# Patient Record
Sex: MALE | Race: WHITE | NOT HISPANIC OR LATINO | Employment: OTHER | URBAN - METROPOLITAN AREA
[De-identification: names, ages, dates, MRNs, and addresses within clinical notes are randomized per-mention and may not be internally consistent; named-entity substitution may affect disease eponyms.]

---

## 2017-05-26 ENCOUNTER — TELEPHONE (OUTPATIENT)
Dept: FAMILY MEDICINE | Facility: CLINIC | Age: 82
End: 2017-05-26

## 2017-05-26 DIAGNOSIS — E78.5 HYPERLIPIDEMIA LDL GOAL <100: Primary | ICD-10-CM

## 2017-05-26 DIAGNOSIS — Z12.5 SCREENING FOR PROSTATE CANCER: ICD-10-CM

## 2017-05-26 NOTE — TELEPHONE ENCOUNTER
Reason for Call:  Other     Detailed comments: Patient wants to know if he needs to make an appointment for lab before his next appointment on 6/14/2017?     Phone Number Patient can be reached at: Home number on file [phone] (home)    Best Time: any    Can we leave a detailed message on this number? YES    Call taken on 5/26/2017 at 11:36 AM by Vanesa Matt

## 2017-06-12 DIAGNOSIS — E78.5 HYPERLIPIDEMIA LDL GOAL <100: ICD-10-CM

## 2017-06-12 DIAGNOSIS — Z12.5 SCREENING FOR PROSTATE CANCER: ICD-10-CM

## 2017-06-12 LAB
ALBUMIN SERPL-MCNC: 3.9 G/DL (ref 3.4–5)
ALP SERPL-CCNC: 61 U/L (ref 40–150)
ALT SERPL W P-5'-P-CCNC: 27 U/L (ref 0–70)
ANION GAP SERPL CALCULATED.3IONS-SCNC: 7 MMOL/L (ref 3–14)
AST SERPL W P-5'-P-CCNC: 23 U/L (ref 0–45)
BILIRUB SERPL-MCNC: 0.7 MG/DL (ref 0.2–1.3)
BUN SERPL-MCNC: 19 MG/DL (ref 7–30)
CALCIUM SERPL-MCNC: 9.2 MG/DL (ref 8.5–10.1)
CHLORIDE SERPL-SCNC: 104 MMOL/L (ref 94–109)
CHOLEST SERPL-MCNC: 150 MG/DL
CO2 SERPL-SCNC: 29 MMOL/L (ref 20–32)
CREAT SERPL-MCNC: 1.08 MG/DL (ref 0.66–1.25)
GFR SERPL CREATININE-BSD FRML MDRD: 66 ML/MIN/1.7M2
GLUCOSE SERPL-MCNC: 95 MG/DL (ref 70–99)
HDLC SERPL-MCNC: 43 MG/DL
LDLC SERPL CALC-MCNC: 85 MG/DL
NONHDLC SERPL-MCNC: 107 MG/DL
POTASSIUM SERPL-SCNC: 4.3 MMOL/L (ref 3.4–5.3)
PROT SERPL-MCNC: 7.6 G/DL (ref 6.8–8.8)
PSA SERPL-ACNC: 1.06 UG/L (ref 0–4)
SODIUM SERPL-SCNC: 140 MMOL/L (ref 133–144)
TRIGL SERPL-MCNC: 111 MG/DL

## 2017-06-12 PROCEDURE — 36415 COLL VENOUS BLD VENIPUNCTURE: CPT | Performed by: FAMILY MEDICINE

## 2017-06-12 PROCEDURE — 80061 LIPID PANEL: CPT | Performed by: FAMILY MEDICINE

## 2017-06-12 PROCEDURE — G0103 PSA SCREENING: HCPCS | Performed by: FAMILY MEDICINE

## 2017-06-12 PROCEDURE — 80053 COMPREHEN METABOLIC PANEL: CPT | Performed by: FAMILY MEDICINE

## 2017-06-14 ENCOUNTER — OFFICE VISIT (OUTPATIENT)
Dept: FAMILY MEDICINE | Facility: CLINIC | Age: 82
End: 2017-06-14
Payer: MEDICARE

## 2017-06-14 VITALS
BODY MASS INDEX: 26.68 KG/M2 | TEMPERATURE: 97.7 F | HEART RATE: 54 BPM | SYSTOLIC BLOOD PRESSURE: 132 MMHG | RESPIRATION RATE: 16 BRPM | HEIGHT: 71 IN | OXYGEN SATURATION: 95 % | WEIGHT: 190.6 LBS | DIASTOLIC BLOOD PRESSURE: 80 MMHG

## 2017-06-14 DIAGNOSIS — I10 ESSENTIAL HYPERTENSION WITH GOAL BLOOD PRESSURE LESS THAN 130/80: ICD-10-CM

## 2017-06-14 DIAGNOSIS — I25.10 CORONARY ARTERY DISEASE INVOLVING NATIVE CORONARY ARTERY OF NATIVE HEART WITHOUT ANGINA PECTORIS: ICD-10-CM

## 2017-06-14 DIAGNOSIS — E78.5 HYPERLIPIDEMIA LDL GOAL <100: ICD-10-CM

## 2017-06-14 PROCEDURE — 99214 OFFICE O/P EST MOD 30 MIN: CPT | Performed by: FAMILY MEDICINE

## 2017-06-14 RX ORDER — ROSUVASTATIN CALCIUM 40 MG/1
40 TABLET, COATED ORAL DAILY
Qty: 90 TABLET | Refills: 3 | Status: SHIPPED | OUTPATIENT
Start: 2017-06-14 | End: 2018-07-12

## 2017-06-14 RX ORDER — METOPROLOL SUCCINATE 25 MG/1
25 TABLET, EXTENDED RELEASE ORAL DAILY
Qty: 90 TABLET | Refills: 3 | Status: SHIPPED | OUTPATIENT
Start: 2017-06-14 | End: 2018-07-05

## 2017-06-14 RX ORDER — NITROGLYCERIN 0.4 MG/1
0.4 TABLET SUBLINGUAL EVERY 5 MIN PRN
Qty: 25 TABLET | Refills: 3 | Status: SHIPPED | OUTPATIENT
Start: 2017-06-14 | End: 2019-07-17

## 2017-06-14 RX ORDER — ISOSORBIDE MONONITRATE 30 MG/1
30 TABLET, EXTENDED RELEASE ORAL EVERY MORNING
Qty: 90 TABLET | Refills: 3 | Status: SHIPPED | OUTPATIENT
Start: 2017-06-14 | End: 2018-07-12

## 2017-06-14 ASSESSMENT — PAIN SCALES - GENERAL: PAINLEVEL: NO PAIN (0)

## 2017-06-14 NOTE — MR AVS SNAPSHOT
"              After Visit Summary   6/14/2017    Tima Mckenzie    MRN: [id number]           Patient Information     Date Of Birth          1935        Visit Information        Provider Department      6/14/2017 3:20 PM Julieta Gandhi MD [facility]        Today's Diagnoses     Essential hypertension with goal blood pressure less than 130/80        Hyperlipidemia LDL goal <100        Coronary artery disease involving native coronary artery of native heart without angina pectoris           Follow-ups after your visit        Follow-up notes from your care team     Return in about 1 year (around 6/14/2018).      Who to contact     If you have questions or need follow up information about today's clinic visit or your schedule please contact [facility] directly at [phone].  Normal or non-critical lab and imaging results will be communicated to you by MyChart, letter or phone within 4 business days after the clinic has received the results. If you do not hear from us within 7 days, please contact the clinic through MyChart or phone. If you have a critical or abnormal lab result, we will notify you by phone as soon as possible.  Submit refill requests through [employer] or call your pharmacy and they will forward the refill request to us. Please allow 3 business days for your refill to be completed.          Additional Information About Your Visit        MyChart Information     [employer] lets you send messages to your doctor, view your test results, renew your prescriptions, schedule appointments and more. To sign up, go to [email or web address] . Click on \"Log in\" on the left side of the screen, which will take you to the Welcome page. Then click on \"Sign up Now\" on the right side of the page.     You will be asked to enter the access code listed below, as well as some personal information. Please follow the directions to create your username and password.     Your access " "code is: [email or web address]  Expires: 2017  4:06 PM     Your access code will  in 90 days. If you need help or a new code, please call your [facility] or [phone].        Care EveryWhere ID     This is your Care EveryWhere ID. This could be used by other organizations to access your [place] medical records  [id number]        Your Vitals Were     Pulse Temperature Respirations Height Pulse Oximetry BMI (Body Mass Index)    54 97.7  F (36.5  C) (Oral) 16 1.803 m (5' 11\") 95% 26.58 kg/m2       Blood Pressure from Last 3 Encounters:   17 132/80   10/24/16 128/70   16 132/76    Weight from Last 3 Encounters:   17 86.5 kg (190 lb 9.6 oz)   10/24/16 87.7 kg (193 lb 6.4 oz)   16 87.1 kg (192 lb)              Today, you had the following     No orders found for display         Where to get your medicines      These medications were sent to [place] [address] [place] [facility] [place] - [address]  [address] [facility] [place] [address]     Phone:  [phone]     isosorbide mononitrate 30 MG 24 hr tablet    metoprolol 25 MG 24 hr tablet    nitroglycerin 0.4 MG sublingual tablet    rosuvastatin 40 MG tablet          Primary Care Provider Office Phone # Fax #    Julieta Ramos Gandhi [phone]       [address] [address]        Thank you!     Thank you for choosing [facility]  for your care. Our goal is always to provide you with excellent care. Hearing back from our patients is one way we can continue to improve our services. Please take a few minutes to complete the written survey that you may receive in the mail after your visit with us. Thank you!             Your Updated Medication List - Protect others around you: Learn how to safely use, store and throw away your medicines at www.disposemymeds.org.          This list is accurate as of: 17  4:06 PM.  Always use your most recent med list.       "             Brand Name Dispense Instructions for use    aspirin 81 MG tablet      Take 1 tablet by mouth daily. *.       calcium-vitamin D 600-400 MG-UNIT per tablet    CALTRATE    100 tablet    Take 1 tablet by mouth 2 times daily.       cetirizine 10 MG tablet    zyrTEC    90 tablet    Take 1 tablet (10 mg) by mouth every evening       fluticasone 50 MCG/ACT spray    FLONASE    1 Package    Spray 2 sprays into both nostrils daily       isosorbide mononitrate 30 MG 24 hr tablet    IMDUR    90 tablet    Take 1 tablet (30 mg) by mouth every morning       metoprolol 25 MG 24 hr tablet    TOPROL-XL    90 tablet    Take 1 tablet (25 mg) by mouth daily       nitroglycerin 0.4 MG sublingual tablet    NITROSTAT    25 tablet    Place 1 tablet (0.4 mg) under the tongue every 5 minutes as needed up to 3 tablets per episode.       NIZORAL EX      2.5mg apply as needed       rosuvastatin 40 MG tablet    CRESTOR    90 tablet    Take 1 tablet (40 mg) by mouth daily

## 2017-06-14 NOTE — PROGRESS NOTES
"  SUBJECTIVE:                                                    Tima Mckenzie is a 82 year old male who presents to clinic today for the following health issues:      Hyperlipidemia Follow-Up      Rate your low fat/cholesterol diet?: good    Taking statin?  Yes, no muscle aches from statin    Other lipid medications/supplements?:  none     Hypertension Follow-up      Outpatient blood pressures are not being checked.    Low Salt Diet: low salt       Amount of exercise or physical activity: 3 days weekly    Problems taking medications regularly: No    Medication side effects: none    Diet: regular (no restrictions)    1. Pronating ankles - R is worse. Recommend heel cups? Referral to podiatry?  2. Pain in toes -- when he wakes up -- when he stands pain improves    SUBJECTIVE:  Here today primarily in follow-up of hypertension, lipids, coronary disease. No urinary symptoms whatsoever. Continues to be very active, playing tennis multiple times per week. He notes that he spent pronating his right ankle little more causing some pain along the lateral aspect of his ankle. Just wanted to ask about different types of inserts. Also notes that on occasion his little toes, left greater than right, will go numb at night. Seems to resolve with standing up. Wanted to make sure was not a circulation issue. No claudication symptoms otherwise.    Review of systems otherwise negative.  Past medical, family, and social history reviewed and updated in chart.    OBJECTIVE:  /80 (BP Location: Right arm, Patient Position: Right side, Cuff Size: Adult Regular)  Pulse 54  Temp 97.7  F (36.5  C) (Oral)  Resp 16  Ht 1.803 m (5' 11\")  Wt 86.5 kg (190 lb 9.6 oz)  SpO2 95%  BMI 26.58 kg/m2  Alert, pleasant, upbeat, and in no apparent discomfort.  S1 and S2 normal, no murmurs, clicks, gallops or rubs. Regular rate and rhythm. Chest is clear; no wheezes or rales. No edema or JVD.  Feet - normal pedal pulses and capillary " refill.  Past labs reviewed with the patient.     ASSESSMENT / PLAN:  (I10) Essential hypertension with goal blood pressure less than 130/80  Comment: Doing well at current dosage. Refilled ×1 year  Plan: metoprolol (TOPROL-XL) 25 MG 24 hr tablet,         isosorbide mononitrate (IMDUR) 30 MG 24 hr         tablet            (E78.5) Hyperlipidemia LDL goal <100  Comment: Doing well current dosage. Refill ×1 year  Plan: rosuvastatin (CRESTOR) 40 MG tablet            (I25.10) Coronary artery disease involving native coronary artery of native heart without angina pectoris  Comment: refilled   Plan: nitroglycerin (NITROSTAT) 0.4 MG sublingual         tablet          I think the issue of the numb toes is more of a surface nerve impingement, and not a circulatory issue. Patient will work on positioning    Follow up annually or as needed  SNatalia Gandhi MD    (Chart documentation completed in part with Dragon voice-recognition software.  Even though reviewed some grammatical, spelling, and word errors may remain.)

## 2017-06-14 NOTE — NURSING NOTE
"Chief Complaint   Patient presents with     Recheck Medication     labs completed       Initial /80 (BP Location: Right arm, Patient Position: Right side, Cuff Size: Adult Regular)  Pulse 54  Temp 97.7  F (36.5  C) (Oral)  Resp 16  Ht 1.803 m (5' 11\")  Wt 86.5 kg (190 lb 9.6 oz)  SpO2 95%  BMI 26.58 kg/m2 Estimated body mass index is 26.58 kg/(m^2) as calculated from the following:    Height as of this encounter: 1.803 m (5' 11\").    Weight as of this encounter: 86.5 kg (190 lb 9.6 oz).  Medication Reconciliation: complete     Will Janice GRIGSBY      "

## 2018-01-23 ENCOUNTER — OFFICE VISIT (OUTPATIENT)
Dept: FAMILY MEDICINE | Facility: CLINIC | Age: 83
End: 2018-01-23
Payer: MEDICARE

## 2018-01-23 VITALS
SYSTOLIC BLOOD PRESSURE: 128 MMHG | BODY MASS INDEX: 27.13 KG/M2 | DIASTOLIC BLOOD PRESSURE: 76 MMHG | RESPIRATION RATE: 12 BRPM | HEART RATE: 60 BPM | TEMPERATURE: 98.4 F | WEIGHT: 193.8 LBS | HEIGHT: 71 IN

## 2018-01-23 DIAGNOSIS — I25.10 CORONARY ARTERY DISEASE INVOLVING NATIVE CORONARY ARTERY OF NATIVE HEART WITHOUT ANGINA PECTORIS: Primary | ICD-10-CM

## 2018-01-23 DIAGNOSIS — I10 ESSENTIAL HYPERTENSION WITH GOAL BLOOD PRESSURE LESS THAN 130/80: ICD-10-CM

## 2018-01-23 DIAGNOSIS — N45.1 EPIDIDYMITIS: ICD-10-CM

## 2018-01-23 PROCEDURE — 99214 OFFICE O/P EST MOD 30 MIN: CPT | Performed by: FAMILY MEDICINE

## 2018-01-23 RX ORDER — CIPROFLOXACIN 500 MG/1
500 TABLET, FILM COATED ORAL 2 TIMES DAILY
Qty: 14 TABLET | Refills: 0 | Status: SHIPPED | OUTPATIENT
Start: 2018-01-23 | End: 2018-06-22

## 2018-01-23 NOTE — MR AVS SNAPSHOT
"              After Visit Summary   1/23/2018    Tima Mckenzie    MRN: [id number]           Patient Information     Date Of Birth          1935        Visit Information        Provider Department      1/23/2018 11:40 AM Julieta Gandhi MD [facility]        Today's Diagnoses     Coronary artery disease involving native coronary artery of native heart without angina pectoris    -  1    Essential hypertension with goal blood pressure less than 130/80        Epididymitis           Follow-ups after your visit        Follow-up notes from your care team     Return in about 6 months (around 7/23/2018).      Who to contact     If you have questions or need follow up information about today's clinic visit or your schedule please contact [facility] directly at [phone].  Normal or non-critical lab and imaging results will be communicated to you by MyChart, letter or phone within 4 business days after the clinic has received the results. If you do not hear from us within 7 days, please contact the clinic through MyChart or phone. If you have a critical or abnormal lab result, we will notify you by phone as soon as possible.  Submit refill requests through [employer] or call your pharmacy and they will forward the refill request to us. Please allow 3 business days for your refill to be completed.          Additional Information About Your Visit        [employer] Information     [employer] lets you send messages to your doctor, view your test results, renew your prescriptions, schedule appointments and more. To sign up, go to [email or web address] . Click on \"Log in\" on the left side of the screen, which will take you to the Welcome page. Then click on \"Sign up Now\" on the right side of the page.     You will be asked to enter the access code listed below, as well as some personal information. Please follow the directions to create your username and password.     Your access code is: " [email or web address]  Expires: 2018 12:12 PM     Your access code will  in 90 days. If you need help or a new code, please call your [facility] clinic or [phone].        Care EveryWhere ID     This is your Care EveryWhere ID. This could be used by other organizations to access your [facility] medical records  [id number]        Your Vitals Were     Pulse Temperature Respirations Height BMI (Body Mass Index)       60 98.4  F (36.9  C) (Oral) 12 1.803 m (5' 11\") 27.03 kg/m2        Blood Pressure from Last 3 Encounters:   18 128/76   17 132/80   10/24/16 128/70    Weight from Last 3 Encounters:   18 87.9 kg (193 lb 12.8 oz)   17 86.5 kg (190 lb 9.6 oz)   10/24/16 87.7 kg (193 lb 6.4 oz)              Today, you had the following     No orders found for display         Today's Medication Changes          These changes are accurate as of: 18 12:12 PM.  If you have any questions, ask your nurse or doctor.               Start taking these medicines.        Dose/Directions    ciprofloxacin 500 MG tablet   Commonly known as:  CIPRO   Used for:  Epididymitis   Started by:  Julieta Gandhi MD        Dose:  500 mg   Take 1 tablet (500 mg) by mouth 2 times daily   Quantity:  14 tablet   Refills:  0            Where to get your medicines      These medications were sent to [place] [address] [place] [facility] [place]  [address]  [address] [address]     Phone:  [phone]     ciprofloxacin 500 MG tablet                Primary Care Provider Office Phone # Fax #    Julieta Gandhi [phone] [address] [facility] [address]        Equal Access to Services     [facility] ANDRES AH: Norma royalo Soshilo, waaxda luqadaha, qaybta kaalmada adeegyada, waxay hammad lord. So [facility] [phone].    ATENCIÓN: Si habla español, tiene a mares disposición servicios gratuitos de asistencia lingüística. Llame al " [phone].    We comply with applicable federal civil rights laws and [place] laws. We do not discriminate on the basis of race, color, national origin, age, disability, sex, sexual orientation, or gender identity.            Thank you!     Thank you for choosing [facility]  for your care. Our goal is always to provide you with excellent care. Hearing back from our patients is one way we can continue to improve our services. Please take a few minutes to complete the written survey that you may receive in the mail after your visit with us. Thank you!             Your Updated Medication List - Protect others around you: Learn how to safely use, store and throw away your medicines at www.disposemymeds.org.          This list is accurate as of: 1/23/18 12:12 PM.  Always use your most recent med list.                   Brand Name Dispense Instructions for use Diagnosis    aspirin 81 MG tablet      Take 1 tablet by mouth daily. *.        calcium-vitamin D 600-400 MG-UNIT per tablet    CALTRATE    100 tablet    Take 1 tablet by mouth 2 times daily.    Preventative health care       cetirizine 10 MG tablet    zyrTEC    90 tablet    Take 1 tablet (10 mg) by mouth every evening    Seasonal allergic rhinitis, unspecified allergic rhinitis trigger       ciprofloxacin 500 MG tablet    CIPRO    14 tablet    Take 1 tablet (500 mg) by mouth 2 times daily    Epididymitis       fluticasone 50 MCG/ACT spray    FLONASE    1 Package    Spray 2 sprays into both nostrils daily    Seasonal allergic rhinitis       isosorbide mononitrate 30 MG 24 hr tablet    IMDUR    90 tablet    Take 1 tablet (30 mg) by mouth every morning    Essential hypertension with goal blood pressure less than 130/80       metoprolol succinate 25 MG 24 hr tablet    TOPROL-XL    90 tablet    Take 1 tablet (25 mg) by mouth daily    Essential hypertension with goal blood pressure less than 130/80       nitroGLYcerin 0.4 MG sublingual tablet     NITROSTAT    25 tablet    Place 1 tablet (0.4 mg) under the tongue every 5 minutes as needed up to 3 tablets per episode.    Coronary artery disease involving native coronary artery of native heart without angina pectoris       NIZORAL EX      2.5mg apply as needed        rosuvastatin 40 MG tablet    CRESTOR    90 tablet    Take 1 tablet (40 mg) by mouth daily    Hyperlipidemia LDL goal <100

## 2018-01-23 NOTE — PROGRESS NOTES
"  SUBJECTIVE:   Tima Mckenzie is a 82 year old male who presents to clinic today for the following health issues:      Hypertension Follow-up      Outpatient blood pressures are not being checked.    Low Salt Diet: low salt        Amount of exercise or physical activity: 4-5 days/week for an average of 30-45 minutes    Problems taking medications regularly: No    Medication side effects: none    Diet: regular (no restrictions)    SUBJECTIVE:  Here today in routine six-month follow-up of hypertension, lipids, coronary artery disease  From the standpoint he is doing well.  Continues to play tennis quite a bit with no coronary symptoms whatsoever  Patient reports no side effects from medications, and desires no change in therapy.     Recently returned from a trip to [place].  Shortly after returning developed a tender mass near his right testicle.  He feels it measured almost 1 inch in size and was quite tender to palpate.  Denied any associated urinary symptoms whatsoever.  It has shrunk down to only a small remnant.  Status post vasectomy number of years ago.    Review of systems otherwise negative.  Past medical, family, and social history reviewed and updated in chart.    OBJECTIVE:  /76  Pulse 60  Temp 98.4  F (36.9  C) (Oral)  Resp 12  Ht 1.803 m (5' 11\")  Wt 87.9 kg (193 lb 12.8 oz)  BMI 27.03 kg/m2  Alert, pleasant, upbeat, and in no apparent discomfort.  S1 and S2 normal, no murmurs, clicks, gallops or rubs. Regular rate and rhythm. Chest is clear; no wheezes or rales. No edema or JVD.   -normal circumcised male.  Left teste is normal.  Right teste itself is normal, but there is a tender fluidlike mass in the epididymis just above it.  Past labs reviewed with the patient.     ASSESSMENT / PLAN:  (I25.10) Coronary artery disease involving native coronary artery of native heart without angina pectoris  (primary encounter diagnosis)  Comment: Doing well.  Continue with lifestyle and monitoring " risk factors  Plan:     (I10) Essential hypertension with goal blood pressure less than 130/80  Comment: Controlled on current regimen.  Lab work up-to-date  Plan:     (N45.1) Epididymitis  Comment: Counseled the patient on the benign nature of this in his particular case.  He is leaving next week for [place] and will bring a prescription for ciprofloxacin along with him in case it returns  Plan: ciprofloxacin (CIPRO) 500 MG tablet            Follow up 6 months  SNatalia Gandhi MD    (Chart documentation completed in part with Dragon voice-recognition software.  Even though reviewed some grammatical, spelling, and word errors may remain.)

## 2018-01-23 NOTE — NURSING NOTE
"Chief Complaint   Patient presents with     RECHECK       Initial /70 (BP Location: Right arm, Patient Position: Sitting, Cuff Size: Adult Regular)  Pulse 60  Temp 98.4  F (36.9  C) (Oral)  Resp 12  Ht 1.803 m (5' 11\")  Wt 87.9 kg (193 lb 12.8 oz)  BMI 27.03 kg/m2 Estimated body mass index is 27.03 kg/(m^2) as calculated from the following:    Height as of this encounter: 1.803 m (5' 11\").    Weight as of this encounter: 87.9 kg (193 lb 12.8 oz).  Medication Reconciliation: abe Javed        "

## 2018-03-05 ENCOUNTER — OFFICE VISIT (OUTPATIENT)
Dept: FAMILY MEDICINE | Facility: CLINIC | Age: 83
End: 2018-03-05
Payer: MEDICARE

## 2018-03-05 VITALS
HEART RATE: 69 BPM | WEIGHT: 190.5 LBS | BODY MASS INDEX: 26.57 KG/M2 | OXYGEN SATURATION: 97 % | TEMPERATURE: 97.7 F | SYSTOLIC BLOOD PRESSURE: 132 MMHG | DIASTOLIC BLOOD PRESSURE: 80 MMHG | RESPIRATION RATE: 18 BRPM

## 2018-03-05 DIAGNOSIS — J06.9 UPPER RESPIRATORY TRACT INFECTION, UNSPECIFIED TYPE: Primary | ICD-10-CM

## 2018-03-05 PROCEDURE — 99213 OFFICE O/P EST LOW 20 MIN: CPT | Performed by: FAMILY MEDICINE

## 2018-03-05 NOTE — NURSING NOTE
"Chief Complaint   Patient presents with     URI       Initial /82 (BP Location: Right arm, Patient Position: Sitting, Cuff Size: Adult Regular)  Pulse 69  Temp 97.7  F (36.5  C) (Oral)  Resp 18  Wt 86.4 kg (190 lb 8 oz)  SpO2 97%  BMI 26.57 kg/m2 Estimated body mass index is 26.57 kg/(m^2) as calculated from the following:    Height as of 1/23/18: 1.803 m (5' 11\").    Weight as of this encounter: 86.4 kg (190 lb 8 oz).  Medication Reconciliation: abe Javed        "

## 2018-03-05 NOTE — PROGRESS NOTES
SUBJECTIVE:   Tima Mckenzie is a 82 year old male who presents to clinic today for the following health issues:      Acute Illness   Acute illness concerns: URI  Onset: 2-3 weeks    Fever: no    Chills/Sweats: no    Headache (location?): YES    Sinus Pressure:no    Conjunctivitis:  no    Ear Pain: no    Rhinorrhea: YES    Congestion: no    Sore Throat: no     Cough: YES    Wheeze: YES    Decreased Appetite: YES    Nausea: no    Vomiting: no    Diarrhea:  no    Dysuria/Freq.: no    Fatigue/Achiness: YES    Sick/Strep Exposure: no     Therapies Tried and outcome: allergy relief helps a little    SUBJECTIVE:  Here today with symptoms as above that began 2-3 weeks prior after coming back from a trip to [facility].  Is never felt terribly sick with fever, chills, etc. but symptoms are not going away.  Continues to have copious postnasal drip and chest congestion.  Feels tired and fatigued overall.  At times he is a bit wheezy but not really short of breath.  Loratadine seems to help temporarily but not completely    Review of systems otherwise negative.  Past medical, family, and social history reviewed and updated in chart.    OBJECTIVE:  /80  Pulse 69  Temp 97.7  F (36.5  C) (Oral)  Resp 18  Wt 86.4 kg (190 lb 8 oz)  SpO2 97%  BMI 26.57 kg/m2  Alert, pleasant, upbeat, and in no apparent discomfort.  Ears normal. Throat and pharynx normal. Neck supple. No adenopathy or masses in the neck or supraclavicular regions. Sinuses non tender.  Heart regular rate and rhythm without murmur  Lungs have end expiratory congestion wheezing bilaterally that clears with deep breathing  Past labs reviewed with the patient.     ASSESSMENT / PLAN:  (J06.9) Upper respiratory tract infection, unspecified type  (primary encounter diagnosis)  Comment: Discussed mechanism of action of the proposed medication, as well as potential effects, both good and bad.  Patient expressed understanding and agreed with treatment.   Plan:  amoxicillin-clavulanate (AUGMENTIN) 875-125 MG         per tablet            Follow up as needed   S. Ramos Gandhi MD    (Chart documentation completed in part with Dragon voice-recognition software.  Even though reviewed some grammatical, spelling, and word errors may remain.)

## 2018-03-05 NOTE — MR AVS SNAPSHOT
After Visit Summary   3/5/2018    Tima Mckenzie    MRN: [id number]           Patient Information     Date Of Birth          1935        Visit Information        Provider Department      3/5/2018 3:00 PM Julieta Gandhi MD [facility]        Today's Diagnoses     Upper respiratory tract infection, unspecified type    -  1       Follow-ups after your visit        Follow-up notes from your care team     Return if symptoms worsen or fail to improve.      Who to contact     If you have questions or need follow up information about today's clinic visit or your schedule please contact [facility] directly at [phone].  Normal or non-critical lab and imaging results will be communicated to you by MyChart, letter or phone within 4 business days after the clinic has received the results. If you do not hear from us within 7 days, please contact the clinic through MyChart or phone. If you have a critical or abnormal lab result, we will notify you by phone as soon as possible.  Submit refill requests through [employer] or call your pharmacy and they will forward the refill request to us. Please allow 3 business days for your refill to be completed.          Additional Information About Your Visit        Care EveryWhere ID     This is your Care EveryWhere ID. This could be used by other organizations to access your [facility] medical records  [id number]        Your Vitals Were     Pulse Temperature Respirations Pulse Oximetry BMI (Body Mass Index)       69 97.7  F (36.5  C) (Oral) 18 97% 26.57 kg/m2        Blood Pressure from Last 3 Encounters:   03/05/18 132/80   01/23/18 128/76   06/14/17 132/80    Weight from Last 3 Encounters:   03/05/18 86.4 kg (190 lb 8 oz)   01/23/18 87.9 kg (193 lb 12.8 oz)   06/14/17 86.5 kg (190 lb 9.6 oz)              Today, you had the following     No orders found for display         Today's Medication Changes          These changes are  accurate as of 3/5/18  3:22 PM.  If you have any questions, ask your nurse or doctor.               Start taking these medicines.        Dose/Directions    amoxicillin-clavulanate 875-125 MG per tablet   Commonly known as:  AUGMENTIN   Used for:  Upper respiratory tract infection, unspecified type   Started by:  Julieta Gandhi MD        Dose:  1 tablet   Take 1 tablet by mouth 2 times daily   Quantity:  20 tablet   Refills:  0            Where to get your medicines      These medications were sent to [place] [facility] [id number] [facility] - [place], [place] - [address]  [address] [place] [address]     Phone:  [phone]     amoxicillin-clavulanate 875-125 MG per tablet                Primary Care Provider Office Phone # Fax #    Julieta Gandhi [phone] [address] [facility] [address]        Equal Access to Services     [facility]: Hadii aad ku hadasho Soomaali, waaxda luqadaha, qaybta kaalmada adeegyada, waxay idiin hayaan sugey adam . So [facility] [phone].    ATENCIÓN: Si habla español, tiene a mares disposición servicios gratuitos de asistencia lingüística. Esperanza al [phone].    We comply with applicable federal civil rights laws and [place] laws. We do not discriminate on the basis of race, color, national origin, age, disability, sex, sexual orientation, or gender identity.            Thank you!     Thank you for choosing [facility]  for your care. Our goal is always to provide you with excellent care. Hearing back from our patients is one way we can continue to improve our services. Please take a few minutes to complete the written survey that you may receive in the mail after your visit with us. Thank you!             Your Updated Medication List - Protect others around you: Learn how to safely use, store and throw away your medicines at www.disposemymeds.org.          This list is accurate as of 3/5/18  3:22 PM.  Always use  your most recent med list.                   Brand Name Dispense Instructions for use Diagnosis    amoxicillin-clavulanate 875-125 MG per tablet    AUGMENTIN    20 tablet    Take 1 tablet by mouth 2 times daily    Upper respiratory tract infection, unspecified type       aspirin 81 MG tablet      Take 1 tablet by mouth daily. *.        calcium-vitamin D 600-400 MG-UNIT per tablet    CALTRATE    100 tablet    Take 1 tablet by mouth 2 times daily.    Preventative health care       cetirizine 10 MG tablet    zyrTEC    90 tablet    Take 1 tablet (10 mg) by mouth every evening    Seasonal allergic rhinitis, unspecified allergic rhinitis trigger       ciprofloxacin 500 MG tablet    CIPRO    14 tablet    Take 1 tablet (500 mg) by mouth 2 times daily    Epididymitis       fluticasone 50 MCG/ACT spray    FLONASE    1 Package    Spray 2 sprays into both nostrils daily    Seasonal allergic rhinitis       isosorbide mononitrate 30 MG 24 hr tablet    IMDUR    90 tablet    Take 1 tablet (30 mg) by mouth every morning    Essential hypertension with goal blood pressure less than 130/80       metoprolol succinate 25 MG 24 hr tablet    TOPROL-XL    90 tablet    Take 1 tablet (25 mg) by mouth daily    Essential hypertension with goal blood pressure less than 130/80       nitroGLYcerin 0.4 MG sublingual tablet    NITROSTAT    25 tablet    Place 1 tablet (0.4 mg) under the tongue every 5 minutes as needed up to 3 tablets per episode.    Coronary artery disease involving native coronary artery of native heart without angina pectoris       NIZORAL EX      2.5mg apply as needed        rosuvastatin 40 MG tablet    CRESTOR    90 tablet    Take 1 tablet (40 mg) by mouth daily    Hyperlipidemia LDL goal <100

## 2018-06-11 ENCOUNTER — OFFICE VISIT (OUTPATIENT)
Dept: FAMILY MEDICINE | Facility: CLINIC | Age: 83
End: 2018-06-11
Payer: MEDICARE

## 2018-06-11 VITALS
OXYGEN SATURATION: 95 % | BODY MASS INDEX: 26.9 KG/M2 | DIASTOLIC BLOOD PRESSURE: 72 MMHG | RESPIRATION RATE: 18 BRPM | HEIGHT: 71 IN | HEART RATE: 61 BPM | WEIGHT: 192.1 LBS | TEMPERATURE: 97.9 F | SYSTOLIC BLOOD PRESSURE: 144 MMHG

## 2018-06-11 DIAGNOSIS — R09.81 NASAL CONGESTION: ICD-10-CM

## 2018-06-11 DIAGNOSIS — J81.1 PULMONARY CONGESTION AND HYPOSTASIS: Primary | ICD-10-CM

## 2018-06-11 PROCEDURE — 99214 OFFICE O/P EST MOD 30 MIN: CPT | Performed by: NURSE PRACTITIONER

## 2018-06-11 RX ORDER — MOMETASONE FUROATE MONOHYDRATE 50 UG/1
2 SPRAY, METERED NASAL DAILY
Qty: 1 BOX | Refills: 3 | Status: SHIPPED | OUTPATIENT
Start: 2018-06-11 | End: 2019-12-18

## 2018-06-11 RX ORDER — AZITHROMYCIN 250 MG/1
TABLET, FILM COATED ORAL
Qty: 6 TABLET | Refills: 0 | Status: SHIPPED | OUTPATIENT
Start: 2018-06-11 | End: 2018-06-22

## 2018-06-11 ASSESSMENT — PAIN SCALES - GENERAL: PAINLEVEL: NO PAIN (0)

## 2018-06-11 NOTE — PROGRESS NOTES
SUBJECTIVE:   Tima Mckenzie is a 83 year old male who presents to clinic today for the following health issues:      Acute Illness   Acute illness concerns: Sinus problem  Onset: 3 weeks ago    Fever: no    Chills/Sweats: no    Headache (location?): YES    Sinus Pressure:YES    Conjunctivitis:  no    Ear Pain: no    Rhinorrhea: YES    Congestion: no     Sore Throat: no     Cough: YES    Wheeze: YES    Decreased Appetite: no    Nausea: no    Vomiting: no    Diarrhea:  no    Dysuria/Freq.: no    Fatigue/Achiness: YES    Sick/Strep Exposure: no     Therapies Tried and outcome: sinus relief OTC but that isn't helping too much    Had augmentin in march. Took other sinus stuff and it dried up. Went on cruise to [place] 3 weeks ago. It was inside room. Spent more time in the lounges looking out. After there for few days and nose was dripping. His wife wasn't as bad. Seems to be worsening. Has not used flonase. The cough is the worst, sometimes productive. Thin nasal drainage.  More SOB the past few weeks.       Problem list and histories reviewed & adjusted, as indicated.  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     Seasonal allergic rhinitis     Bilateral inguinal hernia     Senile nuclear sclerosis     Posterior vitreous detachment of both eyes     Essential hypertension with goal blood pressure less than 130/80     Seasonal allergic rhinitis, unspecified allergic rhinitis trigger     Coronary artery disease involving native coronary artery of native heart without angina pectoris     Past Surgical History:   Procedure Laterality Date     ANGIOGRAM  4/30/2003    No Disease, Clemons,LAD,SVG,OM1- small LT Main, Occlusion of CX- D1 50-70% Stenosis, RCA  30-80%     COLONOSCOPY       COLONOSCOPY N/A 10/14/2014    Procedure: COMBINED COLONOSCOPY, SINGLE BIOPSY/POLYPECTOMY BY BIOPSY;  Surgeon: Konstantin Coates MD;  Location: [facility] OR     COLONOSCOPY WITH CO2 INSUFFLATION N/A 10/14/2014    Procedure: COLONOSCOPY WITH CO2 INSUFFLATION;  Surgeon: Konstantin Coates MD;  Location: [facility] OR     CORONARY ARTERY BYPASS  1997    X 3     HERNIA REPAIR  2014    [facility].       Social History   Substance Use Topics     Smoking status: Former Smoker     Smokeless tobacco: Never Used      Comment: 1997     Alcohol use Yes      Comment: rarely      History reviewed. No pertinent family history.      Current Outpatient Prescriptions   Medication Sig Dispense Refill     aspirin 81 MG tablet Take 1 tablet by mouth daily. *.   3     azithromycin (ZITHROMAX) 250 MG tablet Two tablets first day, then one tablet daily for four days. 6 tablet 0     calcium-vitamin D (CALTRATE) 600-400 MG-UNIT per tablet Take 1 tablet by mouth 2 times daily. 100 tablet 11     cetirizine (ZYRTEC) 10 MG tablet Take 1 tablet (10 mg) by mouth every evening 90 tablet 3     ciprofloxacin (CIPRO) 500 MG tablet Take 1 tablet (500 mg) by mouth 2 times daily 14 tablet 0     fluticasone (FLONASE) 50 MCG/ACT nasal spray Spray 2 sprays into both nostrils daily 1 Package 11     isosorbide mononitrate (IMDUR) 30 MG 24 hr tablet Take 1 tablet (30 mg) by mouth every morning 90 tablet 3     metoprolol (TOPROL-XL) 25 MG 24 hr tablet Take 1 tablet (25 mg) by mouth daily 90 tablet 3     mometasone (NASONEX) 50 MCG/ACT spray Spray 2 sprays into both nostrils daily 1 Box 3     nitroglycerin (NITROSTAT) 0.4 MG sublingual tablet Place 1 tablet (0.4 mg) under the tongue every 5 minutes as needed up to 3 tablets per episode. 25 tablet 3     NIZORAL EX 2.5mg apply as needed        rosuvastatin (CRESTOR) 40 MG tablet Take 1 tablet (40 mg) by mouth daily 90 tablet 3     Allergies   Allergen Reactions     No Known Drug Allergy   "      Reviewed and updated as needed this visit by clinical staff  Tobacco  Allergies  Meds  Problems  Med Hx  Surg Hx  Fam Hx  Soc Hx        Reviewed and updated as needed this visit by Provider  Allergies  Meds  Problems         ROS:  Constitutional, HEENT, cardiovascular, pulmonary-as above, gi and gu systems are negative, except as otherwise noted.    OBJECTIVE:     /72 (BP Location: Right arm, Patient Position: Sitting, Cuff Size: Adult Regular)  Pulse 61  Temp 97.9  F (36.6  C) (Oral)  Resp 18  Ht 1.803 m (5' 11\")  Wt 87.1 kg (192 lb 1.6 oz)  SpO2 95%  BMI 26.79 kg/m2  Body mass index is 26.79 kg/(m^2).  GENERAL: tired appearing, alert and no distress  EYES: Eyes grossly normal to inspection, PERRL and conjunctivae and sclerae normal  HENT: ear canals and TM's normal, nose and mouth without ulcers or lesions. Nasal passages irritated, red appearing  NECK: no adenopathy, no asymmetry, masses, or scars and thyroid normal to palpation  RESP: lungs diminished, left posterior crackles and decreased air movement to auscultation - no wheezing  CV: regular rate and rhythm, normal S1 S2, no S3 or S4, no murmur, click or rub,Diagnostic Test Results:  none     ASSESSMENT/PLAN:         1. Pulmonary congestion and hypostasis  Consider chest x-ray if doesn't clear with antibiotic. Likely walking pneumonia  - azithromycin (ZITHROMAX) 250 MG tablet; Two tablets first day, then one tablet daily for four days.  Dispense: 6 tablet; Refill: 0    2. Nasal congestion  Has congestion and nasal dripping  - mometasone (NASONEX) 50 MCG/ACT spray; Spray 2 sprays into both nostrils daily  Dispense: 1 Box; Refill: 3    FUTURE APPOINTMENTS:       - Follow-up visit prn    MARIA DEL CARMEN Weems, NP-C  [facility]    "

## 2018-06-11 NOTE — MR AVS SNAPSHOT
"              After Visit Summary   6/11/2018    Tima Mckenzie    MRN: [id number]           Patient Information     Date Of Birth          1935        Visit Information        Provider Department      6/11/2018 2:20 PM Viki Bishop NP [facility]        Today's Diagnoses     Pulmonary congestion and hypostasis    -  1    Nasal congestion           Follow-ups after your visit        Who to contact     If you have questions or need follow up information about today's clinic visit or your schedule please contact [facility] directly at [phone].  Normal or non-critical lab and imaging results will be communicated to you by MyChart, letter or phone within 4 business days after the clinic has received the results. If you do not hear from us within 7 days, please contact the clinic through MyChart or phone. If you have a critical or abnormal lab result, we will notify you by phone as soon as possible.  Submit refill requests through [employer] or call your pharmacy and they will forward the refill request to us. Please allow 3 business days for your refill to be completed.          Additional Information About Your Visit        Care EveryWhere ID     This is your Care EveryWhere ID. This could be used by other organizations to access your [facility] medical records  [id number]        Your Vitals Were     Pulse Temperature Respirations Height Pulse Oximetry BMI (Body Mass Index)    61 97.9  F (36.6  C) (Oral) 18 1.803 m (5' 11\") 95% 26.79 kg/m2       Blood Pressure from Last 3 Encounters:   06/11/18 144/72   03/05/18 132/80   01/23/18 128/76    Weight from Last 3 Encounters:   06/11/18 87.1 kg (192 lb 1.6 oz)   03/05/18 86.4 kg (190 lb 8 oz)   01/23/18 87.9 kg (193 lb 12.8 oz)              Today, you had the following     No orders found for display         Today's Medication Changes          These changes are accurate as of 6/11/18 11:59 PM.  If you have any questions, ask your nurse " or doctor.               Start taking these medicines.        Dose/Directions    azithromycin 250 MG tablet   Commonly known as:  ZITHROMAX   Used for:  Pulmonary congestion and hypostasis   Started by:  Viki Bishop NP        Two tablets first day, then one tablet daily for four days.   Quantity:  6 tablet   Refills:  0       mometasone 50 MCG/ACT spray   Commonly known as:  NASONEX   Used for:  Nasal congestion   Started by:  Viki Bishop NP        Dose:  2 spray   Spray 2 sprays into both nostrils daily   Quantity:  1 Box   Refills:  3            Where to get your medicines      These medications were sent to [place] [facility] [address] [facility] - [place], [place] - [address]  [address], [place] [address]     Phone:  [phone]     azithromycin 250 MG tablet    mometasone 50 MCG/ACT spray                Primary Care Provider Office Phone # Fax #    Julieta Ramos Gandhi [phone] [address] [facility] [address]        Equal Access to Services     [facility]: Hadii aad ku hadasho Soomaali, waaxda luqadaha, qaybta kaalmada adeegyada, waxay idiin hayaan adeeg khararomy adam . So [facility] [phone].    ATENCIÓN: Si habla español, tiene a mares disposición servicios gratuitos de asistencia lingüística. [facility] [phone].    We comply with applicable federal civil rights laws and [place] laws. We do not discriminate on the basis of race, color, national origin, age, disability, sex, sexual orientation, or gender identity.            Thank you!     Thank you for choosing [facility]  for your care. Our goal is always to provide you with excellent care. Hearing back from our patients is one way we can continue to improve our services. Please take a few minutes to complete the written survey that you may receive in the mail after your visit with us. Thank you!             Your Updated Medication List - Protect others around you: Learn how to safely use, store and  throw away your medicines at www.disposemymeds.org.          This list is accurate as of 6/11/18 11:59 PM.  Always use your most recent med list.                   Brand Name Dispense Instructions for use Diagnosis    aspirin 81 MG tablet      Take 1 tablet by mouth daily. *.        azithromycin 250 MG tablet    ZITHROMAX    6 tablet    Two tablets first day, then one tablet daily for four days.    Pulmonary congestion and hypostasis       calcium-vitamin D 600-400 MG-UNIT per tablet    CALTRATE    100 tablet    Take 1 tablet by mouth 2 times daily.    Preventative health care       cetirizine 10 MG tablet    zyrTEC    90 tablet    Take 1 tablet (10 mg) by mouth every evening    Seasonal allergic rhinitis, unspecified allergic rhinitis trigger       ciprofloxacin 500 MG tablet    CIPRO    14 tablet    Take 1 tablet (500 mg) by mouth 2 times daily    Epididymitis       fluticasone 50 MCG/ACT spray    FLONASE    1 Package    Spray 2 sprays into both nostrils daily    Seasonal allergic rhinitis       isosorbide mononitrate 30 MG 24 hr tablet    IMDUR    90 tablet    Take 1 tablet (30 mg) by mouth every morning    Essential hypertension with goal blood pressure less than 130/80       metoprolol succinate 25 MG 24 hr tablet    TOPROL-XL    90 tablet    Take 1 tablet (25 mg) by mouth daily    Essential hypertension with goal blood pressure less than 130/80       mometasone 50 MCG/ACT spray    NASONEX    1 Box    Spray 2 sprays into both nostrils daily    Nasal congestion       nitroGLYcerin 0.4 MG sublingual tablet    NITROSTAT    25 tablet    Place 1 tablet (0.4 mg) under the tongue every 5 minutes as needed up to 3 tablets per episode.    Coronary artery disease involving native coronary artery of native heart without angina pectoris       NIZORAL EX      2.5mg apply as needed        rosuvastatin 40 MG tablet    CRESTOR    90 tablet    Take 1 tablet (40 mg) by mouth daily    Hyperlipidemia LDL goal <100

## 2018-06-22 ENCOUNTER — OFFICE VISIT (OUTPATIENT)
Dept: FAMILY MEDICINE | Facility: CLINIC | Age: 83
End: 2018-06-22
Payer: MEDICARE

## 2018-06-22 VITALS
HEIGHT: 71 IN | DIASTOLIC BLOOD PRESSURE: 82 MMHG | SYSTOLIC BLOOD PRESSURE: 124 MMHG | OXYGEN SATURATION: 96 % | RESPIRATION RATE: 18 BRPM | TEMPERATURE: 97.8 F | HEART RATE: 52 BPM | WEIGHT: 188 LBS | BODY MASS INDEX: 26.32 KG/M2

## 2018-06-22 DIAGNOSIS — J30.1 ACUTE SEASONAL ALLERGIC RHINITIS DUE TO POLLEN: Primary | ICD-10-CM

## 2018-06-22 PROCEDURE — 99213 OFFICE O/P EST LOW 20 MIN: CPT | Performed by: NURSE PRACTITIONER

## 2018-06-22 RX ORDER — FEXOFENADINE HCL AND PSEUDOEPHEDRINE HCL 180; 240 MG/1; MG/1
1 TABLET, EXTENDED RELEASE ORAL DAILY
Qty: 30 TABLET | Refills: 1 | Status: SHIPPED | OUTPATIENT
Start: 2018-06-22 | End: 2018-07-12

## 2018-06-22 NOTE — PROGRESS NOTES
SUBJECTIVE:   Tima Mckenzie is a 83 year old male who presents to clinic today for the following health issues:      Acute Illness   Acute illness concerns: Cough   Onset: 5 weeks     Fever: no     Chills/Sweats: no     Headache (location?): YES    Sinus Pressure:no    Conjunctivitis:  no    Ear Pain: no    Rhinorrhea: YES    Congestion: no     Sore Throat: no      Cough: YES-productive of clear sputum    Wheeze: YES    Decreased Appetite: no     Nausea: no     Vomiting: no     Diarrhea:  no     Dysuria/Freq.: no     Fatigue/Achiness: YES- fatigue due to age per pt     Sick/Strep Exposure: no      Therapies Tried and outcome: Treated with Zpak and Cipro - getting a little better    After antibiotic 2nd time nose was still dripping, then postnasal drip, on-going coughing.  Feeling more dry now. Using nasonex, loratidine, then taking Allegra in the evening.  Will give 2 months of Allegra D, if working, ask for refill and will give it.     Does get heart burn and reflux. Takes OTC antacid.         Problem list and histories reviewed & adjusted, as indicated.  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     Seasonal allergic rhinitis     Bilateral inguinal hernia     Senile nuclear sclerosis     Posterior vitreous detachment of both eyes     Essential hypertension with goal blood pressure less than 130/80     Seasonal allergic rhinitis, unspecified allergic rhinitis trigger     Coronary artery disease involving native coronary artery of native heart without angina pectoris     Past Surgical History:   Procedure Laterality Date     ANGIOGRAM  4/30/2003    No Disease, Clemons,LAD,SVG,OM1- small LT Main, Occlusion of CX- D1 50-70% Stenosis, RCA 30-80%      "COLONOSCOPY       COLONOSCOPY N/A 10/14/2014    Procedure: COMBINED COLONOSCOPY, SINGLE BIOPSY/POLYPECTOMY BY BIOPSY;  Surgeon: Konstantin Coates MD;  Location: [facility] OR     COLONOSCOPY WITH CO2 INSUFFLATION N/A 10/14/2014    Procedure: COLONOSCOPY WITH CO2 INSUFFLATION;  Surgeon: Konstantin Coates MD;  Location: [facility] OR     CORONARY ARTERY BYPASS  1997    X 3     HERNIA REPAIR  2014    [facility].       Social History   Substance Use Topics     Smoking status: Former Smoker     Smokeless tobacco: Never Used      Comment: 1997     Alcohol use Yes      Comment: rarely      History reviewed. No pertinent family history.        Reviewed and updated as needed this visit by clinical staff  Tobacco  Allergies  Meds  Med Hx  Surg Hx  Fam Hx  Soc Hx      Reviewed and updated as needed this visit by Provider         ROS:  Constitutional, HEENT, cardiovascular, pulmonary, gi and gu systems are negative, except as otherwise noted.    OBJECTIVE:     /82 (BP Location: Right arm, Patient Position: Sitting, Cuff Size: Adult Regular)  Pulse 52  Temp 97.8  F (36.6  C) (Oral)  Resp 18  Ht 1.803 m (5' 11\")  Wt 85.3 kg (188 lb)  SpO2 96%  BMI 26.22 kg/m2  Body mass index is 26.22 kg/(m^2).  GENERAL: healthy, alert and no distress  EYES: Eyes grossly normal to inspection, PERRL and conjunctivae and sclerae normal  HENT: ear canals and TM's normal, nose and mouth without ulcers or lesions posterior pharynx slight erythema, nasal passages red  NECK: no adenopathy, no asymmetry, masses, or scars and thyroid normal to palpation  RESP: lungs clear to auscultation - no rales, rhonchi or wheezes, dry cough noted  CV: regular rate and rhythm, normal S1 S2, no S3 or S4, no murmur  MS: no gross musculoskeletal defects noted, no edema    Diagnostic Test Results:  none     ASSESSMENT/PLAN:           1. Acute seasonal allergic rhinitis due to pollen  Cough is not infectious, likely allergy related as he admits to " post-nasal drip and GERD which both can cause coughing. Trial Allegra D, if working call for refills. Continue using nasal spray  - fexofenadine-pseudoePHEDrine (ALLEGRA-D 24) 180-240 MG per 24 hr tablet; Take 1 tablet by mouth daily  Dispense: 30 tablet; Refill: 1    FUTURE APPOINTMENTS:       - Follow-up visit prn    MARIA DEL CARMEN Weems, NP-C  [facility]

## 2018-06-22 NOTE — MR AVS SNAPSHOT
"              After Visit Summary   6/22/2018    Tima Mckenzie    MRN: [id number]           Patient Information     Date Of Birth          1935        Visit Information        Provider Department      6/22/2018 12:40 PM Viki Bishop NP [facility]        Today's Diagnoses     Acute seasonal allergic rhinitis due to pollen    -  1    Need for prophylactic vaccination with tetanus-diphtheria (TD)          Care Instructions    Call if not improving, or call if need more refills on Allegra-D          Follow-ups after your visit        Who to contact     If you have questions or need follow up information about today's clinic visit or your schedule please contact [facility] directly at [phone].  Normal or non-critical lab and imaging results will be communicated to you by MyChart, letter or phone within 4 business days after the clinic has received the results. If you do not hear from us within 7 days, please contact the clinic through MyChart or phone. If you have a critical or abnormal lab result, we will notify you by phone as soon as possible.  Submit refill requests through [employer] or call your pharmacy and they will forward the refill request to us. Please allow 3 business days for your refill to be completed.          Additional Information About Your Visit        Care EveryWhere ID     This is your Care EveryWhere ID. This could be used by other organizations to access your [place] medical records  [id number]        Your Vitals Were     Pulse Temperature Respirations Height Pulse Oximetry BMI (Body Mass Index)    52 97.8  F (36.6  C) (Oral) 18 1.803 m (5' 11\") 96% 26.22 kg/m2       Blood Pressure from Last 3 Encounters:   06/22/18 124/82   06/11/18 144/72   03/05/18 132/80    Weight from Last 3 Encounters:   06/22/18 85.3 kg (188 lb)   06/11/18 87.1 kg (192 lb 1.6 oz)   03/05/18 86.4 kg (190 lb 8 oz)              Today, you had the following     No orders found for " display         Today's Medication Changes          These changes are accurate as of 6/22/18  1:16 PM.  If you have any questions, ask your nurse or doctor.               Start taking these medicines.        Dose/Directions    fexofenadine-pseudoePHEDrine 180-240 MG per 24 hr tablet   Commonly known as:  ALLEGRA-D 24   Used for:  Acute seasonal allergic rhinitis due to pollen   Started by:  Viki Bishop NP        Dose:  1 tablet   Take 1 tablet by mouth daily   Quantity:  30 tablet   Refills:  1         Stop taking these medicines if you haven't already. Please contact your care team if you have questions.     fluticasone 50 MCG/ACT spray   Commonly known as:  FLONASE   Stopped by:  Viki Bishop NP                Where to get your medicines      Some of these will need a paper prescription and others can be bought over the counter.  Ask your nurse if you have questions.     Bring a paper prescription for each of these medications     fexofenadine-pseudoePHEDrine 180-240 MG per 24 hr tablet                Primary Care Provider Office Phone # Fax #    Julieta Ramos Gandhi [phone] [address] [facility] [address]        Equal Access to Services     [facility]: Hadii sepideh aguilar hadasho Soomaali, waaxda luqadaha, qaybta kaalmada adeegyada, andres adam . So [facility] [phone].    ATENCIÓN: Si habla español, tiene a mares disposición servicios gratuitos de asistencia lingüística. [facility] [phone].    We comply with applicable federal civil rights laws and [place] laws. We do not discriminate on the basis of race, color, national origin, age, disability, sex, sexual orientation, or gender identity.            Thank you!     Thank you for choosing [facility]  for your care. Our goal is always to provide you with excellent care. Hearing back from our patients is one way we can continue to improve our services. Please take a few minutes to  complete the written survey that you may receive in the mail after your visit with us. Thank you!             Your Updated Medication List - Protect others around you: Learn how to safely use, store and throw away your medicines at www.disposemymeds.org.          This list is accurate as of 6/22/18  1:16 PM.  Always use your most recent med list.                   Brand Name Dispense Instructions for use Diagnosis    aspirin 81 MG tablet      Take 1 tablet by mouth daily. *.        calcium-vitamin D 600-400 MG-UNIT per tablet    CALTRATE    100 tablet    Take 1 tablet by mouth 2 times daily.    Preventative health care       cetirizine 10 MG tablet    zyrTEC    90 tablet    Take 1 tablet (10 mg) by mouth every evening    Seasonal allergic rhinitis, unspecified allergic rhinitis trigger       fexofenadine-pseudoePHEDrine 180-240 MG per 24 hr tablet    ALLEGRA-D 24    30 tablet    Take 1 tablet by mouth daily    Acute seasonal allergic rhinitis due to pollen       isosorbide mononitrate 30 MG 24 hr tablet    IMDUR    90 tablet    Take 1 tablet (30 mg) by mouth every morning    Essential hypertension with goal blood pressure less than 130/80       metoprolol succinate 25 MG 24 hr tablet    TOPROL-XL    90 tablet    Take 1 tablet (25 mg) by mouth daily    Essential hypertension with goal blood pressure less than 130/80       mometasone 50 MCG/ACT spray    NASONEX    1 Box    Spray 2 sprays into both nostrils daily    Nasal congestion       nitroGLYcerin 0.4 MG sublingual tablet    NITROSTAT    25 tablet    Place 1 tablet (0.4 mg) under the tongue every 5 minutes as needed up to 3 tablets per episode.    Coronary artery disease involving native coronary artery of native heart without angina pectoris       NIZORAL EX      2.5mg apply as needed        rosuvastatin 40 MG tablet    CRESTOR    90 tablet    Take 1 tablet (40 mg) by mouth daily    Hyperlipidemia LDL goal <100

## 2018-07-05 DIAGNOSIS — I10 ESSENTIAL HYPERTENSION WITH GOAL BLOOD PRESSURE LESS THAN 130/80: ICD-10-CM

## 2018-07-05 RX ORDER — METOPROLOL SUCCINATE 25 MG/1
25 TABLET, EXTENDED RELEASE ORAL DAILY
Qty: 30 TABLET | Refills: 0 | Status: SHIPPED | OUTPATIENT
Start: 2018-07-05 | End: 2018-07-12

## 2018-07-05 NOTE — TELEPHONE ENCOUNTER
Due for office visit and labs, per provider plan and last chronic disease check up on 1/23/18. 30 day alfred refill given. No future appt scheduled at this time.  Team, please remind patient that is due for follow up by end of July and that a alfred refill was given on Metoprolol, thank you.    Marta Vides RN  [facility]

## 2018-07-05 NOTE — TELEPHONE ENCOUNTER
"Requested Prescriptions   Pending Prescriptions Disp Refills     metoprolol succinate (TOPROL-XL) 25 MG 24 hr tablet 90 tablet 3     Sig: Take 1 tablet (25 mg) by mouth daily    Beta-Blockers Protocol Passed    7/5/2018 10:34 AM       Passed - Blood pressure under 140/90 in past 12 months    BP Readings from Last 3 Encounters:   06/22/18 124/82   06/11/18 144/72   03/05/18 132/80                Passed - Patient is age 6 or older       Passed - Recent (12 mo) or future (30 days) visit within the authorizing provider's specialty    Patient had office visit in the last 12 months or has a visit in the next 30 days with authorizing provider or within the authorizing provider's specialty.  See \"Patient Info\" tab in inbasket, or \"Choose Columns\" in Meds & Orders section of the refill encounter.            metoprolol (TOPROL-XL) 25 MG 24 hr tablet  Last Written Prescription Date:  6/14/17  Last Fill Quantity: 90,  # refills: 3   Last office visit: 6/22/2018 with prescribing provider:  Dr. Gandhi   Future Office Visit:      "

## 2018-07-12 ENCOUNTER — OFFICE VISIT (OUTPATIENT)
Dept: FAMILY MEDICINE | Facility: CLINIC | Age: 83
End: 2018-07-12
Payer: MEDICARE

## 2018-07-12 VITALS
HEART RATE: 54 BPM | TEMPERATURE: 97.8 F | WEIGHT: 191.5 LBS | HEIGHT: 71 IN | BODY MASS INDEX: 26.81 KG/M2 | OXYGEN SATURATION: 96 % | DIASTOLIC BLOOD PRESSURE: 80 MMHG | SYSTOLIC BLOOD PRESSURE: 124 MMHG

## 2018-07-12 DIAGNOSIS — I25.10 CORONARY ARTERY DISEASE INVOLVING NATIVE CORONARY ARTERY OF NATIVE HEART WITHOUT ANGINA PECTORIS: ICD-10-CM

## 2018-07-12 DIAGNOSIS — Z23 NEED FOR PROPHYLACTIC VACCINATION WITH TETANUS-DIPHTHERIA (TD): ICD-10-CM

## 2018-07-12 DIAGNOSIS — E78.00 HYPERCHOLESTEREMIA: Primary | ICD-10-CM

## 2018-07-12 DIAGNOSIS — E78.5 HYPERLIPIDEMIA LDL GOAL <100: ICD-10-CM

## 2018-07-12 DIAGNOSIS — I10 ESSENTIAL HYPERTENSION WITH GOAL BLOOD PRESSURE LESS THAN 130/80: ICD-10-CM

## 2018-07-12 DIAGNOSIS — Z23 NEED FOR PROPHYLACTIC VACCINATION AGAINST STREPTOCOCCUS PNEUMONIAE (PNEUMOCOCCUS): ICD-10-CM

## 2018-07-12 PROCEDURE — 99214 OFFICE O/P EST MOD 30 MIN: CPT | Mod: 25 | Performed by: NURSE PRACTITIONER

## 2018-07-12 PROCEDURE — 90714 TD VACC NO PRESV 7 YRS+ IM: CPT | Performed by: NURSE PRACTITIONER

## 2018-07-12 PROCEDURE — 90670 PCV13 VACCINE IM: CPT | Performed by: NURSE PRACTITIONER

## 2018-07-12 PROCEDURE — 90471 IMMUNIZATION ADMIN: CPT | Performed by: NURSE PRACTITIONER

## 2018-07-12 PROCEDURE — G0009 ADMIN PNEUMOCOCCAL VACCINE: HCPCS | Mod: 59 | Performed by: NURSE PRACTITIONER

## 2018-07-12 RX ORDER — ISOSORBIDE MONONITRATE 30 MG/1
30 TABLET, EXTENDED RELEASE ORAL EVERY MORNING
Qty: 90 TABLET | Refills: 1 | Status: SHIPPED | OUTPATIENT
Start: 2018-07-12 | End: 2019-01-24

## 2018-07-12 RX ORDER — METOPROLOL SUCCINATE 25 MG/1
25 TABLET, EXTENDED RELEASE ORAL DAILY
Qty: 90 TABLET | Refills: 1 | Status: SHIPPED | OUTPATIENT
Start: 2018-07-12 | End: 2019-03-28

## 2018-07-12 RX ORDER — ROSUVASTATIN CALCIUM 40 MG/1
40 TABLET, COATED ORAL DAILY
Qty: 90 TABLET | Refills: 1 | Status: SHIPPED | OUTPATIENT
Start: 2018-07-12 | End: 2019-03-28

## 2018-07-12 ASSESSMENT — PAIN SCALES - GENERAL: PAINLEVEL: NO PAIN (0)

## 2018-07-12 NOTE — PROGRESS NOTES
SUBJECTIVE:   Tima Mckenzie is a 83 year old male who presents to clinic today for the following health issues:      Hyperlipidemia Follow-Up      Rate your low fat/cholesterol diet?: fair    Taking statin?  Yes, possible muscle aches from statin    Other lipid medications/supplements?:  None    On rosuvastatin  Wakes up in the middle of the night hips/legs hurt. On-going likely prior to being on statin. Not worsening. Takes asa and it relieves it.      Hypertension Follow-up      Outpatient blood pressures are not being checked.    Low Salt Diet: low salt    On IMDUR 30 mg daily  Metoprolol 25 mg daily-HR seems to be in the 50s    Vascular Disease Follow-up:  Coronary Artery Disease (CAD)      Chest pain or pressure, left side neck or arm pain: No    Shortness of breath/increased sweats/nausea with exertion: Yes, with activity    Pain in calves walking 1-2 blocks: No    Worsened or new symptoms since last visit: No    Nitroglycerin use: no    Daily aspirin use: Yes      Amount of exercise or physical activity: 2-3 days/week for an average of 30-45 minutes    Problems taking medications regularly: No    Medication side effects: muscle aches    Diet: low salt    On asa 81 mg daily      Everything is stable today. Going on cruise in January for a cruise in [place]!        Problem list and histories reviewed & adjusted, as indicated.  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     Seasonal allergic rhinitis     Bilateral inguinal hernia     Senile nuclear sclerosis     Posterior vitreous detachment of both eyes     Essential hypertension with goal blood pressure less than 130/80     Seasonal allergic rhinitis, unspecified allergic rhinitis trigger      Coronary artery disease involving native coronary artery of native heart without angina pectoris     Past Surgical History:   Procedure Laterality Date     ANGIOGRAM  4/30/2003    No Disease, Clemons,LAD,SVG,OM1- small LT Main, Occlusion of CX- D1 50-70% Stenosis, RCA 30-80%     COLONOSCOPY       COLONOSCOPY N/A 10/14/2014    Procedure: COMBINED COLONOSCOPY, SINGLE BIOPSY/POLYPECTOMY BY BIOPSY;  Surgeon: Konstantin Coates MD;  Location: [facility] OR     COLONOSCOPY WITH CO2 INSUFFLATION N/A 10/14/2014    Procedure: COLONOSCOPY WITH CO2 INSUFFLATION;  Surgeon: Konstantin Coates MD;  Location: [facility] OR     CORONARY ARTERY BYPASS  1997    X 3     HERNIA REPAIR  2014    [facility].       Social History   Substance Use Topics     Smoking status: Former Smoker     Smokeless tobacco: Never Used      Comment: 1997     Alcohol use Yes      Comment: rarely      No family history on file.      Current Outpatient Prescriptions   Medication Sig Dispense Refill     aspirin 81 MG tablet Take 1 tablet by mouth daily. *.   3     calcium-vitamin D (CALTRATE) 600-400 MG-UNIT per tablet Take 1 tablet by mouth 2 times daily. 100 tablet 11     Fexofenadine HCl (ALLEGRA PO)        isosorbide mononitrate (IMDUR) 30 MG 24 hr tablet Take 1 tablet (30 mg) by mouth every morning 90 tablet 1     metoprolol succinate (TOPROL-XL) 25 MG 24 hr tablet Take 1 tablet (25 mg) by mouth daily 90 tablet 1     mometasone (NASONEX) 50 MCG/ACT spray Spray 2 sprays into both nostrils daily 1 Box 3     nitroglycerin (NITROSTAT) 0.4 MG sublingual tablet Place 1 tablet (0.4 mg) under the tongue every 5 minutes as needed up to 3 tablets per episode. 25 tablet 3     rosuvastatin (CRESTOR) 40 MG tablet Take 1 tablet (40 mg) by mouth daily 90 tablet 1     cetirizine (ZYRTEC) 10 MG tablet Take 1 tablet (10 mg) by mouth every evening (Patient not taking: Reported on 7/12/2018) 90 tablet 3     NIZORAL EX 2.5mg apply as needed        [DISCONTINUED] isosorbide  "mononitrate (IMDUR) 30 MG 24 hr tablet Take 1 tablet (30 mg) by mouth every morning 90 tablet 3     [DISCONTINUED] metoprolol succinate (TOPROL-XL) 25 MG 24 hr tablet Take 1 tablet (25 mg) by mouth daily 30 tablet 0     [DISCONTINUED] rosuvastatin (CRESTOR) 40 MG tablet Take 1 tablet (40 mg) by mouth daily 90 tablet 3     Allergies   Allergen Reactions     No Known Drug Allergy      Seasonal Allergies        Reviewed and updated as needed this visit by clinical staff  Tobacco  Allergies  Meds  Problems       Reviewed and updated as needed this visit by Provider  Allergies  Meds  Problems         ROS:  Constitutional, HEENT, cardiovascular, pulmonary, gi and gu systems are negative, except as otherwise noted.    OBJECTIVE:     /80 (BP Location: Right arm, Patient Position: Chair, Cuff Size: Adult Regular)  Pulse 54  Temp 97.8  F (36.6  C) (Oral)  Ht 1.803 m (5' 11\")  Wt 86.9 kg (191 lb 8 oz)  SpO2 96%  BMI 26.71 kg/m2  Body mass index is 26.71 kg/(m^2).  GENERAL: healthy, alert and no distress  EYES: Eyes grossly normal to inspection, PERRL and conjunctivae and sclerae normal  HENT: ear canals and TM's normal, nose and mouth without ulcers or lesions  NECK: no adenopathy, no asymmetry, masses, or scars and thyroid normal to palpation  RESP: lungs clear to auscultation - no rales, rhonchi or wheezes  CV: regular rate and rhythm, normal S1 S2, no S3 or S4, no murmur, click or rub  ABDOMEN: soft, nontender, no hepatosplenomegaly, no masses and bowel sounds normal  MS: no gross musculoskeletal defects noted, no edema    Diagnostic Test Results:  No results found for this or any previous visit (from the past 24 hour(s)).    ASSESSMENT/PLAN:         1. Hypercholesteremia  Screen. stable  - Lipid panel reflex to direct LDL Fasting; Future    2. Coronary artery disease involving native coronary artery of native heart without angina pectoris  stable  - COMPREHENSIVE METABOLIC PANEL; Future    3. Essential " hypertension with goal blood pressure less than 130/80  Stable. Refilled medications.  Monitor HR then, may need to make changes on metoprolol?  - COMPREHENSIVE METABOLIC PANEL; Future  - metoprolol succinate (TOPROL-XL) 25 MG 24 hr tablet; Take 1 tablet (25 mg) by mouth daily  Dispense: 90 tablet; Refill: 1  - isosorbide mononitrate (IMDUR) 30 MG 24 hr tablet; Take 1 tablet (30 mg) by mouth every morning  Dispense: 90 tablet; Refill: 1    4. Need for prophylactic vaccination against Streptococcus pneumoniae (pneumococcus)  given    5. Need for prophylactic vaccination with tetanus-diphtheria (TD)  given    6. Hyperlipidemia LDL goal <100  Screen. Continue medication.  - rosuvastatin (CRESTOR) 40 MG tablet; Take 1 tablet (40 mg) by mouth daily  Dispense: 90 tablet; Refill: 1    FUTURE APPOINTMENTS:       - Follow-up visit in 6 months.    MARIA DEL CARMEN Weems, NP-C  [facility]

## 2018-07-12 NOTE — NURSING NOTE
Screening Questionnaire for Adult Immunization    Are you sick today?   No   Do you have allergies to medications, food, a vaccine component or latex?   No   Have you ever had a serious reaction after receiving a vaccination?   No   Do you have a long-term health problem with heart disease, lung disease, asthma, kidney disease, metabolic disease (e.g. diabetes), anemia, or other blood disorder?   Yes   Do you have cancer, leukemia, HIV/AIDS, or any other immune system problem?   No   In the past 3 months, have you taken medications that affect  your immune system, such as prednisone, other steroids, or anticancer drugs; drugs for the treatment of rheumatoid arthritis, Crohn s disease, or psoriasis; or have you had radiation treatments?   No   Have you had a seizure, or a brain or other nervous system problem?   No   During the past year, have you received a transfusion of blood or blood     products, or been given immune (gamma) globulin or antiviral drug?   No   For women: Are you pregnant or is there a chance you could become        pregnant during the next month?   No   Have you received any vaccinations in the past 4 weeks?   No     Immunization questionnaire was positive for at least one answer.  Notified Dr. Bishop.        Per orders of Dr. Bishop, injection of TD and Pneumococcal 13 given by Jayshree Acuna. Patient instructed to remain in clinic for 15 minutes afterwards, and to report any adverse reaction to me immediately.       Screening performed by Jayshree Acuna on 7/12/2018 at 10:55 AM.

## 2018-07-12 NOTE — MR AVS SNAPSHOT
After Visit Summary   7/12/2018    Tima Mckenzie    MRN: [id number]           Patient Information     Date Of Birth          1935        Visit Information        Provider Department      7/12/2018 10:20 AM Viki Bishop NP [facility]        Today's Diagnoses     Hypercholesteremia    -  1    Coronary artery disease involving native coronary artery of native heart without angina pectoris        Essential hypertension with goal blood pressure less than 130/80        Need for prophylactic vaccination against Streptococcus pneumoniae (pneumococcus)        Need for prophylactic vaccination with tetanus-diphtheria (TD)        Hyperlipidemia LDL goal <100           Follow-ups after your visit        Future tests that were ordered for you today     Open Future Orders        Priority Expected Expires Ordered    COMPREHENSIVE METABOLIC PANEL Routine  7/12/2019 7/12/2018    Lipid panel reflex to direct LDL Fasting Routine  7/12/2019 7/12/2018            Who to contact     If you have questions or need follow up information about today's clinic visit or your schedule please contact [facility] directly at [phone].  Normal or non-critical lab and imaging results will be communicated to you by MyChart, letter or phone within 4 business days after the clinic has received the results. If you do not hear from us within 7 days, please contact the clinic through MyChart or phone. If you have a critical or abnormal lab result, we will notify you by phone as soon as possible.  Submit refill requests through [employer] or call your pharmacy and they will forward the refill request to us. Please allow 3 business days for your refill to be completed.          Additional Information About Your Visit        Care EveryWhere ID     This is your Care EveryWhere ID. This could be used by other organizations to access your [place] medical records  [id number]        Your Vitals Were      "Pulse Temperature Height Pulse Oximetry BMI (Body Mass Index)       54 97.8  F (36.6  C) (Oral) 1.803 m (5' 11\") 96% 26.71 kg/m2        Blood Pressure from Last 3 Encounters:   07/12/18 124/80   06/22/18 124/82   06/11/18 144/72    Weight from Last 3 Encounters:   07/12/18 86.9 kg (191 lb 8 oz)   06/22/18 85.3 kg (188 lb)   06/11/18 87.1 kg (192 lb 1.6 oz)                 Where to get your medicines      These medications were sent to [place] [address] [facility] [place], [place] - [address]  [address] [address]     Phone:  [phone]     isosorbide mononitrate 30 MG 24 hr tablet    metoprolol succinate 25 MG 24 hr tablet    rosuvastatin 40 MG tablet          Primary Care Provider Office Phone # Fax #    Julieta Ramos Gandhi [phone] [address] [facility] [address]        Equal Access to Services     [facility]: Hadii sepideh ku hadasho Soomaali, waaxda luqadaha, qaybta kaalmada aderobert, andres adam . So [facility] [phone].    ATENCIÓN: Si habla español, tiene a mares disposición servicios gratuitos de asistencia lingüística. [facility] [phone].    We comply with applicable federal civil rights laws and [place] laws. We do not discriminate on the basis of race, color, national origin, age, disability, sex, sexual orientation, or gender identity.            Thank you!     Thank you for choosing [facility]  for your care. Our goal is always to provide you with excellent care. Hearing back from our patients is one way we can continue to improve our services. Please take a few minutes to complete the written survey that you may receive in the mail after your visit with us. Thank you!             Your Updated Medication List - Protect others around you: Learn how to safely use, store and throw away your medicines at www.disposemymeds.org.          This list is accurate as of 7/12/18 10:47 AM.  Always use your most " recent med list.                   Brand Name Dispense Instructions for use Diagnosis    ALLEGRA PO           aspirin 81 MG tablet      Take 1 tablet by mouth daily. *.        calcium-vitamin D 600-400 MG-UNIT per tablet    CALTRATE    100 tablet    Take 1 tablet by mouth 2 times daily.    Preventative health care       cetirizine 10 MG tablet    zyrTEC    90 tablet    Take 1 tablet (10 mg) by mouth every evening    Seasonal allergic rhinitis, unspecified allergic rhinitis trigger       isosorbide mononitrate 30 MG 24 hr tablet    IMDUR    90 tablet    Take 1 tablet (30 mg) by mouth every morning    Essential hypertension with goal blood pressure less than 130/80       metoprolol succinate 25 MG 24 hr tablet    TOPROL-XL    90 tablet    Take 1 tablet (25 mg) by mouth daily    Essential hypertension with goal blood pressure less than 130/80       mometasone 50 MCG/ACT spray    NASONEX    1 Box    Spray 2 sprays into both nostrils daily    Nasal congestion       nitroGLYcerin 0.4 MG sublingual tablet    NITROSTAT    25 tablet    Place 1 tablet (0.4 mg) under the tongue every 5 minutes as needed up to 3 tablets per episode.    Coronary artery disease involving native coronary artery of native heart without angina pectoris       NIZORAL EX      2.5mg apply as needed        rosuvastatin 40 MG tablet    CRESTOR    90 tablet    Take 1 tablet (40 mg) by mouth daily    Hyperlipidemia LDL goal <100

## 2018-08-30 DIAGNOSIS — I10 ESSENTIAL HYPERTENSION WITH GOAL BLOOD PRESSURE LESS THAN 130/80: ICD-10-CM

## 2018-08-30 DIAGNOSIS — I25.10 CORONARY ARTERY DISEASE INVOLVING NATIVE CORONARY ARTERY OF NATIVE HEART WITHOUT ANGINA PECTORIS: ICD-10-CM

## 2018-08-30 DIAGNOSIS — E78.00 HYPERCHOLESTEREMIA: ICD-10-CM

## 2018-08-30 LAB
ALBUMIN SERPL-MCNC: 3.8 G/DL (ref 3.4–5)
ALP SERPL-CCNC: 56 U/L (ref 40–150)
ALT SERPL W P-5'-P-CCNC: 24 U/L (ref 0–70)
ANION GAP SERPL CALCULATED.3IONS-SCNC: 6 MMOL/L (ref 3–14)
AST SERPL W P-5'-P-CCNC: 25 U/L (ref 0–45)
BILIRUB SERPL-MCNC: 0.7 MG/DL (ref 0.2–1.3)
BUN SERPL-MCNC: 16 MG/DL (ref 7–30)
CALCIUM SERPL-MCNC: 9.2 MG/DL (ref 8.5–10.1)
CHLORIDE SERPL-SCNC: 104 MMOL/L (ref 94–109)
CHOLEST SERPL-MCNC: 132 MG/DL
CO2 SERPL-SCNC: 29 MMOL/L (ref 20–32)
CREAT SERPL-MCNC: 1.04 MG/DL (ref 0.66–1.25)
GFR SERPL CREATININE-BSD FRML MDRD: 69 ML/MIN/1.7M2
GLUCOSE SERPL-MCNC: 84 MG/DL (ref 70–99)
HDLC SERPL-MCNC: 43 MG/DL
LDLC SERPL CALC-MCNC: 66 MG/DL
NONHDLC SERPL-MCNC: 89 MG/DL
POTASSIUM SERPL-SCNC: 4.2 MMOL/L (ref 3.4–5.3)
PROT SERPL-MCNC: 7.6 G/DL (ref 6.8–8.8)
SODIUM SERPL-SCNC: 139 MMOL/L (ref 133–144)
TRIGL SERPL-MCNC: 113 MG/DL

## 2018-08-30 PROCEDURE — 80061 LIPID PANEL: CPT | Performed by: NURSE PRACTITIONER

## 2018-08-30 PROCEDURE — 80053 COMPREHEN METABOLIC PANEL: CPT | Performed by: NURSE PRACTITIONER

## 2018-08-30 PROCEDURE — 36415 COLL VENOUS BLD VENIPUNCTURE: CPT | Performed by: NURSE PRACTITIONER

## 2018-09-17 DIAGNOSIS — E78.5 HYPERLIPIDEMIA LDL GOAL <100: ICD-10-CM

## 2018-09-17 NOTE — TELEPHONE ENCOUNTER
"Requested Prescriptions   Pending Prescriptions Disp Refills     rosuvastatin (CRESTOR) 40 MG tablet 90 tablet 1     Sig: Take 1 tablet (40 mg) by mouth daily    Statins Protocol Passed    9/17/2018 11:19 AM       Passed - LDL on file in past 12 months    Recent Labs   Lab Test  08/30/18   0756   LDL  66            Passed - No abnormal creatine kinase in past 12 months    No lab results found.            Passed - Recent (12 mo) or future (30 days) visit within the authorizing provider's specialty    Patient had office visit in the last 12 months or has a visit in the next 30 days with authorizing provider or within the authorizing provider's specialty.  See \"Patient Info\" tab in inbasket, or \"Choose Columns\" in Meds & Orders section of the refill encounter.           Passed - Patient is age 18 or older      rosuvastatin (CRESTOR) 40 MG tablet  Last Written Prescription Date:  7/12/18  Last Fill Quantity: 90,  # refills: 1   Last office visit: 7/12/2018 with prescribing provider:  Dr. Gandhi   Future Office Visit:      "

## 2018-09-18 RX ORDER — ROSUVASTATIN CALCIUM 40 MG/1
40 TABLET, COATED ORAL DAILY
Qty: 90 TABLET | Refills: 1
Start: 2018-09-18

## 2018-09-18 NOTE — TELEPHONE ENCOUNTER
90 day supply with 1 refills sent on 7/12/18. Should have refill on file at pharmacy. Too soon to refill.     Belle Haq RN, BSN

## 2019-01-21 DIAGNOSIS — I10 ESSENTIAL HYPERTENSION WITH GOAL BLOOD PRESSURE LESS THAN 130/80: ICD-10-CM

## 2019-01-21 NOTE — TELEPHONE ENCOUNTER
"Requested Prescriptions   Pending Prescriptions Disp Refills     isosorbide mononitrate (IMDUR) 30 MG 24 hr tablet 90 tablet 1     Sig: Take 1 tablet (30 mg) by mouth every morning    Nitrates Passed - 1/21/2019  8:09 AM       Passed - Blood pressure under 140/90 in past 12 months    BP Readings from Last 3 Encounters:   07/12/18 124/80   06/22/18 124/82   06/11/18 144/72                Passed - Pt is not on erectile dysfunction medications       Passed - Recent (12 mo) or future (30 days) visit within the authorizing provider's specialty    Patient had office visit in the last 12 months or has a visit in the next 30 days with authorizing provider or within the authorizing provider's specialty.  See \"Patient Info\" tab in inbasket, or \"Choose Columns\" in Meds & Orders section of the refill encounter.             Passed - Medication is active on med list       Passed - Patient is age 18 or older        isosorbide mononitrate (IMDUR) 30 MG 24 hr tablet  Last Written Prescription Date:  7/12/18  Last Fill Quantity: 90,  # refills: 1   Last office visit: 7/12/2018 with prescribing provider:  Viki Bishop   Future Office Visit:      "

## 2019-01-24 RX ORDER — ISOSORBIDE MONONITRATE 30 MG/1
30 TABLET, EXTENDED RELEASE ORAL EVERY MORNING
Qty: 90 TABLET | Refills: 1 | Status: SHIPPED | OUTPATIENT
Start: 2019-01-24 | End: 2019-03-28

## 2019-01-24 NOTE — TELEPHONE ENCOUNTER
Prescription approved per [facility] Refill Protocol.    Marta Vides RN  [facility]

## 2019-03-26 ENCOUNTER — TELEPHONE (OUTPATIENT)
Dept: FAMILY MEDICINE | Facility: CLINIC | Age: 84
End: 2019-03-26

## 2019-03-26 NOTE — TELEPHONE ENCOUNTER
Reason for Call:  Other     Detailed comments: Please call back and advise. Do I need lab work before I see you on this Thursday. Please leave a message.    Phone Number Patient can be reached at: Cell number on file:    Telephone Information:   Mobile [phone]     Best Time: any    Can we leave a detailed message on this number? YES    Call taken on 3/26/2019 at 10:34 AM by Bety England

## 2019-03-26 NOTE — TELEPHONE ENCOUNTER
His cholesterol was stable last August 2018-it is next due August 2019.  He does not need fasting labs, he can do labs when he comes to the appointment.  We will just rechecking his kidney function-which he does not need to be fasting for.  AMRIA DEL CARMEN Weems, NP-C  [facility]

## 2019-03-28 ENCOUNTER — OFFICE VISIT (OUTPATIENT)
Dept: FAMILY MEDICINE | Facility: CLINIC | Age: 84
End: 2019-03-28
Payer: MEDICARE

## 2019-03-28 VITALS
WEIGHT: 191.9 LBS | HEIGHT: 71 IN | BODY MASS INDEX: 26.86 KG/M2 | TEMPERATURE: 97.6 F | DIASTOLIC BLOOD PRESSURE: 77 MMHG | SYSTOLIC BLOOD PRESSURE: 136 MMHG | OXYGEN SATURATION: 96 % | HEART RATE: 54 BPM

## 2019-03-28 DIAGNOSIS — I25.10 CORONARY ARTERY DISEASE INVOLVING NATIVE CORONARY ARTERY OF NATIVE HEART WITHOUT ANGINA PECTORIS: ICD-10-CM

## 2019-03-28 DIAGNOSIS — I21.3 ST ELEVATION MYOCARDIAL INFARCTION (STEMI), UNSPECIFIED ARTERY (H): ICD-10-CM

## 2019-03-28 DIAGNOSIS — E78.5 HYPERLIPIDEMIA LDL GOAL <100: ICD-10-CM

## 2019-03-28 DIAGNOSIS — I10 ESSENTIAL HYPERTENSION WITH GOAL BLOOD PRESSURE LESS THAN 130/80: Primary | ICD-10-CM

## 2019-03-28 LAB
ANION GAP SERPL CALCULATED.3IONS-SCNC: 6 MMOL/L (ref 3–14)
BUN SERPL-MCNC: 20 MG/DL (ref 7–30)
CALCIUM SERPL-MCNC: 9.2 MG/DL (ref 8.5–10.1)
CHLORIDE SERPL-SCNC: 105 MMOL/L (ref 94–109)
CO2 SERPL-SCNC: 28 MMOL/L (ref 20–32)
CREAT SERPL-MCNC: 1.04 MG/DL (ref 0.66–1.25)
GFR SERPL CREATININE-BSD FRML MDRD: 67 ML/MIN/{1.73_M2}
GLUCOSE SERPL-MCNC: 90 MG/DL (ref 70–99)
POTASSIUM SERPL-SCNC: 4.3 MMOL/L (ref 3.4–5.3)
SODIUM SERPL-SCNC: 139 MMOL/L (ref 133–144)

## 2019-03-28 PROCEDURE — 80048 BASIC METABOLIC PNL TOTAL CA: CPT | Performed by: NURSE PRACTITIONER

## 2019-03-28 PROCEDURE — 36415 COLL VENOUS BLD VENIPUNCTURE: CPT | Performed by: NURSE PRACTITIONER

## 2019-03-28 PROCEDURE — 99214 OFFICE O/P EST MOD 30 MIN: CPT | Performed by: NURSE PRACTITIONER

## 2019-03-28 RX ORDER — ISOSORBIDE MONONITRATE 30 MG/1
30 TABLET, EXTENDED RELEASE ORAL EVERY MORNING
Qty: 90 TABLET | Refills: 1 | Status: SHIPPED | OUTPATIENT
Start: 2019-03-28 | End: 2019-07-17

## 2019-03-28 RX ORDER — ROSUVASTATIN CALCIUM 40 MG/1
40 TABLET, COATED ORAL DAILY
Qty: 90 TABLET | Refills: 1 | Status: SHIPPED | OUTPATIENT
Start: 2019-03-28 | End: 2019-07-17

## 2019-03-28 RX ORDER — METOPROLOL SUCCINATE 25 MG/1
25 TABLET, EXTENDED RELEASE ORAL DAILY
Qty: 90 TABLET | Refills: 1 | Status: SHIPPED | OUTPATIENT
Start: 2019-03-28 | End: 2019-07-17

## 2019-03-28 ASSESSMENT — MIFFLIN-ST. JEOR: SCORE: 1597.58

## 2019-03-28 ASSESSMENT — PAIN SCALES - GENERAL: PAINLEVEL: NO PAIN (0)

## 2019-03-28 NOTE — PROGRESS NOTES
"  SUBJECTIVE:   Tima Mckenzie is a 83 year old male who presents to clinic today for the following health issues:      Hyperlipidemia Follow-Up      Rate your low fat/cholesterol diet?: good    Taking statin?  Yes, no muscle aches from statin    Other lipid medications/supplements?:  None    At night hips and knees hurt, when he 'forgets' to take his medication they don't hurt. No change in pain in years. Stable labs, repeat in 6 months.     Hypertension Follow-up      Outpatient blood pressures are not being checked.    Low Salt Diet: no added salt    Amount of exercise or physical activity: 4-5 days/week for an average of 45-60 minutes    Problems taking medications regularly: No    Medication side effects: none    Diet: low salt    Hx of MI, HTN, CAD.  HR is in 50s, no change from last visit. No chest pain no SOB. A tiny dizziness. Few months ago, thought he had a minor stroke, off balanced to the left, dizzy after getting up quickly. That has all since passed.  Highly encouraged patient next time this occurs to go to the emergency room.  He reports \"what would they do with me?\"  Patient should follow-up in ER if that occurs again, they can also do some scanning and make sure everything is stable-especially since he has had history of MI.  Patient did not seem convinced that he should go if this occurs again.  Plays tennis daily, no dizziness. Will check BMP today.       Concern - Lump on right index finger  Onset: about a year    Description:   Lump appeared about a year ago on right index finger next to the knuckle. States no pain when pressing on the lump, however, the knuckle hurts when pressing or putting pressure on knuckle. States he has tried pressing really hard on the lump and it would flattened but lump will appear 15-20 mins later.       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  If starts causing pain by provider Denise do surgical consult.  At this time appears " benign        Zyrtec once in a while.   Not taking calcium vit D recently.  Going on Eco-Vacay then BringShare at the end in May.       Problem list and histories reviewed & adjusted, as indicated.  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     Seasonal allergic rhinitis     Bilateral inguinal hernia     Senile nuclear sclerosis     Posterior vitreous detachment of both eyes     Essential hypertension with goal blood pressure less than 130/80     Seasonal allergic rhinitis, unspecified allergic rhinitis trigger     Coronary artery disease involving native coronary artery of native heart without angina pectoris     Past Surgical History:   Procedure Laterality Date     ANGIOGRAM  4/30/2003    No Disease, [place],LAD,SVG,OM1- small LT Main, Occlusion of CX- D1 50-70% Stenosis, RCA 30-80%     COLONOSCOPY       COLONOSCOPY N/A 10/14/2014    Procedure: COMBINED COLONOSCOPY, SINGLE BIOPSY/POLYPECTOMY BY BIOPSY;  Surgeon: Konstantin Coates MD;  Location: [facility] OR     COLONOSCOPY WITH CO2 INSUFFLATION N/A 10/14/2014    Procedure: COLONOSCOPY WITH CO2 INSUFFLATION;  Surgeon: Konstantin Coates MD;  Location: [facility] OR     CORONARY ARTERY BYPASS  1997    X 3     HERNIA REPAIR  2014    [facility].       Social History     Tobacco Use     Smoking status: Former Smoker     Smokeless tobacco: Never Used     Tobacco comment: 1997   Substance Use Topics     Alcohol use: Yes     Comment: rarely      History reviewed. No pertinent family history.      Current Outpatient Medications   Medication Sig Dispense Refill     aspirin 81 MG tablet Take 1 tablet by mouth daily. *.   3     calcium-vitamin D (CALTRATE) 600-400 MG-UNIT per tablet Take 1 tablet by  "mouth 2 times daily. 100 tablet 11     cetirizine (ZYRTEC) 10 MG tablet Take 1 tablet (10 mg) by mouth every evening 90 tablet 3     Fexofenadine HCl (ALLEGRA PO)        isosorbide mononitrate (IMDUR) 30 MG 24 hr tablet Take 1 tablet (30 mg) by mouth every morning 90 tablet 1     metoprolol succinate ER (TOPROL-XL) 25 MG 24 hr tablet Take 1 tablet (25 mg) by mouth daily 90 tablet 1     mometasone (NASONEX) 50 MCG/ACT spray Spray 2 sprays into both nostrils daily 1 Box 3     nitroglycerin (NITROSTAT) 0.4 MG sublingual tablet Place 1 tablet (0.4 mg) under the tongue every 5 minutes as needed up to 3 tablets per episode. 25 tablet 3     NIZORAL EX 2.5mg apply as needed        rosuvastatin (CRESTOR) 40 MG tablet Take 1 tablet (40 mg) by mouth daily 90 tablet 1     Allergies   Allergen Reactions     No Known Drug Allergy      Seasonal Allergies        Reviewed and updated as needed this visit by clinical staff  Tobacco  Allergies  Meds  Problems  Med Hx  Surg Hx  Fam Hx  Soc Hx        Reviewed and updated as needed this visit by Provider  Tobacco  Allergies  Meds  Problems  Med Hx  Surg Hx  Fam Hx         ROS:  Constitutional, HEENT, cardiovascular-as above,  pulmonary, gi and gu systems are negative, except as otherwise noted.    OBJECTIVE:     /77 (BP Location: Left arm, Patient Position: Chair, Cuff Size: Adult Regular)   Pulse 54   Temp 97.6  F (36.4  C) (Tympanic)   Ht 1.803 m (5' 11\")   Wt 87 kg (191 lb 14.4 oz)   SpO2 96%   BMI 26.76 kg/m    Body mass index is 26.76 kg/m .  GENERAL: healthy, alert and no distress  EYES: Eyes grossly normal to inspection, PERRL and conjunctivae and sclerae normal  HENT: ear canals and TM's normal, nose and mouth without ulcers or lesions  NECK: no adenopathy, no asymmetry, masses, or scars and thyroid normal to palpation  RESP: lungs clear to auscultation - no rales, rhonchi or wheezes  CV: regular rate and rhythm, normal S1 S2, no S3 or S4, faint murmur " "noted, click or rub, no peripheral edema   ABDOMEN: soft, nontender, no hepatosplenomegaly, no masses and bowel sounds normal  MS: no gross musculoskeletal defects noted    Diagnostic Test Results:  No results found for this or any previous visit (from the past 24 hour(s)).    ASSESSMENT/PLAN:     Hyperlipidemia; controlled   Plan:  No changes in the patient's current treatment plan    Hypertension; controlled   Associated with the following complications:    None   Plan:  No changes in the patient's current treatment plan          1. Essential hypertension with goal blood pressure less than 130/80  Blood pressure heart rate are stable.  Continue same medications follow-up in 4 months-thought noted heart murmur. Sent patient letter to return in 4 months, not 6 like originally suggested.   - isosorbide mononitrate (IMDUR) 30 MG 24 hr tablet; Take 1 tablet (30 mg) by mouth every morning  Dispense: 90 tablet; Refill: 1  - metoprolol succinate ER (TOPROL-XL) 25 MG 24 hr tablet; Take 1 tablet (25 mg) by mouth daily  Dispense: 90 tablet; Refill: 1  - Basic metabolic panel  - **Comprehensive metabolic panel FUTURE anytime; Future    2. Hyperlipidemia LDL goal <100  Stable.  Follow-up in 4 months with fasting labs.  - rosuvastatin (CRESTOR) 40 MG tablet; Take 1 tablet (40 mg) by mouth daily  Dispense: 90 tablet; Refill: 1  - Lipid panel reflex to direct LDL Fasting; Future    3. ST elevation myocardial infarction (STEMI), unspecified artery (H)  This was 20 years ago.  Things have been stable since.    4. Coronary artery disease involving native coronary artery of native heart without angina pectoris  Patient reported some dizziness and possible \"TIA\".  Highly advised patient  This occurs to go to the emergency room.  He did not seem very convinced that this is what he should do.  He has zero symptoms since that occurred.      FUTURE APPOINTMENTS:       - Follow-up visit in 4 months, fasting labs prior    MARIA DEL CARMEN Weems, " NP-C  [facility]    This chart was documented by provider using a voice activated software called Dragon in addition to manual typing. There may be vocabulary errors or other grammatical errors due to this.

## 2019-03-28 NOTE — LETTER
[facility]  [address] [place] [address]  [id number]  [phone]    March 29, 2019      Tima Mckenzie  [address] [facility] [address]          Mr. Mckenzie,     Your kidney function was normal.  I did forget to note that I thought I heard a very slight heart murmur.  I recommend returning in about 4 months (around July) instead of 6 months so we can follow-up on that.  Certainly go to the emergency room if you feel like you are having a stroke or TIA again as discussed.  Please call if you have any questions.     Please contact the clinic if you have additional questions.  Thank you.     Sincerely,     MARIA DEL CARMEN Weems, NP-C   [facility]

## 2019-06-24 DIAGNOSIS — I10 ESSENTIAL HYPERTENSION WITH GOAL BLOOD PRESSURE LESS THAN 130/80: ICD-10-CM

## 2019-06-24 NOTE — TELEPHONE ENCOUNTER
"Requested Prescriptions   Pending Prescriptions Disp Refills     metoprolol succinate ER (TOPROL-XL) 25 MG 24 hr tablet  Last Written Prescription Date:  3/28/19  Last Fill Quantity: 90 tablet,  # refills: 1   Last office visit: 3/28/2019 with prescribing provider:  Viki Bishop NP   Future Office Visit:     90 tablet 1     Sig: Take 1 tablet (25 mg) by mouth daily       Beta-Blockers Protocol Passed - 6/24/2019 10:56 AM        Passed - Blood pressure under 140/90 in past 12 months     BP Readings from Last 3 Encounters:   03/28/19 136/77   07/12/18 124/80   06/22/18 124/82                 Passed - Patient is age 6 or older        Passed - Recent (12 mo) or future (30 days) visit within the authorizing provider's specialty     Patient had office visit in the last 12 months or has a visit in the next 30 days with authorizing provider or within the authorizing provider's specialty.  See \"Patient Info\" tab in inbasket, or \"Choose Columns\" in Meds & Orders section of the refill encounter.              Passed - Medication is active on med list          "

## 2019-06-26 RX ORDER — METOPROLOL SUCCINATE 25 MG/1
25 TABLET, EXTENDED RELEASE ORAL DAILY
Qty: 90 TABLET | Refills: 1 | OUTPATIENT
Start: 2019-06-26

## 2019-07-17 ENCOUNTER — OFFICE VISIT (OUTPATIENT)
Dept: FAMILY MEDICINE | Facility: CLINIC | Age: 84
End: 2019-07-17
Payer: MEDICARE

## 2019-07-17 VITALS
RESPIRATION RATE: 17 BRPM | TEMPERATURE: 97.7 F | HEIGHT: 71 IN | DIASTOLIC BLOOD PRESSURE: 70 MMHG | BODY MASS INDEX: 26.6 KG/M2 | HEART RATE: 58 BPM | OXYGEN SATURATION: 97 % | WEIGHT: 190 LBS | SYSTOLIC BLOOD PRESSURE: 117 MMHG

## 2019-07-17 DIAGNOSIS — I35.0 AORTIC VALVE STENOSIS, ETIOLOGY OF CARDIAC VALVE DISEASE UNSPECIFIED: ICD-10-CM

## 2019-07-17 DIAGNOSIS — E78.5 HYPERLIPIDEMIA LDL GOAL <100: ICD-10-CM

## 2019-07-17 DIAGNOSIS — Z12.5 SCREENING FOR PROSTATE CANCER: ICD-10-CM

## 2019-07-17 DIAGNOSIS — I10 ESSENTIAL HYPERTENSION WITH GOAL BLOOD PRESSURE LESS THAN 130/80: Primary | ICD-10-CM

## 2019-07-17 DIAGNOSIS — R09.81 NASAL CONGESTION: ICD-10-CM

## 2019-07-17 DIAGNOSIS — I25.10 CORONARY ARTERY DISEASE INVOLVING NATIVE CORONARY ARTERY OF NATIVE HEART WITHOUT ANGINA PECTORIS: ICD-10-CM

## 2019-07-17 DIAGNOSIS — R01.1 UNDIAGNOSED CARDIAC MURMURS: ICD-10-CM

## 2019-07-17 PROCEDURE — 99214 OFFICE O/P EST MOD 30 MIN: CPT | Performed by: NURSE PRACTITIONER

## 2019-07-17 RX ORDER — ISOSORBIDE MONONITRATE 30 MG/1
30 TABLET, EXTENDED RELEASE ORAL EVERY MORNING
Qty: 90 TABLET | Refills: 1 | Status: SHIPPED | OUTPATIENT
Start: 2019-07-17 | End: 2019-12-18

## 2019-07-17 RX ORDER — ROSUVASTATIN CALCIUM 40 MG/1
40 TABLET, COATED ORAL DAILY
Qty: 90 TABLET | Refills: 1 | Status: SHIPPED | OUTPATIENT
Start: 2019-07-17 | End: 2019-12-18

## 2019-07-17 RX ORDER — METOPROLOL SUCCINATE 25 MG/1
25 TABLET, EXTENDED RELEASE ORAL DAILY
Qty: 90 TABLET | Refills: 1 | Status: SHIPPED | OUTPATIENT
Start: 2019-07-17 | End: 2019-12-18

## 2019-07-17 RX ORDER — MOMETASONE FUROATE MONOHYDRATE 50 UG/1
2 SPRAY, METERED NASAL DAILY
Qty: 1 BOX | Refills: 3 | Status: CANCELLED | OUTPATIENT
Start: 2019-07-17

## 2019-07-17 RX ORDER — NITROGLYCERIN 0.4 MG/1
0.4 TABLET SUBLINGUAL EVERY 5 MIN PRN
Qty: 25 TABLET | Refills: 3 | Status: SHIPPED | OUTPATIENT
Start: 2019-07-17 | End: 2020-05-21

## 2019-07-17 ASSESSMENT — MIFFLIN-ST. JEOR: SCORE: 1583.96

## 2019-07-17 ASSESSMENT — PAIN SCALES - GENERAL: PAINLEVEL: NO PAIN (0)

## 2019-07-17 NOTE — PROGRESS NOTES
Subjective     Tima Mckenzie is a 84 year old male who presents to clinic today for the following health issues:    HPI   Hyperlipidemia Follow-Up      Are you having any of the following symptoms? (Select all that apply)  Shortness of breath only when exercising    Are you regularly taking any medication or supplement to lower your cholesterol?   Yes- Crestor    Are you having muscle aches or other side effects that you think could be caused by your cholesterol lowering medication?  Yes- at night     Eating healthy. Plays tennis 3x/week and otherwise 30 min on treadmill.    Hypertension Follow-up      Do you check your blood pressure regularly outside of the clinic? No     Are you following a low salt diet? Yes    Are your blood pressures ever more than 140 on the top number (systolic) OR more   than 90 on the bottom number (diastolic), for example 140/90? No       Amount of exercise or physical activity: 4-5 days/week for an average of 30-90 minutes    Problems taking medications regularly: No    Medication side effects: muscle aches    Diet: regular (no restrictions)    Blood pressure stable today     Hx of MI, HTN, CAD.  HR is in 50s, no change from last visit. 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     Seasonal allergic rhinitis     Bilateral inguinal hernia     Senile nuclear sclerosis     Posterior vitreous detachment of both eyes     Essential hypertension with goal blood pressure less than 130/80     Seasonal allergic rhinitis, unspecified allergic rhinitis trigger     Coronary artery disease involving native coronary artery of native heart without angina pectoris     Past Surgical History:   Procedure Laterality Date     ANGIOGRAM  4/30/2003     No Disease, [place],LAD,SVG,OM1- small LT Main, Occlusion of CX- D1 50-70% Stenosis, RCA 30-80%     COLONOSCOPY       COLONOSCOPY N/A 10/14/2014    Procedure: COMBINED COLONOSCOPY, SINGLE BIOPSY/POLYPECTOMY BY BIOPSY;  Surgeon: Konstantin Coates MD;  Location: [facility] OR     COLONOSCOPY WITH CO2 INSUFFLATION N/A 10/14/2014    Procedure: COLONOSCOPY WITH CO2 INSUFFLATION;  Surgeon: Konstantin Coates MD;  Location: [facility] OR     CORONARY ARTERY BYPASS  1997    X 3     HERNIA REPAIR  2014    [facility].       Social History     Tobacco Use     Smoking status: Former Smoker     Smokeless tobacco: Never Used     Tobacco comment: 1997   Substance Use Topics     Alcohol use: Yes     Comment: rarely      History reviewed. No pertinent family history.      Current Outpatient Medications   Medication Sig Dispense Refill     aspirin 81 MG tablet Take 1 tablet by mouth daily. *.   3     calcium-vitamin D (CALTRATE) 600-400 MG-UNIT per tablet Take 1 tablet by mouth 2 times daily. 100 tablet 11     cetirizine (ZYRTEC) 10 MG tablet Take 1 tablet (10 mg) by mouth every evening 90 tablet 3     Fexofenadine HCl (ALLEGRA PO)        isosorbide mononitrate (IMDUR) 30 MG 24 hr tablet Take 1 tablet (30 mg) by mouth every morning 90 tablet 1     metoprolol succinate ER (TOPROL-XL) 25 MG 24 hr tablet Take 1 tablet (25 mg) by mouth daily 90 tablet 1     mometasone (NASONEX) 50 MCG/ACT spray Spray 2 sprays into both nostrils daily 1 Box 3     nitroGLYcerin (NITROSTAT) 0.4 MG sublingual tablet Place 1 tablet (0.4 mg) under the tongue every 5 minutes as needed for chest pain up to 3 tablets per episode. 25 tablet 3     NIZORAL EX 2.5mg apply as needed        rosuvastatin (CRESTOR) 40 MG tablet Take 1 tablet (40 mg) by mouth daily 90 tablet 1     Allergies   Allergen Reactions     No Known Drug Allergy      Seasonal Allergies        Reviewed and updated as needed this visit by Provider  Tobacco  Allergies  Meds  Problems  Med Hx  Surg  "Hx  Fam Hx         Review of Systems   ROS COMP: Constitutional, HEENT, cardiovascular, pulmonary, gi and gu systems are negative, except as otherwise noted.      Objective    /70 (BP Location: Right arm, Patient Position: Chair, Cuff Size: Adult Large)   Pulse 58   Temp 97.7  F (36.5  C) (Oral)   Resp 17   Ht 1.803 m (5' 11\")   Wt 86.2 kg (190 lb)   SpO2 97%   BMI 26.50 kg/m    Body mass index is 26.5 kg/m .  Physical Exam   GENERAL: healthy, alert and no distress  EYES: Eyes grossly normal to inspection, PERRL and conjunctivae and sclerae normal  HENT: ear canals and TM's normal, nose and mouth without ulcers or lesions  NECK: no adenopathy, no asymmetry, masses, or scars and thyroid normal to palpation  RESP: lungs clear to auscultation - no rales, rhonchi or wheezes  CV: bradycardia rate and rhythm, normal S1 S2, no S3 or S4, slight murmur noted, no click or rub, no peripheral edema  ABDOMEN: soft, nontender, no hepatosplenomegaly, no masses and bowel sounds normal  MS: no gross musculoskeletal defects noted  NEURO: Normal strength and tone, mentation intact and speech normal    Diagnostic Test Results:  Labs reviewed in Epic  No results found for this or any previous visit (from the past 24 hour(s)).        Assessment & Plan     1. Essential hypertension with goal blood pressure less than 130/80  Blood pressures stable, refills given.  Could consider cutting metoprolol in half in the future  - isosorbide mononitrate (IMDUR) 30 MG 24 hr tablet; Take 1 tablet (30 mg) by mouth every morning  Dispense: 90 tablet; Refill: 1  - metoprolol succinate ER (TOPROL-XL) 25 MG 24 hr tablet; Take 1 tablet (25 mg) by mouth daily  Dispense: 90 tablet; Refill: 1  - **Comprehensive metabolic panel FUTURE anytime; Future    2. Undiagnosed cardiac murmurs  Noted very faint murmur today.  Follow-up with echo, will send patient results. may need to consider cardiology referral based off of heart history with MI and " "coronary artery disease.   - Echocardiogram Complete; Future    3. Nasal congestion  Does not need refills of nasonex    4. Coronary artery disease involving native coronary artery of native heart without angina pectoris  Refill given  - nitroGLYcerin (NITROSTAT) 0.4 MG sublingual tablet; Place 1 tablet (0.4 mg) under the tongue every 5 minutes as needed for chest pain up to 3 tablets per episode.  Dispense: 25 tablet; Refill: 3    5. Hyperlipidemia LDL goal <100  Refill given, is going to return for fasting labs  - rosuvastatin (CRESTOR) 40 MG tablet; Take 1 tablet (40 mg) by mouth daily  Dispense: 90 tablet; Refill: 1  - Lipid panel reflex to direct LDL Fasting; Future    6. Screening for prostate cancer  Has not had a PSA checked in a while, will screen today  - PSA, screen; Future    Addend:  7.  Aortic valve stenosis  Noted to be moderate on echo, also noted severe aortic valve calcifications.  Will have patient follow-up with cardiology sooner than later     BMI:   Estimated body mass index is 26.5 kg/m  as calculated from the following:    Height as of this encounter: 1.803 m (5' 11\").    Weight as of this encounter: 86.2 kg (190 lb).         Return in about 6 months (around 1/17/2020) for Routine Visit.  Or return based off of ECHO results    MARIA DEL CARMEN Weems, NP-C  [facility]    This chart was documented by provider using a voice activated software called Dragon in addition to manual typing. There may be vocabulary errors or other grammatical errors due to this.       "

## 2019-07-25 DIAGNOSIS — R01.1 UNDIAGNOSED CARDIAC MURMURS: ICD-10-CM

## 2019-07-25 DIAGNOSIS — I10 ESSENTIAL HYPERTENSION WITH GOAL BLOOD PRESSURE LESS THAN 130/80: ICD-10-CM

## 2019-07-25 DIAGNOSIS — E78.5 HYPERLIPIDEMIA LDL GOAL <100: ICD-10-CM

## 2019-07-25 DIAGNOSIS — Z12.5 SCREENING FOR PROSTATE CANCER: ICD-10-CM

## 2019-07-25 LAB
ALBUMIN SERPL-MCNC: 3.9 G/DL (ref 3.4–5)
ALP SERPL-CCNC: 60 U/L (ref 40–150)
ALT SERPL W P-5'-P-CCNC: 25 U/L (ref 0–70)
ANION GAP SERPL CALCULATED.3IONS-SCNC: 5 MMOL/L (ref 3–14)
AST SERPL W P-5'-P-CCNC: 26 U/L (ref 0–45)
BILIRUB SERPL-MCNC: 0.6 MG/DL (ref 0.2–1.3)
BUN SERPL-MCNC: 19 MG/DL (ref 7–30)
CALCIUM SERPL-MCNC: 9.2 MG/DL (ref 8.5–10.1)
CHLORIDE SERPL-SCNC: 106 MMOL/L (ref 94–109)
CHOLEST SERPL-MCNC: 125 MG/DL
CO2 SERPL-SCNC: 28 MMOL/L (ref 20–32)
CREAT SERPL-MCNC: 1.01 MG/DL (ref 0.66–1.25)
GFR SERPL CREATININE-BSD FRML MDRD: 69 ML/MIN/{1.73_M2}
GLUCOSE SERPL-MCNC: 80 MG/DL (ref 70–99)
HDLC SERPL-MCNC: 49 MG/DL
LDLC SERPL CALC-MCNC: 60 MG/DL
NONHDLC SERPL-MCNC: 76 MG/DL
POTASSIUM SERPL-SCNC: 4.2 MMOL/L (ref 3.4–5.3)
PROT SERPL-MCNC: 7.3 G/DL (ref 6.8–8.8)
PSA SERPL-ACNC: 1.18 UG/L (ref 0–4)
SODIUM SERPL-SCNC: 139 MMOL/L (ref 133–144)
TRIGL SERPL-MCNC: 80 MG/DL

## 2019-07-25 PROCEDURE — 36415 COLL VENOUS BLD VENIPUNCTURE: CPT | Performed by: NURSE PRACTITIONER

## 2019-07-25 PROCEDURE — 80061 LIPID PANEL: CPT | Performed by: NURSE PRACTITIONER

## 2019-07-25 PROCEDURE — G0103 PSA SCREENING: HCPCS | Performed by: NURSE PRACTITIONER

## 2019-07-25 PROCEDURE — 80053 COMPREHEN METABOLIC PANEL: CPT | Performed by: NURSE PRACTITIONER

## 2019-07-25 NOTE — LETTER
[address]  [address]  [phone]    July 25, 2019      Tima Mckenzie  [address] [facility] [address]      Mr. Mckenzie,     All of your labs were normal for you.     Please contact the clinic if you have additional questions.  Thank you.     Sincerely,     MARIA DEL CARMEN Weems, NP-C   [facility]

## 2019-07-30 ENCOUNTER — ANCILLARY PROCEDURE (OUTPATIENT)
Dept: CARDIOLOGY | Facility: CLINIC | Age: 84
End: 2019-07-30
Attending: NURSE PRACTITIONER
Payer: MEDICARE

## 2019-07-30 PROCEDURE — 93306 TTE W/DOPPLER COMPLETE: CPT | Performed by: INTERNAL MEDICINE

## 2019-08-01 ENCOUNTER — OFFICE VISIT (OUTPATIENT)
Dept: CARDIOLOGY | Facility: CLINIC | Age: 84
End: 2019-08-01
Attending: NURSE PRACTITIONER
Payer: MEDICARE

## 2019-08-01 VITALS
BODY MASS INDEX: 26.52 KG/M2 | HEIGHT: 71 IN | WEIGHT: 189.4 LBS | HEART RATE: 57 BPM | DIASTOLIC BLOOD PRESSURE: 65 MMHG | OXYGEN SATURATION: 97 % | SYSTOLIC BLOOD PRESSURE: 120 MMHG

## 2019-08-01 DIAGNOSIS — I25.10 CORONARY ARTERY DISEASE INVOLVING NATIVE CORONARY ARTERY OF NATIVE HEART WITHOUT ANGINA PECTORIS: Primary | ICD-10-CM

## 2019-08-01 DIAGNOSIS — I35.0 NONRHEUMATIC AORTIC VALVE STENOSIS: ICD-10-CM

## 2019-08-01 DIAGNOSIS — E78.5 HYPERLIPIDEMIA LDL GOAL <100: ICD-10-CM

## 2019-08-01 PROCEDURE — 93000 ELECTROCARDIOGRAM COMPLETE: CPT | Performed by: INTERNAL MEDICINE

## 2019-08-01 PROCEDURE — 99204 OFFICE O/P NEW MOD 45 MIN: CPT | Performed by: INTERNAL MEDICINE

## 2019-08-01 ASSESSMENT — PAIN SCALES - GENERAL: PAINLEVEL: NO PAIN (0)

## 2019-08-01 ASSESSMENT — MIFFLIN-ST. JEOR: SCORE: 1581.24

## 2019-08-01 NOTE — NURSING NOTE
"Tima Mckenzie's goals for this visit include:   Chief Complaint   Patient presents with     Consult     aortic valve stenosis, etiology of cardiac valve disease       He requests these members of his care team be copied on today's visit information: no    PCP: Julieta Gandhi    Referring Provider:  Viki Bishop NP  [address] [place] [address]    /65 (BP Location: Left arm, Patient Position: Sitting, Cuff Size: Adult Regular)   Pulse 57   Ht 1.803 m (5' 11\")   Wt 85.9 kg (189 lb 6.4 oz)   SpO2 97%   BMI 26.42 kg/m      Do you need any medication refills at today's visit? no    "

## 2019-08-01 NOTE — PROGRESS NOTES
Chief complaint: aortic stenosis, establish care    HPI:   Tima Mckenzie is a 84 year old male with history of CAD s/p 3vCABG (LIMA-LAD SVG-OM1-?) 1997 referred for management of newly-diagnosed aortic stenosis.    He has history of a 3vCABG in [place], [place] in 1997 ([facility] Operative report is not available, but our EMR and records from [facility] report LIMA-LAD and SVG-OM; Mr. Mckenzie reports that his SVG bypassed 2 vessels (possibly a jump graft.) He had a single episode of chest pain shortly after moving to [place] in 2003 and was admittted to [facility], where he had a stress test which was normal.    He was at a primary care visit and was noted to have a systolic murmur prompting TTE which showed moderate aortic stenosis, prompting referral for further management.    He has excellent functional capacity and plays tennis 3 days per week without difficulty. He does admit that he has slowed down slightly over the past several years, but reports that his exertional capacity has been relatively constant for the past several years. He specifically denies chest pain/pressure/tightness, exertional lightheadedness, or exertional dyspnea.     ECG today shows: sinus bradycardia with leftward axis and nonspecific T-wave abnormality, VR 54,   QTc 420.     He denies any chest pain, dyspnea at rest or with exertion, PND, orthopnea, peripheral edema, palpitations, lightheadedness or syncope.       PAST MEDICAL HISTORY:  Past Medical History:   Diagnosis Date     Arthritis      BPH (benign prostatic hypertrophy)      Coronary artery disease      Diverticulosis      Hypercholesterolemia      Hypertension      MI (myocardial infarction) (H) 3/1997    Anterior Wall MI/LAD/OM1/SVG    CABG X 3     Neuropathy     RT Foot     Seasonal allergic rhinitis 7/1/2013       CURRENT MEDICATIONS:  Current Outpatient Medications   Medication Sig Dispense Refill     aspirin 81 MG tablet Take 1 tablet by  mouth daily. *.   3     calcium-vitamin D (CALTRATE) 600-400 MG-UNIT per tablet Take 1 tablet by mouth 2 times daily. 100 tablet 11     cetirizine (ZYRTEC) 10 MG tablet Take 1 tablet (10 mg) by mouth every evening 90 tablet 3     Fexofenadine HCl (ALLEGRA PO)        isosorbide mononitrate (IMDUR) 30 MG 24 hr tablet Take 1 tablet (30 mg) by mouth every morning 90 tablet 1     metoprolol succinate ER (TOPROL-XL) 25 MG 24 hr tablet Take 1 tablet (25 mg) by mouth daily 90 tablet 1     mometasone (NASONEX) 50 MCG/ACT spray Spray 2 sprays into both nostrils daily 1 Box 3     nitroGLYcerin (NITROSTAT) 0.4 MG sublingual tablet Place 1 tablet (0.4 mg) under the tongue every 5 minutes as needed for chest pain up to 3 tablets per episode. 25 tablet 3     NIZORAL EX 2.5mg apply as needed        rosuvastatin (CRESTOR) 40 MG tablet Take 1 tablet (40 mg) by mouth daily 90 tablet 1       PAST SURGICAL HISTORY:  Past Surgical History:   Procedure Laterality Date     ANGIOGRAM  4/30/2003    No Disease, Clemons,LAD,SVG,OM1- small LT Main, Occlusion of CX- D1 50-70% Stenosis, RCA 30-80%     COLONOSCOPY       COLONOSCOPY N/A 10/14/2014    Procedure: COMBINED COLONOSCOPY, SINGLE BIOPSY/POLYPECTOMY BY BIOPSY;  Surgeon: Konstantin Coates MD;  Location:  OR     COLONOSCOPY WITH CO2 INSUFFLATION N/A 10/14/2014    Procedure: COLONOSCOPY WITH CO2 INSUFFLATION;  Surgeon: Konstantin Coates MD;  Location: [facility] OR     CORONARY ARTERY BYPASS  1997    X 3     HERNIA REPAIR  2014    [facility]       ALLERGIES:     Allergies   Allergen Reactions     No Known Drug Allergy      Seasonal Allergies        FAMILY HISTORY:  Brother with fatal MI at 55.  No family history of premature CAD.    SOCIAL HISTORY:  Social History     Tobacco Use     Smoking status: Former Smoker     Smokeless tobacco: Never Used     Tobacco comment: 1997   Substance Use Topics     Alcohol use: Yes     Comment: rarely      Drug use: No       ROS:   A comprehensive 14  "point review of systems is negative other than as mentioned in HPI.    Exam:  /65 (BP Location: Left arm, Patient Position: Sitting, Cuff Size: Adult Regular)   Pulse 57   Ht 1.803 m (5' 11\")   Wt 85.9 kg (189 lb 6.4 oz)   SpO2 97%   BMI 26.42 kg/m    GENERAL APPEARANCE: healthy, alert and no distress  EYES: no icterus, no xanthelasmas  ENT: normal palate, mucosa moist, no central cyanosis  NECK: JVP not elevated  RESPIRATORY: lungs clear to auscultation - no rales, rhonchi or wheezes, no use of accessory muscles, no retractions, respirations are unlabored, normal respiratory rate  CARDIOVASCULAR: regular rhythm, normal S1 with physiologic split S2, no S3 or S4 +III/VI systolic murmur RUSB radiating to entire precordium.  GI: soft, non tender, bowel sounds normal,no abdominal bruits  EXTREMITIES: no edema, no bruits  NEURO: alert and oriented to person/place/time, normal speech, gait and affect  VASC: Radial, dorsalis pedis and posterior tibialis pulses 2+ bilaterally.  SKIN: no ecchymoses, no rashes.  PSYCH: cooperative, affect appropriate.     Labs:  Reviewed.     Testing/Procedures:  I personally visualized and interpreted:  TTE :   LVEF=55-60%. Basal inferolateral hypokinesis.  RV size/wall motion/thickness normal.  Severely calcified trileaflet aortic valve with moderate aortic stenosis (PV 3.2 m/s MG 22 mmHG DVI 0.26 JESS 1.27 cm2 SVI 51 mL/m2)  Mild MR.  Mild-mod LA enlargment. Mild RA enlargement.   RVSP 31+RA pressure.           Outside results of note:  Outside records from [facility]  were obtained, and relevant results/notes have been incorporated into HPI.    Assessment and Plan:   1. Moderate trileaflet aortic stenosis, new:  Asymptomatic, with TTE 7/25/2019 showing PV 3.2 m/s MG 22 mmHG DVI 0.26 JESS 1.27 cm2 SVI 51 mL/m2-- all consistent with moderate aortic stenosis.  I counseled him regarding the symptoms of aortic stenosis including angina, heart failure/exertional dyspnea, and " exertional lightheadedness/syncope.  He is not experiencing any of the symptoms but will contact us should any of these develop.  He will follow-up in 1 year with echocardiogram prior.  He is aware that he should contact us immediately should he develop any symptoms that would suggest progression of his aortic stenosis at which time, I would pursue repeat echocardiogram and if resting gradients still appears moderate, would consider either a calcium score or exercise echocardiogram to exclude occult severe stenosis.    2.  Coronary artery disease status post three-vessel coronary bypass graft without angina:  Patient is stably free of anginal symptoms with excellent capacity.  He should continue ASA 81 mg, rosuvastatin 40 mg, Toprol XL 25 mg daily, and Imdur 30 mg daily.    3. Hypertension, controlled: continue present management.  4. Hyperlipidemia, controlled: continue present management.     The patient states understanding and is agreeable with plan.     Artis Parker MD  Cardiology    CC  [facility]

## 2019-08-07 ENCOUNTER — TELEPHONE (OUTPATIENT)
Dept: CARDIOLOGY | Facility: CLINIC | Age: 84
End: 2019-08-07

## 2019-08-07 NOTE — TELEPHONE ENCOUNTER
----- Message from Preeti Monahan sent at 8/6/2019 10:50 AM CDT -----  Regarding: Outside Records  Good morning,    Mellisa had faxed an JOHNATHON to [facility] on 8/1/19. A fax was received stating that the records were destroyed, they do not retain from 16 years ago.    If there are any questions, please call the [facility] at [phone].    Thank you.    Preeti [facility]  Adult Med Spec/Surg Spec   [phone]

## 2019-12-18 ENCOUNTER — OFFICE VISIT (OUTPATIENT)
Dept: FAMILY MEDICINE | Facility: CLINIC | Age: 84
End: 2019-12-18
Payer: MEDICARE

## 2019-12-18 VITALS
HEIGHT: 71 IN | WEIGHT: 194 LBS | HEART RATE: 64 BPM | TEMPERATURE: 97.6 F | OXYGEN SATURATION: 96 % | SYSTOLIC BLOOD PRESSURE: 121 MMHG | DIASTOLIC BLOOD PRESSURE: 70 MMHG | BODY MASS INDEX: 27.16 KG/M2

## 2019-12-18 DIAGNOSIS — I10 ESSENTIAL HYPERTENSION WITH GOAL BLOOD PRESSURE LESS THAN 130/80: ICD-10-CM

## 2019-12-18 DIAGNOSIS — I35.0 AORTIC VALVE STENOSIS, ETIOLOGY OF CARDIAC VALVE DISEASE UNSPECIFIED: Primary | ICD-10-CM

## 2019-12-18 DIAGNOSIS — E78.5 HYPERLIPIDEMIA LDL GOAL <100: ICD-10-CM

## 2019-12-18 PROCEDURE — 99214 OFFICE O/P EST MOD 30 MIN: CPT | Performed by: NURSE PRACTITIONER

## 2019-12-18 RX ORDER — ROSUVASTATIN CALCIUM 40 MG/1
40 TABLET, COATED ORAL DAILY
Qty: 90 TABLET | Refills: 1 | Status: SHIPPED | OUTPATIENT
Start: 2019-12-18 | End: 2020-05-21

## 2019-12-18 RX ORDER — ISOSORBIDE MONONITRATE 30 MG/1
30 TABLET, EXTENDED RELEASE ORAL EVERY MORNING
Qty: 90 TABLET | Refills: 1 | Status: SHIPPED | OUTPATIENT
Start: 2019-12-18 | End: 2020-05-21

## 2019-12-18 RX ORDER — METOPROLOL SUCCINATE 25 MG/1
25 TABLET, EXTENDED RELEASE ORAL DAILY
Qty: 90 TABLET | Refills: 1 | Status: SHIPPED | OUTPATIENT
Start: 2019-12-18 | End: 2020-05-21

## 2019-12-18 RX ORDER — MULTIVITAMIN,THERAPEUTIC
1 TABLET ORAL DAILY
COMMUNITY

## 2019-12-18 ASSESSMENT — MIFFLIN-ST. JEOR: SCORE: 1602.11

## 2019-12-18 NOTE — PATIENT INSTRUCTIONS
[phone]-Cardiology Dr. Parker-see them in January for worsening breathing    Follow up with NP in 6 months, sooner if concerns.    ECHO: [phone]-get before seeing cardiology

## 2019-12-18 NOTE — Clinical Note
FYI: Patient now having increased shortness of breath with activity.  I ordered an ECHO, advised him to get that done prior to seeing you again.  He is hoping to see you in January as he leaves the state for a month in February. I advised due to his worsening symptoms since this summer he needs to be seen before he leaves for a month. Thank you,MARIA DEL CARMEN Weems, NP-[facility]

## 2019-12-18 NOTE — PROGRESS NOTES
Subjective     Tima Mckenzie is a 84 year old male who presents to clinic today for the following health issues:    HPI   Hyperlipidemia Follow-Up      Are you regularly taking any medication or supplement to lower your cholesterol?   Yes- Crestor    Are you having muscle aches or other side effects that you think could be caused by your cholesterol lowering medication?  No       As he has aged he feels more muscle pains from the cholesterol medication. Takes a few 325 mg aspirin in the evening and in the middle of the night.  Provider was surprised to hear this, he states that he has never thought to mention it.  No problem taking any of his medications.      Hypertension Follow-up      Do you check your blood pressure regularly outside of the clinic? No     Are you following a low salt diet? No but doesn't add a lot of salt     Are your blood pressures ever more than 140 on the top number (systolic) OR more   than 90 on the bottom number (diastolic), for example 140/90? No      How many servings of fruits and vegetables do you eat daily?  2-3    On average, how many sweetened beverages do you drink each day (Examples: soda, juice, sweet tea, etc.  Do NOT count diet or artificially sweetened beverages)?   0    How many days per week do you miss taking your medication? 0        Hx of aortic stenosis.  He saw cardiology in August, things were going very well, he was advised to return in 1 year unless symptoms worsen and then cardiology would want to have an echo repeated. He is having worsening symptoms since the summer.  Any prolonged effort feels out of breath and it doesn't resolve for some time. He wants to potentially consider surgery or fixing the valve if needed. Even outside pushing stuff he notes more SOB with activity. Almost feels panicky at times. When he plays tennis, when he serves he feels more SOB. Steady effort causes more SOB. No dizziness, no chest pain. No leg swelling other than left leg, but  has hx of injury there and it is only mild swelling once in a while.      Normal bowel/bladder. Bladder overactive. Some muscle pains, feels is from cholesterol medication. If he sits can make a long flight but as soon as he stand he feels the urge to go.        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     Seasonal allergic rhinitis     Bilateral inguinal hernia     Senile nuclear sclerosis     Posterior vitreous detachment of both eyes     Essential hypertension with goal blood pressure less than 130/80     Seasonal allergic rhinitis, unspecified allergic rhinitis trigger     Coronary artery disease involving native coronary artery of native heart without angina pectoris     Nonrheumatic aortic valve stenosis     Past Surgical History:   Procedure Laterality Date     ANGIOGRAM  4/30/2003    No Disease, Clemons,LAD,SVG,OM1- small LT Main, Occlusion of CX- D1 50-70% Stenosis, RCA 30-80%     COLONOSCOPY       COLONOSCOPY N/A 10/14/2014    Procedure: COMBINED COLONOSCOPY, SINGLE BIOPSY/POLYPECTOMY BY BIOPSY;  Surgeon: Konstantin Coaets MD;  Location: [facility] OR     COLONOSCOPY WITH CO2 INSUFFLATION N/A 10/14/2014    Procedure: COLONOSCOPY WITH CO2 INSUFFLATION;  Surgeon: Konstantin Coates MD;  Location: [facility] OR     CORONARY ARTERY BYPASS  1997    X 3     HERNIA REPAIR  2014    [facility]       Social History     Tobacco Use     Smoking status: Former Smoker     Smokeless tobacco: Never Used     Tobacco comment: 1997   Substance Use Topics     Alcohol use: Yes     Comment: rarely      Family History   Problem Relation Age of Onset     Coronary Artery Disease Brother 55        Fatal MI         Current Outpatient Medications   Medication Sig Dispense Refill     aspirin (ASA) 325 MG  "EC tablet Take 1 tablet (325 mg) by mouth every 6 hours as needed for moderate pain       cetirizine (ZYRTEC) 10 MG tablet Take 1 tablet (10 mg) by mouth every evening 90 tablet 3     Fexofenadine HCl (ALLEGRA PO)        isosorbide mononitrate (IMDUR) 30 MG 24 hr tablet Take 1 tablet (30 mg) by mouth every morning 90 tablet 1     metoprolol succinate ER (TOPROL-XL) 25 MG 24 hr tablet Take 1 tablet (25 mg) by mouth daily 90 tablet 1     multivitamin, therapeutic (THERA-VIT) TABS tablet Take 1 tablet by mouth daily       nitroGLYcerin (NITROSTAT) 0.4 MG sublingual tablet Place 1 tablet (0.4 mg) under the tongue every 5 minutes as needed for chest pain up to 3 tablets per episode. 25 tablet 3     rosuvastatin (CRESTOR) 40 MG tablet Take 1 tablet (40 mg) by mouth daily 90 tablet 1     Allergies   Allergen Reactions     No Known Drug Allergy      Seasonal Allergies          Reviewed and updated as needed this visit by Provider  Tobacco  Allergies  Meds  Problems  Med Hx  Surg Hx  Fam Hx         Review of Systems   ROS COMP: Constitutional, HEENT, cardiovascular-as above, pulmonary, gi and gu systems are negative, except as otherwise noted.      Objective    /70 (BP Location: Right arm, Patient Position: Chair, Cuff Size: Adult Large)   Pulse 64   Temp 97.6  F (36.4  C) (Oral)   Ht 1.803 m (5' 11\")   Wt 88 kg (194 lb)   SpO2 96%   BMI 27.06 kg/m    Body mass index is 27.06 kg/m .  Physical Exam   GENERAL: healthy, alert and no distress  EYES: Eyes grossly normal to inspection, PERRL and conjunctivae and sclerae normal  HENT: ear canals and TM's normal, nose and mouth without ulcers or lesions  NECK: no adenopathy, no asymmetry, masses, or scars and thyroid normal to palpation  RESP: lungs clear to auscultation - no rales, rhonchi or wheezes  CV: regular rate and rhythm, normal S1 S2, no S3 or S4, + strong murmur noted in both right/left chest fields, click or rub, no peripheral edema  ABDOMEN: soft, " nontender, no hepatosplenomegaly, no masses and bowel sounds normal  MS: no gross musculoskeletal defects noted, no edema    Diagnostic Test Results:  Labs reviewed in Epic  No results found for this or any previous visit (from the past 24 hour(s)).        Assessment & Plan     1. Aortic valve stenosis, etiology of cardiac valve disease unspecified  Patient is having worsening shortness of breath symptoms with activity, he feels it is significant enough where it is affecting his activity. Follow-up with cardiology in January, get an ECHO done prior to then.  Patient plans to leave for [place] for a month in February so advise he sees cardiology prior to that.  - Echocardiogram Complete; Future  - aspirin (ASA) 325 MG EC tablet; Take 1 tablet (325 mg) by mouth every 6 hours as needed for moderate pain    2. Essential hypertension with goal blood pressure less than 130/80  Stable, refills given follow-up in 6 months  - metoprolol succinate ER (TOPROL-XL) 25 MG 24 hr tablet; Take 1 tablet (25 mg) by mouth daily  Dispense: 90 tablet; Refill: 1  - isosorbide mononitrate (IMDUR) 30 MG 24 hr tablet; Take 1 tablet (30 mg) by mouth every morning  Dispense: 90 tablet; Refill: 1  - aspirin (ASA) 325 MG EC tablet; Take 1 tablet (325 mg) by mouth every 6 hours as needed for moderate pain  - Comprehensive metabolic panel (BMP + Alb, Alk Phos, ALT, AST, Total. Bili, TP)    3. Hyperlipidemia LDL goal <100  Stable refills given can get repeat cholesterol done in 6 months.  Patient does know he is having some muscle aches and pains from the cholesterol medication, we can consider decreasing this medication to see if it improves his muscles aches.  Monitor for now  - rosuvastatin (CRESTOR) 40 MG tablet; Take 1 tablet (40 mg) by mouth daily  Dispense: 90 tablet; Refill: 1         Return in about 6 months (around 6/18/2020).  Otherwise see cardiology in 1 month    MARIA DEL CARMEN Weems, NP-C  [facility]    This chart was  documented by provider using a voice activated software called Dragon in addition to manual typing. There may be vocabulary errors or other grammatical errors due to this.

## 2019-12-31 ENCOUNTER — ANCILLARY PROCEDURE (OUTPATIENT)
Dept: CARDIOLOGY | Facility: CLINIC | Age: 84
End: 2019-12-31
Attending: NURSE PRACTITIONER
Payer: MEDICARE

## 2019-12-31 DIAGNOSIS — I35.0 AORTIC VALVE STENOSIS, ETIOLOGY OF CARDIAC VALVE DISEASE UNSPECIFIED: ICD-10-CM

## 2019-12-31 PROCEDURE — 93306 TTE W/DOPPLER COMPLETE: CPT | Performed by: INTERNAL MEDICINE

## 2019-12-31 NOTE — LETTER
[address]  [address]  [phone]    December 31, 2019      Tima Mckenzie  [address] [facility] [address]            Mr. Mckenzie,     Your ECHO of your heart has not changed much. I will send the results to cardiology also. I'm happy to see you will be able to get in prior to leaving for your trip in February.     Please contact the clinic if you have additional questions.  Thank you.     Sincerely,     MARIA DEL CARMEN Weems, NP-C   [facility]

## 2020-01-13 NOTE — RESULT ENCOUNTER NOTE
TTE 12/31/19: Normal LV/RV size/function/thickness, LVEF=60-65%. Probably moderate aortic stenosis (peak velocity 2.8 m/s, mean gradient 20 mmHg, DVI 0.30, JESS 1.5 cm2, SVI 55 mL/m2.) Compared with 7/2019 TTE, aortic valve gradients are slightly lower (previously PV 3.2 m/s MG 22 mmHg DVI 0.26 JESS 1.26 SVI 51 mL/m2), however aortic stenosis is probably unchanged in severity.     Results will be discussed further at Mr. ToddMckenzie's upcoming visit on 1/16/20.  Artis Parker MD  Cardiology

## 2020-01-16 ENCOUNTER — OFFICE VISIT (OUTPATIENT)
Dept: CARDIOLOGY | Facility: CLINIC | Age: 85
End: 2020-01-16
Payer: COMMERCIAL

## 2020-01-16 VITALS
WEIGHT: 194.7 LBS | SYSTOLIC BLOOD PRESSURE: 118 MMHG | DIASTOLIC BLOOD PRESSURE: 80 MMHG | BODY MASS INDEX: 27.26 KG/M2 | HEART RATE: 60 BPM | HEIGHT: 71 IN

## 2020-01-16 DIAGNOSIS — I25.708 CORONARY ARTERY DISEASE OF BYPASS GRAFT OF NATIVE HEART WITH STABLE ANGINA PECTORIS (H): Primary | ICD-10-CM

## 2020-01-16 DIAGNOSIS — I35.0 NON-RHEUMATIC AORTIC STENOSIS: ICD-10-CM

## 2020-01-16 PROCEDURE — 99215 OFFICE O/P EST HI 40 MIN: CPT | Performed by: INTERNAL MEDICINE

## 2020-01-16 RX ORDER — ASPIRIN 81 MG/1
81 TABLET ORAL
Status: ON HOLD | COMMUNITY
End: 2022-08-18

## 2020-01-16 RX ORDER — LIDOCAINE 40 MG/G
CREAM TOPICAL
Status: CANCELLED | OUTPATIENT
Start: 2020-01-16

## 2020-01-16 RX ORDER — SODIUM CHLORIDE 9 MG/ML
INJECTION, SOLUTION INTRAVENOUS CONTINUOUS
Status: CANCELLED | OUTPATIENT
Start: 2020-01-16

## 2020-01-16 ASSESSMENT — PAIN SCALES - GENERAL: PAINLEVEL: NO PAIN (0)

## 2020-01-16 ASSESSMENT — MIFFLIN-ST. JEOR: SCORE: 1605.28

## 2020-01-16 NOTE — PROGRESS NOTES
Chief complaint: aortic stenosis, establish care    HPI:   Tima Mckenzie is a 84 year old male with history of CAD s/p 3vCABG (LIMA-LAD SVG-OM1-?) 1997 who presents for follow up of aortic stenosis and evaluation of new-onset chest tightness and exertional dyspnea.     Cardiac history:  He has history of a 3vCABG in [place], [place] in 1997 ([facility].) Operative report is not available (we attempted to obtain op report and angiogram images but were informed that all records from that time have been destroyed), but our EMR and records from [facility] report LIMA-LAD and SVG-OM; Mr. Mckenzie reports that his SVG bypassed 2 vessels (possibly a jump graft.) He had an angiogram 4/2003 (just before moving to [place]) at [facility] for abnormal stress test which repotedly showed a single diseased vessel (unclear which) for which he was prescribed Imdur. We attempted to obtain these images and were told that they had been destroyed.     He had a single episode of chest pain shortly after moving to [place] in 2003 and was admittted to [facility], where he had a stress test which was normal.    He was at a primary care visit and was noted to have a systolic murmur prompting TTE which showed moderate aortic stenosis, prompting referral for further management.    He has excellent functional capacity and plays tennis 3 days per week without difficulty. He does admit that he has slowed down slightly over the past several years, but reports that his exertional capacity has been relatively constant for the past several years. He specifically denies chest pain/pressure/tightness, exertional lightheadedness, or exertional dyspnea.     Interval history(1/16/2020):  Since his last visit, he has noted exertional dyspnea and also ~6 month history of associated chest tightness across the upper chest. Regarding chest tightness, he feels it is occurring with gradually decreasing levels of exertion. He reports that he has  noticed dyspnea by the 3rd or 4th serve playing tennis, ploughing snow, and carrying things up and down stairs. He was previously able to walk for 30 minutes at 3.5 mph on the treadmill, and now can only walk 20 minutes at 2.5 mph before having to stop due to dyspnea. He feels that this is a significant change compared with his previous visit. He denies any exertional lightheadedness or syncope.     He denies any dyspnea at rest, PND, orthopnea, peripheral edema, palpitations, lightheadedness or syncope.       PAST MEDICAL HISTORY:  Past Medical History:   Diagnosis Date     Arthritis      BPH (benign prostatic hypertrophy)      Coronary artery disease      Diverticulosis      Hypercholesterolemia      Hypertension      MI (myocardial infarction) (H) 3/1997    Anterior Wall MI/LAD/OM1/SVG    CABG X 3     Neuropathy     RT Foot     Seasonal allergic rhinitis 7/1/2013       CURRENT MEDICATIONS:  Current Outpatient Medications   Medication Sig Dispense Refill     aspirin 81 MG EC tablet Take 81 mg by mouth       isosorbide mononitrate (IMDUR) 30 MG 24 hr tablet Take 1 tablet (30 mg) by mouth every morning 90 tablet 1     metoprolol succinate ER (TOPROL-XL) 25 MG 24 hr tablet Take 1 tablet (25 mg) by mouth daily 90 tablet 1     multivitamin, therapeutic (THERA-VIT) TABS tablet Take 1 tablet by mouth daily       nitroGLYcerin (NITROSTAT) 0.4 MG sublingual tablet Place 1 tablet (0.4 mg) under the tongue every 5 minutes as needed for chest pain up to 3 tablets per episode. 25 tablet 3     rosuvastatin (CRESTOR) 40 MG tablet Take 1 tablet (40 mg) by mouth daily 90 tablet 1       PAST SURGICAL HISTORY:  Past Surgical History:   Procedure Laterality Date     ANGIOGRAM  4/30/2003    No Disease, [place],LAD,SVG,OM1- small LT Main, Occlusion of CX- D1 50-70% Stenosis, RCA 30-80%     COLONOSCOPY       COLONOSCOPY N/A 10/14/2014    Procedure: COMBINED COLONOSCOPY, SINGLE BIOPSY/POLYPECTOMY BY BIOPSY;  Surgeon: Konstantin Coates,  "MD;  Location: [facility] OR     COLONOSCOPY WITH CO2 INSUFFLATION N/A 10/14/2014    Procedure: COLONOSCOPY WITH CO2 INSUFFLATION;  Surgeon: Konstantin Coates MD;  Location: [facility] OR     CORONARY ARTERY BYPASS  1997    X 3     HERNIA REPAIR  2014    [facility]       ALLERGIES:     Allergies   Allergen Reactions     No Known Drug Allergy      Seasonal Allergies        FAMILY HISTORY:  Brother with fatal MI at 55.  No family history of premature CAD.    SOCIAL HISTORY:  Social History     Tobacco Use     Smoking status: Former Smoker     Smokeless tobacco: Never Used     Tobacco comment: 1997   Substance Use Topics     Alcohol use: Yes     Comment: rarely      Drug use: No       ROS:   A comprehensive 14 point review of systems is negative other than as mentioned in HPI.    Exam:  /80 (BP Location: Left arm, Patient Position: Sitting, Cuff Size: Adult Regular)   Pulse 60   Ht 1.803 m (5' 11\")   Wt 88.3 kg (194 lb 11.2 oz)   BMI 27.16 kg/m    GENERAL APPEARANCE: healthy, alert and no distress  EYES: no icterus, no xanthelasmas  ENT: normal palate, mucosa moist, no central cyanosis  NECK: JVP not elevated  RESPIRATORY: lungs clear to auscultation - no rales, rhonchi or wheezes, no use of accessory muscles, no retractions, respirations are unlabored, normal respiratory rate  CARDIOVASCULAR: regular rhythm, normal S1 with physiologic split S2, no S3 or S4 +III/VI systolic murmur RUSB radiating to entire precordium.  GI: soft, non tender, bowel sounds normal,no abdominal bruits  EXTREMITIES: no edema, no bruits  NEURO: alert and oriented to person/place/time, normal speech, gait and affect  VASC: Radial, dorsalis pedis and posterior tibialis pulses 2+ bilaterally.  SKIN: no ecchymoses, no rashes.  PSYCH: cooperative, affect appropriate.     Labs:  Reviewed.     Testing/Procedures:  I personally visualized and interpreted:  TTE 7/30/19:   LVEF=55-60%. Basal inferolateral hypokinesis.  RV size/wall motion/thickness " normal.  Severely calcified trileaflet aortic valve with moderate aortic stenosis (PV 3.2 m/s MG 22 mmHG DVI 0.26 JESS 1.27 cm2 SVI 51 mL/m2)  Mild MR.  Mild-mod LA enlargment. Mild RA enlargement.   RVSP 31+RA pressure.     TTE 12/31/19:   Normal LV/RV size/function/thickness, LVEF=60-65%. Probably moderate aortic stenosis (peak velocity 2.8 m/s, mean gradient 20 mmHg, DVI 0.30, JESS 1.5 cm2, SVI 55 mL/m2.)   Compared with 7/2019 TTE, aortic valve gradients are slightly lower (previously PV 3.2 m/s MG 22 mmHg DVI 0.26 JESS 1.26 SVI 51 mL/m2), however aortic stenosis is probably unchanged in severity        Outside results of note:  Outside records from [facility]  were obtained, and relevant results/notes have been incorporated into HPI.    Assessment and Plan:   1. Moderate trileaflet aortic stenosis:  2. Coronary artery disease status post three-vessel coronary bypass graft (LIMA-LAD SVG-OM-?), now with progressive angina:  With new-onset exertional chest tightness and progressive exertional dyspnea and with previously excellent functional capacity and high level of activity, there is significant concern for either significant stenosis in 1 of his bypass grafts or progression of aortic stenosis that was not detected on resting echocardiogram.  Given that concurrent possibilities of either occult significant aortic stenosis or significant coronary disease, functional testing is not an ideal first-line option.  Plan to proceed with coronary angiogram to exclude significant lesion in 1 of his bypass grafts or distal to a graft touchdown.      Unfortunately his precise graft anatomy is not clear.  We did attempt to obtain operative reports and coronary angiogram images from [facility] in [place], [place].  We were informed that all records and images more than 15 years old are destroyed and that message neither his angiograms nor his operative report are retrievable.     Available documentation includes vague  reports that his 4/2003 angiogram showed a coronary lesion (unclear if this was in an unrevascularized native coronary or a bypass graft) for which he was placed on Imdur.     If coronary angiogram fails to disclose a significant lesion to account for Mr. Mckenzie's new symptoms, I would proceed with exercise echocardiogram (protocol for aortic valve evaluation, not for ischemia) and possibly also aortic valve calcium score to evaluate for undetected progression to severe aortic stenosis.    We discussed that this is a somewhat complex diagnostic dilemma as both aortic stenosis and coronary artery disease can present with identical symptoms of angina and exertional dyspnea and that this necessitates the sequential work-up approach outlined above.    He should continue ASA 81 mg, rosuvastatin 40 mg, Toprol XL 25 mg daily, and Imdur 30 mg daily.    3. Hypertension, controlled: continue present management.  4. Hyperlipidemia, controlled: continue present management.     The patient states understanding and is agreeable with plan.     Artis Parker MD  Cardiology      [facility]

## 2020-01-16 NOTE — Clinical Note
Rehan Rose, Could you contact Deja to help get Tima scheduled for a coronary angiogram with grafts within 2 weeks?They leave for [place] on 1/28. I have all orders in. He is not available on Monday or Wednesday AM if at all possible to avoid those days though it is just Tennis that he would have to miss if a M or W is best for cath lab. Let me know if any questions.Thank you, Enedina

## 2020-01-16 NOTE — PATIENT INSTRUCTIONS
You are scheduled for a Coronary Angiogram on _______ at the [facility], [address] [place] [address], [place] [address]. You are to check in at the Gold Waiting Area at _______.        When you arrive you will be escorted back to the pre procedure area called 2A. Here they will insert an IV, draw labs, and obtain a short medical history. Please bring an updated list of your current medications.     A physician will come and talk with you about the procedure and obtain consent.     A nurse from the Cardiac Catheterization Lab will then escort you to the procedure area. You will be receiving sedation during the procedure so you will need someone to drive you to and from the hospital.     After the procedure you will recover for a short period (2 - 6 hours) on 6B. You will be discharged with instructions for post procedure care. However, depending on what the angiogram shows you may have to have stents placed and this might require an overnight stay. We ask that you bring a small bag of necessities for your comfort if you would need to stay overnight. DO NOT BRING ANY VALUABLES.        Pre procedure instructions:  1. Make arrangements to have a  as you will not be allowed to drive following the procedure. Someone should stay with you the night after your procedure.  2.  Do not eat any solid food or milk products for 8 hours prior to the exam. You may drink clear liquids until 2 hours prior to the exam. Clear liquids include the following: water, Jell-O, clear broth, apple juice or any non-carbonated drink that you can see through (no pop!).   3. Do not drink alcohol or smoke 24 hours prior to test.  4. Hold these meds:  Do not take your oral diabetic medication or short acting insulin the day of the procedure. Do not take metformin the day of and for 2 days following, contact your PCP or Endocrinologist regarding glucose control during this time.  Hold your warfarin (2-3 days  prior), pradaxa (2 days prior), Eliquis/Xarelto 1 day prior.   6. You may take your other morning medications as prescribed with a sip of water. If you currently take an aspirin, continue taking your same dose. If not, take a 325 mg aspirin the day before and the day of your procedure.        We will be in touch with you regarding date and time of procedure.   Dr. Parker would like to see you back in 2 months and we will plan to be in touch after the angiogram as well to determine if further testing is necessary.    If any questions call the Cardiology Clinic at [phone]

## 2020-01-16 NOTE — NURSING NOTE
Reviewed coronary angiogram prep with patient. He will be contacted on 1/17/2020 with date and time of procedure. He is not available on Monday or Wednesday mornings. Coronary angiogram orders are in.     Enedina Carpenter RN

## 2020-01-16 NOTE — NURSING NOTE
"Tima Mckenzie's goals for this visit include:   Chief Complaint   Patient presents with     RECHECK     CAD, s/p CABG 1997 w/o angina- accompanied by wife Ciera       He requests these members of his care team be copied on today's visit information: no    PCP: Julieta Gandhi    Referring Provider:  No referring provider defined for this encounter.    /80 (BP Location: Left arm, Patient Position: Sitting, Cuff Size: Adult Regular)   Pulse 60   Ht 1.803 m (5' 11\")   Wt 88.3 kg (194 lb 11.2 oz)   BMI 27.16 kg/m      Do you need any medication refills at today's visit? no    "

## 2020-01-17 ENCOUNTER — TELEPHONE (OUTPATIENT)
Dept: CARDIOLOGY | Facility: CLINIC | Age: 85
End: 2020-01-17

## 2020-01-17 NOTE — TELEPHONE ENCOUNTER
Called and spoke to patient. Scheduled for a Coronary Angiogram on 1/20/20 at the [facility]. He will check in at the [facility] at 1:00 pm. He was instructed again in NPO, push fluids the day before and morning of. He is also an avid  and told to talk to staff about activity restrictions after angiogram. He will have a . He had no other questions.   LUCERO Stone

## 2020-01-20 ENCOUNTER — HOSPITAL ENCOUNTER (OUTPATIENT)
Facility: CLINIC | Age: 85
Discharge: HOME | End: 2020-01-20
Attending: INTERNAL MEDICINE | Admitting: INTERNAL MEDICINE
Payer: COMMERCIAL

## 2020-01-20 ENCOUNTER — APPOINTMENT (OUTPATIENT)
Dept: MEDSURG UNIT | Facility: CLINIC | Age: 85
End: 2020-01-20
Attending: INTERNAL MEDICINE
Payer: COMMERCIAL

## 2020-01-20 VITALS
BODY MASS INDEX: 27.16 KG/M2 | HEART RATE: 68 BPM | HEIGHT: 71 IN | DIASTOLIC BLOOD PRESSURE: 83 MMHG | WEIGHT: 194 LBS | RESPIRATION RATE: 16 BRPM | SYSTOLIC BLOOD PRESSURE: 146 MMHG | TEMPERATURE: 97.4 F | OXYGEN SATURATION: 100 %

## 2020-01-20 DIAGNOSIS — I35.0 NON-RHEUMATIC AORTIC STENOSIS: ICD-10-CM

## 2020-01-20 DIAGNOSIS — I25.708 CORONARY ARTERY DISEASE OF BYPASS GRAFT OF NATIVE HEART WITH STABLE ANGINA PECTORIS (H): ICD-10-CM

## 2020-01-20 DIAGNOSIS — E78.5 HYPERLIPIDEMIA LDL GOAL <100: ICD-10-CM

## 2020-01-20 PROBLEM — Z98.890 STATUS POST CORONARY ANGIOGRAM: Status: ACTIVE | Noted: 2020-01-20

## 2020-01-20 LAB
ANION GAP SERPL CALCULATED.3IONS-SCNC: 2 MMOL/L (ref 3–14)
BUN SERPL-MCNC: 20 MG/DL (ref 7–30)
CALCIUM SERPL-MCNC: 9.3 MG/DL (ref 8.5–10.1)
CHLORIDE SERPL-SCNC: 106 MMOL/L (ref 94–109)
CHOLEST SERPL-MCNC: 170 MG/DL
CO2 SERPL-SCNC: 30 MMOL/L (ref 20–32)
CREAT SERPL-MCNC: 1.09 MG/DL (ref 0.66–1.25)
ERYTHROCYTE [DISTWIDTH] IN BLOOD BY AUTOMATED COUNT: 12.5 % (ref 10–15)
GFR SERPL CREATININE-BSD FRML MDRD: 63 ML/MIN/{1.73_M2}
GLUCOSE SERPL-MCNC: 87 MG/DL (ref 70–99)
HCT VFR BLD AUTO: 50 % (ref 40–53)
HDLC SERPL-MCNC: 46 MG/DL
HGB BLD-MCNC: 16.8 G/DL (ref 13.3–17.7)
KCT BLD-ACNC: 280 SEC (ref 75–150)
LDLC SERPL CALC-MCNC: 99 MG/DL
MCH RBC QN AUTO: 31 PG (ref 26.5–33)
MCHC RBC AUTO-ENTMCNC: 33.6 G/DL (ref 31.5–36.5)
MCV RBC AUTO: 92 FL (ref 78–100)
NONHDLC SERPL-MCNC: 124 MG/DL
PLATELET # BLD AUTO: 191 10E9/L (ref 150–450)
POTASSIUM SERPL-SCNC: 4.5 MMOL/L (ref 3.4–5.3)
RBC # BLD AUTO: 5.42 10E12/L (ref 4.4–5.9)
SODIUM SERPL-SCNC: 138 MMOL/L (ref 133–144)
TRIGL SERPL-MCNC: 126 MG/DL
WBC # BLD AUTO: 7.2 10E9/L (ref 4–11)

## 2020-01-20 PROCEDURE — 27210794 ZZH OR GENERAL SUPPLY STERILE: Performed by: INTERNAL MEDICINE

## 2020-01-20 PROCEDURE — C1894 INTRO/SHEATH, NON-LASER: HCPCS | Performed by: INTERNAL MEDICINE

## 2020-01-20 PROCEDURE — C1760 CLOSURE DEV, VASC: HCPCS | Performed by: INTERNAL MEDICINE

## 2020-01-20 PROCEDURE — 25000125 ZZHC RX 250: Performed by: INTERNAL MEDICINE

## 2020-01-20 PROCEDURE — C1887 CATHETER, GUIDING: HCPCS | Performed by: INTERNAL MEDICINE

## 2020-01-20 PROCEDURE — 92928 PRQ TCAT PLMT NTRAC ST 1 LES: CPT | Mod: 76 | Performed by: INTERNAL MEDICINE

## 2020-01-20 PROCEDURE — 80061 LIPID PANEL: CPT | Performed by: NURSE PRACTITIONER

## 2020-01-20 PROCEDURE — C1874 STENT, COATED/COV W/DEL SYS: HCPCS | Performed by: INTERNAL MEDICINE

## 2020-01-20 PROCEDURE — 25000132 ZZH RX MED GY IP 250 OP 250 PS 637: Mod: GY | Performed by: INTERNAL MEDICINE

## 2020-01-20 PROCEDURE — 85027 COMPLETE CBC AUTOMATED: CPT | Performed by: INTERNAL MEDICINE

## 2020-01-20 PROCEDURE — 99153 MOD SED SAME PHYS/QHP EA: CPT | Performed by: INTERNAL MEDICINE

## 2020-01-20 PROCEDURE — 93005 ELECTROCARDIOGRAM TRACING: CPT

## 2020-01-20 PROCEDURE — 93455 CORONARY ART/GRFT ANGIO S&I: CPT | Mod: 26 | Performed by: INTERNAL MEDICINE

## 2020-01-20 PROCEDURE — 93455 CORONARY ART/GRFT ANGIO S&I: CPT | Mod: XU | Performed by: INTERNAL MEDICINE

## 2020-01-20 PROCEDURE — 25000128 H RX IP 250 OP 636: Performed by: INTERNAL MEDICINE

## 2020-01-20 PROCEDURE — 99152 MOD SED SAME PHYS/QHP 5/>YRS: CPT | Performed by: INTERNAL MEDICINE

## 2020-01-20 PROCEDURE — 36415 COLL VENOUS BLD VENIPUNCTURE: CPT | Performed by: NURSE PRACTITIONER

## 2020-01-20 PROCEDURE — C1769 GUIDE WIRE: HCPCS | Performed by: INTERNAL MEDICINE

## 2020-01-20 PROCEDURE — 93571 IV DOP VEL&/PRESS C FLO 1ST: CPT | Mod: 26 | Performed by: INTERNAL MEDICINE

## 2020-01-20 PROCEDURE — 92929 ZZHC PRQ TRLUML CORONARY BM STENT W/ANGIO ADDL ART/BRNCH: CPT | Mod: RC | Performed by: INTERNAL MEDICINE

## 2020-01-20 PROCEDURE — 93010 ELECTROCARDIOGRAM REPORT: CPT | Performed by: INTERNAL MEDICINE

## 2020-01-20 PROCEDURE — C9601 PERC DRUG-EL COR STENT BRAN: HCPCS | Performed by: INTERNAL MEDICINE

## 2020-01-20 PROCEDURE — C9600 PERC DRUG-EL COR STENT SING: HCPCS | Mod: LD | Performed by: INTERNAL MEDICINE

## 2020-01-20 PROCEDURE — 80048 BASIC METABOLIC PNL TOTAL CA: CPT | Performed by: NURSE PRACTITIONER

## 2020-01-20 PROCEDURE — 40000172 ZZH STATISTIC PROCEDURE PREP ONLY

## 2020-01-20 PROCEDURE — 85347 COAGULATION TIME ACTIVATED: CPT

## 2020-01-20 PROCEDURE — 25800030 ZZH RX IP 258 OP 636: Performed by: INTERNAL MEDICINE

## 2020-01-20 PROCEDURE — 93571 IV DOP VEL&/PRESS C FLO 1ST: CPT | Performed by: INTERNAL MEDICINE

## 2020-01-20 PROCEDURE — 40000065 ZZH STATISTIC EKG NON-CHARGEABLE

## 2020-01-20 DEVICE — STENT SYNERGY DRUG ELUTING 2.50X12MM  H7493926012250: Type: IMPLANTABLE DEVICE | Status: FUNCTIONAL

## 2020-01-20 DEVICE — STENT SYNERGY DRUG ELUTING 2.50X08MM  H7493926008250: Type: IMPLANTABLE DEVICE | Status: FUNCTIONAL

## 2020-01-20 DEVICE — STENT SYNERGY DRUG ELUTING 3.50X16MM  H7493926016350: Type: IMPLANTABLE DEVICE | Status: FUNCTIONAL

## 2020-01-20 RX ORDER — ASPIRIN 81 MG/1
81 TABLET ORAL DAILY
Status: DISCONTINUED | OUTPATIENT
Start: 2020-01-21 | End: 2020-01-20 | Stop reason: HOSPADM

## 2020-01-20 RX ORDER — FLUMAZENIL 0.1 MG/ML
0.2 INJECTION, SOLUTION INTRAVENOUS
Status: DISCONTINUED | OUTPATIENT
Start: 2020-01-20 | End: 2020-01-20 | Stop reason: HOSPADM

## 2020-01-20 RX ORDER — DOPAMINE HYDROCHLORIDE 160 MG/100ML
2-20 INJECTION, SOLUTION INTRAVENOUS CONTINUOUS PRN
Status: DISCONTINUED | OUTPATIENT
Start: 2020-01-20 | End: 2020-01-20 | Stop reason: HOSPADM

## 2020-01-20 RX ORDER — ADENOSINE 3 MG/ML
INJECTION, SOLUTION INTRAVENOUS
Status: DISCONTINUED | OUTPATIENT
Start: 2020-01-20 | End: 2020-01-20 | Stop reason: HOSPADM

## 2020-01-20 RX ORDER — NOREPINEPHRINE BITARTRATE 0.06 MG/ML
.03-.4 INJECTION, SOLUTION INTRAVENOUS CONTINUOUS PRN
Status: DISCONTINUED | OUTPATIENT
Start: 2020-01-20 | End: 2020-01-20 | Stop reason: HOSPADM

## 2020-01-20 RX ORDER — SODIUM CHLORIDE 9 MG/ML
INJECTION, SOLUTION INTRAVENOUS CONTINUOUS
Status: DISCONTINUED | OUTPATIENT
Start: 2020-01-20 | End: 2020-01-20 | Stop reason: HOSPADM

## 2020-01-20 RX ORDER — DOBUTAMINE HYDROCHLORIDE 200 MG/100ML
2-20 INJECTION INTRAVENOUS CONTINUOUS PRN
Status: DISCONTINUED | OUTPATIENT
Start: 2020-01-20 | End: 2020-01-20 | Stop reason: HOSPADM

## 2020-01-20 RX ORDER — LIDOCAINE 40 MG/G
CREAM TOPICAL
Status: DISCONTINUED | OUTPATIENT
Start: 2020-01-20 | End: 2020-01-20 | Stop reason: HOSPADM

## 2020-01-20 RX ORDER — NITROGLYCERIN 20 MG/100ML
.07-2 INJECTION INTRAVENOUS CONTINUOUS PRN
Status: DISCONTINUED | OUTPATIENT
Start: 2020-01-20 | End: 2020-01-20 | Stop reason: HOSPADM

## 2020-01-20 RX ORDER — NITROGLYCERIN 0.4 MG/1
0.4 TABLET SUBLINGUAL EVERY 5 MIN PRN
Status: DISCONTINUED | OUTPATIENT
Start: 2020-01-20 | End: 2020-01-20 | Stop reason: HOSPADM

## 2020-01-20 RX ORDER — HEPARIN SODIUM 1000 [USP'U]/ML
INJECTION, SOLUTION INTRAVENOUS; SUBCUTANEOUS
Status: DISCONTINUED | OUTPATIENT
Start: 2020-01-20 | End: 2020-01-20 | Stop reason: HOSPADM

## 2020-01-20 RX ORDER — ATROPINE SULFATE 0.1 MG/ML
0.5 INJECTION INTRAVENOUS EVERY 5 MIN PRN
Status: DISCONTINUED | OUTPATIENT
Start: 2020-01-20 | End: 2020-01-20 | Stop reason: HOSPADM

## 2020-01-20 RX ORDER — IOPAMIDOL 755 MG/ML
INJECTION, SOLUTION INTRAVASCULAR
Status: DISCONTINUED | OUTPATIENT
Start: 2020-01-20 | End: 2020-01-20 | Stop reason: HOSPADM

## 2020-01-20 RX ORDER — HEPARIN SODIUM 10000 [USP'U]/100ML
100-1000 INJECTION, SOLUTION INTRAVENOUS CONTINUOUS PRN
Status: DISCONTINUED | OUTPATIENT
Start: 2020-01-20 | End: 2020-01-20 | Stop reason: HOSPADM

## 2020-01-20 RX ORDER — FENTANYL CITRATE 50 UG/ML
25-50 INJECTION, SOLUTION INTRAMUSCULAR; INTRAVENOUS
Status: DISCONTINUED | OUTPATIENT
Start: 2020-01-20 | End: 2020-01-20 | Stop reason: HOSPADM

## 2020-01-20 RX ORDER — EPTIFIBATIDE 2 MG/ML
2 INJECTION, SOLUTION INTRAVENOUS CONTINUOUS PRN
Status: DISCONTINUED | OUTPATIENT
Start: 2020-01-20 | End: 2020-01-20 | Stop reason: HOSPADM

## 2020-01-20 RX ORDER — EPTIFIBATIDE 2 MG/ML
180 INJECTION, SOLUTION INTRAVENOUS EVERY 10 MIN PRN
Status: DISCONTINUED | OUTPATIENT
Start: 2020-01-20 | End: 2020-01-20 | Stop reason: HOSPADM

## 2020-01-20 RX ORDER — NALOXONE HYDROCHLORIDE 0.4 MG/ML
.2-.4 INJECTION, SOLUTION INTRAMUSCULAR; INTRAVENOUS; SUBCUTANEOUS
Status: DISCONTINUED | OUTPATIENT
Start: 2020-01-20 | End: 2020-01-20 | Stop reason: HOSPADM

## 2020-01-20 RX ORDER — FENTANYL CITRATE 50 UG/ML
INJECTION, SOLUTION INTRAMUSCULAR; INTRAVENOUS
Status: DISCONTINUED | OUTPATIENT
Start: 2020-01-20 | End: 2020-01-20 | Stop reason: HOSPADM

## 2020-01-20 RX ORDER — HYDRALAZINE HYDROCHLORIDE 20 MG/ML
INJECTION INTRAMUSCULAR; INTRAVENOUS
Status: DISCONTINUED | OUTPATIENT
Start: 2020-01-20 | End: 2020-01-20 | Stop reason: HOSPADM

## 2020-01-20 RX ORDER — ARGATROBAN 1 MG/ML
350 INJECTION, SOLUTION INTRAVENOUS
Status: DISCONTINUED | OUTPATIENT
Start: 2020-01-20 | End: 2020-01-20 | Stop reason: HOSPADM

## 2020-01-20 RX ORDER — ARGATROBAN 1 MG/ML
150 INJECTION, SOLUTION INTRAVENOUS
Status: DISCONTINUED | OUTPATIENT
Start: 2020-01-20 | End: 2020-01-20 | Stop reason: HOSPADM

## 2020-01-20 RX ORDER — NALOXONE HYDROCHLORIDE 0.4 MG/ML
.1-.4 INJECTION, SOLUTION INTRAMUSCULAR; INTRAVENOUS; SUBCUTANEOUS
Status: DISCONTINUED | OUTPATIENT
Start: 2020-01-20 | End: 2020-01-20 | Stop reason: HOSPADM

## 2020-01-20 RX ADMIN — SODIUM CHLORIDE: 9 INJECTION, SOLUTION INTRAVENOUS at 14:33

## 2020-01-20 ASSESSMENT — MIFFLIN-ST. JEOR: SCORE: 1594.17

## 2020-01-20 ASSESSMENT — PAIN DESCRIPTION - DESCRIPTORS: DESCRIPTORS: ACHING

## 2020-01-20 NOTE — DISCHARGE INSTRUCTIONS
Going Home after an Angioplasty or Stent Placement (Cardiac)  ______________________________________________      After you go home:    Have an adult stay with you for 24 hours.    Drink plenty of fluids.    You may eat your normal diet, unless your doctor tells you otherwise.    For 24 hours:    Relax and take it easy.    Do NOT smoke.    Do NOT make any important or legal decisions.    Do NOT drive or operate machines at home or at work.    Do NOT drink alcohol.    Remove the Band-Aid after 24 hours. If there is minor oozing, apply another Band-aid and remove it after 12 hours.    For 2 days, do NOT have sex or do any heavy exercise.    Do NOT take a bath, or use a hot tub or pool for at least 3 days. You may shower.    Care of groin site  It is normal to have a small bruise or lump at the site.    Do not scrub the site.    For the first 2 days: Do not stoop or squat. When you cough, sneeze or move your bowels, hold your hand over the puncture site and press gently.    Do not lift more than 10 pounds for at least 3 to 5 days.    Do not use lotion or powder near the puncture site for 3 days.    If you start bleeding from the site in your groin, lie down flat and press firmly  on the site. Call your doctor as soon as you can.      Medicines    If you have started taking Plavix or Effient, do not stop taking it until you talk to your heart doctor (cardiologist).    If you are on metformin (Glucophage), do not restart it until you have blood tests (within 2 to 3 days after discharge). When your doctor tells you it is safe, you may restart the metformin.    If you have stopped any other medicines, check with your nurse or provider about when to restart them.    Call 911 right away if you have bleeding that is heavy or does not stop.    Call your doctor if:    You have a large or growing hard lump around the site.    The site is red, swollen, hot or tender.    Blood or fluid is draining from the site.    You have  chills or a fever greater than 101 F (38 C).    Your leg or arm feels numb or cool.    You have hives, a rash or unusual itching.

## 2020-01-20 NOTE — PROGRESS NOTES
Pt arrives to 2a, with spouse, for CORS. Pre procedure assessment completed. H&P is up to date. Consent needs to be signed. PIV placed. Pt reports he took 324mg aspirin this morning for low back pain.

## 2020-01-20 NOTE — Clinical Note
Stent deployed in the proximal right coronary artery. Max pressure = 18 leia. Total duration = 10 seconds.

## 2020-01-20 NOTE — Clinical Note
Stent deployed in the left anterior descending diagonal. Max pressure = 16 leia. Total duration = 7 seconds.

## 2020-01-21 LAB
INTERPRETATION ECG - MUSE: NORMAL
INTERPRETATION ECG - MUSE: NORMAL

## 2020-01-22 ENCOUNTER — TELEPHONE (OUTPATIENT)
Dept: NEPHROLOGY | Facility: CLINIC | Age: 85
End: 2020-01-22

## 2020-01-22 NOTE — TELEPHONE ENCOUNTER
Received telephone call from patient and wife 1/21 AM reporting drainage underneath his bandage at groin site of angiogram. Site was not actively bleeding or oozing and did not appear to be raised or any more painful. Some bruising present. Advised patient to continue to monitor site throughout the course of the day and call if any increased drainage, increased pain or lump at site. Advised that he should also follow post angiogram restrictions that were reviewed with him at time of discharge.     This RN contacted patient later in day and he stated that site was fine and with no new drainage. Advised that it had been advised that he follow up with Cardiology NP at [facility] in 2 weeks. He was agreeable to this appointment.     Will forward this message to Preeti Monahan to assist with scheduling this follow up.    Enedina Carpenter RN

## 2020-01-23 NOTE — TELEPHONE ENCOUNTER
Per Rehan Greene,     I have spoken with the patient. He will be in [place] until end of February, he is scheduled to see Dr. Parker on Thur March 5, 3 wks sooner than the original appointment. He will call you with any concerns he may have.     Thank you for the message.     Preeti     This plan had been discussed and approved by Dr. Clement.    Enedina Carpenter RN, BSN  Nephrology Care Coordinator  [facility]

## 2020-02-21 ENCOUNTER — OFFICE VISIT (OUTPATIENT)
Dept: FAMILY MEDICINE | Facility: CLINIC | Age: 85
End: 2020-02-21
Payer: COMMERCIAL

## 2020-02-21 VITALS
WEIGHT: 188 LBS | HEART RATE: 71 BPM | DIASTOLIC BLOOD PRESSURE: 82 MMHG | TEMPERATURE: 97.9 F | BODY MASS INDEX: 26.32 KG/M2 | OXYGEN SATURATION: 97 % | HEIGHT: 71 IN | SYSTOLIC BLOOD PRESSURE: 138 MMHG

## 2020-02-21 DIAGNOSIS — I25.708 CORONARY ARTERY DISEASE OF BYPASS GRAFT OF NATIVE HEART WITH STABLE ANGINA PECTORIS (H): ICD-10-CM

## 2020-02-21 DIAGNOSIS — J06.9 UPPER RESPIRATORY TRACT INFECTION, UNSPECIFIED TYPE: Primary | ICD-10-CM

## 2020-02-21 PROCEDURE — 99214 OFFICE O/P EST MOD 30 MIN: CPT | Performed by: FAMILY MEDICINE

## 2020-02-21 RX ORDER — AMOXICILLIN 875 MG
875 TABLET ORAL 2 TIMES DAILY
Qty: 20 TABLET | Refills: 0 | Status: SHIPPED | OUTPATIENT
Start: 2020-02-21 | End: 2020-05-21

## 2020-02-21 ASSESSMENT — MIFFLIN-ST. JEOR: SCORE: 1566.95

## 2020-02-21 NOTE — PROGRESS NOTES
"Subjective     Tima Mckenzie is a 84 year old male who presents to clinic today for the following health issues:    HPI   Acute Illness   Acute illness concerns: Cough and Sinus drainage   Onset: 2 weeks     Fever: no     Chills/Sweats: no     Headache (location?): no     Sinus Pressure:no    Conjunctivitis:  no    Ear Pain: no    Rhinorrhea: YES- lots of drainage     Congestion: YES- chest congestion     Sore Throat: YES     Cough: YES-non-productive    Wheeze: YES    Decreased Appetite: no     Nausea: no     Vomiting: no     Diarrhea:  no     Dysuria/Freq.: no     Fatigue/Achiness: YES- on and off     Sick/Strep Exposure: YES- wife      Therapies Tried and outcome: loratadine with some relief      SUBJECTIVE:  Here today with the above symptoms that started 2 to 3 weeks ago while on vacation in [place].  No specific ill exposures with his wife had similar kind of symptoms initially with some generalized fatigue and coughing.  She is gotten better but the patient himself has not.  Does not feel sick with fevers or chills but has a constant congestion in his chest and is difficult to bring up.  He feels a bit wheezy but not short of breath.  Some nasal congestion and postnasal drip really seems to be originating in his chest.  Reviewed interval history including recent angiogram showing some recurrence of disease.  Working to maximize medical therapy and lifestyle management. the symptoms did not start with any change in medications or with the heart procedure itself.    Review of systems otherwise negative.  Past medical, family, and social history reviewed and updated in chart.    OBJECTIVE:  /82   Pulse 71   Temp 97.9  F (36.6  C) (Oral)   Ht 1.791 m (5' 10.5\")   Wt 85.3 kg (188 lb)   SpO2 97%   BMI 26.59 kg/m    Alert, pleasant, upbeat, and in no apparent discomfort.  Ears normal. Throat and pharynx normal. Neck supple. No adenopathy or masses in the neck or supraclavicular regions. Sinuses non " tender.  S1 and S2 normal, no murmurs, clicks, gallops or rubs. Regular rate and rhythm. Chest is clear; no wheezes or rales. No edema or JVD.   Past labs reviewed with the patient.     ASSESSMENT / PLAN:  (J06.9) Upper respiratory tract infection, unspecified type  (primary encounter diagnosis)  Comment: Discussed mechanism of action of the proposed medication, as well as potential effects, both good and bad.  Patient expressed understanding and agreed with treatment.   Plan: amoxicillin (AMOXIL) 875 MG tablet            (I25.708) 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)  Comment: Stable.  Working to maximize situation.  Discussed what over-the-counter medications to avoid.  Plan:     Follow up 1 week if not improving  S. Ramos Gandhi MD    (Chart documentation completed in part with Dragon voice-recognition software.  Even though reviewed some grammatical, spelling, and word errors may remain.)

## 2020-02-21 NOTE — PATIENT INSTRUCTIONS
Suggested over the counter cold remedies:    Nasal congestion / sneezing:  - Claritin or Zyrtec once to twice daily  - Benadryl also helps - but drowsiness is a side effect - so might be a good nighttime addition    Sinus pressure:  - Afrin nasal spray - can use safely for 3-5 days    Chest congestion and cough:  - Guaifenesin    - Liquid form (cough syrup), or   - Pill form (Mucinex)  - Dextromethorphan - cough suppressant that often is combined with guaifenesin

## 2020-03-05 ENCOUNTER — OFFICE VISIT (OUTPATIENT)
Dept: CARDIOLOGY | Facility: CLINIC | Age: 85
End: 2020-03-05
Payer: COMMERCIAL

## 2020-03-05 VITALS
BODY MASS INDEX: 26.64 KG/M2 | HEART RATE: 68 BPM | DIASTOLIC BLOOD PRESSURE: 80 MMHG | WEIGHT: 188.3 LBS | SYSTOLIC BLOOD PRESSURE: 132 MMHG

## 2020-03-05 DIAGNOSIS — I25.10 CORONARY ARTERY DISEASE INVOLVING NATIVE CORONARY ARTERY OF NATIVE HEART WITHOUT ANGINA PECTORIS: Primary | ICD-10-CM

## 2020-03-05 DIAGNOSIS — I35.0 NONRHEUMATIC AORTIC VALVE STENOSIS: ICD-10-CM

## 2020-03-05 DIAGNOSIS — E78.5 HYPERLIPIDEMIA LDL GOAL <100: ICD-10-CM

## 2020-03-05 DIAGNOSIS — I10 ESSENTIAL HYPERTENSION WITH GOAL BLOOD PRESSURE LESS THAN 130/80: ICD-10-CM

## 2020-03-05 PROCEDURE — 99214 OFFICE O/P EST MOD 30 MIN: CPT | Performed by: INTERNAL MEDICINE

## 2020-03-05 ASSESSMENT — PAIN SCALES - GENERAL: PAINLEVEL: NO PAIN (0)

## 2020-03-05 NOTE — PROGRESS NOTES
Chief complaint: aortic stenosis, CAD    HPI:   Tima Mckenzie is a 84 year old male with history of CAD s/p 3vCABG (LIMA-LAD SVG-OM1-?) 1997 who presents for follow up of aortic stenosis and evaluation of new-onset chest tightness and exertional dyspnea.     Cardiac history:  He has history of a 3vCABG in [place], [place] in 1997 ([facility].) Operative report is not available (we attempted to obtain op report and angiogram images but were informed that all records from that time have been destroyed), but our EMR and records from [facility] report LIMA-LAD and SVG-OM; Mr. Mckenzie reports that his SVG bypassed 2 vessels (possibly a jump graft.) He had an angiogram 4/2003 (just before moving to [place]) at [facility] for abnormal stress test which repotedly showed a single diseased vessel (unclear which) for which he was prescribed Imdur. We attempted to obtain these images and were told that they had been destroyed.   He had a single episode of chest pain shortly after moving to [place] in 2003 and was admittted to [facility], where he had a stress test which was normal.      He established care with me 8/1/19. At that time, he was at a primary care visit and was noted to have a systolic murmur prompting TTE which showed moderate aortic stenosis, prompting referral for further management. He reported new onset RAE that had been progressive at his initial visit.     He underwent coronary angiogram which showed widely patent LIMA-LAD and 100% proximal occlusion of his single SVG (likely a touchdown graft to OM and RCA system); he underwent PCI to native rPDA, D1, and RCA.     3/5/2020:  Since his PCI, he feels his dyspnea is dramatically improved. He has been playing tennis without difficulty.     PAST MEDICAL HISTORY:  Past Medical History:   Diagnosis Date     Arthritis      BPH (benign prostatic hypertrophy)      Coronary artery disease      Diverticulosis      Hypercholesterolemia       Hypertension      MI (myocardial infarction) (H) 3/1997    Anterior Wall MI/LAD/OM1/SVG    CABG X 3     Neuropathy     RT Foot     Seasonal allergic rhinitis 7/1/2013       CURRENT MEDICATIONS:  Current Outpatient Medications   Medication Sig Dispense Refill     aspirin 81 MG EC tablet Take 81 mg by mouth       isosorbide mononitrate (IMDUR) 30 MG 24 hr tablet Take 1 tablet (30 mg) by mouth every morning 90 tablet 1     MAGNESIUM PO Take by mouth daily       metoprolol succinate ER (TOPROL-XL) 25 MG 24 hr tablet Take 1 tablet (25 mg) by mouth daily 90 tablet 1     multivitamin, therapeutic (THERA-VIT) TABS tablet Take 1 tablet by mouth daily       nitroGLYcerin (NITROSTAT) 0.4 MG sublingual tablet Place 1 tablet (0.4 mg) under the tongue every 5 minutes as needed for chest pain up to 3 tablets per episode. 25 tablet 3     rosuvastatin (CRESTOR) 40 MG tablet Take 1 tablet (40 mg) by mouth daily 90 tablet 1     ticagrelor (BRILINTA) 90 MG tablet Take 1 tablet (90 mg) by mouth 2 times daily Start this evening. 180 tablet 3       PAST SURGICAL HISTORY:  Past Surgical History:   Procedure Laterality Date     ANGIOGRAM  4/30/2003    No Disease, Clemons,LAD,SVG,OM1- small LT Main, Occlusion of CX- D1 50-70% Stenosis, RCA 30-80%     COLONOSCOPY       COLONOSCOPY N/A 10/14/2014    Procedure: COMBINED COLONOSCOPY, SINGLE BIOPSY/POLYPECTOMY BY BIOPSY;  Surgeon: Konstantin Coates MD;  Location: [facility] OR     COLONOSCOPY WITH CO2 INSUFFLATION N/A 10/14/2014    Procedure: COLONOSCOPY WITH CO2 INSUFFLATION;  Surgeon: Konstantin Coates MD;  Location: [facility] OR     CORONARY ARTERY BYPASS  1997    X 3     CV CORONARY ANGIOGRAM N/A 1/20/2020    Procedure: CV CORONARY ANGIOGRAM;  Surgeon: Magdiel Hernandez MD;  Location: [facility] CARDIAC CATH LAB     CV FRACTIONAL FLOW RESERVE N/A 1/20/2020    Procedure: Fractional Flow Reserve;  Surgeon: Magdiel Hernandez MD;  Location: [facility] CARDIAC CATH LAB     CV PCI STENT DRUG ELUTING N/A  1/20/2020    Procedure: Percutaneous Coronary Intervention Stent Drug Eluting;  Surgeon: Magdiel Hernandez MD;  Location:  [facility] CARDIAC CATH LAB     HERNIA REPAIR  2014    [facility]       ALLERGIES:     Allergies   Allergen Reactions     No Known Drug Allergy      Seasonal Allergies        FAMILY HISTORY:  Brother with fatal MI at 55.  No family history of premature CAD.    SOCIAL HISTORY:  Social History     Tobacco Use     Smoking status: Former Smoker     Smokeless tobacco: Never Used     Tobacco comment: 1997   Substance Use Topics     Alcohol use: Yes     Comment: rarely      Drug use: No       ROS:   A comprehensive 14 point review of systems is negative other than as mentioned in HPI.    Exam:  /80 (BP Location: Left arm, Patient Position: Sitting, Cuff Size: Adult Regular)   Pulse 68   Wt 85.4 kg (188 lb 4.8 oz)   BMI 26.64 kg/m    GENERAL APPEARANCE: healthy, alert and no distress  EYES: no icterus, no xanthelasmas  ENT: normal palate, mucosa moist, no central cyanosis  NECK: JVP not elevated  RESPIRATORY: lungs clear to auscultation - no rales, rhonchi or wheezes, no use of accessory muscles, no retractions, respirations are unlabored, normal respiratory rate  CARDIOVASCULAR: regular rhythm, normal S1 with physiologic split S2, no S3 or S4 +III/VI systolic murmur RUSB radiating to entire precordium.  GI: soft, non tender, bowel sounds normal,no abdominal bruits  EXTREMITIES: no edema, no bruits  NEURO: alert and oriented to person/place/time, normal speech, gait and affect  VASC: Radial, dorsalis pedis and posterior tibialis pulses 2+ bilaterally.  SKIN: no ecchymoses, no rashes.  PSYCH: cooperative, affect appropriate.     Labs:  Reviewed.     Testing/Procedures:  I personally visualized and interpreted:  TTE 7/30/19:   LVEF=55-60%. Basal inferolateral hypokinesis.  RV size/wall motion/thickness normal.  Severely calcified trileaflet aortic valve with moderate aortic stenosis (PV 3.2  m/s MG 22 mmHG DVI 0.26 JESS 1.27 cm2 SVI 51 mL/m2)  Mild MR.  Mild-mod LA enlargment. Mild RA enlargement.   RVSP 31+RA pressure.     TTE 12/31/19:   Normal LV/RV size/function/thickness, LVEF=60-65%. Probably moderate aortic stenosis (peak velocity 2.8 m/s, mean gradient 20 mmHg, DVI 0.30, JESS 1.5 cm2, SVI 55 mL/m2.)   Compared with 7/2019 TTE, aortic valve gradients are slightly lower (previously PV 3.2 m/s MG 22 mmHg DVI 0.26 JESS 1.26 SVI 51 mL/m2), however aortic stenosis is probably unchanged in severity    Coronary Angiogram 1/20/20  Obstructive coronary artery disease   Vein graft closed, LIMA patent   S/p PCI of pRCA, rPDA, D1 with drug eluting stents    Conclusion   1st Diag lesion is 80% stenosed.  Prox RCA lesion is 70% stenosed.  Mid RCA lesion is 30% stenosed.  RPDA lesion is 80% stenosed.  Mid LAD lesion is 100% stenosed.  Ost Cx to Prox Cx lesion is 99% stenosed.    Outside results of note:  Outside records from [facility]  were obtained, and relevant results/notes have been incorporated into HPI.    Assessment and Plan:   1. Moderate trileaflet aortic stenosis:  2. Coronary artery disease status post three-vessel coronary bypass graft (LIMA-LAD SVG-OM-?) s/p PCI w/ BYRON to pRCA/rPDA/D1 1/20/20  Now stably free of exertional dyspnea after PCI, suggesting strongly that his exertional dyspnea was an anginal equivalent and suggesting against occult severe aortic stenosis.   He should continue ASA 81 mg, rosuvastatin 40 mg, Toprol XL 25 mg daily, and Imdur 30 mg daily.  He should continue ticagrelor 90 mg BID through at least 1/20/2021 without interruption.   Plan for repeat TTE in 1 year to reassess aortic stenosis.  He declines cardiac rehab and plans to continue using the treadmill and playing tennis.     3. Hypertension, controlled: continue present management.  4. Hyperlipidemia, controlled: continue present management.     The patient states understanding and is agreeable with plan.     Artis GILMORE  MD [facility]  Cardiology

## 2020-03-05 NOTE — PATIENT INSTRUCTIONS
You were seen at the [facility] today.  You saw Dr. Artis Parker  Here are your Instructions:    1. Continue ticagrelor (Brillinta) through at least 1/20/2021 (1 year from your stent procedure) without interruption.  2. Follow up with Dr. Parker in 1 year with Echocardiogram prior.         If you have questions after this visit:  Send a [employer] message or contact Dory Anderson LPN  Office:  [phone] option #1, then #4 & ask for Dory (nurse line)  Fax:  [phone]  After Hours:  [phone] option #4 ask to speak to he on-call Cardiologist  Appointments:  [phone] option #1, then option #1

## 2020-03-05 NOTE — NURSING NOTE
Tima Mckenzie's goals for this visit include:   Chief Complaint   Patient presents with     RECHECK     s/p angiogram 1/20/2020       He requests these members of his care team be copied on today's visit information: no    PCP: Julieta Gandhi    Referring Provider:  Artis Parker MD  [id number]  [facility] [place] [address]    /80 (BP Location: Left arm, Patient Position: Sitting, Cuff Size: Adult Regular)   Pulse 68   Wt 85.4 kg (188 lb 4.8 oz)   BMI 26.64 kg/m      Do you need any medication refills at today's visit? no

## 2020-03-09 DIAGNOSIS — R09.81 NASAL CONGESTION: ICD-10-CM

## 2020-03-09 NOTE — TELEPHONE ENCOUNTER
"Requested Prescriptions   Pending Prescriptions Disp Refills     mometasone (NASONEX) 50 MCG/ACT nasal spray 1 Box 3     Sig: Spray 2 sprays into both nostrils daily       Inhaled Steroids Protocol Failed - 3/9/2020 12:59 PM        Failed - Medication is active on med list        Passed - Patient is age 12 or older        Passed - Recent (12 mo) or future (30 days) visit within the authorizing provider's specialty     Patient has had an office visit with the authorizing provider or a provider within the authorizing providers department within the previous 12 mos or has a future within next 30 days. See \"Patient Info\" tab in inbasket, or \"Choose Columns\" in Meds & Orders section of the refill encounter.                   mometasone (NASONEX) 50 MCG/ACT spray (Discontinued)       Last Written Prescription Date:  6/11/18  Last Fill Quantity: 1,   # refills: 3  Last Office Visit: 2/21/2020  Future Office visit:       Routing refill request to provider for review/approval because:  Drug not active on patient's medication list    "

## 2020-03-12 RX ORDER — MOMETASONE FUROATE MONOHYDRATE 50 UG/1
2 SPRAY, METERED NASAL DAILY
Qty: 1 BOX | Refills: 3 | Status: SHIPPED | OUTPATIENT
Start: 2020-03-12 | End: 2021-06-23

## 2020-03-12 NOTE — TELEPHONE ENCOUNTER
Routing refill request to provider for review/approval because:  Drug not active on patient's medication list  Ligia Britt RN  [facility]

## 2020-05-15 ENCOUNTER — TELEPHONE (OUTPATIENT)
Dept: CARDIOLOGY | Facility: CLINIC | Age: 85
End: 2020-05-15

## 2020-05-15 NOTE — TELEPHONE ENCOUNTER
[facility] Call Center    Phone Message    May a detailed message be left on voicemail: yes     Reason for Call: Symptoms or Concerns     Patient states he has shortness of breath after 3 steps. Said he had 3 stints placed at beginning of this year and everything seems to have gotten worse. Writer offered nurse triage line, but patient refused, said he will wait for nurse call to discuss. Said he is not in extreme shape right now, just concerned.       Action Taken: Message routed to:  Adult Clinics: Cardiology p [id number]    Travel Screening: Not Applicable

## 2020-05-15 NOTE — TELEPHONE ENCOUNTER
"Spoke to Tima. He has notices that he has been getting more short of breath with activity. He was in the yard picking up sticks for about 15 minutes today and found himself quite short of breath after doing so. He also noted a longer recovery time. He initially denied chest pain but noted that last week, on a walk he did have some chest tightness across his chest. He remains quite active, walks daily, though shortness of breath has been progressing for him. Patient is recently back from a 11 hour trip/drive to [place] which he does often. Many stops were made along the way to move, strech, eat, refuel. Patient currently feels well and does not feel that this is emergent at this time.     He does not check home BP. His weight is \"the same\". He denies any edema.     Patient is wondering if his one of his stents is becoming stenosed.     Advised that I will route to Dr. Parker to advise. Patient agreed to lay low and not complete labor intensive work. He understands that he needs to seek emergent care if symptoms worsen.    Enedina Carpenter, RN, BSN  [facility]    "

## 2020-05-18 NOTE — TELEPHONE ENCOUNTER
Per Dr. Parker:  Let's get him a follow up virtual visit at his earliest convenience to discuss further. Agree with the guidance you've provided.     Spoke to patient. He verbalized understanding. He will download the [employer] touch point char for a  Thursday 5/21/2020 video visit. Of note, he mentions that his symptoms were improved today with mild activity.     Enedina Carpenter RN, BSN  [facility]

## 2020-05-21 ENCOUNTER — VIRTUAL VISIT (OUTPATIENT)
Dept: CARDIOLOGY | Facility: CLINIC | Age: 85
End: 2020-05-21
Payer: COMMERCIAL

## 2020-05-21 DIAGNOSIS — E78.5 HYPERLIPIDEMIA LDL GOAL <100: ICD-10-CM

## 2020-05-21 DIAGNOSIS — I25.10 CORONARY ARTERY DISEASE INVOLVING NATIVE CORONARY ARTERY OF NATIVE HEART WITHOUT ANGINA PECTORIS: ICD-10-CM

## 2020-05-21 DIAGNOSIS — I10 ESSENTIAL HYPERTENSION WITH GOAL BLOOD PRESSURE LESS THAN 130/80: ICD-10-CM

## 2020-05-21 PROCEDURE — 99213 OFFICE O/P EST LOW 20 MIN: CPT | Mod: 95 | Performed by: INTERNAL MEDICINE

## 2020-05-21 RX ORDER — ISOSORBIDE MONONITRATE 30 MG/1
30 TABLET, EXTENDED RELEASE ORAL EVERY MORNING
Qty: 90 TABLET | Refills: 1 | Status: SHIPPED | OUTPATIENT
Start: 2020-05-21 | End: 2020-12-01

## 2020-05-21 RX ORDER — NITROGLYCERIN 0.4 MG/1
0.4 TABLET SUBLINGUAL EVERY 5 MIN PRN
Qty: 25 TABLET | Refills: 3 | Status: SHIPPED | OUTPATIENT
Start: 2020-05-21 | End: 2023-11-22

## 2020-05-21 RX ORDER — METOPROLOL SUCCINATE 25 MG/1
25 TABLET, EXTENDED RELEASE ORAL DAILY
Qty: 90 TABLET | Refills: 1 | Status: SHIPPED | OUTPATIENT
Start: 2020-05-21 | End: 2020-12-01

## 2020-05-21 RX ORDER — ROSUVASTATIN CALCIUM 40 MG/1
40 TABLET, COATED ORAL DAILY
Qty: 90 TABLET | Refills: 1 | Status: SHIPPED | OUTPATIENT
Start: 2020-05-21 | End: 2020-12-01

## 2020-05-21 NOTE — PROGRESS NOTES
"Tima Mckenzie is a 84 year old male who is being evaluated via a billable video visit.      The patient has been notified of following:     \"This video visit will be conducted via a call between you and your physician/provider. We have found that certain health care needs can be provided without the need for an in-person physical exam.  This service lets us provide the care you need with a video conversation.  If a prescription is necessary we can send it directly to your pharmacy.  If lab work is needed we can place an order for that and you can then stop by our lab to have the test done at a later time.    Video visits are billed at different rates depending on your insurance coverage.  Please reach out to your insurance provider with any questions.    If during the course of the call the physician/provider feels a video visit is not appropriate, you will not be charged for this service.\"    Patient has given verbal consent for Video visit?YES    How would you like to obtain your AVS? Mail AVS    Patient would like the video invitation sent by:text [phone]    Will anyone else be joining your video visit? Yes wife Ciera        Video-Visit Details    Type of service:  Video Visit    Video Start Time: 12:47 PM  Video End Time: 12:56 PM  Total video time: 9 minutes    Originating Location (pt. Location): Home    Distant Location (provider location):  [facility]     Platform used for Video Visit: [facility]       Chief complaint: aortic stenosis, CAD    HPI:   Tima Mckenzie is a 84 year old male with history of CAD s/p 3vCABG (LIMA-LAD SVG-OM1-?) 1997 who presents for follow up of aortic stenosis and evaluation of new-onset chest tightness and exertional dyspnea.     Cardiac history:  He has history of a 3vCABG in [place], [place] in 1997 ([facility] Operative report is not available (we attempted to obtain op report and angiogram images but were informed that all records from that time have " been destroyed), but our EMR and records from [facility] report LIMA-LAD and SVG-OM; Mr. Mckenzie reports that his SVG bypassed 2 vessels (possibly a jump graft.) He had an angiogram 4/2003 (just before moving to [place]) at [facility] for abnormal stress test which repotedly showed a single diseased vessel (unclear which) for which he was prescribed Imdur. We attempted to obtain these images and were told that they had been destroyed.   He had a single episode of chest pain shortly after moving to [place] in 2003 and was admittted to [facility], where he had a stress test which was normal.      He established care with me 8/1/19. At that time, he was at a primary care visit and was noted to have a systolic murmur prompting TTE which showed moderate aortic stenosis, prompting referral for further management. He reported new onset RAE that had been progressive at his initial visit.     He underwent coronary angiogram which showed widely patent LIMA-LAD and 100% proximal occlusion of his single SVG (likely a touchdown graft to OM and RCA system); he underwent PCI to native rPDA, D1, and RCA.     3/5/2020:  Since his PCI, he feels his dyspnea is dramatically improved. He has been playing tennis without difficulty.     5/21/20:  He had been sedentary since his angiogram and went for a walk last week and felt dyspneic. This was initially concerning to him, but since that time he has been doing a 15-minute exercise program from [employer] and walking for 25 minutes without chest discomfort or dyspnea. He does still have exertional dyspnea walking at a brisk pace or with vigorous yardwork. He feels this is gradually improving as he increases his activity.     He denies any chest pain, dyspnea at rest, palpitations, PND, orthopnea, peripheral edema, lightheadedness, or syncope.       PAST MEDICAL HISTORY:  Past Medical History:   Diagnosis Date     Arthritis      BPH (benign prostatic hypertrophy)      Coronary  artery disease      Diverticulosis      Hypercholesterolemia      Hypertension      MI (myocardial infarction) (H) 3/1997    Anterior Wall MI/LAD/OM1/SVG    CABG X 3     Neuropathy     RT Foot     Seasonal allergic rhinitis 7/1/2013       CURRENT MEDICATIONS:  Current Outpatient Medications   Medication Sig Dispense Refill     aspirin 81 MG EC tablet Take 81 mg by mouth       isosorbide mononitrate (IMDUR) 30 MG 24 hr tablet Take 1 tablet (30 mg) by mouth every morning 90 tablet 1     metoprolol succinate ER (TOPROL-XL) 25 MG 24 hr tablet Take 1 tablet (25 mg) by mouth daily 90 tablet 1     mometasone (NASONEX) 50 MCG/ACT nasal spray Spray 2 sprays into both nostrils daily 1 Box 3     multivitamin, therapeutic (THERA-VIT) TABS tablet Take 1 tablet by mouth daily       nitroGLYcerin (NITROSTAT) 0.4 MG sublingual tablet Place 1 tablet (0.4 mg) under the tongue every 5 minutes as needed for chest pain up to 3 tablets per episode. 25 tablet 3     rosuvastatin (CRESTOR) 40 MG tablet Take 1 tablet (40 mg) by mouth daily 90 tablet 1     ticagrelor (BRILINTA) 90 MG tablet Take 1 tablet (90 mg) by mouth 2 times daily Start this evening. 180 tablet 3     MAGNESIUM PO Take by mouth daily         PAST SURGICAL HISTORY:  Past Surgical History:   Procedure Laterality Date     ANGIOGRAM  4/30/2003    No Disease, Clemons,LAD,SVG,OM1- small LT Main, Occlusion of CX- D1 50-70% Stenosis, RCA 30-80%     COLONOSCOPY       COLONOSCOPY N/A 10/14/2014    Procedure: COMBINED COLONOSCOPY, SINGLE BIOPSY/POLYPECTOMY BY BIOPSY;  Surgeon: Konstantin Coates MD;  Location: [facility] OR     COLONOSCOPY WITH CO2 INSUFFLATION N/A 10/14/2014    Procedure: COLONOSCOPY WITH CO2 INSUFFLATION;  Surgeon: Konstantin Coates MD;  Location: [facility] OR     CORONARY ARTERY BYPASS  1997    X 3     CV CORONARY ANGIOGRAM N/A 1/20/2020    Procedure: CV CORONARY ANGIOGRAM;  Surgeon: Magdiel Hernandez MD;  Location:  [facility] CARDIAC CATH LAB     CV FRACTIONAL FLOW RESERVE  N/A 1/20/2020    Procedure: Fractional Flow Reserve;  Surgeon: Magdiel Hernandez MD;  Location:  HEART CARDIAC CATH LAB     CV PCI STENT DRUG ELUTING N/A 1/20/2020    Procedure: Percutaneous Coronary Intervention Stent Drug Eluting;  Surgeon: Magdiel Hernandez MD;  Location:  HEART CARDIAC CATH LAB     HERNIA REPAIR  2014    [facility]       ALLERGIES:     Allergies   Allergen Reactions     No Known Drug Allergy      Seasonal Allergies        FAMILY HISTORY:  Brother with fatal MI at 55.  No family history of premature CAD.    SOCIAL HISTORY:  Social History     Tobacco Use     Smoking status: Former Smoker     Smokeless tobacco: Never Used     Tobacco comment: 1997   Substance Use Topics     Alcohol use: Yes     Comment: rarely      Drug use: No       ROS:   A comprehensive 14 point review of systems is negative other than as mentioned in HPI.    Exam:  CONSITUTIONAL: no acute distress  HEENT: no icterus, sclerae white  CV: no visible edema of visualized upper extremities, no gross JVD  CHEST: respirations nonlabored, no audible wheezing  NEURO: AA&Ox3, speech fluent/appropriate, motor grossly nonfocal  PSYCH: cooperative, affect appropriate  DERM: no rashes on visualized face/neck/upper extremities    The rest of a comprehensive physical examination is deferred due to PHE (public health emergency) video visit restrictions.        Labs:  Reviewed.     Testing/Procedures:  TTE 7/30/19:   LVEF=55-60%. Basal inferolateral hypokinesis.  RV size/wall motion/thickness normal.  Severely calcified trileaflet aortic valve with moderate aortic stenosis (PV 3.2 m/s MG 22 mmHG DVI 0.26 JESS 1.27 cm2 SVI 51 mL/m2)  Mild MR.  Mild-mod LA enlargment. Mild RA enlargement.   RVSP 31+RA pressure.     TTE 12/31/19:   Normal LV/RV size/function/thickness, LVEF=60-65%. Probably moderate aortic stenosis (peak velocity 2.8 m/s, mean gradient 20 mmHg, DVI 0.30, JESS 1.5 cm2, SVI 55 mL/m2.)   Compared with 7/2019 TTE, aortic  valve gradients are slightly lower (previously PV 3.2 m/s MG 22 mmHg DVI 0.26 JESS 1.26 SVI 51 mL/m2), however aortic stenosis is probably unchanged in severity    Coronary Angiogram 1/20/20  Obstructive coronary artery disease   Vein graft closed, LIMA patent   S/p PCI of pRCA, rPDA, D1 with drug eluting stents    Conclusion   1st Diag lesion is 80% stenosed.  Prox RCA lesion is 70% stenosed.  Mid RCA lesion is 30% stenosed.  RPDA lesion is 80% stenosed.  Mid LAD lesion is 100% stenosed.  Ost Cx to Prox Cx lesion is 99% stenosed.    Outside results of note:  Outside records from [facility]  were obtained, and relevant results/notes have been incorporated into HPI.    Assessment and Plan:   1. Moderate trileaflet aortic stenosis:  2. Coronary artery disease status post three-vessel coronary bypass graft (LIMA-LAD SVG-OM-?) s/p PCI w/ BYRON to pRCA/rPDA/D1 1/20/20  Now stably free of exertional dyspnea after PCI, suggesting strongly that his exertional dyspnea was an anginal equivalent and suggesting against occult severe aortic stenosis.   He should continue ASA 81 mg, rosuvastatin 40 mg, Toprol XL 25 mg daily, and Imdur 30 mg daily.  He should continue ticagrelor 90 mg BID through at least 1/20/2021 without interruption.   Plan for repeat TTE 3/2021 to reassess aortic stenosis.  He declines cardiac rehab and plans to continue using the treadmill and playing tennis.     3. Hypertension, controlled: continue present management.  4. Hyperlipidemia, controlled: continue present management.     The patient states understanding and is agreeable with plan.     Artis Parker MD  Cardiology

## 2020-05-21 NOTE — PATIENT INSTRUCTIONS
You were seen at the [facility] clinic today.  You saw Dr. Artis Parker  Here are your Instructions:    1. Follow up with Dr. Parker as scheduled with Echocardiogram prior.

## 2020-06-01 ENCOUNTER — VIRTUAL VISIT (OUTPATIENT)
Dept: FAMILY MEDICINE | Facility: CLINIC | Age: 85
End: 2020-06-01
Payer: COMMERCIAL

## 2020-06-01 DIAGNOSIS — I25.10 CORONARY ARTERY DISEASE INVOLVING NATIVE CORONARY ARTERY OF NATIVE HEART WITHOUT ANGINA PECTORIS: ICD-10-CM

## 2020-06-01 DIAGNOSIS — K62.5 RECTAL BLEEDING: ICD-10-CM

## 2020-06-01 DIAGNOSIS — K57.32 DIVERTICULITIS OF COLON: Primary | ICD-10-CM

## 2020-06-01 PROCEDURE — 99214 OFFICE O/P EST MOD 30 MIN: CPT | Mod: 95 | Performed by: FAMILY MEDICINE

## 2020-06-01 NOTE — PATIENT INSTRUCTIONS
Patient Education     Diverticulitis    Some people get pouches along the wall of the colon as they get older. The pouches, called diverticuli, usually cause no symptoms. If the pouches become blocked, you can get an infection. This infection is called diverticulitis. It causes pain in your lower abdomen and fever. If not treated, it can become a serious condition, causing an abscess to form inside the pouch. The abscess may block the intestinal tract even or rupture, spreading infection throughout the abdomen.  When treatment is started early, oral antibiotics alone may be enough to cure diverticulitis. This method is tried first. But, if you don't improve or if your condition gets worse while using oral antibiotics, you may need to be admitted to the hospital for IV antibiotics. Severe cases may require surgery.  Home care  The following guidelines will help you care for yourself at home:    During the acute illness, rest and follow your healthcare provider's instructions about diet. Sometimes you will need to follow a clear liquid diet to rest your bowel. Once your symptoms are better, you may be told to follow a low-fiber diet for some time. Include foods like:  ? Flake cereal, mashed potatoes, pancakes, waffles, pasta, white bread, rice, applesauce, bananas, eggs, fish, poultry, tofu, and cooked soft vegetables    Take antibiotics exactly as instructed. Don't miss any doses or stop taking the medication, even if you feel better.    Monitor your temperature and tell your healthcare provider if you have rising temperatures.  Preventing future attacks  Once you have an episode of diverticulitis, you are at risk for having it again. After you have recovered from this episode, you may be able to lower your risk by eating a high-fiber diet (20 gm/day to 35 gm/day of fiber). This cleans out the colon pouches that already exist and may prevent new ones from forming. Foods high in fiber include fresh fruits and edible  peelings, raw or lightly cooked vegetables, whole grain cereals and breads, dried beans and peas, and bran.  Other steps that can help prevent future attacks include:    Take your medicines, such as antibiotics, as your healthcare provider says.    Drink 6 to 8 glasses of water every day, unless told otherwise.    Use a heating pad or hot water bottle to help abdominal cramping or pain.    Begin an exercise program. Ask your healthcare provider how to get started. You can benefit from simple activities such as walking or gardening.    Treat diarrhea with a bland diet. Start with liquids only; then slowly add fiber over time.    Watch for changes in your bowel movements (constipation to diarrhea). Avoid constipation by eating a high fiber diet and taking a stool softener if needed.    Get plenty of rest and sleep.  Follow-up care  Follow up with your healthcare provider as advised or sooner if you are not getting better in the next 2 days.  When to seek medical advice  Call your healthcare provider right away if any of these occur:    Fever of 100.4 F (38 C) or higher, or as directed by your healthcare provider    Repeated vomiting or swelling of the abdomen    Weakness, dizziness, light-headedness    Pain in your abdomen that gets worse, severe, or spreads to your back    Pain that moves to the right lower abdomen    Rectal bleeding (stools that are red, black or maroon color)    Unexpected vaginal bleeding  Date Last Reviewed: 9/1/2016 2000-2019 [employer]. [address] [place] [address], [place] [address]. All rights reserved. This information is not intended as a substitute for professional medical care. Always follow your healthcare professional's instructions.

## 2020-06-01 NOTE — PROGRESS NOTES
"Tima Mckenzie is a 84 year old male who is being evaluated via a billable telephone visit.      The patient has been notified of following:     \"This telephone visit will be conducted via a call between you and your physician/provider. We have found that certain health care needs can be provided without the need for a physical exam.  This service lets us provide the care you need with a short phone conversation.  If a prescription is necessary we can send it directly to your pharmacy.  If lab work is needed we can place an order for that and you can then stop by our lab to have the test done at a later time.    Telephone visits are billed at different rates depending on your insurance coverage. During this emergency period, for some insurers they may be billed the same as an in-person visit.  Please reach out to your insurance provider with any questions.    If during the course of the call the physician/provider feels a telephone visit is not appropriate, you will not be charged for this service.\"    Patient has given verbal consent for Telephone visit?  Yes    What phone number would you like to be contacted at? [phone]    How would you like to obtain your AVS? Mail a copy    Subjective     Tima Mckenzie is a 84 year old male who presents via phone visit today for the following health issues:    HPI     Concern - Blood in stool  Onset: started yesterday    Description:   Had about 4 episodes yesterday of tiny bit of blood in stool. Abdominal pain/cramps deep under umbilicus area. Stool liquid with blood. None today but has not had a bowel movement and is still feeling some abdominal pain in the left lower quadrant. No fever, chills, sweats or malaise. History of diverticulosis by colonoscopy in 2014.     Intensity: mild    Progression of Symptoms:  improving    Accompanying Signs & Symptoms:  none    Previous history of similar problem:   none    Precipitating factors:   Worsened by: " none    Alleviating factors:  Improved by: none    Therapies Tried and outcome: none           Vascular Disease Follow-up      How often do you take nitroglycerin? Never    Do you take an aspirin every day? Yes and ticagrelor for ischemic heart disease. No other bleeding issues.       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     Seasonal allergic rhinitis     Bilateral inguinal hernia     Senile nuclear sclerosis     Posterior vitreous detachment of both eyes     Essential hypertension with goal blood pressure less than 130/80     Seasonal allergic rhinitis, unspecified allergic rhinitis trigger     Coronary artery disease involving native coronary artery of native heart without angina pectoris     Nonrheumatic aortic valve stenosis     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)     Non-rheumatic aortic stenosis     Status post coronary angiogram     Past Surgical History:   Procedure Laterality Date     ANGIOGRAM  4/30/2003    No Disease, Clemons,LAD,SVG,OM1- small LT Main, Occlusion of CX- D1 50-70% Stenosis, RCA 30-80%     COLONOSCOPY       COLONOSCOPY N/A 10/14/2014    Procedure: COMBINED COLONOSCOPY, SINGLE BIOPSY/POLYPECTOMY BY BIOPSY;  Surgeon: Konstantin Coates MD;  Location: [facility] OR     COLONOSCOPY WITH CO2 INSUFFLATION N/A 10/14/2014    Procedure: COLONOSCOPY WITH CO2 INSUFFLATION;  Surgeon: Konstantin Coates MD;  Location: [facility] OR     CORONARY ARTERY BYPASS  1997    X 3     CV CORONARY ANGIOGRAM N/A 1/20/2020    Procedure: CV CORONARY ANGIOGRAM;  Surgeon: Magdiel Hernandez MD;  [facility]     CV FRACTIONAL FLOW RESERVE N/A 1/20/2020    Procedure: Fractional Flow Reserve;  Surgeon: Mary  MD Magdiel;  Location: [facility] C[facility]     CV PCI STENT DRUG ELUTING N/A 1/20/2020    Procedure: Percutaneous Coronary Intervention Stent Drug Eluting;  Surgeon: Magdiel Hernandez MD;  Location:  [facility]     HERNIA REPAIR  2014    [facility]       Social History     Tobacco Use     Smoking status: Former Smoker     Smokeless tobacco: Never Used     Tobacco comment: 1997   Substance Use Topics     Alcohol use: Yes     Comment: rarely      Family History   Problem Relation Age of Onset     Coronary Artery Disease Brother 55        Fatal MI         Current Outpatient Medications   Medication Sig Dispense Refill     amoxicillin-clavulanate (AUGMENTIN) 875-125 MG tablet Take 1 tablet by mouth 2 times daily 20 tablet 0     aspirin 81 MG EC tablet Take 81 mg by mouth       isosorbide mononitrate (IMDUR) 30 MG 24 hr tablet Take 1 tablet (30 mg) by mouth every morning 90 tablet 1     MAGNESIUM PO Take by mouth daily       metoprolol succinate ER (TOPROL-XL) 25 MG 24 hr tablet Take 1 tablet (25 mg) by mouth daily 90 tablet 1     mometasone (NASONEX) 50 MCG/ACT nasal spray Spray 2 sprays into both nostrils daily 1 Box 3     multivitamin, therapeutic (THERA-VIT) TABS tablet Take 1 tablet by mouth daily       nitroGLYcerin (NITROSTAT) 0.4 MG sublingual tablet Place 1 tablet (0.4 mg) under the tongue every 5 minutes as needed for chest pain up to 3 tablets per episode. 25 tablet 3     rosuvastatin (CRESTOR) 40 MG tablet Take 1 tablet (40 mg) by mouth daily 90 tablet 1     ticagrelor (BRILINTA) 90 MG tablet Take 1 tablet (90 mg) by mouth 2 times daily Start this evening. 180 tablet 3     Allergies   Allergen Reactions     No Known Drug Allergy      Seasonal Allergies      Recent Labs   Lab Test 01/20/20  1333 07/25/19  0805  08/30/18  0756 06/12/17  0718   LDL 99 60  --  66 85   HDL 46 49  --  43 43   TRIG 126 80  --  113 111   ALT  --  25  --  24 27   CR 1.09 1.01   < > 1.04 1.08   GFRESTIMATED  63 69   < > 69 66   GFRESTBLACK 73 80   < > 83 80   POTASSIUM 4.5 4.2   < > 4.2 4.3    < > = values in this interval not displayed.      BP Readings from Last 3 Encounters:   03/05/20 132/80   02/21/20 138/82   01/20/20 (!) 146/83    Wt Readings from Last 3 Encounters:   03/05/20 85.4 kg (188 lb 4.8 oz)   02/21/20 85.3 kg (188 lb)   01/20/20 88 kg (194 lb)                    Reviewed and updated as needed this visit by Provider         Review of Systems   Constitutional, HEENT, cardiovascular, pulmonary, gi and gu systems are negative, except as otherwise noted.       Objective   Reported vitals:  There were no vitals taken for this visit.   healthy, alert and no distress  PSYCH: Alert and oriented times 3; coherent speech, normal   rate and volume, able to articulate logical thoughts, able   to abstract reason, no tangential thoughts, no hallucinations   or delusions  His affect is normal  RESP: No cough, no audible wheezing, able to talk in full sentences  Remainder of exam unable to be completed due to telephone visits    Diagnostic Test Results:  Labs reviewed in Epic  Admission on 01/20/2020, Discharged on 01/20/2020   Component Date Value Ref Range Status     Interpretation ECG 01/20/2020 Click View Image link to view waveform and result   Final     WBC 01/20/2020 7.2  4.0 - 11.0 10e9/L Final     RBC Count 01/20/2020 5.42  4.4 - 5.9 10e12/L Final     Hemoglobin 01/20/2020 16.8  13.3 - 17.7 g/dL Final     Hematocrit 01/20/2020 50.0  40.0 - 53.0 % Final     MCV 01/20/2020 92  78 - 100 fl Final     MCH 01/20/2020 31.0  26.5 - 33.0 pg Final     MCHC 01/20/2020 33.6  31.5 - 36.5 g/dL Final     RDW 01/20/2020 12.5  10.0 - 15.0 % Final     Platelet Count 01/20/2020 191  150 - 450 10e9/L Final     Activated Clot Time 01/20/2020 280* 75 - 150 sec Final     Interpretation ECG 01/20/2020 Click View Image link to view waveform and result   Final              Assessment/Plan:  1. Diverticulitis of colon  Diagnosis based on  symptoms and no exam or imaging/lab studies. Discussed differential with patient and need for confirming exam and work up. Due to COVID 19 risk patient prefers empiric treatment at this time. Will follow up if symptoms not improving in the next 2-3 days. Discussed when to call or go to emergency department with worsening abdominal pain, fever, rectal bleeding or malaise.   - amoxicillin-clavulanate (AUGMENTIN) 875-125 MG tablet; Take 1 tablet by mouth 2 times daily  Dispense: 20 tablet; Refill: 0    2. Rectal bleeding  Small amount and not suspicious for major GI bleed. Colonoscopy is not recent, but biopsy was normal and no other polyps found. May need gastroenterology follow up if bleeding is recurrent after treatment. Follow up with primary care provider for decision making about repeat colonoscopy.     3. Coronary artery disease involving native coronary artery of native heart without angina pectoris  On antiplatelet therapy. No current symptoms of angina.       No follow-ups on file.      Phone call duration:  12 minutes    Chelsea Horowitz MD

## 2020-06-01 NOTE — PROGRESS NOTES
"Tima Mckenzie is a 84 year old male who is being evaluated via a billable telephone visit.      The patient has been notified of following:     \"This telephone visit will be conducted via a call between you and your physician/provider. We have found that certain health care needs can be provided without the need for a physical exam.  This service lets us provide the care you need with a short phone conversation.  If a prescription is necessary we can send it directly to your pharmacy.  If lab work is needed we can place an order for that and you can then stop by our lab to have the test done at a later time.    Telephone visits are billed at different rates depending on your insurance coverage. During this emergency period, for some insurers they may be billed the same as an in-person visit.  Please reach out to your insurance provider with any questions.    If during the course of the call the physician/provider feels a telephone visit is not appropriate, you will not be charged for this service.\"    Patient has given verbal consent for Telephone visit?  Yes    What phone number would you like to be contacted at? [phone]    How would you like to obtain your AVS? Mail a copy    Subjective     Tima Mckenzie is a 84 year old male who presents via phone visit today for the following health issues:    HPI     Concern - Blood in stool  Onset: started yesterday    Description:   Had about 4 episodes yesterday of blood in stool. Abdominal pain/cramps. Stool liquid with blood. None today    Intensity: mild    Progression of Symptoms:  improving    Accompanying Signs & Symptoms:  none    Previous history of similar problem:   none    Precipitating factors:   Worsened by: none    Alleviating factors:  Improved by: none    Therapies Tried and outcome: none      {PEDS Chronic and Acute Problems (Optional):537981}     {additonal problems for provider to add (Optional):881545}    {HIST REVIEW/ LINKS 2 " "(Optional):179339}    Reviewed and updated as needed this visit by Provider         Review of Systems   {ROS COMP (Optional):437799}       Objective   Reported vitals:  There were no vitals taken for this visit.   {GENERAL APPEARANCE:50::\"healthy\",\"alert\",\"no distress\"}  PSYCH: Alert and oriented times 3; coherent speech, normal   rate and volume, able to articulate logical thoughts, able   to abstract reason, no tangential thoughts, no hallucinations   or delusions  His affect is { :4825493::\"normal\"}  RESP: No cough, no audible wheezing, able to talk in full sentences  Remainder of exam unable to be completed due to telephone visits    {Diagnostic Test Results (Optional):683620::\"Diagnostic Test Results:\",\"Labs reviewed in Epic\"}        Assessment/Plan:  {Diagnosis, Associated Orders and Comment:763878}    No follow-ups on file.      Phone call duration:  *** minutes    {signature options:771111}          "

## 2020-11-28 DIAGNOSIS — I25.10 CORONARY ARTERY DISEASE INVOLVING NATIVE CORONARY ARTERY OF NATIVE HEART WITHOUT ANGINA PECTORIS: ICD-10-CM

## 2020-11-28 DIAGNOSIS — I10 ESSENTIAL HYPERTENSION WITH GOAL BLOOD PRESSURE LESS THAN 130/80: ICD-10-CM

## 2020-11-28 DIAGNOSIS — E78.5 HYPERLIPIDEMIA LDL GOAL <100: ICD-10-CM

## 2020-11-28 DIAGNOSIS — I25.708 CORONARY ARTERY DISEASE OF BYPASS GRAFT OF NATIVE HEART WITH STABLE ANGINA PECTORIS (H): ICD-10-CM

## 2020-11-28 NOTE — TELEPHONE ENCOUNTER
Reason for Call:  Other prescription    Detailed comments: Patient had called requesting a renewal on all his medications and he would like them sent to [facility] [id number] [place] [facility] - [place], [place] - [address]    Phone Number Patient can be reached at: Home number on file [phone] (home)    Best Time: Anytime    Can we leave a detailed message on this number? YES    Call taken on 11/28/2020 at 12:39 PM by Ann Marie Chirinos

## 2020-12-01 RX ORDER — METOPROLOL SUCCINATE 25 MG/1
25 TABLET, EXTENDED RELEASE ORAL DAILY
Qty: 90 TABLET | Refills: 0 | Status: SHIPPED | OUTPATIENT
Start: 2020-12-01 | End: 2021-02-23

## 2020-12-01 RX ORDER — ROSUVASTATIN CALCIUM 40 MG/1
40 TABLET, COATED ORAL DAILY
Qty: 90 TABLET | Refills: 0 | Status: SHIPPED | OUTPATIENT
Start: 2020-12-01 | End: 2021-02-23

## 2020-12-01 RX ORDER — NITROGLYCERIN 0.4 MG/1
0.4 TABLET SUBLINGUAL EVERY 5 MIN PRN
Qty: 25 TABLET | Refills: 3 | OUTPATIENT
Start: 2020-12-01

## 2020-12-01 RX ORDER — ISOSORBIDE MONONITRATE 30 MG/1
30 TABLET, EXTENDED RELEASE ORAL EVERY MORNING
Qty: 90 TABLET | Refills: 0 | Status: SHIPPED | OUTPATIENT
Start: 2020-12-01 | End: 2021-02-25

## 2020-12-01 NOTE — TELEPHONE ENCOUNTER
Routing refill request to provider for review/approval because:  Labs not current:  ALT, AST  Smiley BROWNN, RN

## 2021-02-05 ENCOUNTER — TELEPHONE (OUTPATIENT)
Dept: CARDIOLOGY | Facility: CLINIC | Age: 86
End: 2021-02-05

## 2021-02-05 DIAGNOSIS — I35.0 NONRHEUMATIC AORTIC VALVE STENOSIS: Primary | ICD-10-CM

## 2021-02-05 DIAGNOSIS — I10 ESSENTIAL HYPERTENSION WITH GOAL BLOOD PRESSURE LESS THAN 130/80: ICD-10-CM

## 2021-02-05 DIAGNOSIS — I25.10 CAD (CORONARY ARTERY DISEASE): ICD-10-CM

## 2021-02-05 NOTE — TELEPHONE ENCOUNTER
[facility] Call Center    Phone Message    May a detailed message be left on voicemail: yes     Reason for Call: Patient states he is noticing swelling in limbs for the past day. Would like a call back to discuss. Please advise.      Action Taken: Message routed to:  Adult Clinics: Cardiology p [id number]    Travel Screening: Not Applicable

## 2021-02-08 NOTE — TELEPHONE ENCOUNTER
Patient returned call. He has noticed an increase in swelling in his feet and ankles over the past couple of days (since Friday). He does not have any new weights to report though is 195 lbs 3 weeks ago. He reports following low sodium diet. Feels he has been not exercising as much and reports being sedentary as of recent. Patient does not wear compression stockings.  He does not report any SOB or chest pain at this time. He does not have any home BP readings to report.     Leg elevation has improved swelling somewhat. Medication rec completed. Patient is not on any diuretics.     Mr. Mckenzie does have an upcoming echo and follow up scheduled with Dr. Parker on 3/4/21. Advised patient to continue leg elevation and low sodium diet at this time.     Will route to Dr. Parker to determine if any changes to plan of care at this time.     Enedina Carepnter, RN, BSN  [facility]

## 2021-02-08 NOTE — TELEPHONE ENCOUNTER
LM for patient to return call to discuss symptoms mentioned in note below.     Enedina Carpenter, RN, BSN

## 2021-02-10 NOTE — TELEPHONE ENCOUNTER
I think an echocardiogram and an in-person visit would really be the most appropriate ways to assess. If he feels swelling is getting much worse, we could consider a sooner in-person visit (with another provider if necessary), but otherwise proceeding as planned would be the best course of action.     Thanks,  Artis Parker MD

## 2021-02-12 NOTE — TELEPHONE ENCOUNTER
Pt has been moved up to Feb 25 for echo and in clinic visit with Dr. Parker.    He was initially scheduled on March 4th    Mellisa Mora CMA......February 12, 2021     4:25 PM

## 2021-02-21 ENCOUNTER — HEALTH MAINTENANCE LETTER (OUTPATIENT)
Age: 86
End: 2021-02-21

## 2021-02-22 DIAGNOSIS — I10 ESSENTIAL HYPERTENSION WITH GOAL BLOOD PRESSURE LESS THAN 130/80: ICD-10-CM

## 2021-02-22 DIAGNOSIS — E78.5 HYPERLIPIDEMIA LDL GOAL <100: ICD-10-CM

## 2021-02-23 RX ORDER — METOPROLOL SUCCINATE 25 MG/1
TABLET, EXTENDED RELEASE ORAL
Qty: 90 TABLET | Refills: 0 | Status: SHIPPED | OUTPATIENT
Start: 2021-02-23 | End: 2021-06-23

## 2021-02-23 RX ORDER — ROSUVASTATIN CALCIUM 40 MG/1
TABLET, COATED ORAL
Qty: 90 TABLET | Refills: 0 | Status: SHIPPED | OUTPATIENT
Start: 2021-02-23 | End: 2021-06-16

## 2021-02-23 NOTE — TELEPHONE ENCOUNTER
Routing refill request to provider for review/approval because:  Labs not current:  Lipids    Marti Rivera BSN, RN

## 2021-02-25 ENCOUNTER — OFFICE VISIT (OUTPATIENT)
Dept: CARDIOLOGY | Facility: CLINIC | Age: 86
End: 2021-02-25
Payer: COMMERCIAL

## 2021-02-25 ENCOUNTER — ANCILLARY PROCEDURE (OUTPATIENT)
Dept: CARDIOLOGY | Facility: CLINIC | Age: 86
End: 2021-02-25
Attending: INTERNAL MEDICINE
Payer: COMMERCIAL

## 2021-02-25 VITALS — OXYGEN SATURATION: 97 % | HEART RATE: 57 BPM | SYSTOLIC BLOOD PRESSURE: 134 MMHG | DIASTOLIC BLOOD PRESSURE: 77 MMHG

## 2021-02-25 DIAGNOSIS — I25.708 CORONARY ARTERY DISEASE OF BYPASS GRAFT OF NATIVE HEART WITH STABLE ANGINA PECTORIS (H): ICD-10-CM

## 2021-02-25 DIAGNOSIS — I10 ESSENTIAL HYPERTENSION WITH GOAL BLOOD PRESSURE LESS THAN 130/80: ICD-10-CM

## 2021-02-25 DIAGNOSIS — I35.0 NONRHEUMATIC AORTIC VALVE STENOSIS: ICD-10-CM

## 2021-02-25 DIAGNOSIS — I25.10 CAD (CORONARY ARTERY DISEASE): ICD-10-CM

## 2021-02-25 PROCEDURE — 99215 OFFICE O/P EST HI 40 MIN: CPT | Performed by: INTERNAL MEDICINE

## 2021-02-25 PROCEDURE — 93306 TTE W/DOPPLER COMPLETE: CPT | Performed by: INTERNAL MEDICINE

## 2021-02-25 RX ORDER — ISOSORBIDE MONONITRATE 60 MG/1
60 TABLET, EXTENDED RELEASE ORAL EVERY MORNING
Qty: 30 TABLET | Refills: 11 | Status: SHIPPED | OUTPATIENT
Start: 2021-02-25 | End: 2022-04-12

## 2021-02-25 ASSESSMENT — PAIN SCALES - GENERAL: PAINLEVEL: NO PAIN (0)

## 2021-02-25 NOTE — NURSING NOTE
Tima Mckenzie's goals for this visit include:   Chief Complaint   Patient presents with     RECHECK     follow up, echo prior       He requests these members of his care team be copied on today's visit information: no    PCP: Julieta Gandhi    Referring Provider:  No referring provider defined for this encounter.    /77 (BP Location: Left arm, Patient Position: Sitting, Cuff Size: Adult Regular)   Pulse 57   SpO2 97%     Do you need any medication refills at today's visit? No  Mellisa Mora CMA......February 25, 2021     3:09 PM

## 2021-02-25 NOTE — PROGRESS NOTES
Chief complaint: aortic stenosis, CAD    HPI:   Tima Mckenzie is a 85 year old male with history of CAD s/p 3vCABG (LIMA-LAD SVG-OM1-?) 1997 who presents for follow up of aortic stenosis and CAD.    Cardiac history:  He has history of a 3vCABG in [place], [place] in 1997 ([facility].) Operative report is not available (we attempted to obtain op report and angiogram images but were informed that all records from that time have been destroyed), but our EMR and records from [facility] report LIMA-LAD and SVG-OM; Mr. Mckenzie reports that his SVG bypassed 2 vessels (possibly a jump graft.) He had an angiogram 4/2003 (just before moving to [place]) at [facility] for abnormal stress test which repotedly showed a single diseased vessel (unclear which) for which he was prescribed Imdur. We attempted to obtain these images and were told that they had been destroyed.   He had a single episode of chest pain shortly after moving to [place] in 2003 and was admittted to [facility], where he had a stress test which was normal.      He established care with me 8/1/19. At that time, he was at a primary care visit and was noted to have a systolic murmur prompting TTE which showed moderate aortic stenosis, prompting referral for further management. He reported new onset RAE that had been progressive at his initial visit.     He underwent coronary angiogram which showed widely patent LIMA-LAD and 100% proximal occlusion of his single SVG (likely a touchdown graft to OM and RCA system); he underwent PCI to native rPDA, D1, and RCA.     3/5/2020:  Since his PCI, he feels his dyspnea is dramatically improved. He has been playing tennis without difficulty.     5/21/20:  He had been sedentary since his angiogram and went for a walk last week and felt dyspneic. This was initially concerning to him, but since that time he has been doing a 15-minute exercise program from [employer] and walking for 25 minutes without chest  discomfort or dyspnea. He does still have exertional dyspnea walking at a brisk pace or with vigorous yardwork. He feels this is gradually improving as he increases his activity.     2/25/21:  He reports exertional dyspnea walking up 1/2 flight of stairs and he has to rest after walking up 1 flight of stairs; he feels that this is very slightly worse compared with his last visit. He does light calisthenics but no cardio; this makes him slightly dyspneic also. He does admit that he has been significantly more sedentary due to the COVID-19 pandemic and is unsure how much of his reduction in exertional capacity may be related to this.    He denies any chest pain, dyspnea at rest, palpitations, PND, orthopnea, peripheral edema, lightheadedness, or syncope.       PAST MEDICAL HISTORY:  Past Medical History:   Diagnosis Date     Arthritis      BPH (benign prostatic hypertrophy)      Coronary artery disease      Diverticulosis      Hypercholesterolemia      Hypertension      MI (myocardial infarction) (H) 3/1997    Anterior Wall MI/LAD/OM1/SVG    CABG X 3     Neuropathy     RT Foot     Seasonal allergic rhinitis 7/1/2013       CURRENT MEDICATIONS:  Current Outpatient Medications   Medication Sig Dispense Refill     aspirin 81 MG EC tablet Take 81 mg by mouth       isosorbide mononitrate (IMDUR) 30 MG 24 hr tablet Take 1 tablet (30 mg) by mouth every morning 90 tablet 0     MAGNESIUM PO Take by mouth daily       metoprolol succinate ER (TOPROL-XL) 25 MG 24 hr tablet TAKE 1 TABLET BY MOUTH EVERY DAY 90 tablet 0     multivitamin, therapeutic (THERA-VIT) TABS tablet Take 1 tablet by mouth daily       nitroGLYcerin (NITROSTAT) 0.4 MG sublingual tablet Place 1 tablet (0.4 mg) under the tongue every 5 minutes as needed for chest pain up to 3 tablets per episode. 25 tablet 3     rosuvastatin (CRESTOR) 40 MG tablet TAKE 1 TABLET BY MOUTH EVERY DAY 90 tablet 0     ticagrelor (BRILINTA) 90 MG tablet Take 1 tablet (90 mg) by mouth 2  times daily Start this evening. 180 tablet 3     amoxicillin-clavulanate (AUGMENTIN) 875-125 MG tablet Take 1 tablet by mouth 2 times daily 20 tablet 0     mometasone (NASONEX) 50 MCG/ACT nasal spray Spray 2 sprays into both nostrils daily (Patient not taking: Reported on 2/25/2021) 1 Box 3       PAST SURGICAL HISTORY:  Past Surgical History:   Procedure Laterality Date     ANGIOGRAM  4/30/2003    No Disease, Clemons,LAD,SVG,OM1- small LT Main, Occlusion of CX- D1 50-70% Stenosis, RCA 30-80%     COLONOSCOPY       COLONOSCOPY N/A 10/14/2014    Procedure: COMBINED COLONOSCOPY, SINGLE BIOPSY/POLYPECTOMY BY BIOPSY;  Surgeon: Konstantin Coates MD;  Location: [facility] OR     COLONOSCOPY WITH CO2 INSUFFLATION N/A 10/14/2014    Procedure: COLONOSCOPY WITH CO2 INSUFFLATION;  Surgeon: Konstantin Coates MD;  Location: [facility] OR     CORONARY ARTERY BYPASS  1997    X 3     CV CORONARY ANGIOGRAM N/A 1/20/2020    Procedure: CV CORONARY ANGIOGRAM;  Surgeon: Magdiel Hernandez MD;  Location: [facility] CARDIAC CATH LAB     CV FRACTIONAL FLOW RESERVE N/A 1/20/2020    Procedure: Fractional Flow Reserve;  Surgeon: Magdiel Hernandez MD;  Location: [facility] CARDIAC CATH LAB     CV PCI STENT DRUG ELUTING N/A 1/20/2020    Procedure: Percutaneous Coronary Intervention Stent Drug Eluting;  Surgeon: Magdiel Hernandez MD;  Location: [facility] CARDIAC CATH LAB     HERNIA REPAIR  2014    [facility]       ALLERGIES:     Allergies   Allergen Reactions     No Known Drug Allergy      Seasonal Allergies        FAMILY HISTORY:  Brother with fatal MI at 55.  No family history of premature CAD.    SOCIAL HISTORY:  Social History     Tobacco Use     Smoking status: Former Smoker     Smokeless tobacco: Never Used     Tobacco comment: 1997   Substance Use Topics     Alcohol use: Yes     Comment: rarely      Drug use: No       ROS:   A comprehensive 14 point review of systems is negative other than as mentioned in HPI.    Exam:  GENERAL APPEARANCE:  healthy, alert and no distress  EYES: no icterus, no xanthelasmas  ENT: normal palate, mucosa moist, no central cyanosis  NECK: JVP not elevated  RESPIRATORY: lungs clear to auscultation - no rales, rhonchi or wheezes, no use of accessory muscles, no retractions, respirations are unlabored, normal respiratory rate  CARDIOVASCULAR: regular rhythm, normal S1 with physiologic split S2, no S3 or S4 +III/VI systolic murmur RUSB radiating to entire precordium.  GI: soft, non tender, bowel sounds normal,no abdominal bruits  EXTREMITIES: trace pitting bilateral legs, no bruits  NEURO: alert and oriented to person/place/time, normal speech, gait and affect  VASC: Radial, dorsalis pedis and posterior tibialis pulses 2+ bilaterally.  SKIN: Stasis pigmentation bilateral legs.  PSYCH: cooperative, affect appropriate.       Labs:  Reviewed.     Testing/Procedures:  TTE 7/30/19:   LVEF=55-60%. Basal inferolateral hypokinesis.  RV size/wall motion/thickness normal.  Severely calcified trileaflet aortic valve with moderate aortic stenosis (PV 3.2 m/s MG 22 mmHG DVI 0.26 JESS 1.27 cm2 SVI 51 mL/m2)  Mild MR.  Mild-mod LA enlargment. Mild RA enlargement.   RVSP 31+RA pressure.     TTE 12/31/19:   Normal LV/RV size/function/thickness, LVEF=60-65%. Probably moderate aortic stenosis (peak velocity 2.8 m/s, mean gradient 20 mmHg, DVI 0.30, JESS 1.5 cm2, SVI 55 mL/m2.)   Compared with 7/2019 TTE, aortic valve gradients are slightly lower (previously PV 3.2 m/s MG 22 mmHg DVI 0.26 JESS 1.26 SVI 51 mL/m2), however aortic stenosis is probably unchanged in severity    Coronary Angiogram 1/20/20  Obstructive coronary artery disease   Vein graft closed, LIMA patent   S/p PCI of pRCA, rPDA, D1 with drug eluting stents    Conclusion   1st Diag lesion is 80% stenosed.  Prox RCA lesion is 70% stenosed.  Mid RCA lesion is 30% stenosed.  RPDA lesion is 80% stenosed.  Mid LAD lesion is 100% stenosed.  Ost Cx to Prox Cx lesion is 99% stenosed.    I  personally visualize and interpreted:  TTE 2/25/21:   Normal LV/RV size/function/thickness, LVEF=55-60%  Moderate aortic stenosis (PV 3.1 m/s mean gradient 21 mmHg DVI 0.25 JESS 1.3 cm2, SVI 49 mL/m2)  Compared with 3/5/20: There has been no significant change. Specifically, aortic stenosis is unchanged in severity and remains moderate.     Outside results of note:  Outside records from [facility]  were obtained, and relevant results/notes have been incorporated into HPI.    Assessment and Plan:   1. Moderate trileaflet aortic stenosis:  2. Coronary artery disease status post three-vessel coronary bypass graft (LIMA-LAD SVG-OM-?) s/p PCI w/ BYRON to pRCA/rPDA/D1 1/20/20  3. Exertional dyspnea, recurrent  With recurrence of exertional dyspnea (albeit under circumstances of decreased activity due to the COVID-19 pandemic.) This as his previous anginal equivalent but change is subtle and it is unclear to what extent his symptoms are related to CAD vs. Deconditioning.   As his aortic stenosis remains stably moderate and given this question, a trial of increased medical anti-anginal therapy should be both reasonable and safe, with plan for angiogram in the event of either incomplete relief of symptoms or initial relief of symptoms followed by recurrence. If symptoms do not change at all (or if he experiences side effects) with increased nitrate, would decrease back to his previous dose and continue to monitor.   Note that initially after PCI, he had been free of exertional dyspnea, suggesting strongly that his exertional dyspnea was an anginal equivalent.   He should continue ASA 81 mg, rosuvastatin 40 mg, Toprol XL 25 mg daily, and Imdur 30 mg daily.  Given complexity of PCI, would plan for extended DAPT in the absence of bleeding-- continue ticagrelor 90 mg BID through 1/20/2022  -increase Imdur to 60 mg daily  -contact in 2 weeks to reassess symptoms:    --if Imdur causes side effects (lightheadedness, etc.) or  dyspnea is unchanged with increased dose, reduce dose back to 30 mg daily, continue to monitor and reassess if symptoms worsen   --if Imdur completely resolves symptoms, remain on increased dose and continue to follow    --if Imdur incompletely improved symptoms (or if symptoms recur after resolving), would plan for coronary angiogram    3. Hypertension, controlled: continue present management.  4. Hyperlipidemia, controlled: continue present management.   5. Venous insufficiency: continue compression hose and counseled regarding elevation    Follow up in 3 months, tentatively plan to repeat TTE in 6-12 months or for change in symptoms.     The patient states understanding and is agreeable with plan.     Artis Parker MD  Cardiology

## 2021-02-25 NOTE — RESULT ENCOUNTER NOTE
TTE 2/25/21:   Normal LV/RV size/function/thickness, LVEF=55-60%  Moderate aortic stenosis (PV 3.1 m/s mean gradient 21 mmHg DVI 0.25 JESS 1.3 cm2, SVI 49 mL/m2)  Compared with 3/5/20: There has been no significant change. Specifically, aortic stenosis is unchanged in severity and remains moderate.     Results discussed with patient in clinic today.    Artis Parker MD  Cardiology

## 2021-02-26 ENCOUNTER — IMMUNIZATION (OUTPATIENT)
Dept: NURSING | Facility: CLINIC | Age: 86
End: 2021-02-26
Payer: COMMERCIAL

## 2021-02-26 PROCEDURE — 0001A PR COVID VAC PFIZER DIL RECON 30 MCG/0.3 ML IM: CPT

## 2021-02-26 PROCEDURE — 91300 PR COVID VAC PFIZER DIL RECON 30 MCG/0.3 ML IM: CPT

## 2021-03-19 ENCOUNTER — IMMUNIZATION (OUTPATIENT)
Dept: NURSING | Facility: CLINIC | Age: 86
End: 2021-03-19
Attending: FAMILY MEDICINE
Payer: COMMERCIAL

## 2021-03-19 PROCEDURE — 0002A PR COVID VAC PFIZER DIL RECON 30 MCG/0.3 ML IM: CPT

## 2021-03-19 PROCEDURE — 91300 PR COVID VAC PFIZER DIL RECON 30 MCG/0.3 ML IM: CPT

## 2021-05-27 ENCOUNTER — VIRTUAL VISIT (OUTPATIENT)
Dept: CARDIOLOGY | Facility: CLINIC | Age: 86
End: 2021-05-27
Payer: COMMERCIAL

## 2021-05-27 DIAGNOSIS — E78.5 HYPERLIPIDEMIA LDL GOAL <100: ICD-10-CM

## 2021-05-27 DIAGNOSIS — I10 ESSENTIAL HYPERTENSION WITH GOAL BLOOD PRESSURE LESS THAN 130/80: ICD-10-CM

## 2021-05-27 DIAGNOSIS — Z11.59 SCREENING FOR VIRAL DISEASE: ICD-10-CM

## 2021-05-27 DIAGNOSIS — I35.0 NONRHEUMATIC AORTIC VALVE STENOSIS: ICD-10-CM

## 2021-05-27 DIAGNOSIS — I25.708 CORONARY ARTERY DISEASE OF BYPASS GRAFT OF NATIVE HEART WITH STABLE ANGINA PECTORIS (H): Primary | ICD-10-CM

## 2021-05-27 PROCEDURE — 99214 OFFICE O/P EST MOD 30 MIN: CPT | Mod: 95 | Performed by: INTERNAL MEDICINE

## 2021-05-27 NOTE — PATIENT INSTRUCTIONS
You were seen at the [facility] clinic today.  You saw Dr. Artis Parker  Here are your Instructions:    1. Coronary angiogram at your earliest convenience.  2. Echocardiogram to be scheduled same day as coronary angiogram.  3. Follow up with Dr. Parker in 3 months.

## 2021-05-27 NOTE — PROGRESS NOTES
"Cameron is a 85 year old who is being evaluated via a billable video visit.          Video-Visit Details    Type of service:  Video Visit    Video Start Time: {video visit start/end time for provider to select:501773}    Video End Time:{video visit start/end time for provider to select:781059}    Originating Location (pt. Location): {video visit patient location:899305::\"Home\"}    Distant Location (provider location):  [facility] HEART [facility]     Platform used for Video Visit: {Virtual Visit Platforms:234121::\"[employer]\"}  "

## 2021-05-27 NOTE — PROGRESS NOTES
Tima Mckenzie is a 85 year old male who is being evaluated via a billable video visit.        Chief complaint: aortic stenosis, CAD    HPI:   Tima Mckenzie is a 85 year old male with history of CAD s/p 3vCABG (LIMA-LAD SVG-OM1-?) 1997 who presents for follow up of aortic stenosis and CAD.    Cardiac history:  He has history of a 3vCABG in [place], [place] in 1997 ([facility] Operative report is not available (we attempted to obtain op report and angiogram images but were informed that all records from that time have been destroyed), but our EMR and records from [facility] report LIMA-LAD and SVG-OM; Mr. Mckenzie reports that his SVG bypassed 2 vessels (possibly a jump graft.) He had an angiogram 4/2003 (just before moving to [place]) at [facility] for abnormal stress test which repotedly showed a single diseased vessel (unclear which) for which he was prescribed Imdur. We attempted to obtain these images and were told that they had been destroyed.   He had a single episode of chest pain shortly after moving to [place] in 2003 and was admittted to [facility], where he had a stress test which was normal.      He established care with me 8/1/19. At that time, he was at a primary care visit and was noted to have a systolic murmur prompting TTE which showed moderate aortic stenosis, prompting referral for further management. He reported new onset RAE that had been progressive at his initial visit.     He underwent coronary angiogram which showed widely patent LIMA-LAD and 100% proximal occlusion of his single SVG (likely a touchdown graft to OM and RCA system); he underwent PCI to native rPDA, D1, and RCA.     3/5/2020:  Since his PCI, he feels his dyspnea is dramatically improved. He has been playing tennis without difficulty.     5/21/20:  He had been sedentary since his angiogram and went for a walk last week and felt dyspneic. This was initially concerning to him, but since that time he has  been doing a 15-minute exercise program from [facility] and walking for 25 minutes without chest discomfort or dyspnea. He does still have exertional dyspnea walking at a brisk pace or with vigorous yardwork. He feels this is gradually improving as he increases his activity.     2/25/21:  He reports exertional dyspnea walking up 1/2 flight of stairs and he has to rest after walking up 1 flight of stairs; he feels that this is very slightly worse compared with his last visit. He does light calisthenics but no cardio; this makes him slightly dyspneic also. He does admit that he has been significantly more sedentary due to the COVID-19 pandemic and is unsure how much of his reduction in exertional capacity may be related to this.    Imdur was increased to 60 mg daily at this time.     05/27/21:  Since his last visit, he reports feeling generally worse. Last fall, he could walk around the block for 1.5 miles without difficulty. He now has chest tightness and dyspnea walking <1 block. He also has chest tightness and dyspnea with <1 flight of stairs. He is not sure whether increasing Imdur improved his symptoms. He denies exertional lightheadedness.     He denies any palpitations, PND, orthopnea, peripheral edema, lightheadedness, or syncope.       PAST MEDICAL HISTORY:  Past Medical History:   Diagnosis Date     Arthritis      BPH (benign prostatic hypertrophy)      Coronary artery disease      Diverticulosis      Hypercholesterolemia      Hypertension      MI (myocardial infarction) (H) 3/1997    Anterior Wall MI/LAD/OM1/SVG    CABG X 3     Neuropathy     RT Foot     Seasonal allergic rhinitis 7/1/2013       CURRENT MEDICATIONS:  Current Outpatient Medications   Medication Sig Dispense Refill     amoxicillin-clavulanate (AUGMENTIN) 875-125 MG tablet Take 1 tablet by mouth 2 times daily 20 tablet 0     aspirin 81 MG EC tablet Take 81 mg by mouth       isosorbide mononitrate (IMDUR) 60 MG 24 hr tablet Take 1 tablet (60 mg)  by mouth every morning 30 tablet 11     MAGNESIUM PO Take by mouth daily       metoprolol succinate ER (TOPROL-XL) 25 MG 24 hr tablet TAKE 1 TABLET BY MOUTH EVERY DAY 90 tablet 0     mometasone (NASONEX) 50 MCG/ACT nasal spray Spray 2 sprays into both nostrils daily (Patient not taking: Reported on 2/25/2021) 1 Box 3     multivitamin, therapeutic (THERA-VIT) TABS tablet Take 1 tablet by mouth daily       nitroGLYcerin (NITROSTAT) 0.4 MG sublingual tablet Place 1 tablet (0.4 mg) under the tongue every 5 minutes as needed for chest pain up to 3 tablets per episode. 25 tablet 3     rosuvastatin (CRESTOR) 40 MG tablet TAKE 1 TABLET BY MOUTH EVERY DAY 90 tablet 0     ticagrelor (BRILINTA) 90 MG tablet Take 1 tablet (90 mg) by mouth 2 times daily Start this evening. 180 tablet 3       PAST SURGICAL HISTORY:  Past Surgical History:   Procedure Laterality Date     ANGIOGRAM  4/30/2003    No Disease, Clemons,LAD,SVG,OM1- small LT Main, Occlusion of CX- D1 50-70% Stenosis, RCA 30-80%     COLONOSCOPY       COLONOSCOPY N/A 10/14/2014    Procedure: COMBINED COLONOSCOPY, SINGLE BIOPSY/POLYPECTOMY BY BIOPSY;  Surgeon: Konstantin Coates MD;  Location: [facility] OR     COLONOSCOPY WITH CO2 INSUFFLATION N/A 10/14/2014    Procedure: COLONOSCOPY WITH CO2 INSUFFLATION;  Surgeon: Konstantin Coates MD;  Location: [facility] OR     CORONARY ARTERY BYPASS  1997    X 3     CV CORONARY ANGIOGRAM N/A 1/20/2020    Procedure: CV CORONARY ANGIOGRAM;  Surgeon: Magdiel Hernandez MD;  Location: [facility] CARDIAC CATH LAB     CV FRACTIONAL FLOW RESERVE N/A 1/20/2020    Procedure: Fractional Flow Reserve;  Surgeon: Magdiel Hernandez MD;  Location: [facility] CARDIAC CATH LAB     CV PCI STENT DRUG ELUTING N/A 1/20/2020    Procedure: Percutaneous Coronary Intervention Stent Drug Eluting;  Surgeon: Magdiel Hernandez MD;  Location:  [facility] CARDIAC CATH LAB     HERNIA REPAIR  2014    [facility] [place]       ALLERGIES:     Allergies   Allergen Reactions     No  Known Drug Allergy      Seasonal Allergies        FAMILY HISTORY:  Brother with fatal MI at 55.  No family history of premature CAD.    SOCIAL HISTORY:  Social History     Tobacco Use     Smoking status: Former Smoker     Smokeless tobacco: Never Used     Tobacco comment: 1997   Substance Use Topics     Alcohol use: Yes     Comment: rarely      Drug use: No       ROS:   A comprehensive 14 point review of systems is negative other than as mentioned in HPI.    Exam:  CONSITUTIONAL: no acute distress  HEENT: no icterus, sclerae white  CV: no visible edema of visualized upper extremities, no gross JVD  CHEST: respirations nonlabored, no audible wheezing  NEURO: AA&Ox3, speech fluent/appropriate, motor grossly nonfocal  PSYCH: cooperative, affect appropriate  DERM: no rashes on visualized face/neck/upper extremities    The rest of a comprehensive physical examination is deferred due to video visit restrictions.          Labs:  Reviewed.     Testing/Procedures:  TTE 7/30/19:   LVEF=55-60%. Basal inferolateral hypokinesis.  RV size/wall motion/thickness normal.  Severely calcified trileaflet aortic valve with moderate aortic stenosis (PV 3.2 m/s MG 22 mmHG DVI 0.26 JESS 1.27 cm2 SVI 51 mL/m2)  Mild MR.  Mild-mod LA enlargment. Mild RA enlargement.   RVSP 31+RA pressure.     TTE 12/31/19:   Normal LV/RV size/function/thickness, LVEF=60-65%. Probably moderate aortic stenosis (peak velocity 2.8 m/s, mean gradient 20 mmHg, DVI 0.30, JESS 1.5 cm2, SVI 55 mL/m2.)   Compared with 7/2019 TTE, aortic valve gradients are slightly lower (previously PV 3.2 m/s MG 22 mmHg DVI 0.26 JESS 1.26 SVI 51 mL/m2), however aortic stenosis is probably unchanged in severity    Coronary Angiogram 1/20/20  Obstructive coronary artery disease   Vein graft closed, LIMA patent   S/p PCI of pRCA, rPDA, D1 with drug eluting stents    Conclusion   1st Diag lesion is 80% stenosed.  Prox RCA lesion is 70% stenosed.  Mid RCA lesion is 30% stenosed.  RPDA lesion  is 80% stenosed.  Mid LAD lesion is 100% stenosed.  Ost Cx to Prox Cx lesion is 99% stenosed.    I personally visualize and interpreted:  TTE 2/25/21:   Normal LV/RV size/function/thickness, LVEF=55-60%  Moderate aortic stenosis (PV 3.1 m/s mean gradient 21 mmHg DVI 0.25 JESS 1.3 cm2, SVI 49 mL/m2)  Compared with 3/5/20: There has been no significant change. Specifically, aortic stenosis is unchanged in severity and remains moderate.     Outside results of note:  Outside records from [facility]  were obtained, and relevant results/notes have been incorporated into HPI.    Assessment and Plan:   1. Moderate trileaflet aortic stenosis:  2. Coronary artery disease status post three-vessel coronary bypass graft (LIMA-LAD SVG-OM-D1) s/p PCI w/ BYRON to pRCA/rPDA/D1 1/20/20  3. Exertional dyspnea, persistent and worsening  With progressively worsening exertional dyspnea (his previous anginal equivalent) and now chest tightness despite increased long-acting nitrate, strongly suspect coronary ischemic  of symptoms. Aortic stenosis was stably moderate as of 2/2021, but would also reassess given worsening symptoms to ensure there has been no progression to severe aortic stenosis.   Note that initially after PCI, he had been free of exertional dyspnea, suggesting strongly that his exertional dyspnea was an anginal equivalent.   He should continue ASA 81 mg, rosuvastatin 40 mg, Toprol XL 25 mg daily, and Imdur 60 mg daily.  Given complexity of PCI, would plan for extended DAPT in the absence of bleeding-- continue ticagrelor 90 mg BID through 1/20/2022  -coronary angiogram    -TTE (same date as coronary angiogram) to reassess severity of aortic stenosis  -if coronary angiogram fails to show a culprit lesion to account for symptoms and aortic stenosis appears moderate on TTE, would consider further investigation of aortic stenosis (e.g., stress echocardiogram, CT) to aggressively evaluate for subtle progression to severe  aortic stenosis    3. Hypertension, controlled: continue present management.  4. Hyperlipidemia, controlled: continue present management.   5. Venous insufficiency: continue compression hose and counseled regarding elevation    Tentatively plan for follow up in 3 months.     The patient states understanding and is agreeable with plan.     Video-Visit Details  Type of service:  Video Visit  Video start time: 12:57 PM  Video end time: 1:21 PM  Total video time: 24 minutes  Chart review time: 10 minutes  Total time: 34 minutes  Originating Location (pt. Location): Home  Distant Location (provider location):  [facility] HEART [facility]  Mode of Communication: Video Conference via [employer]    Artis Parker MD  Cardiology

## 2021-05-28 RX ORDER — LIDOCAINE 40 MG/G
CREAM TOPICAL
Status: CANCELLED | OUTPATIENT
Start: 2021-05-28

## 2021-05-28 RX ORDER — SODIUM CHLORIDE 9 MG/ML
INJECTION, SOLUTION INTRAVENOUS CONTINUOUS
Status: CANCELLED | OUTPATIENT
Start: 2021-05-28

## 2021-06-13 ENCOUNTER — TELEPHONE (OUTPATIENT)
Dept: FAMILY MEDICINE | Facility: CLINIC | Age: 86
End: 2021-06-13

## 2021-06-13 ENCOUNTER — MYC REFILL (OUTPATIENT)
Dept: FAMILY MEDICINE | Facility: CLINIC | Age: 86
End: 2021-06-13

## 2021-06-13 DIAGNOSIS — E78.5 HYPERLIPIDEMIA LDL GOAL <100: ICD-10-CM

## 2021-06-13 RX ORDER — ROSUVASTATIN CALCIUM 40 MG/1
40 TABLET, COATED ORAL DAILY
Qty: 90 TABLET | Refills: 0 | Status: CANCELLED | OUTPATIENT
Start: 2021-06-13

## 2021-06-14 RX ORDER — ROSUVASTATIN CALCIUM 40 MG/1
TABLET, COATED ORAL
Qty: 90 TABLET | Refills: 0
Start: 2021-06-14

## 2021-06-14 NOTE — TELEPHONE ENCOUNTER
Mychart message sent due to patient has not been seen or had fasting labs for over 1 year.   Alfred refill had been given but patient has not f/u.     Will leave open to see if patient schedules and in case he requests another alfred refill to get him through to his appointment    Renata Mason RN  [facility]

## 2021-06-15 NOTE — TELEPHONE ENCOUNTER
Additional MyC message sent to patient with clarification of what patient needs to schedule at this time, clarifying if will need more Crestor now or has enough to wait till time of visit, which he was instructed to call to clinic to schedule.    Marta Vides RN  [facility]

## 2021-06-15 NOTE — TELEPHONE ENCOUNTER
See MyC message in Chart Review from 6/13/21.  Patient read message and has been informed needs fasting labs and to schedule follow up visit.   Patient has yet to schedule any future visit, at this time.      Marta Vides RN  [facility]

## 2021-06-16 ENCOUNTER — DOCUMENTATION ONLY (OUTPATIENT)
Dept: LAB | Facility: CLINIC | Age: 86
End: 2021-06-16

## 2021-06-16 DIAGNOSIS — I10 ESSENTIAL HYPERTENSION WITH GOAL BLOOD PRESSURE LESS THAN 130/80: ICD-10-CM

## 2021-06-16 DIAGNOSIS — E78.5 HYPERLIPIDEMIA LDL GOAL <100: Primary | ICD-10-CM

## 2021-06-16 RX ORDER — ROSUVASTATIN CALCIUM 40 MG/1
40 TABLET, COATED ORAL DAILY
Qty: 14 TABLET | Refills: 0 | Status: SHIPPED | OUTPATIENT
Start: 2021-06-16 | End: 2021-06-23

## 2021-06-16 NOTE — PROGRESS NOTES
Orders placed. Does not appear it is a physical patient has scheduled for next week, if he wants that then the appointment needs to get changed. Also let provider know so can add on a PSA then.  Thank you,  MARIA DEL CARMEN Weems, NP-C  [facility]

## 2021-06-17 DIAGNOSIS — E78.5 HYPERLIPIDEMIA LDL GOAL <100: ICD-10-CM

## 2021-06-17 DIAGNOSIS — I10 ESSENTIAL HYPERTENSION WITH GOAL BLOOD PRESSURE LESS THAN 130/80: ICD-10-CM

## 2021-06-17 LAB
ALBUMIN SERPL-MCNC: 3.9 G/DL (ref 3.4–5)
ALP SERPL-CCNC: 48 U/L (ref 40–150)
ALT SERPL W P-5'-P-CCNC: 29 U/L (ref 0–70)
ANION GAP SERPL CALCULATED.3IONS-SCNC: 5 MMOL/L (ref 3–14)
AST SERPL W P-5'-P-CCNC: 20 U/L (ref 0–45)
BILIRUB SERPL-MCNC: 0.5 MG/DL (ref 0.2–1.3)
BUN SERPL-MCNC: 15 MG/DL (ref 7–30)
CALCIUM SERPL-MCNC: 8.8 MG/DL (ref 8.5–10.1)
CHLORIDE SERPL-SCNC: 109 MMOL/L (ref 94–109)
CHOLEST SERPL-MCNC: 138 MG/DL
CO2 SERPL-SCNC: 26 MMOL/L (ref 20–32)
CREAT SERPL-MCNC: 1.15 MG/DL (ref 0.66–1.25)
GFR SERPL CREATININE-BSD FRML MDRD: 58 ML/MIN/{1.73_M2}
GLUCOSE SERPL-MCNC: 94 MG/DL (ref 70–99)
HDLC SERPL-MCNC: 49 MG/DL
LDLC SERPL CALC-MCNC: 66 MG/DL
NONHDLC SERPL-MCNC: 89 MG/DL
POTASSIUM SERPL-SCNC: 4 MMOL/L (ref 3.4–5.3)
PROT SERPL-MCNC: 7.4 G/DL (ref 6.8–8.8)
SODIUM SERPL-SCNC: 140 MMOL/L (ref 133–144)
TRIGL SERPL-MCNC: 116 MG/DL

## 2021-06-17 PROCEDURE — 80061 LIPID PANEL: CPT | Performed by: NURSE PRACTITIONER

## 2021-06-17 PROCEDURE — 80053 COMPREHEN METABOLIC PANEL: CPT | Performed by: NURSE PRACTITIONER

## 2021-06-17 PROCEDURE — 36415 COLL VENOUS BLD VENIPUNCTURE: CPT | Performed by: NURSE PRACTITIONER

## 2021-06-17 NOTE — RESULT ENCOUNTER NOTE
Rehan Barnes,   Your labs look good I'll see you next week!   Thank you,  MARIA DEL CARMEN Weems, NP-C  [facility]

## 2021-06-22 NOTE — PROGRESS NOTES
Assessment & Plan     Essential hypertension with goal blood pressure less than 130/80  Stable, refills sent.  - metoprolol succinate ER (TOPROL-XL) 25 MG 24 hr tablet; Take 1 tablet (25 mg) by mouth daily    Hyperlipidemia LDL goal <100  Stable, controlled. Refills sent  - rosuvastatin (CRESTOR) 40 MG tablet; Take 1 tablet (40 mg) by mouth daily    Screening for prostate cancer  screening  - PSA, screen    Nasal congestion  Updated historical med  - mometasone (NASONEX) 50 MCG/ACT nasal spray; Spray 2 sprays into both nostrils as needed (nasal congestion)      Return in about 1 year (around 6/23/2022) for Annual Exam.    MARIA DEL CARMEN Weems, NP-C  [facility]    Yony Barnes is a 86 year old who presents for the following health issues  accompanied by his self:    History of Present Illness       He eats 0-1 servings of fruits and vegetables daily.He consumes 0 sweetened beverage(s) daily.He exercises with enough effort to increase his heart rate 10 to 19 minutes per day.  He exercises with enough effort to increase his heart rate 7 days per week.   He is taking medications regularly.       -follow up lab results    Hypertension Follow-up      Do you check your blood pressure regularly outside of the clinic? No     Are you following a low salt diet? No    Are your blood pressures ever more than 140 on the top number (systolic) OR more   than 90 on the bottom number (diastolic), for example 140/90? No      Feeling good, exercise daily 15-20, then walks around the block. Last summer walking 1.5 miles, now canot due to SOB. No chest pain but has tightness if he walks too much or lifts too much.   Has angiogram and ECHO next week. Last saw cardiology in May, he let them know about worsening breathing/SOB issues. That is why the testing next week.         Review of Systems   Constitutional, HEENT, cardiovascular, pulmonary, gi and gu systems are negative, except as otherwise noted.      Objective     /73   Pulse 63   Temp 98.2  F (36.8  C) (Oral)   Resp 16   Wt 86.1 kg (189 lb 12.8 oz)   SpO2 97%   BMI 26.85 kg/m    Body mass index is 26.85 kg/m .  Physical Exam   GENERAL: healthy, alert and no distress  RESP: lungs clear to auscultation - no rales, rhonchi or wheezes  CV: regular rate and rhythm, normal S1 S2, no S3 or S4, +murmur, click or rub, no peripheral edema   MS: no gross musculoskeletal defects noted    No results found for this or any previous visit (from the past 24 hour(s)).         spouse

## 2021-06-23 ENCOUNTER — OFFICE VISIT (OUTPATIENT)
Dept: FAMILY MEDICINE | Facility: CLINIC | Age: 86
End: 2021-06-23
Payer: COMMERCIAL

## 2021-06-23 VITALS
RESPIRATION RATE: 16 BRPM | BODY MASS INDEX: 26.85 KG/M2 | DIASTOLIC BLOOD PRESSURE: 73 MMHG | SYSTOLIC BLOOD PRESSURE: 127 MMHG | TEMPERATURE: 98.2 F | HEART RATE: 63 BPM | WEIGHT: 189.8 LBS | OXYGEN SATURATION: 97 %

## 2021-06-23 DIAGNOSIS — R09.81 NASAL CONGESTION: ICD-10-CM

## 2021-06-23 DIAGNOSIS — Z12.5 SCREENING FOR PROSTATE CANCER: ICD-10-CM

## 2021-06-23 DIAGNOSIS — I10 ESSENTIAL HYPERTENSION WITH GOAL BLOOD PRESSURE LESS THAN 130/80: Primary | ICD-10-CM

## 2021-06-23 DIAGNOSIS — E78.5 HYPERLIPIDEMIA LDL GOAL <100: ICD-10-CM

## 2021-06-23 LAB — PSA SERPL-ACNC: 1.58 UG/L (ref 0–4)

## 2021-06-23 PROCEDURE — 36415 COLL VENOUS BLD VENIPUNCTURE: CPT | Performed by: NURSE PRACTITIONER

## 2021-06-23 PROCEDURE — G0103 PSA SCREENING: HCPCS | Performed by: NURSE PRACTITIONER

## 2021-06-23 PROCEDURE — 99214 OFFICE O/P EST MOD 30 MIN: CPT | Performed by: NURSE PRACTITIONER

## 2021-06-23 RX ORDER — ROSUVASTATIN CALCIUM 40 MG/1
40 TABLET, COATED ORAL DAILY
Qty: 90 TABLET | Refills: 3 | Status: SHIPPED | OUTPATIENT
Start: 2021-06-23 | End: 2022-06-27

## 2021-06-23 RX ORDER — METOPROLOL SUCCINATE 25 MG/1
25 TABLET, EXTENDED RELEASE ORAL DAILY
Qty: 90 TABLET | Refills: 3 | Status: SHIPPED | OUTPATIENT
Start: 2021-06-23 | End: 2022-06-27

## 2021-06-23 RX ORDER — MOMETASONE FUROATE MONOHYDRATE 50 UG/1
2 SPRAY, METERED NASAL PRN
COMMUNITY
Start: 2021-06-23 | End: 2022-06-27

## 2021-06-24 ENCOUNTER — TELEPHONE (OUTPATIENT)
Dept: CARDIOLOGY | Facility: CLINIC | Age: 86
End: 2021-06-24

## 2021-06-24 DIAGNOSIS — Z11.59 ENCOUNTER FOR SCREENING FOR OTHER VIRAL DISEASES: Primary | ICD-10-CM

## 2021-06-24 DIAGNOSIS — Z11.59 ENCOUNTER FOR SCREENING FOR OTHER VIRAL DISEASES: ICD-10-CM

## 2021-06-24 LAB
SARS-COV-2 RNA RESP QL NAA+PROBE: NORMAL
SPECIMEN SOURCE: NORMAL

## 2021-06-24 PROCEDURE — U0005 INFEC AGEN DETEC AMPLI PROBE: HCPCS | Performed by: INTERNAL MEDICINE

## 2021-06-24 PROCEDURE — U0003 INFECTIOUS AGENT DETECTION BY NUCLEIC ACID (DNA OR RNA); SEVERE ACUTE RESPIRATORY SYNDROME CORONAVIRUS 2 (SARS-COV-2) (CORONAVIRUS DISEASE [COVID-19]), AMPLIFIED PROBE TECHNIQUE, MAKING USE OF HIGH THROUGHPUT TECHNOLOGIES AS DESCRIBED BY CMS-2020-01-R: HCPCS | Performed by: INTERNAL MEDICINE

## 2021-06-24 NOTE — TELEPHONE ENCOUNTER
Left voicemail for patient to complete Travel Screen for Cardiac Cath Lab appointment on 6/28 and inform patient of updated Visitor Policy.       Will send note to clinic to inquire about covid test as I do not see one ordered in chart.

## 2021-06-24 NOTE — RESULT ENCOUNTER NOTE
Dear Cameron Drew is out of the office and I am reviewing your results.   Your prostate cancer screening test was negative.   Please call or MyChart my office with any questions or concerns.   Fartun Hooker, PAC

## 2021-06-25 LAB
LABORATORY COMMENT REPORT: NORMAL
SARS-COV-2 RNA RESP QL NAA+PROBE: NEGATIVE
SPECIMEN SOURCE: NORMAL

## 2021-06-26 NOTE — H&P
History and Physical: Cardiology Cath Lab Service    Tima Mckenzie MRN# [id number]   YOB: 1935 Age: 86 year old         Assessment and plan:   Tima Mckenzie is a 85 year old male with history of CAD s/p 3vCABG (LIMA-LAD SVG-OM1-D1) 1997, PCI RCA/rPDA/D1 1/2020, mod aortic stenosis (MG 21, JESS 1.4, pV 3), HLD seen today for planned coronary angiogram r/t ongoing RAE.    # RAE  # CAD s/p 3V CABG 1997, PCI 1/2020  - No contraindications noted, will move forward with coronary angiogram  - Patient will be admitted as OP to unit 3C post procedure, with plans to discharge home with wife after post procedure orders have been met    Patient seen and discussed with Dr. Natalee JOYCE, CNP  [facility] Cardiology Cath Lab Services  [phone]      HPI:   Tima Mckenzie is a 85 year old male with history of CAD s/p 3vCABG (LIMA-LAD SVG-OM1-D1) 1997, PCI RCA/rPDA/D1 1/2020, mod aortic stenosis (MG 21, JESS 1.4, pV 3), HLD seen today for planned coronary angiogram r/t ongoing RAE.    Patient reports ongoing RAE for several years, has had serial echocardiograms to monitor aortic stenosis, also underwent coronary angiogram 1/2020 which revealed Vein graft closed, LIMA patent, S/p PCI of pRCA, rPDA, D1. Originally pt felt better after PCI, but over last 2 months has had return of RAE.     Patient continues to endorse RAE with activity, otherwise denies SOB at rest, no chest pain. No major changes in health history since previous visit.     Past Medical History:   Diagnosis Date     Arthritis      BPH (benign prostatic hypertrophy)      Coronary artery disease      Diverticulosis      Hypercholesterolemia      Hypertension      MI (myocardial infarction) (H) 3/1997    Anterior Wall MI/LAD/OM1/SVG    CABG X 3     Neuropathy     RT Foot     Seasonal allergic rhinitis 7/1/2013       Past Surgical History:   Procedure Laterality Date     ANGIOGRAM  4/30/2003    No Disease, [place],LAD,SVG,OM1- small LT  Main, Occlusion of CX- D1 50-70% Stenosis, RCA 30-80%     COLONOSCOPY       COLONOSCOPY N/A 10/14/2014    Procedure: COMBINED COLONOSCOPY, SINGLE BIOPSY/POLYPECTOMY BY BIOPSY;  Surgeon: Konstantin Coates MD;  Location: [facility] OR     COLONOSCOPY WITH CO2 INSUFFLATION N/A 10/14/2014    Procedure: COLONOSCOPY WITH CO2 INSUFFLATION;  Surgeon: Konstantin Coates MD;  Location: [facility] OR     CORONARY ARTERY BYPASS  1997    X 3     CV CORONARY ANGIOGRAM N/A 1/20/2020    Procedure: CV CORONARY ANGIOGRAM;  Surgeon: Magdiel Hernandez MD;  Location: [facility] CARDIAC CATH LAB     CV FRACTIONAL FLOW RATIO WIRE N/A 1/20/2020    Procedure: Fractional Flow Reserve;  Surgeon: Magdiel Hernandez MD;  Location: [facility] CARDIAC CATH LAB     CV PCI STENT DRUG ELUTING N/A 1/20/2020    Procedure: Percutaneous Coronary Intervention Stent Drug Eluting;  Surgeon: Magdiel Hernandez MD;  Location: [facility] CARDIAC CATH LAB     HERNIA REPAIR  2014    [facility].       No current facility-administered medications on file prior to encounter.   aspirin 81 MG EC tablet, Take 81 mg by mouth  isosorbide mononitrate (IMDUR) 60 MG 24 hr tablet, Take 1 tablet (60 mg) by mouth every morning  MAGNESIUM PO, Take by mouth daily  multivitamin, therapeutic (THERA-VIT) TABS tablet, Take 1 tablet by mouth daily  nitroGLYcerin (NITROSTAT) 0.4 MG sublingual tablet, Place 1 tablet (0.4 mg) under the tongue every 5 minutes as needed for chest pain up to 3 tablets per episode.  ticagrelor (BRILINTA) 90 MG tablet, Take 1 tablet (90 mg) by mouth 2 times daily Start this evening.        Family History   Problem Relation Age of Onset     Coronary Artery Disease Brother 55        Fatal MI       Social History     Tobacco Use     Smoking status: Former Smoker     Smokeless tobacco: Never Used     Tobacco comment: 1997   Substance Use Topics     Alcohol use: Yes     Comment: rarely        Allergies   Allergen Reactions     No Known Drug Allergy      Seasonal  "Allergies        ROS:   Skin: negative  Respiratory: Dyspnea on exertion- see HPI  Cardiovascular: negative  Gastrointestinal: negative  Genitourinary: negative  Musculoskeletal: negative  Neurologic: negative  Hematologic/Lymphatic/Immunologic: negative  Endocrine: negative    Physical Examination:  Vitals: BP (!) 149/89   Pulse 58   Temp 97.6  F (36.4  C) (Oral)   Resp 16   Ht 1.803 m (5' 11\")   Wt 86.2 kg (190 lb)   SpO2 98%   BMI 26.50 kg/m    BMI= Body mass index is 26.5 kg/m .    GENERAL APPEARANCE: healthy, alert and no distress  HEENT: no icterus, no xanthelasmas, normal pupil size and reaction, normal palate, mucosa moist  NECK: no JVD, brisk carotid upstroke bilaterally  CHEST: lungs clear to auscultation without rales, rhonchi or wheezes, no use of accessory muscles, no retractions  CARDIOVASCULAR: regular rhythm, normal S1 and S2, no S3 or S4 and no murmur, click or rub.  ABDOMEN: soft, non tender, without hepatosplenomegaly, no masses palpable, bowel sounds normal  EXTREMITIES: warm, no edema, DP/PT pulses 2+ bilaterally, no clubbing or cyanosis   NEURO: alert and oriented to person/place/time, normal speech, gait and affect  SKIN: no ecchymoses, no rashes      Laboratory:  CMP  Recent Labs   Lab 06/28/21  1129      POTASSIUM 4.4   CHLORIDE 108   CO2 PENDING   ANIONGAP PENDING   GLC PENDING   BUN PENDING   CR PENDING   GFRESTIMATED PENDING   GFRESTBLACK PENDING   CLAUDIA PENDING     CBC  Recent Labs   Lab 06/28/21  1129   WBC 7.3   RBC 5.22   HGB 16.5   HCT 48.7   MCV 93   MCH 31.6   MCHC 33.9   RDW 12.4          No results found for: TROPI, TROPONIN, TROPR, TROPN      EKG 6/28/2021: SB HR 55      TTE 2/25/2021:  Interpretation Summary     Moderate aortic stenosis is present. The aortic valve area is 1.47 cm^2, by  the continuity equation. The mean gradient across the aortic valve is 21 mmHg.  The peak aortic velocity is 3.05 m/sec.  Global and regional left ventricular function is " normal with an EF of 55-60%.  Relative wall thickness is increased consistent with concentric remodeling. No  regional wall motion abnormalities are seen.  Right ventricular function, chamber size, wall motion, and thickness are  normal.  No pericardial effusion is present.     This study was compared with the study from 12/31/2019. Stable severity of  aortic stenosis.    Coronary angiogram 1/20/2020:  Pre Procedure Diagnosis    Coronary artery disease s/p CAB   Chest discomfort, shortness of breath    Post Procedure Diagnosis    Obstructive coronary artery disease   Vein graft closed, LIMA patent   S/p PCI of pRCA, rPDA, D1 with drug eluting stents      Conclusion    1st Diag lesion is 80% stenosed.    Prox RCA lesion is 70% stenosed.    Mid RCA lesion is 30% stenosed.    RPDA lesion is 80% stenosed.    Mid LAD lesion is 100% stenosed.    Ost Cx to Prox Cx lesion is 99% stenosed.    IVUS was not performed on the lesion.    Pressure wire/FFR was not performed on the lesion.    IVUS was not performed.    IVUS was not performed on the lesion.    Pressure wire/FFR was not performed on the lesion.    IVUS was not performed.       Plan    Follow bedrest per protocol    Continued medical management and lifestyle modifications for cardiovascular risk factor optimizations.    Follow up visit with Nurse Practitioner in 1-2 weeks.    Follow up with Dr. Parker.    Discharge today per protocol and [place] to outpatient    Continuation of dual antiplatelet therapy for 12 months     Coronary Findings  DiagnosticDominance: Right  Left Anterior Descending   The vessel is moderate in size.   Mid LAD lesion is 100% stenosed.   First Diagonal Branch   The vessel is moderate in size.   1st Diag lesion is 80% stenosed.   Left Circumflex   The vessel is small.   Collaterals   Dist Cx filled by collaterals from RPAV.      Ost Cx to Prox Cx lesion is 99% stenosed.   Right Coronary Artery   The vessel is large.   Prox RCA lesion is 70%  stenosed.   Mid RCA lesion is 30% stenosed.   Right Posterior Descending Artery   RPDA lesion is 80% stenosed.   First Right Posterolateral Branch   The vessel is large.   LIMA Graft to Mid LAD     Intervention  1st Diag lesion   Stent   Lesion length: 8 mm. CATH GUIDING BLUE YELLOW PTFE XB4 [id number] guide catheter was successful. The guidewire guidewire crosses lesion The pre-interventional distal flow is normal (VALENTIN 3). A STENT SYNERGY DRUG ELUTING 2.65Z45AU [id number] drug eluting stent was successfully placed. No pre-stent angioplasty was performed. Minimum lumen area: 2.5 mm . The strut is apposed. No post-stent angioplasty was performed. The post-interventional distal flow is normal (VALENTIN 3). The intervention was successful. No complications occurred at this lesion.   There is a 0% residual stenosis post intervention.     Prox RCA lesion   Stent   Lesion length: 12 mm. CATH ANGIO INFINITI JR4 [id number] guide catheter was successful. The guidewire guidewire crosses lesion The pre-interventional distal flow is normal (VALENTIN 3). A STENT SYNERGY DRUG ELUTING 3.12T85HU [id number] drug eluting stent was successfully placed. No pre-stent angioplasty was performed. Minimum lumen area: 3.5 mm . The strut is apposed. No post-stent angioplasty was performed. The post-interventional distal flow is normal (VALENTIN 3). The intervention was successful. No complications occurred at this lesion. Pressure wire/FFR was not performed on the lesion. IVUS was not performed on the lesion. FVR was not performed on the lesion. No optical coherence tomography (OCT) was performed.   There is a 0% residual stenosis post intervention.     RPDA lesion   Stent   Lesion length: 8 mm. CATH ANGIO INFINITI JR4 [id number] guide catheter was successful. The guidewire guidewire crosses lesion The pre-interventional distal flow is normal (VALENTIN 3). A STENT SYNERGY DRUG ELUTING 2.27E28BT [id number] drug eluting stent  was successfully placed. Minimum lumen area: 2.5 mm . The strut is apposed. The post-interventional distal flow is normal (VALENTIN 3). The intervention was successful. No complications occurred at this lesion. Pressure wire/FFR was not performed on the lesion. IVUS was not performed on the lesion. FVR was not performed on the lesion.   There is a 0% residual stenosis post intervention.     FFR/IFR/Peak/Mean Gradient    Event Details User   4:01 PM 1/20/20 FFR Measurements Completed FFR measurements completed. .80 FFR Prox RCA X2 CK

## 2021-06-28 ENCOUNTER — HOSPITAL ENCOUNTER (OUTPATIENT)
Dept: CARDIOLOGY | Facility: CLINIC | Age: 86
End: 2021-06-28
Attending: INTERNAL MEDICINE
Payer: COMMERCIAL

## 2021-06-28 ENCOUNTER — HOSPITAL ENCOUNTER (OUTPATIENT)
Facility: CLINIC | Age: 86
Discharge: HOME | End: 2021-06-28
Attending: INTERNAL MEDICINE | Admitting: INTERNAL MEDICINE
Payer: COMMERCIAL

## 2021-06-28 ENCOUNTER — APPOINTMENT (OUTPATIENT)
Dept: MEDSURG UNIT | Facility: CLINIC | Age: 86
End: 2021-06-28
Attending: INTERNAL MEDICINE
Payer: COMMERCIAL

## 2021-06-28 ENCOUNTER — APPOINTMENT (OUTPATIENT)
Dept: LAB | Facility: CLINIC | Age: 86
End: 2021-06-28
Attending: INTERNAL MEDICINE
Payer: COMMERCIAL

## 2021-06-28 VITALS
DIASTOLIC BLOOD PRESSURE: 82 MMHG | OXYGEN SATURATION: 99 % | WEIGHT: 190 LBS | HEIGHT: 71 IN | BODY MASS INDEX: 26.6 KG/M2 | SYSTOLIC BLOOD PRESSURE: 154 MMHG | RESPIRATION RATE: 17 BRPM | TEMPERATURE: 97.7 F | HEART RATE: 54 BPM

## 2021-06-28 DIAGNOSIS — I25.708 CORONARY ARTERY DISEASE OF BYPASS GRAFT OF NATIVE HEART WITH STABLE ANGINA PECTORIS (H): ICD-10-CM

## 2021-06-28 DIAGNOSIS — I35.0 NONRHEUMATIC AORTIC VALVE STENOSIS: ICD-10-CM

## 2021-06-28 LAB
ANION GAP SERPL CALCULATED.3IONS-SCNC: 4 MMOL/L (ref 3–14)
BUN SERPL-MCNC: 14 MG/DL (ref 7–30)
CALCIUM SERPL-MCNC: 9.3 MG/DL (ref 8.5–10.1)
CHLORIDE SERPL-SCNC: 108 MMOL/L (ref 94–109)
CO2 SERPL-SCNC: 26 MMOL/L (ref 20–32)
CREAT SERPL-MCNC: 1.11 MG/DL (ref 0.66–1.25)
ERYTHROCYTE [DISTWIDTH] IN BLOOD BY AUTOMATED COUNT: 12.4 % (ref 10–15)
GFR SERPL CREATININE-BSD FRML MDRD: 61 ML/MIN/{1.73_M2}
GLUCOSE SERPL-MCNC: 92 MG/DL (ref 70–99)
HCT VFR BLD AUTO: 48.7 % (ref 40–53)
HGB BLD-MCNC: 16.5 G/DL (ref 13.3–17.7)
KCT BLD-ACNC: 271 SEC (ref 75–150)
MCH RBC QN AUTO: 31.6 PG (ref 26.5–33)
MCHC RBC AUTO-ENTMCNC: 33.9 G/DL (ref 31.5–36.5)
MCV RBC AUTO: 93 FL (ref 78–100)
PLATELET # BLD AUTO: 166 10E9/L (ref 150–450)
POTASSIUM SERPL-SCNC: 4.4 MMOL/L (ref 3.4–5.3)
RBC # BLD AUTO: 5.22 10E12/L (ref 4.4–5.9)
SODIUM SERPL-SCNC: 139 MMOL/L (ref 133–144)
WBC # BLD AUTO: 7.3 10E9/L (ref 4–11)

## 2021-06-28 PROCEDURE — C1887 CATHETER, GUIDING: HCPCS | Performed by: INTERNAL MEDICINE

## 2021-06-28 PROCEDURE — 272N000001 HC OR GENERAL SUPPLY STERILE: Performed by: INTERNAL MEDICINE

## 2021-06-28 PROCEDURE — 93010 ELECTROCARDIOGRAM REPORT: CPT | Mod: 76 | Performed by: INTERNAL MEDICINE

## 2021-06-28 PROCEDURE — 272N000002 HC OR SUPPLY OTHER OPNP: Performed by: INTERNAL MEDICINE

## 2021-06-28 PROCEDURE — 250N000011 HC RX IP 250 OP 636: Performed by: INTERNAL MEDICINE

## 2021-06-28 PROCEDURE — C1753 CATH, INTRAVAS ULTRASOUND: HCPCS | Performed by: INTERNAL MEDICINE

## 2021-06-28 PROCEDURE — 93306 TTE W/DOPPLER COMPLETE: CPT

## 2021-06-28 PROCEDURE — 93455 CORONARY ART/GRFT ANGIO S&I: CPT | Mod: 59 | Performed by: INTERNAL MEDICINE

## 2021-06-28 PROCEDURE — C9600 PERC DRUG-EL COR STENT SING: HCPCS | Mod: RC | Performed by: INTERNAL MEDICINE

## 2021-06-28 PROCEDURE — 99152 MOD SED SAME PHYS/QHP 5/>YRS: CPT | Performed by: INTERNAL MEDICINE

## 2021-06-28 PROCEDURE — C1725 CATH, TRANSLUMIN NON-LASER: HCPCS | Performed by: INTERNAL MEDICINE

## 2021-06-28 PROCEDURE — 92978 ENDOLUMINL IVUS OCT C 1ST: CPT | Performed by: INTERNAL MEDICINE

## 2021-06-28 PROCEDURE — 93005 ELECTROCARDIOGRAM TRACING: CPT

## 2021-06-28 PROCEDURE — 80048 BASIC METABOLIC PNL TOTAL CA: CPT | Performed by: INTERNAL MEDICINE

## 2021-06-28 PROCEDURE — 85027 COMPLETE CBC AUTOMATED: CPT | Performed by: INTERNAL MEDICINE

## 2021-06-28 PROCEDURE — 999N000054 HC STATISTIC EKG NON-CHARGEABLE

## 2021-06-28 PROCEDURE — C1769 GUIDE WIRE: HCPCS | Performed by: INTERNAL MEDICINE

## 2021-06-28 PROCEDURE — C1894 INTRO/SHEATH, NON-LASER: HCPCS | Performed by: INTERNAL MEDICINE

## 2021-06-28 PROCEDURE — 85347 COAGULATION TIME ACTIVATED: CPT

## 2021-06-28 PROCEDURE — 250N000013 HC RX MED GY IP 250 OP 250 PS 637: Performed by: INTERNAL MEDICINE

## 2021-06-28 PROCEDURE — 36415 COLL VENOUS BLD VENIPUNCTURE: CPT | Performed by: INTERNAL MEDICINE

## 2021-06-28 PROCEDURE — 93306 TTE W/DOPPLER COMPLETE: CPT | Mod: 26 | Performed by: INTERNAL MEDICINE

## 2021-06-28 PROCEDURE — 999N000142 HC STATISTIC PROCEDURE PREP ONLY

## 2021-06-28 PROCEDURE — 250N000009 HC RX 250: Performed by: INTERNAL MEDICINE

## 2021-06-28 PROCEDURE — C1760 CLOSURE DEV, VASC: HCPCS | Performed by: INTERNAL MEDICINE

## 2021-06-28 PROCEDURE — C1874 STENT, COATED/COV W/DEL SYS: HCPCS | Performed by: INTERNAL MEDICINE

## 2021-06-28 PROCEDURE — 99153 MOD SED SAME PHYS/QHP EA: CPT | Performed by: INTERNAL MEDICINE

## 2021-06-28 DEVICE — STENT SYNERGY DRUG ELUTING 4.00X12MM  H7493926012400: Type: IMPLANTABLE DEVICE | Status: FUNCTIONAL

## 2021-06-28 RX ORDER — SODIUM CHLORIDE 9 MG/ML
INJECTION, SOLUTION INTRAVENOUS CONTINUOUS
Status: DISCONTINUED | OUTPATIENT
Start: 2021-06-28 | End: 2021-06-28 | Stop reason: HOSPADM

## 2021-06-28 RX ORDER — FENTANYL CITRATE 50 UG/ML
25-50 INJECTION, SOLUTION INTRAMUSCULAR; INTRAVENOUS
Status: ACTIVE | OUTPATIENT
Start: 2021-06-28 | End: 2021-06-28

## 2021-06-28 RX ORDER — EPTIFIBATIDE 2 MG/ML
180 INJECTION, SOLUTION INTRAVENOUS EVERY 10 MIN PRN
Status: DISCONTINUED | OUTPATIENT
Start: 2021-06-28 | End: 2021-06-28 | Stop reason: HOSPADM

## 2021-06-28 RX ORDER — NALOXONE HYDROCHLORIDE 0.4 MG/ML
0.2 INJECTION, SOLUTION INTRAMUSCULAR; INTRAVENOUS; SUBCUTANEOUS
Status: DISCONTINUED | OUTPATIENT
Start: 2021-06-28 | End: 2021-06-28 | Stop reason: HOSPADM

## 2021-06-28 RX ORDER — NALOXONE HYDROCHLORIDE 0.4 MG/ML
0.4 INJECTION, SOLUTION INTRAMUSCULAR; INTRAVENOUS; SUBCUTANEOUS
Status: DISCONTINUED | OUTPATIENT
Start: 2021-06-28 | End: 2021-06-28 | Stop reason: HOSPADM

## 2021-06-28 RX ORDER — EPTIFIBATIDE 2 MG/ML
1 INJECTION, SOLUTION INTRAVENOUS CONTINUOUS PRN
Status: DISCONTINUED | OUTPATIENT
Start: 2021-06-28 | End: 2021-06-28 | Stop reason: HOSPADM

## 2021-06-28 RX ORDER — ACETAMINOPHEN 325 MG/1
650 TABLET ORAL EVERY 4 HOURS PRN
Status: DISCONTINUED | OUTPATIENT
Start: 2021-06-28 | End: 2021-06-28 | Stop reason: HOSPADM

## 2021-06-28 RX ORDER — LIDOCAINE 40 MG/G
CREAM TOPICAL
Status: DISCONTINUED | OUTPATIENT
Start: 2021-06-28 | End: 2021-06-28 | Stop reason: HOSPADM

## 2021-06-28 RX ORDER — ATROPINE SULFATE 0.1 MG/ML
0.5 INJECTION INTRAVENOUS
Status: DISCONTINUED | OUTPATIENT
Start: 2021-06-28 | End: 2021-06-28 | Stop reason: HOSPADM

## 2021-06-28 RX ORDER — OXYCODONE HYDROCHLORIDE 10 MG/1
10 TABLET ORAL EVERY 4 HOURS PRN
Status: DISCONTINUED | OUTPATIENT
Start: 2021-06-28 | End: 2021-06-28 | Stop reason: HOSPADM

## 2021-06-28 RX ORDER — NITROGLYCERIN 0.4 MG/1
0.4 TABLET SUBLINGUAL EVERY 5 MIN PRN
Status: DISCONTINUED | OUTPATIENT
Start: 2021-06-28 | End: 2021-06-28 | Stop reason: HOSPADM

## 2021-06-28 RX ORDER — ONDANSETRON 4 MG/1
4 TABLET, ORALLY DISINTEGRATING ORAL EVERY 6 HOURS PRN
Status: DISCONTINUED | OUTPATIENT
Start: 2021-06-28 | End: 2021-06-28 | Stop reason: HOSPADM

## 2021-06-28 RX ORDER — ASPIRIN 81 MG/1
81 TABLET ORAL DAILY
Status: DISCONTINUED | OUTPATIENT
Start: 2021-06-29 | End: 2021-06-28 | Stop reason: HOSPADM

## 2021-06-28 RX ORDER — HEPARIN SODIUM 1000 [USP'U]/ML
INJECTION, SOLUTION INTRAVENOUS; SUBCUTANEOUS
Status: DISCONTINUED | OUTPATIENT
Start: 2021-06-28 | End: 2021-06-28 | Stop reason: HOSPADM

## 2021-06-28 RX ORDER — ASPIRIN 81 MG/1
81 TABLET, CHEWABLE ORAL ONCE
Status: DISCONTINUED | OUTPATIENT
Start: 2021-06-28 | End: 2021-06-28 | Stop reason: CLARIF

## 2021-06-28 RX ORDER — HEPARIN SODIUM 10000 [USP'U]/100ML
100-1000 INJECTION, SOLUTION INTRAVENOUS CONTINUOUS PRN
Status: DISCONTINUED | OUTPATIENT
Start: 2021-06-28 | End: 2021-06-28 | Stop reason: HOSPADM

## 2021-06-28 RX ORDER — ASPIRIN 81 MG/1
TABLET, CHEWABLE ORAL
Status: DISCONTINUED | OUTPATIENT
Start: 2021-06-28 | End: 2021-06-28 | Stop reason: HOSPADM

## 2021-06-28 RX ORDER — TIROFIBAN HYDROCHLORIDE 50 UG/ML
0.07 INJECTION INTRAVENOUS CONTINUOUS PRN
Status: DISCONTINUED | OUTPATIENT
Start: 2021-06-28 | End: 2021-06-28 | Stop reason: HOSPADM

## 2021-06-28 RX ORDER — NITROGLYCERIN 20 MG/100ML
10-200 INJECTION INTRAVENOUS CONTINUOUS PRN
Status: DISCONTINUED | OUTPATIENT
Start: 2021-06-28 | End: 2021-06-28 | Stop reason: HOSPADM

## 2021-06-28 RX ORDER — IOPAMIDOL 755 MG/ML
INJECTION, SOLUTION INTRAVASCULAR
Status: DISCONTINUED | OUTPATIENT
Start: 2021-06-28 | End: 2021-06-28 | Stop reason: HOSPADM

## 2021-06-28 RX ORDER — OXYCODONE HYDROCHLORIDE 5 MG/1
5 TABLET ORAL EVERY 4 HOURS PRN
Status: DISCONTINUED | OUTPATIENT
Start: 2021-06-28 | End: 2021-06-28 | Stop reason: HOSPADM

## 2021-06-28 RX ORDER — DOBUTAMINE HYDROCHLORIDE 200 MG/100ML
2-20 INJECTION INTRAVENOUS CONTINUOUS PRN
Status: DISCONTINUED | OUTPATIENT
Start: 2021-06-28 | End: 2021-06-28 | Stop reason: HOSPADM

## 2021-06-28 RX ORDER — ONDANSETRON 2 MG/ML
4 INJECTION INTRAMUSCULAR; INTRAVENOUS EVERY 6 HOURS PRN
Status: DISCONTINUED | OUTPATIENT
Start: 2021-06-28 | End: 2021-06-28 | Stop reason: HOSPADM

## 2021-06-28 RX ORDER — ONDANSETRON 2 MG/ML
4 INJECTION INTRAMUSCULAR; INTRAVENOUS EVERY 6 HOURS PRN
Status: DISCONTINUED | OUTPATIENT
Start: 2021-06-28 | End: 2021-06-28

## 2021-06-28 RX ORDER — HYDRALAZINE HYDROCHLORIDE 20 MG/ML
10 INJECTION INTRAMUSCULAR; INTRAVENOUS EVERY 4 HOURS PRN
Status: DISCONTINUED | OUTPATIENT
Start: 2021-06-28 | End: 2021-06-28 | Stop reason: HOSPADM

## 2021-06-28 RX ORDER — ARGATROBAN 1 MG/ML
150 INJECTION, SOLUTION INTRAVENOUS
Status: DISCONTINUED | OUTPATIENT
Start: 2021-06-28 | End: 2021-06-28 | Stop reason: HOSPADM

## 2021-06-28 RX ORDER — NALOXONE HYDROCHLORIDE 0.4 MG/ML
0.4 INJECTION, SOLUTION INTRAMUSCULAR; INTRAVENOUS; SUBCUTANEOUS
Status: DISCONTINUED | OUTPATIENT
Start: 2021-06-28 | End: 2021-06-28

## 2021-06-28 RX ORDER — NALOXONE HYDROCHLORIDE 0.4 MG/ML
0.2 INJECTION, SOLUTION INTRAMUSCULAR; INTRAVENOUS; SUBCUTANEOUS
Status: DISCONTINUED | OUTPATIENT
Start: 2021-06-28 | End: 2021-06-28

## 2021-06-28 RX ORDER — FENTANYL CITRATE 50 UG/ML
INJECTION, SOLUTION INTRAMUSCULAR; INTRAVENOUS
Status: DISCONTINUED | OUTPATIENT
Start: 2021-06-28 | End: 2021-06-28 | Stop reason: HOSPADM

## 2021-06-28 RX ORDER — ARGATROBAN 1 MG/ML
350 INJECTION, SOLUTION INTRAVENOUS
Status: DISCONTINUED | OUTPATIENT
Start: 2021-06-28 | End: 2021-06-28 | Stop reason: HOSPADM

## 2021-06-28 RX ORDER — METOPROLOL TARTRATE 1 MG/ML
5-10 INJECTION, SOLUTION INTRAVENOUS
Status: DISCONTINUED | OUTPATIENT
Start: 2021-06-28 | End: 2021-06-28 | Stop reason: HOSPADM

## 2021-06-28 RX ORDER — FLUMAZENIL 0.1 MG/ML
0.2 INJECTION, SOLUTION INTRAVENOUS
Status: DISCONTINUED | OUTPATIENT
Start: 2021-06-28 | End: 2021-06-28 | Stop reason: HOSPADM

## 2021-06-28 RX ORDER — DOPAMINE HYDROCHLORIDE 160 MG/100ML
2-20 INJECTION, SOLUTION INTRAVENOUS CONTINUOUS PRN
Status: DISCONTINUED | OUTPATIENT
Start: 2021-06-28 | End: 2021-06-28 | Stop reason: HOSPADM

## 2021-06-28 ASSESSMENT — MIFFLIN-ST. JEOR: SCORE: 1573.96

## 2021-06-28 NOTE — PROGRESS NOTES
D/I/A: Pt roomed on 3C in bay 30.  Arrived via litter and accompanied by Cath Lab RN On/Off: On monitor.  VSSA.  Rhythm upon arrival Sinus [place] on monitor.  Denies pain or sob.  Reviewed activity restrictions and when to notify RN, ie-changes to breathing or increased chest pressure or chest pain.  CCL access:  Right groin. CDI. No bleeding or hematoma. Pt on bedrest until 16:05  P: Continue to monitor status.  Discharge to home once meeting criteria.

## 2021-06-28 NOTE — PRE-PROCEDURE
GENERAL PRE-PROCEDURE:   Procedure:  Coronary angiogram with possible intervention  Date/Time:  6/28/2021 11:53 AM    Verbal consent obtained?: Yes    Written consent obtained?: Yes    Risks and benefits: Risks, benefits and alternatives were discussed    DC Plan: Appropriate discharge home plan in place for patients who are going home after procedure   Consent given by:  Patient  Patient states understanding of procedure being performed: Yes    Patient's understanding of procedure matches consent: Yes    Procedure consent matches procedure scheduled: Yes    Expected level of sedation:  Moderate  Appropriately NPO:  Yes  ASA Class:  Class 3- Severe systemic disease, definite functional limitations  Mallampati  :  Grade 2- soft palate, base of uvula, tonsillar pillars, and portion of posterior pharyngeal wall visible  Lungs:  Lungs clear with good breath sounds bilaterally  Heart:  Normal heart sounds and rate  History & Physical reviewed:  History and physical reviewed and no updates needed  Statement of review:  I have reviewed the lab findings, diagnostic data, medications, and the plan for sedation    MARIA DEL CARMEN Huffman, CNP  [facility] Cardiology

## 2021-06-28 NOTE — PROGRESS NOTES
Research Consent Note:     Study Title: Adenosine Contrast Correlations in Evaluating Revascularization ACCELERATION Study  IRB # [id number]    PI pager: Dr. Magdiel Hernandez  # [phone]   pager: Nhung Bolton RN  #  [phone]                       Estimated dates of participation: One year     Consent form was reviewed with the patient.  The purpose, risks, and benefits of study participation were discussed.  The study was reviewed with expected duration of participation, procedures, along with any foreseeable risks or discomforts. It was discussed that study participation is voluntary and that refusal to participate will involve no penalty or decrease benefits to which the subject is otherwise entitled, and the subject may discontinue participation at any time without penalty or loss of benefits. Patient questions were answered. Patient was able to state what study participation involved.  Patient signed the consent and HIPAA forms prior to study participation and was given signed copies of both.

## 2021-06-28 NOTE — PROGRESS NOTES
D/I/A:  Patient is tolerating liquids and foods, ambulating, urinating, puncture sites are stable ( no bleeding and no hematoma) and patient has a .  A+O x4 and making needs known.  CCL access sites C/D/I; no bleeding or hematoma; CMS intact.  VSSA.  Sinus Rhythm on monitor.  IV access removed.  Education completed and outlined in AVS or handout: medications reviewed with patient.  Questions answered prior to discharge.  Belongings returned to patient at discharge.    P: Discharged to self care.  Patient to follow up with appts as per discharge instruction.

## 2021-06-28 NOTE — Clinical Note
The first balloon was inserted into the right coronary artery.Max pressure = 12 leia. Total duration = 10 seconds.

## 2021-06-28 NOTE — PROGRESS NOTES
Arrived from home for a CORS.  VSS.  Denies pain.  PIV placed.  Labs resulted.  H&P current.  Consent obtained.  Took Nasrin today.  Ready for procedure.

## 2021-06-28 NOTE — DISCHARGE INSTRUCTIONS
Going Home after an Angioplasty or Stent Placement (Cardiac)  ______________________________________________      After you go home:    Have an adult stay with you for 24 hours.    Drink plenty of fluids.    You may eat your normal diet, unless your doctor tells you otherwise.    For 24 hours:    Relax and take it easy.    Do NOT smoke.    Do NOT make any important or legal decisions.    Do NOT drive or operate machines at home or at work.    Do NOT drink alcohol.    Remove the Band-Aid after 24 hours. If there is minor oozing, apply another Band-aid and remove it after 12 hours.    For 2 days, do NOT have sex or do any heavy exercise.    Do NOT take a bath, or use a hot tub or pool for at least 3 days. You may shower.    Care of groin site  It is normal to have a small bruise or lump at the site.    Do not scrub the site.    For the first 2 days: Do not stoop or squat. When you cough, sneeze or move your bowels, hold your hand over the puncture site and press gently.    Do not lift more than 10 pounds for at least 3 to 5 days.    Do not use lotion or powder near the puncture site for 3 days.    If you start bleeding from the site in your groin, lie down flat and press firmly  on the site. Call your doctor as soon as you can.      Medicines    If you have started taking Plavix or Effient, do not stop taking it until you talk to your heart doctor (cardiologist).    If you are on metformin (Glucophage), do not restart it until you have blood tests (within 2 to 3 days after discharge). When your doctor tells you it is safe, you may restart the metformin.    If you have stopped any other medicines, check with your nurse or provider about when to restart them.    Call 911 right away if you have bleeding that is heavy or does not stop.    Call your doctor if:    You have a large or growing hard lump around the site.    The site is red, swollen, hot or tender.    Blood or fluid is draining from the site.    You have  "chills or a fever greater than 101 F (38 C).    Your leg or arm feels numb or cool.    You have hives, a rash or unusual itching.      [facility] [place]:  [phone] (7 days a week)              Going home after a Coronary Angiogram with Stent Placement    PROCEDURE SITE:  It is normal to have soreness, mild bruising or a small lump at the puncture site. You may shower but please do not use a hot tub, bath tub or pool for 2 days. Do not apply any lotion or powder near the site for 2 days. For 2 days when you cough, sneeze or push with a bowel movement place your hand over the puncture site and apply gentle pressure. For the first 2 days avoid squatting. Avoid lifting more than 10 pounds for at least 5 days. Further activity restrictions as below. If you feel a \"pop\" with pain and/or notice significant increase in pain, swelling or bleeding from the site- immediately lie down, press firmly on the site and proceed to the nearest medical facility.    MEDICATIONS:  1. You will need to continue to take Brilinta (ticagrelor). This medication is essential for your stent(s). Do not stop it without speaking first to your heart doctor or their nurse- this includes if you have concerns about side effects or cost. Also, if another health provider tells you to stop it, let them know they need to discuss it with your heart doctor.   2. If you are on Metformin (Glucophage) or any medications that contain this, you should not take it for at least 48 hours (2 days) from the time of your procedure. If you have baseline kidney problems, you may be instructed to also have a lab test drawn in 2-3 days before getting the okay to restart it.  3. If you have not been continued on other medications you were previously taking at home it is probably for reason though please discuss your concerns with any of your doctors.     DIET:  We recommend a diet low in saturated fat, trans fat and cholesterol. In " addition it will be helpful to be cautious of sodium intake and carbohydrates. Try to increase the amount of lean meats you eat like fish and chicken, but avoid frying; and reduce the amount of red meat you eat. Eat more fresh fruits and vegetables and try to avoid canned and processed food. Please reference the handouts you received for more specific information.    CARDIAC REHAB:  After the initial healing process of the procedure site, we recommend cardiac rehabilitation for all heart attack and stent patients. Cardiac rehabilitation will help you:  - Rebuild stamina, strength and balance.  - Learn how to participate in activities safely, as well as help you regain confidence to do so.  - Return to activities of daily living and leisure.  You should receive a call from them within the next week, but if you do not or you would like to call you can contact their central scheduling at [phone]    OTHER INFORMATION:  1. If you are a smoker, quitting smoking will be one of the most important things you can do for yourself. There are nicotine replacement options or medications they might be able to be prescribed. Please discuss this with your doctors. Consider calling the QuitPlan at [phone] ([phone]) as they can offer ongoing support after discharge.    CALL YOUR DOCTOR IF:  -You have a large or growing lump/bump around the procedure site  -The site is red, swollen, hot, tender or has drainage  -You have hives, a rash or unusual itching  -You have increasing or worsening shortness of breath or chest pain    FOLLOW UP:  We prefer you to follow up with your primary care provider within one week. You will be arranged to see the cardiologist (heart doctor) in clinic in about 2 to 4 weeks     Should you need to contact us:  Cardiology clinic for scheduling or triage nurse questions/concerns:  [phone]

## 2021-06-29 LAB
INTERPRETATION ECG - MUSE: NORMAL
INTERPRETATION ECG - MUSE: NORMAL

## 2021-06-30 DIAGNOSIS — Z98.890 STATUS POST CORONARY ANGIOGRAM: Primary | ICD-10-CM

## 2021-07-07 ENCOUNTER — DOCUMENTATION ONLY (OUTPATIENT)
Dept: CARDIOLOGY | Facility: CLINIC | Age: 86
End: 2021-07-07

## 2021-07-07 NOTE — PROGRESS NOTES
7/7/21 9:13AM  Contacted [facility] regarding [id number] for PCI on RCA performed 6/28/21.   I spoke with Dr. Wall and conveyed the relevant clinical background (Known moderate aortic stenosis, prior CABG and PCI to ungrafted RCA 1/2020 with recurrence of previous anginal equivalent, progressively worsening despite Toprol XL and increased Imdur, stress testing contraindicated due to possibility of interval progression to severe aortic stenosis, with diagnostic angiogram 6/28/21 showing new 80% stenosis of the pRCA proximal to the previous stent.)  I was given clinical authorization, however the request entered under the incorrect facility[place] [place] [facility], which I did inform Dr. Wall was a pediatric cath lab.   Attempt is being made to correct the procedure locatoin; [facility] to follow up with me with further updates.    7/7/21 12:49 PM   No updates received thus far.    Artis Parker MD  Cardiology

## 2021-07-08 ENCOUNTER — TELEPHONE (OUTPATIENT)
Dept: CARDIOLOGY | Facility: CLINIC | Age: 86
End: 2021-07-08

## 2021-07-08 NOTE — TELEPHONE ENCOUNTER
[facility] Call Center    Phone Message    May a detailed message be left on voicemail: yes     Reason for Call: Other: Jd calling from [employer] regarding a pre authroization request for coronary intervention. She state the authorization number is [id number]. Please give her a call back to discuss at [phone]. Thank you!     Action Taken: Message routed to:  [facility] ([facility] Cardio    Travel Screening: Not Applicable

## 2021-07-22 ENCOUNTER — HOSPITAL ENCOUNTER (OUTPATIENT)
Dept: CARDIAC REHAB | Facility: CLINIC | Age: 86
End: 2021-07-22
Attending: NURSE PRACTITIONER
Payer: COMMERCIAL

## 2021-07-22 DIAGNOSIS — Z98.890 STATUS POST CORONARY ANGIOGRAM: ICD-10-CM

## 2021-07-22 PROCEDURE — 93798 PHYS/QHP OP CAR RHAB W/ECG: CPT

## 2021-07-29 ENCOUNTER — HOSPITAL ENCOUNTER (OUTPATIENT)
Dept: CARDIAC REHAB | Facility: CLINIC | Age: 86
End: 2021-07-29
Attending: INTERNAL MEDICINE
Payer: COMMERCIAL

## 2021-07-29 PROCEDURE — 93798 PHYS/QHP OP CAR RHAB W/ECG: CPT

## 2021-08-03 ENCOUNTER — HOSPITAL ENCOUNTER (OUTPATIENT)
Dept: CARDIAC REHAB | Facility: CLINIC | Age: 86
End: 2021-08-03
Attending: INTERNAL MEDICINE
Payer: COMMERCIAL

## 2021-08-03 PROCEDURE — 93798 PHYS/QHP OP CAR RHAB W/ECG: CPT

## 2021-08-05 ENCOUNTER — HOSPITAL ENCOUNTER (OUTPATIENT)
Dept: CARDIAC REHAB | Facility: CLINIC | Age: 86
End: 2021-08-05
Attending: INTERNAL MEDICINE
Payer: COMMERCIAL

## 2021-08-05 PROCEDURE — 93798 PHYS/QHP OP CAR RHAB W/ECG: CPT

## 2021-08-10 ENCOUNTER — HOSPITAL ENCOUNTER (OUTPATIENT)
Dept: CARDIAC REHAB | Facility: CLINIC | Age: 86
End: 2021-08-10
Attending: INTERNAL MEDICINE
Payer: COMMERCIAL

## 2021-08-10 PROCEDURE — 93798 PHYS/QHP OP CAR RHAB W/ECG: CPT

## 2021-08-12 ENCOUNTER — HOSPITAL ENCOUNTER (OUTPATIENT)
Dept: CARDIAC REHAB | Facility: CLINIC | Age: 86
End: 2021-08-12
Attending: INTERNAL MEDICINE
Payer: COMMERCIAL

## 2021-08-12 PROCEDURE — 93798 PHYS/QHP OP CAR RHAB W/ECG: CPT

## 2021-08-17 ENCOUNTER — HOSPITAL ENCOUNTER (OUTPATIENT)
Dept: CARDIAC REHAB | Facility: CLINIC | Age: 86
End: 2021-08-17
Attending: INTERNAL MEDICINE
Payer: COMMERCIAL

## 2021-08-17 PROCEDURE — 93798 PHYS/QHP OP CAR RHAB W/ECG: CPT

## 2021-08-19 ENCOUNTER — HOSPITAL ENCOUNTER (OUTPATIENT)
Dept: CARDIAC REHAB | Facility: CLINIC | Age: 86
End: 2021-08-19
Attending: INTERNAL MEDICINE
Payer: COMMERCIAL

## 2021-08-19 PROCEDURE — 93798 PHYS/QHP OP CAR RHAB W/ECG: CPT

## 2021-08-26 ENCOUNTER — HOSPITAL ENCOUNTER (OUTPATIENT)
Dept: CARDIAC REHAB | Facility: CLINIC | Age: 86
End: 2021-08-26
Attending: INTERNAL MEDICINE
Payer: COMMERCIAL

## 2021-08-26 PROCEDURE — 93798 PHYS/QHP OP CAR RHAB W/ECG: CPT

## 2021-08-31 ENCOUNTER — HOSPITAL ENCOUNTER (OUTPATIENT)
Dept: CARDIAC REHAB | Facility: CLINIC | Age: 86
End: 2021-08-31
Attending: INTERNAL MEDICINE
Payer: COMMERCIAL

## 2021-08-31 PROCEDURE — 93798 PHYS/QHP OP CAR RHAB W/ECG: CPT

## 2021-09-02 ENCOUNTER — HOSPITAL ENCOUNTER (OUTPATIENT)
Dept: CARDIAC REHAB | Facility: CLINIC | Age: 86
End: 2021-09-02
Attending: INTERNAL MEDICINE
Payer: COMMERCIAL

## 2021-09-02 PROCEDURE — 93798 PHYS/QHP OP CAR RHAB W/ECG: CPT

## 2021-09-07 ENCOUNTER — HOSPITAL ENCOUNTER (OUTPATIENT)
Dept: CARDIAC REHAB | Facility: CLINIC | Age: 86
End: 2021-09-07
Attending: INTERNAL MEDICINE
Payer: COMMERCIAL

## 2021-09-07 ENCOUNTER — MYC MEDICAL ADVICE (OUTPATIENT)
Dept: FAMILY MEDICINE | Facility: CLINIC | Age: 86
End: 2021-09-07

## 2021-09-07 PROCEDURE — 93798 PHYS/QHP OP CAR RHAB W/ECG: CPT

## 2021-09-09 ENCOUNTER — HOSPITAL ENCOUNTER (OUTPATIENT)
Dept: CARDIAC REHAB | Facility: CLINIC | Age: 86
End: 2021-09-09
Attending: INTERNAL MEDICINE
Payer: COMMERCIAL

## 2021-09-09 PROCEDURE — 93798 PHYS/QHP OP CAR RHAB W/ECG: CPT

## 2021-09-13 ENCOUNTER — VIRTUAL VISIT (OUTPATIENT)
Dept: FAMILY MEDICINE | Facility: CLINIC | Age: 86
End: 2021-09-13
Payer: COMMERCIAL

## 2021-09-13 DIAGNOSIS — L82.1 SEBORRHEIC KERATOSIS: Primary | ICD-10-CM

## 2021-09-13 PROCEDURE — 99213 OFFICE O/P EST LOW 20 MIN: CPT | Mod: 95 | Performed by: FAMILY MEDICINE

## 2021-09-13 NOTE — PROGRESS NOTES
"Cameron is a 86 year old who is being evaluated via a billable video visit.      How would you like to obtain your AVS? MyChart  If the video visit is dropped, the invitation should be resent by: Text to cell phone: 1  Will anyone else be joining your video visit? No      Video Start Time: 1148    Assessment & Plan     Seborrheic keratosis  Patient with a recurrent spot on his scalp.  Sent me some photos of it yesterday and it is consistent with a seborrheic keratosis.  We discussed treatment options we will set him up for cryotherapy.  And certainly at that time we will take a good look to make sure this is exactly what it is or whether it needs a shave biopsy.         BMI:   Estimated body mass index is 26.5 kg/m  as calculated from the following:    Height as of 6/28/21: 1.803 m (5' 11\").    Weight as of 6/28/21: 86.2 kg (190 lb).       See Patient Instructions    Return in about 2 weeks (around 9/27/2021) for As scheduled for procedure.    Julieta Gandhi MD  [facility]   Cameron is a 86 year old who presents for the following health issues     HPI     Video visit with patient today to look at a spot on his scalp that has been present for a few years.  He tends to pick it off and then it comes back.  No other associated symptoms.    Review of Systems   Constitutional, HEENT, cardiovascular, pulmonary, gi and gu systems are negative, except as otherwise noted.      Objective           Vitals:  No vitals were obtained today due to virtual visit.    Physical Exam   GENERAL: Healthy, alert and no distress  EYES: Eyes grossly normal to inspection.  No discharge or erythema, or obvious scleral/conjunctival abnormalities.  RESP: No audible wheeze, cough, or visible cyanosis.  No visible retractions or increased work of breathing.    SKIN: Unable to see the spot through the video but he sent me some photos yesterday and it seems to be a waxy brownish raised plaque consistent with a " seborrheic keratosis  NEURO: Cranial nerves grossly intact.  Mentation and speech appropriate for age.  PSYCH: Mentation appears normal, affect normal/bright, judgement and insight intact, normal speech and appearance well-groomed.    Past labs reviewed with the patient.             Video-Visit Details    Type of service:  Video Visit    Video End Time:1153    Originating Location (pt. Location): Home    Distant Location (provider location):  [facility]     Platform used for Video Visit: LoniuBeam

## 2021-09-13 NOTE — Clinical Note
Please schedule patient with an in office 20-minute visit with me to freeze his seborrheic keratosis.  Anytime in the next 2 to 3 weeks.

## 2021-09-14 ENCOUNTER — HOSPITAL ENCOUNTER (OUTPATIENT)
Dept: CARDIAC REHAB | Facility: CLINIC | Age: 86
End: 2021-09-14
Attending: INTERNAL MEDICINE
Payer: COMMERCIAL

## 2021-09-14 PROCEDURE — 93798 PHYS/QHP OP CAR RHAB W/ECG: CPT

## 2021-09-16 ENCOUNTER — HOSPITAL ENCOUNTER (OUTPATIENT)
Dept: CARDIAC REHAB | Facility: CLINIC | Age: 86
End: 2021-09-16
Attending: INTERNAL MEDICINE
Payer: COMMERCIAL

## 2021-09-16 PROCEDURE — 93798 PHYS/QHP OP CAR RHAB W/ECG: CPT

## 2021-09-21 ENCOUNTER — HOSPITAL ENCOUNTER (OUTPATIENT)
Dept: CARDIAC REHAB | Facility: CLINIC | Age: 86
End: 2021-09-21
Attending: INTERNAL MEDICINE
Payer: COMMERCIAL

## 2021-09-21 PROCEDURE — 93798 PHYS/QHP OP CAR RHAB W/ECG: CPT | Performed by: REHABILITATION PRACTITIONER

## 2021-09-23 ENCOUNTER — HOSPITAL ENCOUNTER (OUTPATIENT)
Dept: CARDIAC REHAB | Facility: CLINIC | Age: 86
End: 2021-09-23
Attending: INTERNAL MEDICINE
Payer: COMMERCIAL

## 2021-09-23 PROCEDURE — 93798 PHYS/QHP OP CAR RHAB W/ECG: CPT

## 2021-09-26 ENCOUNTER — HEALTH MAINTENANCE LETTER (OUTPATIENT)
Age: 86
End: 2021-09-26

## 2021-09-28 ENCOUNTER — HOSPITAL ENCOUNTER (OUTPATIENT)
Dept: CARDIAC REHAB | Facility: CLINIC | Age: 86
End: 2021-09-28
Attending: INTERNAL MEDICINE
Payer: COMMERCIAL

## 2021-09-28 PROCEDURE — 93798 PHYS/QHP OP CAR RHAB W/ECG: CPT

## 2021-09-30 ENCOUNTER — HOSPITAL ENCOUNTER (OUTPATIENT)
Dept: CARDIAC REHAB | Facility: CLINIC | Age: 86
End: 2021-09-30
Attending: INTERNAL MEDICINE
Payer: COMMERCIAL

## 2021-09-30 PROCEDURE — 93798 PHYS/QHP OP CAR RHAB W/ECG: CPT

## 2021-10-05 ENCOUNTER — HOSPITAL ENCOUNTER (OUTPATIENT)
Dept: CARDIAC REHAB | Facility: CLINIC | Age: 86
End: 2021-10-05
Attending: INTERNAL MEDICINE
Payer: COMMERCIAL

## 2021-10-05 PROCEDURE — 93798 PHYS/QHP OP CAR RHAB W/ECG: CPT

## 2021-10-07 ENCOUNTER — HOSPITAL ENCOUNTER (OUTPATIENT)
Dept: CARDIAC REHAB | Facility: CLINIC | Age: 86
End: 2021-10-07
Attending: INTERNAL MEDICINE
Payer: COMMERCIAL

## 2021-10-07 PROCEDURE — 93798 PHYS/QHP OP CAR RHAB W/ECG: CPT

## 2021-10-10 NOTE — PROGRESS NOTES
"    Assessment & Plan     Seborrheic keratosis  Had previously sent some photos online but I want to look at them in person.  3 notable lesions, one on the back of each hand, and one on the right forehead.  All consistent with seborrheic keratoses.  Somewhat itchy.  Treated with cryotherapy.  Aftercare discussed.  - DESTRUCT BENIGN LESION, UP TO 14       See Patient Instructions    Return in about 1 week (around 10/18/2021) for If not improving as expected, [employer] message.    Julieta Gandhi MD  [facility] KATHLEEN Barnes is a 86 year old who presents for the following health issues  accompanied by his self:    HPI         Here today for evaluations of some skin lesions, especially his scalp lesion.     Review of Systems   Constitutional, HEENT, cardiovascular, pulmonary, gi and gu systems are negative, except as otherwise noted.      Objective    /76   Pulse 88   Resp 18   Ht 1.803 m (5' 11\")   Wt 84.8 kg (187 lb)   SpO2 98%   BMI 26.08 kg/m    Body mass index is 26.08 kg/m .  Physical Exam   Alert, pleasant, upbeat, and in no apparent discomfort.  Skin exam reveals very little sun damage.  But there are 3 separate raised waxy scaly lesions consistent with seborrheic keratoses.  One on the back of each hand and one on his right upper forehead.  Cryotherapy to all 3 lesions.  Past labs reviewed with the patient.                 "

## 2021-10-11 ENCOUNTER — OFFICE VISIT (OUTPATIENT)
Dept: FAMILY MEDICINE | Facility: CLINIC | Age: 86
End: 2021-10-11
Payer: COMMERCIAL

## 2021-10-11 ENCOUNTER — APPOINTMENT (OUTPATIENT)
Dept: FAMILY MEDICINE | Facility: CLINIC | Age: 86
End: 2021-10-11
Payer: COMMERCIAL

## 2021-10-11 VITALS
BODY MASS INDEX: 26.18 KG/M2 | HEIGHT: 71 IN | SYSTOLIC BLOOD PRESSURE: 130 MMHG | OXYGEN SATURATION: 98 % | WEIGHT: 187 LBS | DIASTOLIC BLOOD PRESSURE: 76 MMHG | HEART RATE: 88 BPM | RESPIRATION RATE: 18 BRPM

## 2021-10-11 DIAGNOSIS — L82.1 SEBORRHEIC KERATOSIS: Primary | ICD-10-CM

## 2021-10-11 PROCEDURE — 99207 PR DROP WITH A PROCEDURE: CPT | Mod: 25 | Performed by: FAMILY MEDICINE

## 2021-10-11 PROCEDURE — 17110 DESTRUCTION B9 LES UP TO 14: CPT | Performed by: FAMILY MEDICINE

## 2021-10-11 ASSESSMENT — MIFFLIN-ST. JEOR: SCORE: 1560.36

## 2021-10-12 ENCOUNTER — HOSPITAL ENCOUNTER (OUTPATIENT)
Dept: CARDIAC REHAB | Facility: CLINIC | Age: 86
End: 2021-10-12
Attending: INTERNAL MEDICINE
Payer: COMMERCIAL

## 2021-10-12 PROCEDURE — 93798 PHYS/QHP OP CAR RHAB W/ECG: CPT

## 2021-10-14 ENCOUNTER — HOSPITAL ENCOUNTER (OUTPATIENT)
Dept: CARDIAC REHAB | Facility: CLINIC | Age: 86
End: 2021-10-14
Attending: INTERNAL MEDICINE
Payer: COMMERCIAL

## 2021-10-14 PROCEDURE — 93798 PHYS/QHP OP CAR RHAB W/ECG: CPT

## 2021-10-19 ENCOUNTER — HOSPITAL ENCOUNTER (OUTPATIENT)
Dept: CARDIAC REHAB | Facility: CLINIC | Age: 86
End: 2021-10-19
Attending: INTERNAL MEDICINE
Payer: COMMERCIAL

## 2021-10-19 PROCEDURE — 93798 PHYS/QHP OP CAR RHAB W/ECG: CPT

## 2021-10-21 ENCOUNTER — HOSPITAL ENCOUNTER (OUTPATIENT)
Dept: CARDIAC REHAB | Facility: CLINIC | Age: 86
End: 2021-10-21
Attending: INTERNAL MEDICINE
Payer: COMMERCIAL

## 2021-10-21 PROCEDURE — 93798 PHYS/QHP OP CAR RHAB W/ECG: CPT

## 2021-10-26 ENCOUNTER — HOSPITAL ENCOUNTER (OUTPATIENT)
Dept: CARDIAC REHAB | Facility: CLINIC | Age: 86
End: 2021-10-26
Attending: INTERNAL MEDICINE
Payer: COMMERCIAL

## 2021-10-26 PROCEDURE — 93798 PHYS/QHP OP CAR RHAB W/ECG: CPT

## 2021-10-28 ENCOUNTER — HOSPITAL ENCOUNTER (OUTPATIENT)
Dept: CARDIAC REHAB | Facility: CLINIC | Age: 86
End: 2021-10-28
Attending: INTERNAL MEDICINE
Payer: COMMERCIAL

## 2021-10-28 PROCEDURE — 93798 PHYS/QHP OP CAR RHAB W/ECG: CPT

## 2021-11-01 ENCOUNTER — MYC MEDICAL ADVICE (OUTPATIENT)
Dept: CARDIOLOGY | Facility: CLINIC | Age: 86
End: 2021-11-01
Payer: COMMERCIAL

## 2021-11-02 ENCOUNTER — HOSPITAL ENCOUNTER (OUTPATIENT)
Dept: CARDIAC REHAB | Facility: CLINIC | Age: 86
End: 2021-11-02
Attending: INTERNAL MEDICINE
Payer: COMMERCIAL

## 2021-11-02 PROCEDURE — 93798 PHYS/QHP OP CAR RHAB W/ECG: CPT

## 2021-11-04 ENCOUNTER — HOSPITAL ENCOUNTER (OUTPATIENT)
Dept: CARDIAC REHAB | Facility: CLINIC | Age: 86
End: 2021-11-04
Attending: INTERNAL MEDICINE
Payer: COMMERCIAL

## 2021-11-04 PROCEDURE — 93798 PHYS/QHP OP CAR RHAB W/ECG: CPT

## 2021-11-08 ENCOUNTER — TELEPHONE (OUTPATIENT)
Dept: FAMILY MEDICINE | Facility: CLINIC | Age: 86
End: 2021-11-08

## 2021-11-08 ENCOUNTER — OFFICE VISIT (OUTPATIENT)
Dept: FAMILY MEDICINE | Facility: CLINIC | Age: 86
End: 2021-11-08
Payer: COMMERCIAL

## 2021-11-08 VITALS
BODY MASS INDEX: 26.15 KG/M2 | TEMPERATURE: 98.3 F | WEIGHT: 187.5 LBS | RESPIRATION RATE: 16 BRPM | SYSTOLIC BLOOD PRESSURE: 127 MMHG | OXYGEN SATURATION: 96 % | HEART RATE: 67 BPM | DIASTOLIC BLOOD PRESSURE: 78 MMHG

## 2021-11-08 DIAGNOSIS — R10.2 SUPRAPUBIC ABDOMINAL PAIN: Primary | ICD-10-CM

## 2021-11-08 LAB
ALBUMIN UR-MCNC: 30 MG/DL
APPEARANCE UR: CLEAR
BACTERIA #/AREA URNS HPF: ABNORMAL /HPF
BASOPHILS # BLD AUTO: 0 10E3/UL (ref 0–0.2)
BASOPHILS NFR BLD AUTO: 0 %
BILIRUB UR QL STRIP: NEGATIVE
COLOR UR AUTO: YELLOW
EOSINOPHIL # BLD AUTO: 0.2 10E3/UL (ref 0–0.7)
EOSINOPHIL NFR BLD AUTO: 2 %
ERYTHROCYTE [DISTWIDTH] IN BLOOD BY AUTOMATED COUNT: 12.9 % (ref 10–15)
GLUCOSE UR STRIP-MCNC: NEGATIVE MG/DL
HCT VFR BLD AUTO: 47.2 % (ref 40–53)
HGB BLD-MCNC: 15.5 G/DL (ref 13.3–17.7)
HGB UR QL STRIP: NEGATIVE
HYALINE CASTS #/AREA URNS LPF: ABNORMAL /LPF
KETONES UR STRIP-MCNC: NEGATIVE MG/DL
LEUKOCYTE ESTERASE UR QL STRIP: NEGATIVE
LYMPHOCYTES # BLD AUTO: 2.2 10E3/UL (ref 0.8–5.3)
LYMPHOCYTES NFR BLD AUTO: 21 %
MCH RBC QN AUTO: 30.9 PG (ref 26.5–33)
MCHC RBC AUTO-ENTMCNC: 32.8 G/DL (ref 31.5–36.5)
MCV RBC AUTO: 94 FL (ref 78–100)
MONOCYTES # BLD AUTO: 1.5 10E3/UL (ref 0–1.3)
MONOCYTES NFR BLD AUTO: 14 %
MUCOUS THREADS #/AREA URNS LPF: PRESENT /LPF
NEUTROPHILS # BLD AUTO: 6.6 10E3/UL (ref 1.6–8.3)
NEUTROPHILS NFR BLD AUTO: 63 %
NITRATE UR QL: NEGATIVE
PH UR STRIP: 6 [PH] (ref 5–7)
PLATELET # BLD AUTO: 174 10E3/UL (ref 150–450)
RBC # BLD AUTO: 5.02 10E6/UL (ref 4.4–5.9)
RBC #/AREA URNS AUTO: ABNORMAL /HPF
SP GR UR STRIP: 1.02 (ref 1–1.03)
UROBILINOGEN UR STRIP-ACNC: 1 E.U./DL
WBC # BLD AUTO: 10.4 10E3/UL (ref 4–11)
WBC #/AREA URNS AUTO: ABNORMAL /HPF

## 2021-11-08 PROCEDURE — 85025 COMPLETE CBC W/AUTO DIFF WBC: CPT | Performed by: FAMILY MEDICINE

## 2021-11-08 PROCEDURE — 83690 ASSAY OF LIPASE: CPT | Performed by: FAMILY MEDICINE

## 2021-11-08 PROCEDURE — 80053 COMPREHEN METABOLIC PANEL: CPT | Performed by: FAMILY MEDICINE

## 2021-11-08 PROCEDURE — 36415 COLL VENOUS BLD VENIPUNCTURE: CPT | Performed by: FAMILY MEDICINE

## 2021-11-08 PROCEDURE — 86140 C-REACTIVE PROTEIN: CPT | Performed by: FAMILY MEDICINE

## 2021-11-08 PROCEDURE — 99214 OFFICE O/P EST MOD 30 MIN: CPT | Performed by: FAMILY MEDICINE

## 2021-11-08 PROCEDURE — 81001 URINALYSIS AUTO W/SCOPE: CPT | Performed by: FAMILY MEDICINE

## 2021-11-08 NOTE — LETTER
November 9, 2021      Tima Mckenzie  [address] [id number]        Tima,  It was a pleasure to see you in the office recently.   Your kidney, liver and electrolyte panel and pancreas test were all normal.   The crp inflammation test was increased. I'm not sure of the cause of this (perhaps diverticulitis is back?) but I would like you to get the CT completed in the next 24 hours to get more information. Please call [facility] in [place] at [phone] to schedule the scan asa. Please let us know if there is difficulty getting this scheduled.     Your preliminary urinalysis does not show infection or blood so a kidney stone is less likely.  The blood count panel shows the white blood cell count is higher range of normal but not yet elevated.   I'll keep you posted as the remainder of the results come in. Please continue with the plan to be see in the ER urgently if the pain becomes intense again or if new symptoms start.       Resulted Orders   Lipase   Result Value Ref Range    Lipase 70 (L) 73 - 393 U/L   Comprehensive metabolic panel (BMP + Alb, Alk Phos, ALT, AST, Total. Bili, TP)   Result Value Ref Range    Sodium 137 133 - 144 mmol/L    Potassium 4.5 3.4 - 5.3 mmol/L    Chloride 105 94 - 109 mmol/L    Carbon Dioxide (CO2) 29 20 - 32 mmol/L    Anion Gap 3 3 - 14 mmol/L    Urea Nitrogen 18 7 - 30 mg/dL    Creatinine 1.07 0.66 - 1.25 mg/dL    Calcium 9.7 8.5 - 10.1 mg/dL    Glucose 94 70 - 99 mg/dL    Alkaline Phosphatase 58 40 - 150 U/L    AST 18 0 - 45 U/L    ALT 23 0 - 70 U/L    Protein Total 7.8 6.8 - 8.8 g/dL    Albumin 3.9 3.4 - 5.0 g/dL    Bilirubin Total 0.9 0.2 - 1.3 mg/dL    GFR Estimate 63 >60 mL/min/1.73m2      Comment:      As of July 11, 2021, eGFR is calculated by the CKD-EPI creatinine equation, without race adjustment. eGFR can be influenced by muscle mass, exercise, and diet. The reported eGFR is an estimation only and is only applicable if the renal function is  stable.   CRP, inflammation   Result Value Ref Range    CRP Inflammation 17.6 (H) 0.0 - 8.0 mg/L   UA Macro with Reflex to Micro and Culture - lab collect   Result Value Ref Range    Color Urine Yellow Colorless, Straw, Light Yellow, Yellow    Appearance Urine Clear Clear    Glucose Urine Negative Negative mg/dL    Bilirubin Urine Negative Negative    Ketones Urine Negative Negative mg/dL    Specific Gravity Urine 1.025 1.003 - 1.035    Blood Urine Negative Negative    pH Urine 6.0 5.0 - 7.0    Protein Albumin Urine 30  (A) Negative mg/dL    Urobilinogen Urine 1.0 0.2, 1.0 E.U./dL    Nitrite Urine Negative Negative    Leukocyte Esterase Urine Negative Negative   CBC with platelets and differential   Result Value Ref Range    WBC Count 10.4 4.0 - 11.0 10e3/uL    RBC Count 5.02 4.40 - 5.90 10e6/uL    Hemoglobin 15.5 13.3 - 17.7 g/dL    Hematocrit 47.2 40.0 - 53.0 %    MCV 94 78 - 100 fL    MCH 30.9 26.5 - 33.0 pg    MCHC 32.8 31.5 - 36.5 g/dL    RDW 12.9 10.0 - 15.0 %    Platelet Count 174 150 - 450 10e3/uL    % Neutrophils 63 %    % Lymphocytes 21 %    % Monocytes 14 %    % Eosinophils 2 %    % Basophils 0 %    Absolute Neutrophils 6.6 1.6 - 8.3 10e3/uL    Absolute Lymphocytes 2.2 0.8 - 5.3 10e3/uL    Absolute Monocytes 1.5 (H) 0.0 - 1.3 10e3/uL    Absolute Eosinophils 0.2 0.0 - 0.7 10e3/uL    Absolute Basophils 0.0 0.0 - 0.2 10e3/uL   Urine Microscopic   Result Value Ref Range    Bacteria Urine Few (A) None Seen /HPF    RBC Urine 0-2 0-2 /HPF /HPF    WBC Urine 0-5 0-5 /HPF /HPF    Mucus Urine Present (A) None Seen /LPF    Hyaline Casts Urine 0-2 (A) None Seen /LPF    Narrative    Urine Culture not indicated       If you have any questions or concerns, please call the clinic at the number listed above or send a Appear Here message.       Kind regards,      Veronica Mehta MD

## 2021-11-08 NOTE — TELEPHONE ENCOUNTER
Patient c/o mild abdominal pain x 1 week.  Pain is constant, but does have more intense pain that comes and goes  Last bowel movement - last night, normal  No pain urinating, no blood in urine. not constipated, no diarrhea.   No fever or chills  If passes gas, feels better    Advised patient needs to be seen    Appointment today with Dr. Tyson Orozco BSN, RN

## 2021-11-08 NOTE — PROGRESS NOTES
Assessment & Plan     Suprapubic abdominal pain  86-year-old male with suprapubic abdominal pain severe in the last 24 hours but now 95% better.  He still has a fairly significantly tender abdomen but is afebrile, normal white blood cell count, stable vital signs and given time of night I do not think I could scan him yet this evening without sending him to the emergency room.   Given he does not appear toxic and is moving easily through the exam room will await the remainder of the labs which I should have back early tomorrow and consider CT tomorrow if ongoing pain.  May need serial exams.  He has strict instructions he should present to the emergency room tonight if things intensify.  - CBC with platelets and differential; Future  - UA Macro with Reflex to Micro and Culture - lab collect; Future  - CRP, inflammation; Future  - Comprehensive metabolic panel (BMP + Alb, Alk Phos, ALT, AST, Total. Bili, TP); Future  - Lipase; Future  - Lipase  - Comprehensive metabolic panel (BMP + Alb, Alk Phos, ALT, AST, Total. Bili, TP)  - CRP, inflammation  - UA Macro with Reflex to Micro and Culture - lab collect  - CBC with platelets and differential  - Urine Microscopic         Patient Instructions   Go to ER if fever, severe pain or recurrent vomiting.   I'll update you on the lab results as they come in.   Update me if pain is increasing again or does not resolve.       Return in about 1 day (around 11/9/2021), or if symptoms worsen or fail to improve.    Veronica Mehta MD  [facility] KATHLEEN Barnes is a 86 year old who presents for the following health issues   History of Present Illness       He eats 0-1 servings of fruits and vegetables daily.He consumes 0 sweetened beverage(s) daily.He exercises with enough effort to increase his heart rate 10 to 19 minutes per day.  He exercises with enough effort to increase his heart rate 6 days per week.   He is taking medications  regularly.       One week hx of abd pain, worsening yesterday with spiking sharp peaks.   Since noon today it's let off and feels about 95 % better.   Last week was able to exercise normally but didn't exercise yesterday or today due to the pain.     Discomfort was suprapubic. At times pain was 10/10 with the sharp stabs and would stop him in his tracks but backgroun pain was more like 1/10.   Pain did not radiate.   No n/v. Eating normally. No fever, chills.     No diarrhea, constipation, no black stool or red stool. Bowels have been regular/not impacting the pain  No dysuria and nl colored urine. No testicular pain, no deep rectal pain    No hx of kidney stones.   Hx of prostate infxn in past. This did not feel similar.     Fairly active. Exercise daily.   In cardiac rehab since had stent this spring.   Hx of bilateral hernia repair.         Review of Systems   Constitutional, HEENT, cardiovascular, pulmonary, gi and gu systems are negative, except as otherwise noted.      Objective    /78 (BP Location: Right arm, Patient Position: Sitting)   Pulse 67   Temp 98.3  F (36.8  C)   Resp 16   Wt 85 kg (187 lb 8 oz)   SpO2 96%   BMI 26.15 kg/m    Body mass index is 26.15 kg/m .  Physical Exam   GENERAL: healthy, alert and no distress  NECK: no adenopathy, no asymmetry, masses, or scars and thyroid normal to palpation  RESP: lungs clear to auscultation - no rales, rhonchi or wheezes  CV: regular rate and rhythm, normal S1 S2, no S3 or S4, no murmur, click or rub, no peripheral edema and peripheral pulses strong  ABDOMEN: very tender suprapubic with slight guarding, Moderate mid abd discomfort  MS: no gross musculoskeletal defects noted, no edema  : Testes are nontender with no mass.  No obvious hernia  Rectal: Prostate enlarged but non-tender.     Results for orders placed or performed in visit on 11/08/21 (from the past 24 hour(s))   CBC with platelets and differential    Narrative    The following orders  were created for panel order CBC with platelets and differential.  Procedure                               Abnormality         Status                     ---------                               -----------         ------                     CBC with platelets and d...[[id number]]  Abnormal            Final result                 Please view results for these tests on the individual orders.   CBC with platelets and differential   Result Value Ref Range    WBC Count 10.4 4.0 - 11.0 10e3/uL    RBC Count 5.02 4.40 - 5.90 10e6/uL    Hemoglobin 15.5 13.3 - 17.7 g/dL    Hematocrit 47.2 40.0 - 53.0 %    MCV 94 78 - 100 fL    MCH 30.9 26.5 - 33.0 pg    MCHC 32.8 31.5 - 36.5 g/dL    RDW 12.9 10.0 - 15.0 %    Platelet Count 174 150 - 450 10e3/uL    % Neutrophils 63 %    % Lymphocytes 21 %    % Monocytes 14 %    % Eosinophils 2 %    % Basophils 0 %    Absolute Neutrophils 6.6 1.6 - 8.3 10e3/uL    Absolute Lymphocytes 2.2 0.8 - 5.3 10e3/uL    Absolute Monocytes 1.5 (H) 0.0 - 1.3 10e3/uL    Absolute Eosinophils 0.2 0.0 - 0.7 10e3/uL    Absolute Basophils 0.0 0.0 - 0.2 10e3/uL   UA Macro with Reflex to Micro and Culture - lab collect    Specimen: Urine, Clean Catch   Result Value Ref Range    Color Urine Yellow Colorless, Straw, Light Yellow, Yellow    Appearance Urine Clear Clear    Glucose Urine Negative Negative mg/dL    Bilirubin Urine Negative Negative    Ketones Urine Negative Negative mg/dL    Specific Gravity Urine 1.025 1.003 - 1.035    Blood Urine Negative Negative    pH Urine 6.0 5.0 - 7.0    Protein Albumin Urine 30  (A) Negative mg/dL    Urobilinogen Urine 1.0 0.2, 1.0 E.U./dL    Nitrite Urine Negative Negative    Leukocyte Esterase Urine Negative Negative   Urine Microscopic   Result Value Ref Range    Bacteria Urine Few (A) None Seen /HPF    RBC Urine 0-2 0-2 /HPF /HPF    WBC Urine 0-5 0-5 /HPF /HPF    Mucus Urine Present (A) None Seen /LPF    Hyaline Casts Urine 0-2 (A) None Seen /LPF    Narrative    Urine Culture  not indicated

## 2021-11-08 NOTE — PATIENT INSTRUCTIONS
Go to ER if fever, severe pain or recurrent vomiting.   I'll update you on the lab results as they come in.   Update me if pain is increasing again or does not resolve.

## 2021-11-09 ENCOUNTER — TELEPHONE (OUTPATIENT)
Dept: FAMILY MEDICINE | Facility: CLINIC | Age: 86
End: 2021-11-09

## 2021-11-09 ENCOUNTER — TELEPHONE (OUTPATIENT)
Dept: FAMILY MEDICINE | Facility: CLINIC | Age: 86
End: 2021-11-09
Payer: COMMERCIAL

## 2021-11-09 ENCOUNTER — ANCILLARY PROCEDURE (OUTPATIENT)
Dept: CT IMAGING | Facility: CLINIC | Age: 86
End: 2021-11-09
Attending: FAMILY MEDICINE
Payer: COMMERCIAL

## 2021-11-09 DIAGNOSIS — K57.32 DIVERTICULITIS OF COLON: Primary | ICD-10-CM

## 2021-11-09 DIAGNOSIS — R10.2 SUPRAPUBIC ABDOMINAL PAIN: Primary | ICD-10-CM

## 2021-11-09 DIAGNOSIS — I71.40 ABDOMINAL AORTIC ANEURYSM (AAA) WITHOUT RUPTURE (H): ICD-10-CM

## 2021-11-09 DIAGNOSIS — R10.2 SUPRAPUBIC ABDOMINAL PAIN: ICD-10-CM

## 2021-11-09 LAB
ALBUMIN SERPL-MCNC: 3.9 G/DL (ref 3.4–5)
ALP SERPL-CCNC: 58 U/L (ref 40–150)
ALT SERPL W P-5'-P-CCNC: 23 U/L (ref 0–70)
ANION GAP SERPL CALCULATED.3IONS-SCNC: 3 MMOL/L (ref 3–14)
AST SERPL W P-5'-P-CCNC: 18 U/L (ref 0–45)
BILIRUB SERPL-MCNC: 0.9 MG/DL (ref 0.2–1.3)
BUN SERPL-MCNC: 18 MG/DL (ref 7–30)
CALCIUM SERPL-MCNC: 9.7 MG/DL (ref 8.5–10.1)
CHLORIDE BLD-SCNC: 105 MMOL/L (ref 94–109)
CO2 SERPL-SCNC: 29 MMOL/L (ref 20–32)
CREAT SERPL-MCNC: 1.07 MG/DL (ref 0.66–1.25)
CRP SERPL-MCNC: 17.6 MG/L (ref 0–8)
GFR SERPL CREATININE-BSD FRML MDRD: 63 ML/MIN/1.73M2
GLUCOSE BLD-MCNC: 94 MG/DL (ref 70–99)
LIPASE SERPL-CCNC: 70 U/L (ref 73–393)
POTASSIUM BLD-SCNC: 4.5 MMOL/L (ref 3.4–5.3)
PROT SERPL-MCNC: 7.8 G/DL (ref 6.8–8.8)
RADIOLOGIST FLAGS: NORMAL
SODIUM SERPL-SCNC: 137 MMOL/L (ref 133–144)

## 2021-11-09 PROCEDURE — 74177 CT ABD & PELVIS W/CONTRAST: CPT | Performed by: RADIOLOGY

## 2021-11-09 RX ORDER — IOPAMIDOL 755 MG/ML
115 INJECTION, SOLUTION INTRAVASCULAR ONCE
Status: COMPLETED | OUTPATIENT
Start: 2021-11-09 | End: 2021-11-09

## 2021-11-09 RX ORDER — CIPROFLOXACIN 500 MG/1
500 TABLET, FILM COATED ORAL 2 TIMES DAILY
Qty: 20 TABLET | Refills: 0 | Status: SHIPPED | OUTPATIENT
Start: 2021-11-09 | End: 2021-11-19

## 2021-11-09 RX ORDER — METRONIDAZOLE 500 MG/1
500 TABLET ORAL 3 TIMES DAILY
Qty: 30 TABLET | Refills: 0 | Status: SHIPPED | OUTPATIENT
Start: 2021-11-09 | End: 2021-11-19

## 2021-11-09 RX ADMIN — IOPAMIDOL 115 ML: 755 INJECTION, SOLUTION INTRAVASCULAR at 15:53

## 2021-11-09 NOTE — RESULT ENCOUNTER NOTE
Tima,  It was a pleasure to see you in the office recently.   Your kidney, liver and electrolyte panel and pancreas test were all normal.   The crp inflammation test was increased. I'm not sure of the cause of this (perhaps diverticulitis is back?) but I would like you to get the CT completed in the next 24 hours to get more information.   Please call [facility] in [place] at [phone] to schedule the scan asap. Please let us know if there is difficulty getting this scheduled.   Please MyChart or call if you have any concerns or questions.   Sincerely,  Veronica Mehta MD

## 2021-11-09 NOTE — TELEPHONE ENCOUNTER
Called patient with CT scan results. Acute diverticulitis found.   rec liquid diet for few days until pain abates  abx- reviewed risks with cipro (tendon issues and confusion) and to stop med and update us if any concerns.   F/u with pcp next week   Be seen sooner/emergently if worsening pain/sx's.     Also incidentally found was aortic aneurysm. Reviewed dx and monitoring with patient. Based on size due for next check with us in 1 year. Patient plans to review with his cardiologist at upcoming visit this week too.     Patient voiced understanding and has his wife listen to the conversation also. All questions answered.

## 2021-11-09 NOTE — TELEPHONE ENCOUNTER
Called patient to get update on his sx's.   He reports no worsening of the pain and continues to have just very low grade discomfort. Does not affect ability to eat/stool/void    Will update him via Hubs[employer] when remainder of results are in.   Likely still need ct abd if persisting sx's  Again rec if acutely worsening to seek more emergent care. He agrees with plan

## 2021-11-09 NOTE — TELEPHONE ENCOUNTER
cipro 250 and 500mg are on back order and not available.  Needing alternate prescription sent to his pharmacy.  Dr. nichols sent this to pharmacy today for acute diverticulitis.  Sandi Arredondo RN

## 2021-11-09 NOTE — TELEPHONE ENCOUNTER
Would rec alternative regimin    Stop cipro/metronidazole and start augmentin twice daily x 10 days.   New rx sent in

## 2021-11-10 NOTE — TELEPHONE ENCOUNTER
Called and spoke to Cameron. Updated him on the medication change due to back order of the original medication sent. Cameron is in agreement of this change.     Homa Navarrete RN

## 2021-11-11 ENCOUNTER — OFFICE VISIT (OUTPATIENT)
Dept: CARDIOLOGY | Facility: CLINIC | Age: 86
End: 2021-11-11
Payer: COMMERCIAL

## 2021-11-11 ENCOUNTER — HOSPITAL ENCOUNTER (OUTPATIENT)
Dept: CARDIAC REHAB | Facility: CLINIC | Age: 86
End: 2021-11-11
Attending: INTERNAL MEDICINE
Payer: COMMERCIAL

## 2021-11-11 VITALS
BODY MASS INDEX: 26.9 KG/M2 | HEIGHT: 70 IN | WEIGHT: 187.9 LBS | SYSTOLIC BLOOD PRESSURE: 124 MMHG | DIASTOLIC BLOOD PRESSURE: 69 MMHG | HEART RATE: 66 BPM | OXYGEN SATURATION: 97 %

## 2021-11-11 DIAGNOSIS — I35.0 NONRHEUMATIC AORTIC VALVE STENOSIS: Primary | ICD-10-CM

## 2021-11-11 DIAGNOSIS — I10 ESSENTIAL HYPERTENSION WITH GOAL BLOOD PRESSURE LESS THAN 130/80: ICD-10-CM

## 2021-11-11 DIAGNOSIS — I71.40 ABDOMINAL AORTIC ANEURYSM (AAA) WITHOUT RUPTURE (H): ICD-10-CM

## 2021-11-11 DIAGNOSIS — I25.810 CORONARY ARTERY DISEASE INVOLVING CORONARY BYPASS GRAFT OF NATIVE HEART WITHOUT ANGINA PECTORIS: ICD-10-CM

## 2021-11-11 PROCEDURE — 99214 OFFICE O/P EST MOD 30 MIN: CPT | Performed by: INTERNAL MEDICINE

## 2021-11-11 PROCEDURE — 93798 PHYS/QHP OP CAR RHAB W/ECG: CPT

## 2021-11-11 ASSESSMENT — PAIN SCALES - GENERAL: PAINLEVEL: NO PAIN (0)

## 2021-11-11 ASSESSMENT — MIFFLIN-ST. JEOR: SCORE: 1556.05

## 2021-11-11 NOTE — PROGRESS NOTES
"Tima Mckenzie's goals for this visit include: When is the heart valve going to be replaced? He wants to talk about that.     He requests these members of his care team be copied on today's visit information: PCP    PCP: Julieta Gandhi    Referring Provider:  No referring provider defined for this encounter.    /69 (BP Location: Left arm, Patient Position: Sitting, Cuff Size: Adult Regular)   Pulse 66   Ht 1.79 m (5' 10.47\")   Wt 85.2 kg (187 lb 14.4 oz)   SpO2 97%   BMI 26.60 kg/m      Do you need any medication refills at today's visit? No.    Luis Enrique De Souza, EMT  Clinic Support  [facility]    [phone]    Employed by [facility]        "

## 2021-11-11 NOTE — PATIENT INSTRUCTIONS
You were seen at the [facility] clinic today.  You saw Dr. Artis Pakrer  Here are your Instructions:    1. Follow up with Dr. Parker in 6 months with echocardiogram.

## 2021-11-11 NOTE — PROGRESS NOTES
Chief complaint: aortic stenosis, CAD    HPI:   Tima Mckenzie is a 86 year old male with history of CAD s/p 3vCABG (LIMA-LAD SVG-OM1-?) 1997 who presents for follow up of aortic stenosis and CAD.    Cardiac history:  He has history of a 3vCABG in [place], [place] in 1997 ([facility].) Operative report is not available (we attempted to obtain op report and angiogram images but were informed that all records from that time have been destroyed), but our EMR and records from [facility] report LIMA-LAD and SVG-OM; Mr. Mckenzie reports that his SVG bypassed 2 vessels (possibly a jump graft.) He had an angiogram 4/2003 (just before moving to [place]) at [facility] for abnormal stress test which repotedly showed a single diseased vessel (unclear which) for which he was prescribed Imdur. We attempted to obtain these images and were told that they had been destroyed.   He had a single episode of chest pain shortly after moving to [place] in 2003 and was admittted to [facility], where he had a stress test which was normal.      He established care with me 8/1/19. At that time, he was at a primary care visit and was noted to have a systolic murmur prompting TTE which showed moderate aortic stenosis, prompting referral for further management. He reported new onset RAE that had been progressive at his initial visit.     He underwent coronary angiogram which showed widely patent LIMA-LAD and 100% proximal occlusion of his single SVG (likely a touchdown graft to OM and RCA system); he underwent PCI to native rPDA, D1, and RCA.     3/5/2020:  Since his PCI, he feels his dyspnea is dramatically improved. He has been playing tennis without difficulty.     5/21/20:  He had been sedentary since his angiogram and went for a walk last week and felt dyspneic. This was initially concerning to him, but since that time he has been doing a 15-minute exercise program from [employer] and walking for 25 minutes without chest  discomfort or dyspnea. He does still have exertional dyspnea walking at a brisk pace or with vigorous yardwork. He feels this is gradually improving as he increases his activity.     2/25/21:  He reports exertional dyspnea walking up 1/2 flight of stairs and he has to rest after walking up 1 flight of stairs; he feels that this is very slightly worse compared with his last visit. He does light calisthenics but no cardio; this makes him slightly dyspneic also. He does admit that he has been significantly more sedentary due to the COVID-19 pandemic and is unsure how much of his reduction in exertional capacity may be related to this.    Imdur was increased to 60 mg daily at this time.     05/27/21:  Since his last visit, he reports feeling generally worse. Last fall, he could walk around the block for 1.5 miles without difficulty. He now has chest tightness and dyspnea walking <1 block. He also has chest tightness and dyspnea with <1 flight of stairs. He is not sure whether increasing Imdur improved his symptoms. He denies exertional lightheadedness.     11/11/21  Since his last visit, he underwent coronary angiogram 6/28/21 which showed new severe pRCA lesion for which he underwent PCI+BYRON. TTE was done at that time and showed stably moderate aortic stenosis.  He has been participating in cardiac rehab and his exertional capacity has been gradually improving. He does still report some modest residual exertional dyspnea. Chest tightness has resolved. He is able to to walk 1.5 miles daily without difficulty. No exertional lightheadedness or syncope.     He unfortunately was recently diagnosed with diverticulitis (CT 11/9/21 reviewed) and was also noted to have 3.7 cm AAA at that time.      He denies any palpitations, PND, orthopnea, peripheral edema, lightheadedness, or syncope.       PAST MEDICAL HISTORY:  Past Medical History:   Diagnosis Date     Arthritis      BPH (benign prostatic hypertrophy)      Coronary artery  disease      Diverticulosis      Hypercholesterolemia      Hypertension      MI (myocardial infarction) (H) 3/1997    Anterior Wall MI/LAD/OM1/SVG    CABG X 3     Neuropathy     RT Foot     Seasonal allergic rhinitis 7/1/2013       CURRENT MEDICATIONS:  Current Outpatient Medications   Medication Sig Dispense Refill     amoxicillin-clavulanate (AUGMENTIN) 875-125 MG tablet Take 1 tablet by mouth 2 times daily for 10 days 20 tablet 0     aspirin 81 MG EC tablet Take 81 mg by mouth       ciprofloxacin (CIPRO) 500 MG tablet Take 1 tablet (500 mg) by mouth 2 times daily for 10 days 20 tablet 0     isosorbide mononitrate (IMDUR) 60 MG 24 hr tablet Take 1 tablet (60 mg) by mouth every morning 30 tablet 11     MAGNESIUM PO Take by mouth daily       metoprolol succinate ER (TOPROL-XL) 25 MG 24 hr tablet Take 1 tablet (25 mg) by mouth daily 90 tablet 3     metroNIDAZOLE (FLAGYL) 500 MG tablet Take 1 tablet (500 mg) by mouth 3 times daily for 10 days 30 tablet 0     mometasone (NASONEX) 50 MCG/ACT nasal spray Spray 2 sprays into both nostrils as needed (nasal congestion)       multivitamin, therapeutic (THERA-VIT) TABS tablet Take 1 tablet by mouth daily       nitroGLYcerin (NITROSTAT) 0.4 MG sublingual tablet Place 1 tablet (0.4 mg) under the tongue every 5 minutes as needed for chest pain up to 3 tablets per episode. 25 tablet 3     rosuvastatin (CRESTOR) 40 MG tablet Take 1 tablet (40 mg) by mouth daily 90 tablet 3     ticagrelor (BRILINTA) 90 MG tablet Take 1 tablet (90 mg) by mouth 2 times daily Start this evening. 180 tablet 3       PAST SURGICAL HISTORY:  Past Surgical History:   Procedure Laterality Date     ANGIOGRAM  4/30/2003    No Disease, [place],LAD,SVG,OM1- small LT Main, Occlusion of CX- D1 50-70% Stenosis, RCA 30-80%     COLONOSCOPY       COLONOSCOPY N/A 10/14/2014    Procedure: COMBINED COLONOSCOPY, SINGLE BIOPSY/POLYPECTOMY BY BIOPSY;  Surgeon: Konstantin Coates MD;  Location: [facility] OR     COLONOSCOPY WITH  "CO2 INSUFFLATION N/A 10/14/2014    Procedure: COLONOSCOPY WITH CO2 INSUFFLATION;  Surgeon: Konstantin Coates MD;  Location: [facility] OR     CORONARY ARTERY BYPASS  1997    X 3     CV CORONARY ANGIOGRAM N/A 1/20/2020    Procedure: CV CORONARY ANGIOGRAM;  Surgeon: Magdiel Hernandez MD;  Location: [facility] CARDIAC CATH LAB     CV CORONARY ANGIOGRAM N/A 6/28/2021    Procedure: CV CORONARY ANGIOGRAM;  Surgeon: Ravi Albright MD;  Location: [facility] CARDIAC CATH LAB     CV FRACTIONAL FLOW RATIO WIRE N/A 1/20/2020    Procedure: Fractional Flow Reserve;  Surgeon: Magdiel Hernandez MD;  Location: [facility] CARDIAC CATH LAB     CV INTRAVASULAR ULTRASOUND N/A 6/28/2021    Procedure: Intravascular Ultrasound;  Surgeon: Ravi Albright MD;  Location: [facility] CARDIAC CATH LAB     CV PCI STENT DRUG ELUTING N/A 1/20/2020    Procedure: Percutaneous Coronary Intervention Stent Drug Eluting;  Surgeon: Magdiel Hernandez MD;  Location: [facility] CARDIAC CATH LAB     CV PCI STENT DRUG ELUTING N/A 6/28/2021    Procedure: Percutaneous Coronary Intervention Stent Drug Eluting;  Surgeon: Ravi Albright MD;  Location: [facility] CARDIAC CATH LAB     HERNIA REPAIR  2014    [facility]       ALLERGIES:     Allergies   Allergen Reactions     No Known Drug Allergy      Seasonal Allergies        FAMILY HISTORY:  Brother with fatal MI at 55.  No family history of premature CAD.    SOCIAL HISTORY:  Social History     Tobacco Use     Smoking status: Former Smoker     Smokeless tobacco: Never Used     Tobacco comment: 1997   Vaping Use     Vaping Use: Never used   Substance Use Topics     Alcohol use: Yes     Comment: rarely      Drug use: No       ROS:   A comprehensive 14 point review of systems is negative other than as mentioned in HPI.    Exam:  /69 (BP Location: Left arm, Patient Position: Sitting, Cuff Size: Adult Regular)   Pulse 66   Ht 1.79 m (5' 10.47\")   Wt 85.2 kg (187 lb 14.4 " oz)   SpO2 97%   BMI 26.60 kg/m    GENERAL APPEARANCE: healthy, alert and no distress  EYES: no icterus, no xanthelasmas  ENT: normal palate, mucosa moist, no central cyanosis  NECK: JVP not elevated  RESPIRATORY: lungs clear to auscultation - no rales, rhonchi or wheezes, no use of accessory muscles, no retractions, respirations are unlabored, normal respiratory rate  CARDIOVASCULAR: regular rhythm, +III/VI systolic murmur RUSB/LUSB, no S3 or S4 and no murmur, click or rub.  GI: soft, non tender, bowel sounds normal,no abdominal bruits  EXTREMITIES: no edema, no bruits  NEURO: alert and oriented to person/place/time, normal speech, gait and affect  VASC: Radial, dorsalis pedis and posterior tibialis pulses 2+ bilaterally.  SKIN: no ecchymoses, no rashes.  PSYCH: cooperative, affect appropriate.       Labs:  Reviewed.     Testing/Procedures:  TTE 7/30/19:   LVEF=55-60%. Basal inferolateral hypokinesis.  RV size/wall motion/thickness normal.  Severely calcified trileaflet aortic valve with moderate aortic stenosis (PV 3.2 m/s MG 22 mmHG DVI 0.26 JESS 1.27 cm2 SVI 51 mL/m2)  Mild MR.  Mild-mod LA enlargment. Mild RA enlargement.   RVSP 31+RA pressure.     TTE 12/31/19:   Normal LV/RV size/function/thickness, LVEF=60-65%. Probably moderate aortic stenosis (peak velocity 2.8 m/s, mean gradient 20 mmHg, DVI 0.30, JESS 1.5 cm2, SVI 55 mL/m2.)   Compared with 7/2019 TTE, aortic valve gradients are slightly lower (previously PV 3.2 m/s MG 22 mmHg DVI 0.26 JESS 1.26 SVI 51 mL/m2), however aortic stenosis is probably unchanged in severity    Coronary Angiogram 1/20/20  Obstructive coronary artery disease   Vein graft closed, LIMA patent   S/p PCI of pRCA, rPDA, D1 with drug eluting stents    Conclusion   1st Diag lesion is 80% stenosed.  Prox RCA lesion is 70% stenosed.  Mid RCA lesion is 30% stenosed.  RPDA lesion is 80% stenosed.  Mid LAD lesion is 100% stenosed.  Ost Cx to Prox Cx lesion is 99% stenosed.    TTE 2/25/21:    Normal LV/RV size/function/thickness, LVEF=55-60%  Moderate aortic stenosis (PV 3.1 m/s mean gradient 21 mmHg DVI 0.25 JESS 1.3 cm2, SVI 49 mL/m2)  Compared with 3/5/20: There has been no significant change. Specifically, aortic stenosis is unchanged in severity and remains moderate.     I personally visualized and interpreted:  Coronary angiogram/PCI 6/28/21:  % mid   -D1 stent patent  % prox   -OM1 fills by collaterals from D1  RCA 80% proximal to previously-placed stent, widely patent pRCA stent  LIMA-LAD: widely patent  SVG-OM: 100% occluded  Interventions: PCI+DESx1 to pRCA    TTE 6/28/21: moderate aortic stenosis, PV 3.2 m/s MG 20 mmHg . No significant change from prior from 2/25/21    Outside results of note:  Outside records from [facility]  were obtained, and relevant results/notes have been incorporated into HPI.    Assessment and Plan:   1. Moderate trileaflet aortic stenosis:  2. Coronary artery disease status post three-vessel coronary bypass graft (LIMA-LAD SVG-OM-D1) s/p PCI w/ BYRON to pRCA/rPDA/D1 1/20/20 and PCI+DSE to RCA 6/28/21  3. Exertional dyspnea, persistent and worsening  Now with stably improved exertional capacity and with resolution of anginal symptoms.   Note that initially after PCI, he had been free of exertional dyspnea, suggesting strongly that his exertional dyspnea was an anginal equivalent. He also had chest tightness prior to most recent PCI.   He should continue ASA 81 mg, rosuvastatin 40 mg, Toprol XL 25 mg daily, and Imdur 60 mg daily.  Given complexity of PCI, would plan for extended DAPT in the absence of bleeding-- continue ticagrelor 90 mg BID through 6/28/2022 (minimally through 6/28/21)  Repeat TTE in 6 months (12 months from last prior) to reassess aortic stenosis.    3. Hypertension, controlled: continue present management.  4. Hyperlipidemia, controlled: continue present management.   5. AAA 3.7 on CT abdomen, newly diagnosed: US aorta in 12 months.    6. Venous insufficiency: continue compression hose and counseled regarding elevation    Follow up in 6 months with echocardiogram prior.    The patient states understanding and is agreeable with plan.     Artis Parker MD  Cardiology

## 2021-11-16 ENCOUNTER — HOSPITAL ENCOUNTER (OUTPATIENT)
Dept: CARDIAC REHAB | Facility: CLINIC | Age: 86
End: 2021-11-16
Attending: INTERNAL MEDICINE
Payer: COMMERCIAL

## 2021-11-16 PROCEDURE — 93798 PHYS/QHP OP CAR RHAB W/ECG: CPT

## 2021-11-18 ENCOUNTER — HOSPITAL ENCOUNTER (OUTPATIENT)
Dept: CARDIAC REHAB | Facility: CLINIC | Age: 86
End: 2021-11-18
Attending: INTERNAL MEDICINE
Payer: COMMERCIAL

## 2021-11-18 PROCEDURE — 93798 PHYS/QHP OP CAR RHAB W/ECG: CPT

## 2021-11-23 ENCOUNTER — HOSPITAL ENCOUNTER (OUTPATIENT)
Dept: CARDIAC REHAB | Facility: CLINIC | Age: 86
End: 2021-11-23
Attending: INTERNAL MEDICINE
Payer: COMMERCIAL

## 2021-11-23 PROCEDURE — 93798 PHYS/QHP OP CAR RHAB W/ECG: CPT

## 2021-11-24 ENCOUNTER — HOSPITAL ENCOUNTER (OUTPATIENT)
Dept: CARDIAC REHAB | Facility: CLINIC | Age: 86
End: 2021-11-24
Attending: INTERNAL MEDICINE
Payer: COMMERCIAL

## 2021-11-24 PROCEDURE — 93798 PHYS/QHP OP CAR RHAB W/ECG: CPT

## 2022-03-03 ENCOUNTER — NURSE TRIAGE (OUTPATIENT)
Dept: FAMILY MEDICINE | Facility: CLINIC | Age: 87
End: 2022-03-03
Payer: COMMERCIAL

## 2022-03-03 NOTE — TELEPHONE ENCOUNTER
Called Pt to triage due to my chart message.     Pt reports very low, mild pain in his lower left abdomen that has been going on for about a week. Pt has history of diverticulitis and recently had an abx for this.   Pt reports that this is very mild and pain has not gone above a 2/10.  Pt denies vomiting, diarrhea, constipation, fever.   No chest pain.   No blood in the stool, Pt reports he is monitoring for this.   No changes in urination.    Pt would like to get advisement from Dr. Gandhi and possibly come in to see him in the next week or so.     Pt verbalized good understanding of symptoms that would warrant urgent evaluation in ER.    Pt does not feel this is an emergent situation as his discomfort is very minimal - will route to provider to review and advise if same day appointment next week is appropriate.     Carol Jain RN, BSN  [facility] [place]      Reason for Disposition    Age > 60 years    Additional Information    Negative: Passed out (i.e., fainted, collapsed and was not responding)    Negative: Shock suspected (e.g., cold/pale/clammy skin, too weak to stand, low BP, rapid pulse)    Negative: Sounds like a life-threatening emergency to the triager    Negative: Chest pain    Negative: Pain is mainly in upper abdomen (if needed ask: 'is it mainly above the belly button?')    Negative: SEVERE abdominal pain (e.g., excruciating)    Negative: Vomiting red blood or black (coffee ground) material    Negative: Bloody, black, or tarry bowel movements (Exception: chronic-unchanged black-grey bowel movements and is taking iron pills or Pepto-bismol)    Negative: Unable to urinate (or only a few drops) and bladder feels very full    Negative: Pain in scrotum persists > 1 hour    Negative: Constant abdominal pain lasting > 2 hours    Negative: Vomiting bile (green color)    Negative: Patient sounds very sick or weak to the triager    Negative: Vomiting and abdomen looks much more swollen than  "usual    Negative: White of the eyes have turned yellow (i.e., jaundice)    Negative: Blood in urine (red, pink, or tea-colored)    Negative: Fever > 103 F (39.4 C)    Negative: Fever > 101 F (38.3 C) and over 60 years of age    Negative: Fever > 100.0 F (37.8 C) and has diabetes mellitus or a weak immune system (e.g., HIV positive, cancer chemotherapy, organ transplant, splenectomy, chronic steroids)    Negative: Fever > 100.0 F (37.8 C) and bedridden (e.g., nursing home patient, stroke, chronic illness, recovering from surgery)    Negative: MILD pain that comes and goes (cramps) lasts > 24 hours    Answer Assessment - Initial Assessment Questions  1. LOCATION: \"Where does it hurt?\"       Lower left abdomen  2. RADIATION: \"Does the pain shoot anywhere else?\" (e.g., chest, back)      No.   3. ONSET: \"When did the pain begin?\" (Minutes, hours or days ago)       About a week.   4. SUDDEN: \"Gradual or sudden onset?\"      Gradual return.   5. PATTERN \"Does the pain come and go, or is it constant?\"     - If constant: \"Is it getting better, staying the same, or worsening?\"       (Note: Constant means the pain never goes away completely; most serious pain is constant and it progresses)      - If intermittent: \"How long does it last?\" \"Do you have pain now?\"      (Note: Intermittent means the pain goes away completely between bouts)      *No Answer*  6. SEVERITY: \"How bad is the pain?\"  (e.g., Scale 1-10; mild, moderate, or severe)     - MILD (1-3): doesn't interfere with normal activities, abdomen soft and not tender to touch      - MODERATE (4-7): interferes with normal activities or awakens from sleep, tender to touch      - SEVERE (8-10): excruciating pain, doubled over, unable to do any normal activities        At the worst it is a 2/10.  7. RECURRENT SYMPTOM: \"Have you ever had this type of abdominal pain before?\" If so, ask: \"When was the last time?\" and \"What happened that time?\"       *No Answer*  8. CAUSE: \"What " "do you think is causing the abdominal pain?\"      *No Answer*  9. RELIEVING/AGGRAVATING FACTORS: \"What makes it better or worse?\" (e.g., movement, antacids, bowel movement)      No medications tried.   10. OTHER SYMPTOMS: \"Has there been any vomiting, diarrhea, constipation, or urine problems?\"        No vomiting or constipation.         Denies fever.        No issues urinating.    Protocols used: ABDOMINAL PAIN - MALE-A-OH      "

## 2022-03-13 ENCOUNTER — HEALTH MAINTENANCE LETTER (OUTPATIENT)
Age: 87
End: 2022-03-13

## 2022-03-22 DIAGNOSIS — I25.708 CORONARY ARTERY DISEASE OF BYPASS GRAFT OF NATIVE HEART WITH STABLE ANGINA PECTORIS (H): ICD-10-CM

## 2022-03-23 NOTE — TELEPHONE ENCOUNTER
Call to pharmacy after receiving refill request, message left of prescription sent 6/28/21 to [facility] discharge pharmacy [id number] with 3 refills.  If transferred prescription from that pharmacy, shouldn't need additional refills for 3 months, or can transfer from [place] pharmacy.  Refill declined at this time

## 2022-03-25 ENCOUNTER — MYC MEDICAL ADVICE (OUTPATIENT)
Dept: CARDIOLOGY | Facility: CLINIC | Age: 87
End: 2022-03-25
Payer: COMMERCIAL

## 2022-03-25 DIAGNOSIS — I25.708 CORONARY ARTERY DISEASE OF BYPASS GRAFT OF NATIVE HEART WITH STABLE ANGINA PECTORIS (H): ICD-10-CM

## 2022-03-25 NOTE — TELEPHONE ENCOUNTER
"Requested Prescriptions   Pending Prescriptions Disp Refills     ticagrelor (BRILINTA) 90 MG tablet 180 tablet 3     Sig: Take 1 tablet (90 mg) by mouth 2 times daily Start this evening.       Platelet Inhibitors Passed - 3/25/2022 10:40 AM        Passed - Normal ALT on file in past 12 months     Recent Labs   Lab Test 11/08/21  1711   ALT 23             Passed - Normal HGB on file in past 12 months     Recent Labs   Lab Test 11/08/21  1711   HGB 15.5               Passed - Normal AST on file in past 12 months     Recent Labs   Lab Test 11/08/21  1711   AST 18             Passed - Normal Platelets on file in past 12 months     Recent Labs   Lab Test 11/08/21  1711                  Passed - Recent (12 mo) or future (30 days) visit within the authorizing provider's specialty     Patient has had an office visit with the authorizing provider or a provider within the authorizing providers department within the previous 12 mos or has a future within next 30 days. See \"Patient Info\" tab in inbasket, or \"Choose Columns\" in Meds & Orders section of the refill encounter.              Passed - Medication is active on med list        Passed - Patient is age 18 or older        Passed - Normal serum creatinine on file in past 12 months     Recent Labs   Lab Test 11/08/21  1711   CR 1.07       Ok to refill medication if creatinine is low             Refill protocol passed    Marti Roger RN  Medical Speciality Care Coordinator  [facility]  Phone: [phone]    "

## 2022-04-12 DIAGNOSIS — I10 ESSENTIAL HYPERTENSION WITH GOAL BLOOD PRESSURE LESS THAN 130/80: ICD-10-CM

## 2022-04-12 RX ORDER — ISOSORBIDE MONONITRATE 60 MG/1
60 TABLET, EXTENDED RELEASE ORAL EVERY MORNING
Qty: 90 TABLET | Refills: 2 | Status: SHIPPED | OUTPATIENT
Start: 2022-04-12 | End: 2023-01-04

## 2022-06-23 ASSESSMENT — ENCOUNTER SYMPTOMS
EYE PAIN: 0
FEVER: 0
CONSTIPATION: 0
WEAKNESS: 0
DIZZINESS: 0
NERVOUS/ANXIOUS: 0
CHILLS: 0
COUGH: 0
SHORTNESS OF BREATH: 1
PALPITATIONS: 0
HEMATOCHEZIA: 0
JOINT SWELLING: 0
DYSURIA: 0
MYALGIAS: 0
ARTHRALGIAS: 0
NAUSEA: 0
DIARRHEA: 0
SORE THROAT: 0
FREQUENCY: 0
PARESTHESIAS: 0
ABDOMINAL PAIN: 0
HEMATURIA: 0
HEARTBURN: 0
HEADACHES: 0

## 2022-06-23 ASSESSMENT — ACTIVITIES OF DAILY LIVING (ADL): CURRENT_FUNCTION: NO ASSISTANCE NEEDED

## 2022-06-27 ENCOUNTER — OFFICE VISIT (OUTPATIENT)
Dept: FAMILY MEDICINE | Facility: CLINIC | Age: 87
End: 2022-06-27
Payer: COMMERCIAL

## 2022-06-27 VITALS
BODY MASS INDEX: 26.05 KG/M2 | OXYGEN SATURATION: 97 % | HEART RATE: 55 BPM | DIASTOLIC BLOOD PRESSURE: 70 MMHG | SYSTOLIC BLOOD PRESSURE: 124 MMHG | RESPIRATION RATE: 20 BRPM | TEMPERATURE: 97.3 F | WEIGHT: 182 LBS | HEIGHT: 70 IN

## 2022-06-27 DIAGNOSIS — E78.5 HYPERLIPIDEMIA LDL GOAL <100: ICD-10-CM

## 2022-06-27 DIAGNOSIS — R09.81 NASAL CONGESTION: ICD-10-CM

## 2022-06-27 DIAGNOSIS — I10 ESSENTIAL HYPERTENSION WITH GOAL BLOOD PRESSURE LESS THAN 130/80: ICD-10-CM

## 2022-06-27 DIAGNOSIS — Z00.00 ENCOUNTER FOR MEDICARE ANNUAL WELLNESS EXAM: ICD-10-CM

## 2022-06-27 DIAGNOSIS — Z00.00 PREVENTATIVE HEALTH CARE: Primary | ICD-10-CM

## 2022-06-27 DIAGNOSIS — I25.708 CORONARY ARTERY DISEASE OF BYPASS GRAFT OF NATIVE HEART WITH STABLE ANGINA PECTORIS (H): ICD-10-CM

## 2022-06-27 LAB
ALBUMIN SERPL-MCNC: 4 G/DL (ref 3.4–5)
ALP SERPL-CCNC: 56 U/L (ref 40–150)
ALT SERPL W P-5'-P-CCNC: 31 U/L (ref 0–70)
ANION GAP SERPL CALCULATED.3IONS-SCNC: 4 MMOL/L (ref 3–14)
AST SERPL W P-5'-P-CCNC: 28 U/L (ref 0–45)
BILIRUB SERPL-MCNC: 0.6 MG/DL (ref 0.2–1.3)
BUN SERPL-MCNC: 16 MG/DL (ref 7–30)
CALCIUM SERPL-MCNC: 9.6 MG/DL (ref 8.5–10.1)
CHLORIDE BLD-SCNC: 107 MMOL/L (ref 94–109)
CHOLEST SERPL-MCNC: 132 MG/DL
CO2 SERPL-SCNC: 28 MMOL/L (ref 20–32)
CREAT SERPL-MCNC: 1.16 MG/DL (ref 0.66–1.25)
FASTING STATUS PATIENT QL REPORTED: YES
GFR SERPL CREATININE-BSD FRML MDRD: 62 ML/MIN/1.73M2
GLUCOSE BLD-MCNC: 92 MG/DL (ref 70–99)
HDLC SERPL-MCNC: 46 MG/DL
LDLC SERPL CALC-MCNC: 64 MG/DL
NONHDLC SERPL-MCNC: 86 MG/DL
POTASSIUM BLD-SCNC: 4.3 MMOL/L (ref 3.4–5.3)
PROT SERPL-MCNC: 7.6 G/DL (ref 6.8–8.8)
SODIUM SERPL-SCNC: 139 MMOL/L (ref 133–144)
TRIGL SERPL-MCNC: 109 MG/DL

## 2022-06-27 PROCEDURE — 80053 COMPREHEN METABOLIC PANEL: CPT | Performed by: NURSE PRACTITIONER

## 2022-06-27 PROCEDURE — 36415 COLL VENOUS BLD VENIPUNCTURE: CPT | Performed by: NURSE PRACTITIONER

## 2022-06-27 PROCEDURE — G0439 PPPS, SUBSEQ VISIT: HCPCS | Performed by: NURSE PRACTITIONER

## 2022-06-27 PROCEDURE — 80061 LIPID PANEL: CPT | Performed by: NURSE PRACTITIONER

## 2022-06-27 RX ORDER — METOPROLOL SUCCINATE 25 MG/1
25 TABLET, EXTENDED RELEASE ORAL DAILY
Qty: 90 TABLET | Refills: 3 | Status: SHIPPED | OUTPATIENT
Start: 2022-06-27 | End: 2022-08-18

## 2022-06-27 RX ORDER — ROSUVASTATIN CALCIUM 40 MG/1
40 TABLET, COATED ORAL DAILY
Qty: 90 TABLET | Refills: 3 | Status: SHIPPED | OUTPATIENT
Start: 2022-06-27 | End: 2023-06-19

## 2022-06-27 RX ORDER — MOMETASONE FUROATE MONOHYDRATE 50 UG/1
2 SPRAY, METERED NASAL PRN
Qty: 17 G | Refills: 1 | Status: SHIPPED | OUTPATIENT
Start: 2022-06-27 | End: 2023-06-28

## 2022-06-27 ASSESSMENT — ENCOUNTER SYMPTOMS
SHORTNESS OF BREATH: 1
COUGH: 0
CHILLS: 0
FREQUENCY: 0
JOINT SWELLING: 0
MYALGIAS: 0
SORE THROAT: 0
WEAKNESS: 0
HEADACHES: 0
NAUSEA: 0
CONSTIPATION: 0
PARESTHESIAS: 0
HEMATOCHEZIA: 0
HEMATURIA: 0
FEVER: 0
DIZZINESS: 0
PALPITATIONS: 0
DYSURIA: 0
ABDOMINAL PAIN: 0
ARTHRALGIAS: 0
HEARTBURN: 0
DIARRHEA: 0
NERVOUS/ANXIOUS: 0
EYE PAIN: 0

## 2022-06-27 ASSESSMENT — PAIN SCALES - GENERAL: PAINLEVEL: NO PAIN (0)

## 2022-06-27 ASSESSMENT — ACTIVITIES OF DAILY LIVING (ADL): CURRENT_FUNCTION: NO ASSISTANCE NEEDED

## 2022-06-27 NOTE — PROGRESS NOTES
"SUBJECTIVE:   Tima Mckenzie is a 87 year old male who presents for Preventive Visit.      Patient has been advised of split billing requirements and indicates understanding: No  Are you in the first 12 months of your Medicare coverage?  No    Healthy Habits:     In general, how would you rate your overall health?  Good    Frequency of exercise:  6-7 days/week    Duration of exercise:  30-45 minutes    Do you usually eat at least 4 servings of fruit and vegetables a day, include whole grains    & fiber and avoid regularly eating high fat or \"junk\" foods?  No    Ability to successfully perform activities of daily living:  No assistance needed    Home Safety:  No safety concerns identified    Hearing Impairment:  No hearing concerns    In the past 6 months, have you been bothered by leaking of urine?  No    In general, how would you rate your overall mental or emotional health?  Good      PHQ-2 Total Score: 0    Additional concerns today:  No    Do you feel safe in your environment? Yes    Have you ever done Advance Care Planning? (For example, a Health Directive, POLST, or a discussion with a medical provider or your loved ones about your wishes): Yes, patient states has an Advance Care Planning document and will bring a copy to the clinic.    Follows with cardiology, last saw them in November 2021. Hx of CABG in 1997, angiogram in 2003. In 2019 had PCI due to 100 occlution of single SVG.  Had angiogram 6/28/2021, showed severe pRCA lesion nad underwent PCI+BYRON. TTE done and showed moderate aortic stenosis.    From 1-2 years ago, breathing with exertion is slowly worsening. Walking daily though.     Has recent diverticulitis dx and noted to have 3.7 AAA at that time. No blood/black in stool. That infection has all cleared.     Got COVID in April 2022 while on a cruise, got moved to a room where they were isolated. But had a balcony and got room service.    Normal aches at times.       Fall risk  Fallen 2 or more " times in the past year?: No  Any fall with injury in the past year?: No    Cognitive Screening   1) Repeat 3 items (Leader, Season, Table)    2) Clock draw: NORMAL  3) 3 item recall: Recalls 1 object   Results: NORMAL clock, 1-2 items recalled: COGNITIVE IMPAIRMENT LESS LIKELY    Mini-CogTM Copyright MICHELLE Juarez. Licensed by the author for use in [facility]; reprinted with permission (chioma@[email or web address]). All rights reserved.      Do you have sleep apnea, excessive snoring or daytime drowsiness?: no    Reviewed and updated as needed this visit by clinical staff   Tobacco  Allergies  Meds   Med Hx  Surg Hx  Fam Hx  Soc Hx          Reviewed and updated as needed this visit by Provider        Surg Hx  Fam Hx           Social History     Tobacco Use     Smoking status: Former Smoker     Smokeless tobacco: Never Used     Tobacco comment: 1997   Substance Use Topics     Alcohol use: Yes     Comment: rarely      If you drink alcohol do you typically have >3 drinks per day or >7 drinks per week? No    No flowsheet data found.        Current providers sharing in care for this patient include:   Patient Care Team:  Julieta Gandhi MD as PCP - General (Family Practice)  Artis Parker MD as Assigned Heart and Vascular Provider  Julieta Gandhi MD as Assigned PCP    The following health maintenance items are reviewed in Epic and correct as of today:  Health Maintenance Due   Topic Date Due     ANNUAL REVIEW OF  ORDERS  Never done     ZOSTER IMMUNIZATION (1 of 2) Never done     ADVANCE CARE PLANNING  11/03/2016     Lab work is in process  Labs reviewed in EPIC  Up to date on vaccines other than shingles. He will consider it        Review of Systems   Constitutional: Negative for chills and fever.   HENT: Negative for congestion, ear pain, hearing loss and sore throat.    Eyes: Negative for pain and visual disturbance.   Respiratory: Positive for shortness of breath. Negative for cough.   "  Cardiovascular: Negative for chest pain, palpitations and peripheral edema.   Gastrointestinal: Negative for abdominal pain, constipation, diarrhea, heartburn, hematochezia and nausea.   Genitourinary: Negative for dysuria, frequency, genital sores, hematuria, impotence, penile discharge and urgency.   Musculoskeletal: Negative for arthralgias, joint swelling and myalgias.   Skin: Negative for rash.   Neurological: Negative for dizziness, weakness, headaches and paresthesias.   Psychiatric/Behavioral: Negative for mood changes. The patient is not nervous/anxious.      Constitutional, HEENT, cardiovascular, pulmonary, gi and gu systems are negative, except as otherwise noted.    OBJECTIVE:   /70   Pulse 55   Temp 97.3  F (36.3  C) (Tympanic)   Resp 20   Ht 1.765 m (5' 9.5\")   Wt 82.6 kg (182 lb)   SpO2 97%   BMI 26.49 kg/m   Estimated body mass index is 26.49 kg/m  as calculated from the following:    Height as of this encounter: 1.765 m (5' 9.5\").    Weight as of this encounter: 82.6 kg (182 lb).  Physical Exam  GENERAL: healthy, alert and no distress  EYES: Eyes grossly normal to inspection, PERRL and conjunctivae and sclerae normal  HENT: ear canals and TM's normal, nose and mouth without ulcers or lesions  NECK: no adenopathy, no asymmetry, masses, or scars and thyroid normal to palpation  RESP: lungs clear to auscultation - no rales, rhonchi or wheezes  CV: regular rate and rhythm, normal S1 S2, no S3 or S4, no murmur, click or rub  ABDOMEN: soft, nontender, no hepatosplenomegaly, no masses and bowel sounds normal  MS: no gross musculoskeletal defects noted, no edema  SKIN: no suspicious lesions or rashes  NEURO: Normal strength and tone, mentation intact and speech normal  PSYCH: mentation appears normal, affect normal/bright    Diagnostic Test Results:  Labs reviewed in Epic  No results found for this or any previous visit (from the past 24 hour(s)).    ASSESSMENT / PLAN:   Tima was seen today " "for physical.    Diagnoses and all orders for this visit:    Preventative health care  -     Comprehensive metabolic panel (BMP + Alb, Alk Phos, ALT, AST, Total. Bili, TP); Future  -     Lipid panel reflex to direct LDL Fasting; Future  -     Comprehensive metabolic panel (BMP + Alb, Alk Phos, ALT, AST, Total. 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    Weight as of this encounter: 82.6 kg (182 lb).        He reports that he has quit smoking. He has never used smokeless tobacco.      Appropriate preventive services were discussed with this patient, including applicable screening as appropriate for cardiovascular disease, diabetes, osteopenia/osteoporosis, and glaucoma.  As appropriate for age/gender, discussed screening for colorectal cancer, prostate cancer, breast cancer, and cervical cancer. Checklist reviewing preventive services available " has been given to the patient.    Reviewed patients plan of care and provided an AVS. The Basic Care Plan (routine screening as documented in Health Maintenance) for Tima meets the Care Plan requirement. This Care Plan has been established and reviewed with the Patient.    Counseling Resources:  ATP IV Guidelines  Pooled Cohorts Equation Calculator  Breast Cancer Risk Calculator  Breast Cancer: Medication to Reduce Risk  FRAX Risk Assessment  ICSI Preventive Guidelines  Dietary Guidelines for Americans, 2010  USDA's MyPlate  ASA Prophylaxis  Lung CA Screening    MARIA DEL CARMEN Weems, NP-C  [facility]      Identified Health Risks:    The patient was counseled and encouraged to consider modifying their diet and eating habits. He was provided with information on recommended healthy diet options.

## 2022-06-27 NOTE — RESULT ENCOUNTER NOTE
Rehan Barnes,    All your labs look good.  Thank you,  MARIA DEL CARMEN Weems, NP-C  [facility]

## 2022-06-27 NOTE — PATIENT INSTRUCTIONS
Patient Education   Personalized Prevention Plan  You are due for the preventive services outlined below.  Your care team is available to assist you in scheduling these services.  If you have already completed any of these items, please share that information with your care team to update in your medical record.  Health Maintenance Due   Topic Date Due     ANNUAL REVIEW OF HM ORDERS  Never done     Zoster (Shingles) Vaccine (1 of 2) Never done       Understanding USDA MyPlate  The USDA has guidelines to help you make healthy food choices. These are called MyPlate. MyPlate shows the food groups that make up healthy meals using the image of a place setting. Before you eat, think about the healthiest choices for what to put on your plate or in your cup or bowl. To learn more about building a healthy plate, visit www.choosemyplate.gov.    The food groups    Fruits. Any fruit or 100% fruit juice counts as part of the Fruit Group. Fruits may be fresh, canned, frozen, or dried, and may be whole, cut-up, or pureed. Make 1/2 of your plate fruits and vegetables.    Vegetables. Any vegetable or 100% vegetable juice counts as a member of the Vegetable Group. Vegetables may be fresh, frozen, canned, or dried. They can be served raw or cooked and may be whole, cut-up, or mashed. Make 1/2 of your plate fruits and vegetables.    Grains. All foods made from grains are part of the Grains Group. These include wheat, rice, oats, cornmeal, and barley. Grains are often used to make foods such as bread, pasta, oatmeal, cereal, tortillas, and grits. Grains should be no more than 1/4 of your plate. At least half of your grains should be whole grains.    Protein. This group includes meat, poultry, seafood, beans and peas, eggs, processed soy products (such as tofu), nuts (including nut butters), and seeds. Make protein choices no more than 1/4 of your plate. Meat and poultry choices should be lean or low fat.    Dairy. The Dairy Group  includes all fluid milk products and foods made from milk that contain calcium, such as yogurt and cheese. (Foods that have little calcium, such as cream, butter, and cream cheese, are not part of this group.) Most dairy choices should be low-fat or fat-free.    Oils. Oils aren't a food group, but they do contain essential nutrients. However it's important to watch your intake of oils. These are fats that are liquid at room temperature. They include canola, corn, olive, soybean, vegetable, and sunflower oil. Foods that are mainly oil include mayonnaise, certain salad dressings, and soft margarines. You likely already get your daily oil allowance from the foods you eat.  Things to limit  Eating healthy also means limiting these things in your diet:       Salt (sodium). Many processed foods have a lot of sodium. To keep sodium intake down, eat fresh vegetables, meats, poultry, and seafood when possible. Purchase low-sodium, reduced-sodium, or no-salt-added food products at the store. And don't add salt to your meals at home. Instead, season them with herbs and spices such as dill, oregano, cumin, and paprika. Or try adding flavor with lemon or lime zest and juice.    Saturated fat. Saturated fats are most often found in animal products such as beef, pork, and chicken. They are often solid at room temperature, such as butter. To reduce your saturated fat intake, choose leaner cuts of meat and poultry. And try healthier cooking methods such as grilling, broiling, roasting, or baking. For a simple lower-fat swap, use plain nonfat yogurt instead of mayonnaise when making potato salad or macaroni salad.    Added sugars. These are sugars added to foods. They are in foods such as ice cream, candy, soda, fruit drinks, sports drinks, energy drinks, cookies, pastries, jams, and syrups. Cut down on added sugars by sharing sweet treats with a family member or friend. You can also choose fruit for dessert, and drink water or other  unsweetened beverages.     Good Thing last reviewed this educational content on 6/1/2020 2000-2021 [employer]. All rights reserved. This information is not intended as a substitute for professional medical care. Always follow your healthcare professional's instructions.

## 2022-07-11 ENCOUNTER — ANCILLARY PROCEDURE (OUTPATIENT)
Dept: CARDIOLOGY | Facility: CLINIC | Age: 87
End: 2022-07-11
Attending: INTERNAL MEDICINE
Payer: COMMERCIAL

## 2022-07-11 DIAGNOSIS — I35.0 NONRHEUMATIC AORTIC VALVE STENOSIS: ICD-10-CM

## 2022-07-11 LAB — LVEF ECHO: NORMAL

## 2022-07-11 PROCEDURE — 93306 TTE W/DOPPLER COMPLETE: CPT | Performed by: STUDENT IN AN ORGANIZED HEALTH CARE EDUCATION/TRAINING PROGRAM

## 2022-07-14 ENCOUNTER — OFFICE VISIT (OUTPATIENT)
Dept: CARDIOLOGY | Facility: CLINIC | Age: 87
End: 2022-07-14
Payer: COMMERCIAL

## 2022-07-14 VITALS
SYSTOLIC BLOOD PRESSURE: 117 MMHG | WEIGHT: 184 LBS | HEART RATE: 64 BPM | DIASTOLIC BLOOD PRESSURE: 71 MMHG | OXYGEN SATURATION: 96 % | BODY MASS INDEX: 26.78 KG/M2

## 2022-07-14 DIAGNOSIS — I71.40 ABDOMINAL AORTIC ANEURYSM (AAA) WITHOUT RUPTURE (H): ICD-10-CM

## 2022-07-14 DIAGNOSIS — I35.0 NONRHEUMATIC AORTIC VALVE STENOSIS: Primary | ICD-10-CM

## 2022-07-14 DIAGNOSIS — R06.09 EXERTIONAL DYSPNEA: ICD-10-CM

## 2022-07-14 DIAGNOSIS — I25.810 CORONARY ARTERY DISEASE INVOLVING CORONARY BYPASS GRAFT OF NATIVE HEART WITHOUT ANGINA PECTORIS: ICD-10-CM

## 2022-07-14 DIAGNOSIS — I10 ESSENTIAL HYPERTENSION WITH GOAL BLOOD PRESSURE LESS THAN 130/80: ICD-10-CM

## 2022-07-14 DIAGNOSIS — I25.708 CORONARY ARTERY DISEASE OF BYPASS GRAFT OF NATIVE HEART WITH STABLE ANGINA PECTORIS (H): ICD-10-CM

## 2022-07-14 DIAGNOSIS — E78.5 HYPERLIPIDEMIA LDL GOAL <100: ICD-10-CM

## 2022-07-14 PROCEDURE — 99215 OFFICE O/P EST HI 40 MIN: CPT | Performed by: INTERNAL MEDICINE

## 2022-07-14 NOTE — PROGRESS NOTES
Tima Mckenzie's goals for this visit include:     He requests these members of his care team be copied on today's visit information: PCP    PCP: Julieta Gandhi    Referring Provider:  Referred Self, MD  No address on file    /71 (BP Location: Right arm, Patient Position: Sitting, Cuff Size: Adult Regular)   Pulse 64   Wt 83.5 kg (184 lb)   SpO2 96%   BMI 26.78 kg/m      Do you need any medication refills at today's visit? No.    Luis Enrique De Souza, EMT  Clinic Support  [facility]    [phone]    Employed by [facility]

## 2022-07-14 NOTE — PATIENT INSTRUCTIONS
The following is a summary of your office visit today:      TAVR CT and Exercise Stress Test tomorrow at the [facility], [address]. Arrive at 10:45am at the [facility] Waiting Room.   3 month follow up with Dr. Parker            If you have had any blood work, imaging or other testing completed we will be in touch within 1-2 weeks regarding the results. If you have any questions, concerns or need to schedule a follow up, please contact us at [phone] If you are needing refills please contact your pharmacy. For urgent after hour care please call the [facility] at [phone] or the [facility] at [phone] and ask to speak to the cardiologist on call.    It was a pleasure meeting with you today. Please let us know if there is anything else we can do for you so that we can be sure you are leaving completely satisfied with your care experience.     Your Cardiology Team at [facility]  Phone: [phone] Fax: [phone]  RN Care Coordinators: Ru  Support Staff: Luis Enrique Mercado            HOW TO CHECK YOUR BLOOD PRESSURE AT HOME:     Avoid eating, smoking, and exercising for at least 30 minutes before taking a reading.    Be sure you have taken your BP medication at least 2-3 hours before you check it.     Sit quietly for 10 minutes before a reading.     Sit in a chair with your feet flat on the floor. Rest your  arm on a table so that the arm cuff is at the same level as your heart.    Remain still during the reading.    Record your blood pressure and pulse in a log and bring to your next appointment.       Exercise Stress Test instructions:     Why do I need this test? This test checks how well blood is flowing through your heart at rest and stress (during exercise).  How do I get ready for the test? Please follow the steps below.  - You may need to stop some medicines before the test. Follow your doctor s  orders.  - Do not take your Metoprolol on Friday  - Do not take nitrates on the day of your test. Do not wear your Nitro-Patch.  - For patients with diabetes: If you take insulin, call your diabetes care team. Ask if you should take a   dose the morning of your test. If you take diabetes medicine by mouth, don t take it on the morning of your test. Bring it with you to take after the test. (If you have questions, call your diabetes care team.)  - Stop all caffeine 12 hours before the test.  This includes coffee, tea, soda pop, chocolate and certain medicines (such as Anacin and Excedrin). Also avoid decaf coffee and tea, as these contain caffeine.  - No alcohol, smoking or other tobacco for 12 hours before the test.  - Stop eating 3 hours before the test. You may drink water or juice.  - Please wear a comfortable 2 piece outfit and walking shoes.  - When you arrive, please tell us if you are diabetic, pregnant or have taken Viagra, Cialis or Levitra in the past 48 hours.  What happens during this test?  Your visit will take about 1 hour.  Before and near the end your stress test, we will do an ultrasound of the heart.  The stress test:  1. We will place a blood pressure cuff on your arm. We will also attach small pads to your chest. The pads are hooked to EKG (electrocardiogram) machine. The machine shows how your heart is working during the test.  2. We will start an IV. The echo (ultrasound) tech may use an imaging agent to enhance the pictures of your heart. It is not a contrast or a dye. We inject this the liquid twice--once before exercise and again at the end of exercise.  3. You will begin the stress test using a recumbent bike and will need to pedal for 4 to 15 minutes. It starts with minimal resistance and increases in resistance every 2 minutes to bring your heart rate up.  The ultrasound: This uses sound waves and a video screen to show how your heart is working.  1. You will lie on your left side. Men  will remove their shirt. Women will wear a gown that opens in the front.  2. We will move a small probe around on your chest. The probe is smeared with gel.  3. An image of the heart will appear on a video screen.       Drink plenty of water prior to your CT

## 2022-07-14 NOTE — PROGRESS NOTES
Chief complaint: aortic stenosis, CAD    HPI:   Tima Mckenzie is a 87 year old male with history of CAD s/p 3vCABG (LIMA-LAD SVG-OM1-?) 1997 who presents for follow up of aortic stenosis and CAD.    Cardiac history:  He has history of a 3vCABG in [place], [place] in 1997 ([facility].) Operative report is not available (we attempted to obtain op report and angiogram images but were informed that all records from that time have been destroyed), but our EMR and records from [facility] report LIMA-LAD and SVG-OM; Mr. Mckenzie reports that his SVG bypassed 2 vessels (possibly a jump graft.) He had an angiogram 4/2003 (just before moving to [place]) at [facility] for abnormal stress test which repotedly showed a single diseased vessel (unclear which) for which he was prescribed Imdur. We attempted to obtain these images and were told that they had been destroyed.   He had a single episode of chest pain shortly after moving to [place] in 2003 and was admittted to [facility], where he had a stress test which was normal.      He established care with me 8/1/19. At that time, he was at a primary care visit and was noted to have a systolic murmur prompting TTE which showed moderate aortic stenosis, prompting referral for further management. He reported new onset RAE that had been progressive at his initial visit.     He underwent coronary angiogram which showed widely patent LIMA-LAD and 100% proximal occlusion of his single SVG (likely a touchdown graft to OM and RCA system); he underwent PCI to native rPDA, D1, and RCA.     3/5/2020:  Since his PCI, he feels his dyspnea is dramatically improved. He has been playing tennis without difficulty.     5/21/20:  He had been sedentary since his angiogram and went for a walk last week and felt dyspneic. This was initially concerning to him, but since that time he has been doing a 15-minute exercise program from [employer] and walking for 25 minutes without chest  discomfort or dyspnea. He does still have exertional dyspnea walking at a brisk pace or with vigorous yardwork. He feels this is gradually improving as he increases his activity.     2/25/21:  He reports exertional dyspnea walking up 1/2 flight of stairs and he has to rest after walking up 1 flight of stairs; he feels that this is very slightly worse compared with his last visit. He does light calisthenics but no cardio; this makes him slightly dyspneic also. He does admit that he has been significantly more sedentary due to the COVID-19 pandemic and is unsure how much of his reduction in exertional capacity may be related to this.    Imdur was increased to 60 mg daily at this time.     05/27/21:  Since his last visit, he reports feeling generally worse. Last fall, he could walk around the block for 1.5 miles without difficulty. He now has chest tightness and dyspnea walking <1 block. He also has chest tightness and dyspnea with <1 flight of stairs. He is not sure whether increasing Imdur improved his symptoms. He denies exertional lightheadedness.     11/11/21:  Since his last visit, he underwent coronary angiogram 6/28/21 which showed new severe pRCA lesion for which he underwent PCI+BYRON. TTE was done at that time and showed stably moderate aortic stenosis.  He has been participating in cardiac rehab and his exertional capacity has been gradually improving. He does still report some modest residual exertional dyspnea. Chest tightness has resolved. He is able to to walk 1.5 miles daily without difficulty. No exertional lightheadedness or syncope.     He unfortunately was recently diagnosed with diverticulitis (CT 11/9/21 reviewed) and was also noted to have 3.7 cm AAA at that time.      07/14/22:  Since his last visit, he feels his exertional dyspnea has gotten worse. He has dyspnea lifting weights, walking at a brisk pace, and walking up stairs. He is walking up to 1 mile/day and sometimes cuts his walks short and  only walks around the block due to dyspnea; this is decreased compared with last visit, when he could walk 1.5 miles. He does sometimes have chest pressure with his exertional dyspnea.   He does not feel that his dyspnea improved noticeably after his PCI.   He denies any palpitations, PND, orthopnea, peripheral edema, lightheadedness, or syncope.       PAST MEDICAL HISTORY:  Past Medical History:   Diagnosis Date     Arthritis      BPH (benign prostatic hypertrophy)      Coronary artery disease      Diverticulosis      Hypercholesterolemia      Hypertension      MI (myocardial infarction) (H) 3/1997    Anterior Wall MI/LAD/OM1/SVG    CABG X 3     Neuropathy     RT Foot     Seasonal allergic rhinitis 7/1/2013       CURRENT MEDICATIONS:  Current Outpatient Medications   Medication Sig Dispense Refill     aspirin 81 MG EC tablet Take 81 mg by mouth       isosorbide mononitrate (IMDUR) 60 MG 24 hr tablet Take 1 tablet (60 mg) by mouth every morning 90 tablet 2     MAGNESIUM PO Take by mouth daily (Patient not taking: No sig reported)       metoprolol succinate ER (TOPROL XL) 25 MG 24 hr tablet Take 1 tablet (25 mg) by mouth daily 90 tablet 3     mometasone (NASONEX) 50 MCG/ACT nasal spray Spray 2 sprays into both nostrils as needed (nasal congestion) 17 g 1     multivitamin, therapeutic (THERA-VIT) TABS tablet Take 1 tablet by mouth daily       nitroGLYcerin (NITROSTAT) 0.4 MG sublingual tablet Place 1 tablet (0.4 mg) under the tongue every 5 minutes as needed for chest pain up to 3 tablets per episode. 25 tablet 3     rosuvastatin (CRESTOR) 40 MG tablet Take 1 tablet (40 mg) by mouth daily 90 tablet 3     ticagrelor (BRILINTA) 90 MG tablet Take 1 tablet (90 mg) by mouth 2 times daily Start this evening. 180 tablet 3       PAST SURGICAL HISTORY:  Past Surgical History:   Procedure Laterality Date     ANGIOGRAM  4/30/2003    No Disease, [place],LAD,SVG,OM1- small LT Main, Occlusion of CX- D1 50-70% Stenosis, RCA 30-80%      COLONOSCOPY       COLONOSCOPY N/A 10/14/2014    Procedure: COMBINED COLONOSCOPY, SINGLE BIOPSY/POLYPECTOMY BY BIOPSY;  Surgeon: Konstantin Coates MD;  Location: [facility] OR     COLONOSCOPY WITH CO2 INSUFFLATION N/A 10/14/2014    Procedure: COLONOSCOPY WITH CO2 INSUFFLATION;  Surgeon: Konstantin Coates MD;  Location: [facility] OR     CORONARY ARTERY BYPASS  1997    X 3     CV CORONARY ANGIOGRAM N/A 1/20/2020    Procedure: CV CORONARY ANGIOGRAM;  Surgeon: Magdiel Hernandez MD;  Location:  [facility] C[facility]     CV CORONARY ANGIOGRAM N/A 6/28/2021    Procedure: CV CORONARY ANGIOGRAM;  Surgeon: Ravi Albright MD;  Location: [facility] CARDIAC CATH LAB     CV FRACTIONAL FLOW RATIO WIRE N/A 1/20/2020    Procedure: Fractional Flow Reserve;  Surgeon: Magdiel Hernandez MD;  Location:  HE[facility] CARDIAC CATH LAB     CV INTRAVASULAR ULTRASOUND N/A 6/28/2021    Procedure: Intravascular Ultrasound;  Surgeon: Ravi Albright MD;  Location:  [facility] CARDIAC CATH LAB     CV PCI STENT DRUG ELUTING N/A 1/20/2020    Procedure: Percutaneous Coronary Intervention Stent Drug Eluting;  Surgeon: Magdiel Hernandez MD;  Location:  [facility] CARDI[facility] CATH LAB     CV PCI STENT DRUG ELUTING N/A 6/28/2021    Procedure: Percutaneous Coronary Intervention Stent Drug Eluting;  Surgeon: Ravi Albright MD;  Location:  HEART CARDIAC CATH LAB     HERNIA REPAIR  2014    [facility]       ALLERGIES:     Allergies   Allergen Reactions     No Known Drug Allergy      Seasonal Allergies        FAMILY HISTORY:  Brother with fatal MI at 55.  No family history of premature CAD.    SOCIAL HISTORY:  Social History     Tobacco Use     Smoking status: Former Smoker     Smokeless tobacco: Never Used     Tobacco comment: 1997   Vaping Use     Vaping Use: Never used   Substance Use Topics     Alcohol use: Yes     Comment: rarely      Drug use: No       ROS:   A comprehensive 14 point review of systems is negative  other than as mentioned in HPI.    Exam:  There were no vitals taken for this visit.  GENERAL APPEARANCE: healthy, alert and no distress  EYES: no icterus, no xanthelasmas  ENT: normal palate, mucosa moist, no central cyanosis  NECK: JVP not elevated  RESPIRATORY: lungs clear to auscultation - no rales, rhonchi or wheezes, no use of accessory muscles, no retractions, respirations are unlabored, normal respiratory rate  CARDIOVASCULAR: regular rhythm, +III/VI systolic murmur RUSB/LUSB, no S3 or S4 and no murmur, click or rub.  GI: soft, non tender, bowel sounds normal,no abdominal bruits  EXTREMITIES: no edema, no bruits  NEURO: alert and oriented to person/place/time, normal speech, gait and affect  VASC: Radial, dorsalis pedis and posterior tibialis pulses 2+ bilaterally.  SKIN: no ecchymoses, no rashes.  PSYCH: cooperative, affect appropriate.       Labs:  Reviewed.     Testing/Procedures:  TTE 7/30/19:   LVEF=55-60%. Basal inferolateral hypokinesis.  RV size/wall motion/thickness normal.  Severely calcified trileaflet aortic valve with moderate aortic stenosis (PV 3.2 m/s MG 22 mmHG DVI 0.26 JESS 1.27 cm2 SVI 51 mL/m2)  Mild MR.  Mild-mod LA enlargment. Mild RA enlargement.   RVSP 31+RA pressure.     TTE 12/31/19:   Normal LV/RV size/function/thickness, LVEF=60-65%. Probably moderate aortic stenosis (peak velocity 2.8 m/s, mean gradient 20 mmHg, DVI 0.30, JESS 1.5 cm2, SVI 55 mL/m2.)   Compared with 7/2019 TTE, aortic valve gradients are slightly lower (previously PV 3.2 m/s MG 22 mmHg DVI 0.26 JESS 1.26 SVI 51 mL/m2), however aortic stenosis is probably unchanged in severity    Coronary Angiogram 1/20/20  Obstructive coronary artery disease   Vein graft closed, LIMA patent   S/p PCI of pRCA, rPDA, D1 with drug eluting stents    Conclusion   1st Diag lesion is 80% stenosed.  Prox RCA lesion is 70% stenosed.  Mid RCA lesion is 30% stenosed.  RPDA lesion is 80% stenosed.  Mid LAD lesion is 100% stenosed.  Ost Cx to  Prox Cx lesion is 99% stenosed.    TTE 2/25/21:   Normal LV/RV size/function/thickness, LVEF=55-60%  Moderate aortic stenosis (PV 3.1 m/s mean gradient 21 mmHg DVI 0.25 JESS 1.3 cm2, SVI 49 mL/m2)   Compared with 3/5/20: There has been no significant change. Specifically, aortic stenosis is unchanged in severity and remains moderate.     I personally visualized and interpreted:  Coronary angiogram/PCI 6/28/21:  % mid   -D1 stent patent  % prox   -OM1 fills by collaterals from D1  RCA 80% proximal to previously-placed stent, widely patent pRCA stent  LIMA-LAD: widely patent  SVG-OM: 100% occluded  Interventions: PCI+DESx1 to pRCA    TTE 6/28/21: moderate aortic stenosis, PV 3.2 m/s MG 20 mmHg . No significant change from prior from 2/25/21    TTE 7/11/22: Moderate to severeaortic stenosis (PV 3.0 m/s MG 22 mmHg DVI 0.25 JESS 1.2 cm2 SVI 46 mL/m2); I suspect stroke volume is overestimated due to suboptimal LVOT PW Doppler tracing and aortic valve gradients were better interrogated on the prior study.        Outside results of note:  Outside records from [facility]  were obtained, and relevant results/notes have been incorporated into HPI.    Assessment and Plan:   1. Moderate to severe trileaflet aortic stenosis, worsening by symptoms:  2. Coronary artery disease status post three-vessel coronary bypass graft (LIMA-LAD SVG-OM-D1) s/p PCI w/ BYRON to pRCA/rPDA/D1 1/20/20 and PCI+DSE to RCA 6/28/21  3. Exertional dyspnea, persistent and worsening (never fully improved improved after PCI and insidiously worsening)  Note that initially after his first PCI, he had been free of exertional dyspnea, suggesting strongly that his exertional dyspnea was an anginal equivalent. He also had chest tightness prior to most recent PCI.  Unlike his initial PCI, he never experienced improvement in his exertional dyspnea after his most recent PCI (although his chest tightness did not appear to have improved somewhat.)  He  has had continued progression of his exertional dyspnea despite revascularization and antianginal therapy.  His most recent echocardiogram has somewhat suboptimal LVOT PW Doppler interrogation and aortic valve gradients were less aggressively interrogated--I suspect that this resulted in stroke-volume being overestimated and severity of aortic stenosis possibly being underestimated.  Given his progressive symptoms without apparent improvement after revascularization, I suspect that his aortic stenosis may have progressed into the severe range.  As such, I have plan for TAVR CTA and exercise stress echocardiogram to better assess aortic stenosis severity.   -exercise stress echocardiogram  -TAVR CTA  -continue rosuvastatin 40 mg, Toprol XL 25 mg daily, and Imdur 60 mg daily  -s/p 12 months of DAPT post-PCI; given complexity of PCI, would plan for extended DAPT in the absence of bleeding-- continue ticagrelor 90 mg BID and ASA 81 mg daily (potentially indefinitely)   -if 1 antiplatelet agent is ultimately stopped, would likely continue P2Y12 indefinitely rather than ASA  -follow up TBD based on testing results, if aortic stenosis is not yet severe, would follow up in 3 months with echocardiogram    3. Hypertension, controlled: continue present management.  4. Hyperlipidemia, controlled: continue present management.   5. AAA 3.7 on CT abdomen: US aorta in 12 months.   6. Venous insufficiency: continue compression hose and counseled regarding elevation    Follow up TBD based on testing results, if aortic stenosis is not yet severe, would follow up in 3 months with echocardiogram    Chart review time Jul 14, 2022:  41 minutes  Visit time Jul 14, 2022: 17 minutes  Total time spent Jul 14, 2022: 58 minutes    Artis Parker MD  Cardiology

## 2022-07-15 ENCOUNTER — HOSPITAL ENCOUNTER (OUTPATIENT)
Dept: CARDIOLOGY | Facility: CLINIC | Age: 87
Discharge: HOME | End: 2022-07-15
Attending: INTERNAL MEDICINE | Admitting: INTERNAL MEDICINE
Payer: COMMERCIAL

## 2022-07-15 DIAGNOSIS — I35.0 NONRHEUMATIC AORTIC VALVE STENOSIS: ICD-10-CM

## 2022-07-15 DIAGNOSIS — R06.09 EXERTIONAL DYSPNEA: ICD-10-CM

## 2022-07-15 PROCEDURE — 93321 DOPPLER ECHO F-UP/LMTD STD: CPT | Mod: 26 | Performed by: INTERNAL MEDICINE

## 2022-07-15 PROCEDURE — 93325 DOPPLER ECHO COLOR FLOW MAPG: CPT | Mod: TC

## 2022-07-15 PROCEDURE — 93350 STRESS TTE ONLY: CPT | Mod: 26 | Performed by: INTERNAL MEDICINE

## 2022-07-15 PROCEDURE — 93325 DOPPLER ECHO COLOR FLOW MAPG: CPT | Mod: 26 | Performed by: INTERNAL MEDICINE

## 2022-07-15 PROCEDURE — 93018 CV STRESS TEST I&R ONLY: CPT | Performed by: INTERNAL MEDICINE

## 2022-07-15 PROCEDURE — 93016 CV STRESS TEST SUPVJ ONLY: CPT | Performed by: INTERNAL MEDICINE

## 2022-07-29 ENCOUNTER — HOSPITAL ENCOUNTER (OUTPATIENT)
Dept: CT IMAGING | Facility: CLINIC | Age: 87
Discharge: HOME | End: 2022-07-29
Attending: INTERNAL MEDICINE | Admitting: INTERNAL MEDICINE
Payer: COMMERCIAL

## 2022-07-29 ENCOUNTER — MYC MEDICAL ADVICE (OUTPATIENT)
Dept: CARDIOLOGY | Facility: CLINIC | Age: 87
End: 2022-07-29

## 2022-07-29 DIAGNOSIS — R06.09 EXERTIONAL DYSPNEA: ICD-10-CM

## 2022-07-29 DIAGNOSIS — I35.0 NONRHEUMATIC AORTIC VALVE STENOSIS: ICD-10-CM

## 2022-07-29 PROCEDURE — 74174 CTA ABD&PLVS W/CONTRAST: CPT | Mod: 26 | Performed by: INTERNAL MEDICINE

## 2022-07-29 PROCEDURE — 74174 CTA ABD&PLVS W/CONTRAST: CPT

## 2022-07-29 PROCEDURE — 71275 CT ANGIOGRAPHY CHEST: CPT | Mod: 26 | Performed by: INTERNAL MEDICINE

## 2022-07-29 PROCEDURE — 250N000011 HC RX IP 250 OP 636: Performed by: INTERNAL MEDICINE

## 2022-07-29 RX ORDER — IOPAMIDOL 755 MG/ML
120 INJECTION, SOLUTION INTRAVASCULAR ONCE
Status: COMPLETED | OUTPATIENT
Start: 2022-07-29 | End: 2022-07-29

## 2022-07-29 RX ADMIN — IOPAMIDOL 120 ML: 755 INJECTION, SOLUTION INTRAVENOUS at 10:17

## 2022-08-01 NOTE — TELEPHONE ENCOUNTER
Replied to message in Aga Roger, RN  Cardiology Care Coordinator  [facility]  Phone: [phone]

## 2022-08-03 NOTE — PROGRESS NOTES
"    [facility] HEART CARE  CARDIOVASCULAR DIVISION    VALVE CLINIC INITIAL CONSULTATION    PRIMARY CARDIOLOGIST: Dr. Parker       PERTINENT CLINICAL HISTORY:     Tima Mckenzie is a very pleasant 87 year old male with moderate to severe aortic valvular stenosis referred to our clinic for evaluation and consideration for potential transcatheter aortic valve replacement (TAVR).  His aortic stenosis is characterized with an area of 1.2 cm2, mean gradient 22 mmHg and peak velocity of 3.1 m/sec with LVEF 60-65% by echocardiogram on 7/15/2022 and an aortic valve Ca score of 3301 which is consistent with severe aortic stenosis.  Additional notable medical history of 3vCABG in [place], [place] in 1997 ([facility] HLD, HTN. He underwent coronary angiogram which showed widely patent LIMA-LAD and 100% proximal occlusion of his single SVG (likely a touchdown graft to OM and RCA system); he underwent PCI to native rPDA, D1, and RCA. Initially after his first PCI his dyspnea improved however it reoccured afterwards and in 2021 he underwent a coronary angiogram which showed new severe pRCA lesion for which he underwent PCI with BYRON.     He is having shortness of breath when he is lifting weight or when he is walking upstairs. Also he has been able to walk up to 1 mile/day but this is on a lower pace and sometimes cuts his walks short and only walks around the block due to dyspnea. He has not had syncope , pre syncope or dizziness.    He does sometimes have chest pressure with his exertional dyspnea. He denies any palpitations, PND, orthopnea, peripheral edema.      Per chart review:   \" He has history of a Operative report is not available (we attempted to obtain op report and angiogram images but were informed that all records from that time have been destroyed), but our EMR and records from [facility] report LIMA-LAD and SVG-OM; Mr. Mckenzie reports that his SVG bypassed 2 vessels (possibly a jump graft.) He " "had an angiogram 4/2003 (just before moving to [place]) at [facility] for abnormal stress test which repotedly showed a single diseased vessel (unclear which) for which he was prescribed Imdur. We attempted to obtain these images and were told that they had been destroyed.\"       PAST MEDICAL HISTORY:     Past Medical History:   Diagnosis Date     Arthritis      BPH (benign prostatic hypertrophy)      Coronary artery disease      Diverticulosis      Hypercholesterolemia      Hypertension      MI (myocardial infarction) (H) 3/1997    Anterior Wall MI/LAD/OM1/SVG    CABG X 3     Neuropathy     RT Foot     Seasonal allergic rhinitis 7/1/2013        PAST SURGICAL HISTORY:     Past Surgical History:   Procedure Laterality Date     ANGIOGRAM  4/30/2003    No Disease, [place],LAD,SVG,OM1- small LT Main, Occlusion of CX- D1 50-70% Stenosis, RCA 30-80%     COLONOSCOPY       COLONOSCOPY N/A 10/14/2014    Procedure: COMBINED COLONOSCOPY, SINGLE BIOPSY/POLYPECTOMY BY BIOPSY;  Surgeon: Konstantin Coates MD;  Location: [facility] OR     COLONOSCOPY WITH CO2 INSUFFLATION N/A 10/14/2014    Procedure: COLONOSCOPY WITH CO2 INSUFFLATION;  Surgeon: Konstantin Coates MD;  Location: [facility] OR     CORONARY ARTERY BYPASS  1997    X 3     CV CORONARY ANGIOGRAM N/A 1/20/2020    Procedure: CV CORONARY ANGIOGRAM;  Surgeon: Magdiel Hernandez MD;  Location: [facility] CARDIAC CATH LAB     CV CORONARY ANGIOGRAM N/A 6/28/2021    Procedure: CV CORONARY ANGIOGRAM;  Surgeon: Ravi Albright MD;  Location: [facility] CARDIAC CATH LAB     CV FRACTIONAL FLOW RATIO WIRE N/A 1/20/2020    Procedure: Fractional Flow Reserve;  Surgeon: Magdiel Hernandez MD;  Location: [facility] CARDIAC CATH LAB     CV INTRAVASULAR ULTRASOUND N/A 6/28/2021    Procedure: Intravascular Ultrasound;  Surgeon: Ravi Albright MD;  Location: [facility] CARDIAC CATH LAB     CV PCI STENT DRUG ELUTING N/A 1/20/2020    Procedure: Percutaneous Coronary Intervention " Stent Drug Eluting;  Surgeon: Magdiel Hernandez MD;  Location:  HEART CARDIAC CATH LAB     CV PCI STENT DRUG ELUTING N/A 6/28/2021    Procedure: Percutaneous Coronary Intervention Stent Drug Eluting;  Surgeon: Ravi Albright MD;  Location:  HEART CARDIAC CATH LAB     HERNIA REPAIR  2014    [facility].        CURRENT MEDICATIONS:     Current Outpatient Medications   Medication Sig Dispense Refill     aspirin 81 MG EC tablet Take 81 mg by mouth       isosorbide mononitrate (IMDUR) 60 MG 24 hr tablet Take 1 tablet (60 mg) by mouth every morning 90 tablet 2     MAGNESIUM PO Take by mouth daily (Patient not taking: No sig reported)       metoprolol succinate ER (TOPROL XL) 25 MG 24 hr tablet Take 1 tablet (25 mg) by mouth daily 90 tablet 3     mometasone (NASONEX) 50 MCG/ACT nasal spray Spray 2 sprays into both nostrils as needed (nasal congestion) 17 g 1     multivitamin, therapeutic (THERA-VIT) TABS tablet Take 1 tablet by mouth daily       nitroGLYcerin (NITROSTAT) 0.4 MG sublingual tablet Place 1 tablet (0.4 mg) under the tongue every 5 minutes as needed for chest pain up to 3 tablets per episode. 25 tablet 3     rosuvastatin (CRESTOR) 40 MG tablet Take 1 tablet (40 mg) by mouth daily 90 tablet 3     ticagrelor (BRILINTA) 90 MG tablet Take 1 tablet (90 mg) by mouth 2 times daily Start this evening. 180 tablet 3        ALLERGIES:     Allergies   Allergen Reactions     No Known Drug Allergy      Seasonal Allergies         FAMILY HISTORY:     Family History   Problem Relation Age of Onset     Coronary Artery Disease Brother 55        Fatal MI        SOCIAL HISTORY:     Social History     Socioeconomic History     Marital status:    Tobacco Use     Smoking status: Former Smoker     Smokeless tobacco: Never Used     Tobacco comment: 1997   Vaping Use     Vaping Use: Never used   Substance and Sexual Activity     Alcohol use: Yes     Comment: rarely      Drug use: No     Sexual activity:  "Yes     Partners: Female        REVIEW OF SYSTEMS:     Constitutional: No fevers or chills  Skin: No new rash or itching  Eyes: No acute change in vision  Ears/Nose/Throat: No purulent rhinorrhea, new hearing loss, or new vertigo  Respiratory: No cough or hemoptysis  Cardiovascular: See HPI  Gastrointestinal: No change in appetite, vomiting, hematemesis or diarrhea  Genitourinary: No dysuria or hematuria  Musculoskeletal: No new back pain, neck pain or muscle pain  Neurologic: No new headaches, focal weakness or behavior changes  Psychiatric: No hallucinations, excessive alcohol consumption or illegal drug usage  Hematologic/Lymphatic/Immunologic: No bleeding, chills, fever, night sweats or weight loss  Endocrine: No new cold intolerance, heat intolerance, polyphagia, polydipsia or polyuria      PHYSICAL EXAMINATION:     /75 (BP Location: Right arm, Patient Position: Sitting, Cuff Size: Adult Regular)   Pulse 57   Ht 1.801 m (5' 10.9\")   Wt 82.6 kg (182 lb 3.2 oz)   SpO2 97%   BMI 25.48 kg/m      GENERAL: No acute distress.  HEENT: EOMI.   Heart:  3/6 systolic murmur with no radiation to carotids  Lungs: Clear to auscultation. No ronchi, wheezes, rales.   Abdomen: Soft, nontender, nondistended. Bowel sounds present.  Extremities: No clubbing, cyanosis, or edema.   Neurologic: Alert and oriented to person/place/time, normal speech and affect. No focal deficits.  Skin: No petechiae, purpura or rash.     LABORATORY DATA:     LIPID RESULTS:  Lab Results   Component Value Date    CHOL 132 06/27/2022    CHOL 138 06/17/2021    HDL 46 06/27/2022    HDL 49 06/17/2021    LDL 64 06/27/2022    LDL 66 06/17/2021    TRIG 109 06/27/2022    TRIG 116 06/17/2021    CHOLHDLRATIO 2.8 07/21/2015       LIVER ENZYME RESULTS:  Lab Results   Component Value Date    AST 28 06/27/2022    AST 20 06/17/2021    ALT 31 06/27/2022    ALT 29 06/17/2021       CBC RESULTS:  Lab Results   Component Value Date    WBC 10.4 11/08/2021    WBC " 7.3 06/28/2021    RBC 5.02 11/08/2021    RBC 5.22 06/28/2021    HGB 15.5 11/08/2021    HGB 16.5 06/28/2021    HCT 47.2 11/08/2021    HCT 48.7 06/28/2021    MCV 94 11/08/2021    MCV 93 06/28/2021    MCH 30.9 11/08/2021    MCH 31.6 06/28/2021    MCHC 32.8 11/08/2021    MCHC 33.9 06/28/2021    RDW 12.9 11/08/2021    RDW 12.4 06/28/2021     11/08/2021     06/28/2021       BMP RESULTS:  Lab Results   Component Value Date     06/27/2022     06/28/2021    POTASSIUM 4.3 06/27/2022    POTASSIUM 4.4 06/28/2021    CHLORIDE 107 06/27/2022    CHLORIDE 108 06/28/2021    CO2 28 06/27/2022    CO2 26 06/28/2021    ANIONGAP 4 06/27/2022    ANIONGAP 4 06/28/2021    GLC 92 06/27/2022    GLC 92 06/28/2021    BUN 16 06/27/2022    BUN 14 06/28/2021    CR 1.16 06/27/2022    CR 1.11 06/28/2021    GFRESTIMATED 62 06/27/2022    GFRESTIMATED 61 06/28/2021    GFRESTBLACK 70 06/28/2021    CLAUDIA 9.6 06/27/2022    CLAUDIA 9.3 06/28/2021        A1C RESULTS:  No results found for: A1C    INR RESULTS:  No results found for: INR       PROCEDURES & FURTHER ASSESSMENTS:     EKG  8/4/2022    Normal sinus rhythm, left axis deviation, TW flattening on anterior     Echocardiogram:    At least moderate low-flow, low-gradient aortic stenosis. Due to lack of  stroke volume measurement at peak exercise and also due to significant  limitation in exercise capacity, severe low-flow low-gradient aortic stenosis  is not fully excluded by this study. Recommend further characterization by CT  if clinical suspicion warrants.     The aortic valve is severely calcified with restricted leaflet motion.  Baseline Doppler parameters are consistent with moderate paradoxical (normal-  EF) low-flow, low-gradient aortic stenosis (peak velocity 3.1 m/s Mean  gradient 22 mmHg DVI 0.26 JESS 1.26 cm2 1.14 cm2 SVI 35 mL/m2 using an LVOT  diameter of 2.5 cm.)  With exercise, aortic valve gradients increased (peak velocity 3.7 m/s, mean  gradient 29 mmHg.) JESS and  stroke volume at peak exercise cannot be calculated  as LVOT Doppler interrogation was not performed.     Target heart rate was not achieved. Exercise was terminated after 3:28 at a  heart rate of 89 bpm (66% MPHR) and a workload of 75W due to severe dyspnea.  Blunted heart rate and normal BP response to exercise.  Functional capacity is reduced for age.  Baseline ECG shows sinus rhythm with nonspecific ST/T wave abnormalities. No  ECG evidence of ischemia at a below-diagnostic workload. Frequent PACs were  present throughout the study.     Normal biventricular size and global systolic function at baseline, LVEF=60-  65%.  With exercise at a below-diagnostic workload, LVEF increased to 65-70% and LV  cavity size decreased appropriately.  Protocol is suboptimal for evaluation of regional wall motion and is non-  diagnostic for ischemia due to submaximal heart rate and suboptimal  endocardial definition. No gross regional wall motion abnormalities are  present at rest or with exercise on limited imaging of regional wall motion at  a below-diagnostic workload.  Mildly dilated aortic root and proximal ascending aorta measuring 4.2 cm (2.1  cm/m2) and 4 cm (2 cm/m2) respectively, unchanged from prior studies.     CT TAVR:  IMPRESSION:     1.  See below for TAVR related aortic annular and vascular  measurements.   2.  No acute aortic dissection, intramural hematoma or contained  rupture noted.  3.  Please review detailed radiology report, to follow separately, for  incidental non-cardiovascular findings.     FINDINGS:     1.  Severely calcified tricuspidaortic valve. Aortic valve calcium  score is 3301 and the aortic valve area by planimetry is 1.14 cm2;  both of these measurements are consistent with severe aortic stenosis.     2.  The LVOT is mildly calcified. The ascending aorta is mildly  calcified, aortic arch is  mildly calcified, the descending thoracic  aorta is moderately calcified. There is mild mitral  annular  calcification.  3.  There are no adverse aortic root features, ie coronary heights are  adequate and there is no significant aortic root calcification.  4.  Status post CABG (LIMA-LAD SVG-OM-DIAG) with known proximally  occluded SVG graft. The LIMA-LAD graft is widely patent.   5.  Stents are present in the RCA, PDA, and D1. Images are  non-diagnostic for coronary stenosis or stent patency due to cardiac  motion artifact.   6.  The arch vessel branching pattern is normal variant. There is a  single common origin of the innominate and left common carotid  arteries.   7.  The suprarenal abdominal aorta is severely calcified; infrarenal  abdominal aorta is severely calcified  8.  There is a fusiform infrarenal abdominal aortic aneurysm with  mural thrombus. Maximal axial aortic dimensions are 38.5 mm x 31.9 mm;  the aneurysm measures 56.1 mm in length.  9.  Widely patent origins of celiac trunk, superior mesenteric artery  and inferior mesenteric artery.  There is an accessory left renal  artery. All renal arteries have widely patent origins.   10.  There is no acute aortic pathology, such as dissection,  intramural hematoma, or contained rupture.      MEASUREMENTS:   Representative dimensions of the thoracoabdominal aorta are as  follows:     1. AORTIC ANNULUS MEASUREMENTS:     1.  The average aortic annulus diameter is 27.2 mm, (long diameter is  28.8 mm and short diameter is 26.0 mm)  2.  Aortic annulus area is 5.82 cm2  3.  Aortic annulus perimeter is 86.4 mm  4.  Suggested 3-cusp coaxial angle for aortic valve is (Mongolian 1 , CAU 8)  and alternate A-P coaxial angle is (Mongolian 0 , CAU 8 ), these  angles  will vary depending upon the patient's body position  5.  Aortic root angle is 46.1 degrees  6.  Left main - Annulus distance: 15.7 mm, RCA - Annulus distance:  20.4 mm     2. LVOT MEASUREMENTS:     1.  The average LVOT diameter (measured 4 mm below annular plane) is  26.4 mm  2.  LVOT area is 5.47 cm2  3.  LVOT  perimeter is 84.7 mm     3. AORTA MEASUREMENTS     1.  The aortic root at the sinuses of Valsalva (left cusp, right cusp,  non cusp): 41.1 mm x  39.6 mm x 39.6 mm  2.  The sinotubular junction:  31.2 mm x 29.5 mm  3.  Sinotubular junction height: 27.2 mm x 19.5 mm  4.  Proximal ascending aorta: 38.4 mm x 36.6 mm  5.  Distal abdominal aorta proximal to the bifurcation: 16.9 mm x 11.9  mm  6.  Innominate artery 3 cm from ostium: 12.0 mm x 11.4 mm  7.  Left common carotid artery 3 cm from ostium: 7.3 mm x 7.2 mm     4. ILIOFEMORAL MEASUREMENTS:     RIGHT:  1.  Right common iliac artery: 10.4 mm x 10.0 mm   2.  Right external iliac artery: 9.4 mm x 8.2 mm   3.  Right common femoral artery: 8.3 mm x 6.6 mm   4.  Tortuosity: moderate   5.  Calcification: moderate      LEFT:  1.  Left common iliac artery: 10.7 mm x  9.3 mm  2.  Left external iliac artery: 8.8 mm x 6.6 mm  3.  Left common femoral artery: 8.6 mm x 6.4 mm  4.  Tortuosity: moderate   5.  Calcification: moderate      5. SUBCLAVIAN MEASUREMENTS:     RIGHT:  1. Minimal luminal diameter: 8.3 mm x  7.6 mm  2. Tortuosity: mild   3. Calcification: mild      LEFT:  1. Minimal luminal diameter: 7.5 mm x  6.9 mm  2. Tortuosity: mild   3. Calcification: mild      Coronary Angiogram and Right Heart Catheterization:  Coronary angiogram/PCI 6/28/21:  % mid   -D1 stent patent  % prox   -OM1 fills by collaterals from D1  RCA 80% proximal to previously-placed stent, widely patent pRCA stent  LIMA-LAD: widely patent  SVG-OM: 100% occluded  Interventions: PCI+DESx1 to pRCA       Coronary Angiogram 1/20/20  Obstructive coronary artery disease   Vein graft closed, LIMA patent   S/p PCI of pRCA, rPDA, D1 with drug eluting stents     Conclusion   1st Diag lesion is 80% stenosed.  Prox RCA lesion is 70% stenosed.  Mid RCA lesion is 30% stenosed.  RPDA lesion is 80% stenosed.  Mid LAD lesion is 100% stenosed.  Ost Cx to Prox Cx lesion is 99% stenosed.    Carotid  Ultrasound: pending     STS RISK SCORE: 3.1   FRAILTY SCORE: 0/6  NYHA CLASS: III     CLINICAL IMPRESSION:     Tima Mckenzie is a 87 year old male with severe symptomatic severe aortic stenosis who is a good candidate for transcatheter aortic valve replacement based on age, frailty, co-morbidities and overall surgical mortality risk estimation of 3.1% based on the STS score.      The pre-procedural TAVR evaluation currently requires carotid U/S additional assessment prior to presentation to the Heart team for discussion during our multi-disciplinary conference for consensus decision and procedural planning. His coronary angiogram from 6/2021 and CT TAVR were reviewed and he appears to have patent HARLEY to LAD, known  of Lcx and patent dominant RCA.     Plan Summary:  1) Severe Aortic Stenosis:  -Presentation of data to the Heart team to assess the optimal treatment of his severe aortic stenosis    Patient was evaluated in clinic with Dr. Hernandez  of interventional cardiology.    Keyana King   Interventional Cardiology Fellow  [phone]    CC  Patient Care Team:  Julieta Gandhi MD as PCP - General (Family Practice)  Artis Parker MD as Assigned Heart and Vascular Provider  Viki Bishop NP as Assigned PCP    Patient seen and examined with Cardiovascular fellow and agree with the assessment and plan described above.     Magdiel Hernandez M.D.  Interventional Cardiology  [facility]

## 2022-08-04 ENCOUNTER — OFFICE VISIT (OUTPATIENT)
Dept: CARDIOLOGY | Facility: CLINIC | Age: 87
End: 2022-08-04
Attending: NURSE PRACTITIONER
Payer: COMMERCIAL

## 2022-08-04 VITALS
HEART RATE: 57 BPM | DIASTOLIC BLOOD PRESSURE: 75 MMHG | WEIGHT: 182.2 LBS | HEIGHT: 71 IN | OXYGEN SATURATION: 97 % | SYSTOLIC BLOOD PRESSURE: 128 MMHG | BODY MASS INDEX: 25.51 KG/M2

## 2022-08-04 DIAGNOSIS — I35.0 SEVERE AORTIC STENOSIS: Primary | ICD-10-CM

## 2022-08-04 DIAGNOSIS — I51.89 OTHER ILL-DEFINED HEART DISEASES: ICD-10-CM

## 2022-08-04 PROCEDURE — 99215 OFFICE O/P EST HI 40 MIN: CPT | Mod: GC

## 2022-08-04 PROCEDURE — G0463 HOSPITAL OUTPT CLINIC VISIT: HCPCS | Mod: 25

## 2022-08-04 PROCEDURE — 93005 ELECTROCARDIOGRAM TRACING: CPT

## 2022-08-04 RX ORDER — LIDOCAINE 40 MG/G
CREAM TOPICAL
Status: CANCELLED | OUTPATIENT
Start: 2022-08-04

## 2022-08-04 RX ORDER — ASPIRIN 81 MG/1
243 TABLET, CHEWABLE ORAL ONCE
Status: CANCELLED | OUTPATIENT
Start: 2022-08-04

## 2022-08-04 RX ORDER — ASPIRIN 325 MG
325 TABLET ORAL ONCE
Status: CANCELLED | OUTPATIENT
Start: 2022-08-04 | End: 2022-08-04

## 2022-08-04 RX ORDER — SODIUM CHLORIDE 9 MG/ML
INJECTION, SOLUTION INTRAVENOUS CONTINUOUS
Status: CANCELLED | OUTPATIENT
Start: 2022-08-04

## 2022-08-04 ASSESSMENT — PAIN SCALES - GENERAL: PAINLEVEL: NO PAIN (0)

## 2022-08-04 NOTE — LETTER
8/4/2022      RE: Tima Mckenzie  [address]  [facility] [address]       Dear Colleague,    Thank you for the opportunity to participate in the care of your patient, Tima Mckenzie, at the [facility] HEART CLINIC [place] [facility]. Please see a copy of my visit note below.        [facility] HEART CARE  CARDIOVASCULAR DIVISION    VALVE CLINIC INITIAL CONSULTATION    PRIMARY CARDIOLOGIST: Dr. Parker       PERTINENT CLINICAL HISTORY:     Tima Mckenzie is a very pleasant 87 year old male with moderate to severe aortic valvular stenosis referred to our clinic for evaluation and consideration for potential transcatheter aortic valve replacement (TAVR).  His aortic stenosis is characterized with an area of 1.2 cm2, mean gradient 22 mmHg and peak velocity of 3.1 m/sec with LVEF 60-65% by echocardiogram on 7/15/2022 and an aortic valve Ca score of 3301 which is consistent with severe aortic stenosis.  Additional notable medical history of 3vCABG in [place], [place] in 1997 ([facility] HLD, HTN. He underwent coronary angiogram which showed widely patent LIMA-LAD and 100% proximal occlusion of his single SVG (likely a touchdown graft to OM and RCA system); he underwent PCI to native rPDA, D1, and RCA. Initially after his first PCI his dyspnea improved however it reoccured afterwards and in 2021 he underwent a coronary angiogram which showed new severe pRCA lesion for which he underwent PCI with BYRON.     He is having shortness of breath when he is lifting weight or when he is walking upstairs. Also he has been able to walk up to 1 mile/day but this is on a lower pace and sometimes cuts his walks short and only walks around the block due to dyspnea. He has not had syncope , pre syncope or dizziness.    He does sometimes have chest pressure with his exertional dyspnea. He denies any palpitations, PND, orthopnea, peripheral edema.      Per  "chart review:   \" He has history of a Operative report is not available (we attempted to obtain op report and angiogram images but were informed that all records from that time have been destroyed), but our EMR and records from [facility] report LIMA-LAD and SVG-OM; Mr. Mckenzie reports that his SVG bypassed 2 vessels (possibly a jump graft.) He had an angiogram 4/2003 (just before moving to [place]) at [facility] for abnormal stress test which repotedly showed a single diseased vessel (unclear which) for which he was prescribed Imdur. We attempted to obtain these images and were told that they had been destroyed.\"       PAST MEDICAL HISTORY:     Past Medical History:   Diagnosis Date     Arthritis      BPH (benign prostatic hypertrophy)      Coronary artery disease      Diverticulosis      Hypercholesterolemia      Hypertension      MI (myocardial infarction) (H) 3/1997    Anterior Wall MI/LAD/OM1/SVG    CABG X 3     Neuropathy     RT Foot     Seasonal allergic rhinitis 7/1/2013        PAST SURGICAL HISTORY:     Past Surgical History:   Procedure Laterality Date     ANGIOGRAM  4/30/2003    No Disease, Clemons,LAD,SVG,OM1- small LT Main, Occlusion of CX- D1 50-70% Stenosis, RCA 30-80%     COLONOSCOPY       COLONOSCOPY N/A 10/14/2014    Procedure: COMBINED COLONOSCOPY, SINGLE BIOPSY/POLYPECTOMY BY BIOPSY;  Surgeon: Konstantin Coates MD;  Location: [facility] OR     COLONOSCOPY WITH CO2 INSUFFLATION N/A 10/14/2014    Procedure: COLONOSCOPY WITH CO2 INSUFFLATION;  Surgeon: Konstantin Coates MD;  Location: [facility] OR     CORONARY ARTERY BYPASS  1997    X 3     CV CORONARY ANGIOGRAM N/A 1/20/2020    Procedure: CV CORONARY ANGIOGRAM;  Surgeon: Magdiel Hernandez MD;  Location: [facility] CARDIAC CATH LAB     CV CORONARY ANGIOGRAM N/A 6/28/2021    Procedure: CV CORONARY ANGIOGRAM;  Surgeon: Ravi Albright MD;  Location: [facility] CARDIAC CATH LAB     CV FRACTIONAL FLOW RATIO WIRE N/A 1/20/2020    Procedure: " Fractional Flow Reserve;  Surgeon: Magdiel Hernandez MD;  Location: [facility] CARDIAC CATH LAB     CV INTRAVASULAR ULTRASOUND N/A 6/28/2021    Procedure: Intravascular Ultrasound;  Surgeon: Ravi Albright MD;  Location: [facility] CARDIAC CATH LAB     CV PCI STENT DRUG ELUTING N/A 1/20/2020    Procedure: Percutaneous Coronary Intervention Stent Drug Eluting;  Surgeon: Magdiel Hernandez MD;  Location: [facility] CARDIAC CATH LAB     CV PCI STENT DRUG ELUTING N/A 6/28/2021    Procedure: Percutaneous Coronary Intervention Stent Drug Eluting;  Surgeon: Ravi Albright MD;  Location: [facility] CARDIAC CATH LAB     HERNIA REPAIR  2014    [facility]        CURRENT MEDICATIONS:     Current Outpatient Medications   Medication Sig Dispense Refill     aspirin 81 MG EC tablet Take 81 mg by mouth       isosorbide mononitrate (IMDUR) 60 MG 24 hr tablet Take 1 tablet (60 mg) by mouth every morning 90 tablet 2     MAGNESIUM PO Take by mouth daily (Patient not taking: No sig reported)       metoprolol succinate ER (TOPROL XL) 25 MG 24 hr tablet Take 1 tablet (25 mg) by mouth daily 90 tablet 3     mometasone (NASONEX) 50 MCG/ACT nasal spray Spray 2 sprays into both nostrils as needed (nasal congestion) 17 g 1     multivitamin, therapeutic (THERA-VIT) TABS tablet Take 1 tablet by mouth daily       nitroGLYcerin (NITROSTAT) 0.4 MG sublingual tablet Place 1 tablet (0.4 mg) under the tongue every 5 minutes as needed for chest pain up to 3 tablets per episode. 25 tablet 3     rosuvastatin (CRESTOR) 40 MG tablet Take 1 tablet (40 mg) by mouth daily 90 tablet 3     ticagrelor (BRILINTA) 90 MG tablet Take 1 tablet (90 mg) by mouth 2 times daily Start this evening. 180 tablet 3        ALLERGIES:     Allergies   Allergen Reactions     No Known Drug Allergy      Seasonal Allergies         FAMILY HISTORY:     Family History   Problem Relation Age of Onset     Coronary Artery Disease Brother 55        Fatal MI  "       SOCIAL HISTORY:     Social History     Socioeconomic History     Marital status:    Tobacco Use     Smoking status: Former Smoker     Smokeless tobacco: Never Used     Tobacco comment: 1997   Vaping Use     Vaping Use: Never used   Substance and Sexual Activity     Alcohol use: Yes     Comment: rarely      Drug use: No     Sexual activity: Yes     Partners: Female        REVIEW OF SYSTEMS:     Constitutional: No fevers or chills  Skin: No new rash or itching  Eyes: No acute change in vision  Ears/Nose/Throat: No purulent rhinorrhea, new hearing loss, or new vertigo  Respiratory: No cough or hemoptysis  Cardiovascular: See HPI  Gastrointestinal: No change in appetite, vomiting, hematemesis or diarrhea  Genitourinary: No dysuria or hematuria  Musculoskeletal: No new back pain, neck pain or muscle pain  Neurologic: No new headaches, focal weakness or behavior changes  Psychiatric: No hallucinations, excessive alcohol consumption or illegal drug usage  Hematologic/Lymphatic/Immunologic: No bleeding, chills, fever, night sweats or weight loss  Endocrine: No new cold intolerance, heat intolerance, polyphagia, polydipsia or polyuria      PHYSICAL EXAMINATION:     /75 (BP Location: Right arm, Patient Position: Sitting, Cuff Size: Adult Regular)   Pulse 57   Ht 1.801 m (5' 10.9\")   Wt 82.6 kg (182 lb 3.2 oz)   SpO2 97%   BMI 25.48 kg/m      GENERAL: No acute distress.  HEENT: EOMI.   Heart:  3/6 systolic murmur with no radiation to carotids  Lungs: Clear to auscultation. No ronchi, wheezes, rales.   Abdomen: Soft, nontender, nondistended. Bowel sounds present.  Extremities: No clubbing, cyanosis, or edema.   Neurologic: Alert and oriented to person/place/time, normal speech and affect. No focal deficits.  Skin: No petechiae, purpura or rash.     LABORATORY DATA:     LIPID RESULTS:  Lab Results   Component Value Date    CHOL 132 06/27/2022    CHOL 138 06/17/2021    HDL 46 06/27/2022    HDL 49 " 06/17/2021    LDL 64 06/27/2022    LDL 66 06/17/2021    TRIG 109 06/27/2022    TRIG 116 06/17/2021    CHOLHDLRATIO 2.8 07/21/2015       LIVER ENZYME RESULTS:  Lab Results   Component Value Date    AST 28 06/27/2022    AST 20 06/17/2021    ALT 31 06/27/2022    ALT 29 06/17/2021       CBC RESULTS:  Lab Results   Component Value Date    WBC 10.4 11/08/2021    WBC 7.3 06/28/2021    RBC 5.02 11/08/2021    RBC 5.22 06/28/2021    HGB 15.5 11/08/2021    HGB 16.5 06/28/2021    HCT 47.2 11/08/2021    HCT 48.7 06/28/2021    MCV 94 11/08/2021    MCV 93 06/28/2021    MCH 30.9 11/08/2021    MCH 31.6 06/28/2021    MCHC 32.8 11/08/2021    MCHC 33.9 06/28/2021    RDW 12.9 11/08/2021    RDW 12.4 06/28/2021     11/08/2021     06/28/2021       BMP RESULTS:  Lab Results   Component Value Date     06/27/2022     06/28/2021    POTASSIUM 4.3 06/27/2022    POTASSIUM 4.4 06/28/2021    CHLORIDE 107 06/27/2022    CHLORIDE 108 06/28/2021    CO2 28 06/27/2022    CO2 26 06/28/2021    ANIONGAP 4 06/27/2022    ANIONGAP 4 06/28/2021    GLC 92 06/27/2022    GLC 92 06/28/2021    BUN 16 06/27/2022    BUN 14 06/28/2021    CR 1.16 06/27/2022    CR 1.11 06/28/2021    GFRESTIMATED 62 06/27/2022    GFRESTIMATED 61 06/28/2021    GFRESTBLACK 70 06/28/2021    CLAUDIA 9.6 06/27/2022    CLAUDIA 9.3 06/28/2021        A1C RESULTS:  No results found for: A1C    INR RESULTS:  No results found for: INR       PROCEDURES & FURTHER ASSESSMENTS:     EKG  8/4/2022    Normal sinus rhythm, left axis deviation, TW flattening on anterior     Echocardiogram:    At least moderate low-flow, low-gradient aortic stenosis. Due to lack of  stroke volume measurement at peak exercise and also due to significant  limitation in exercise capacity, severe low-flow low-gradient aortic stenosis  is not fully excluded by this study. Recommend further characterization by CT  if clinical suspicion warrants.     The aortic valve is severely calcified with restricted leaflet  motion.  Baseline Doppler parameters are consistent with moderate paradoxical (normal-  EF) low-flow, low-gradient aortic stenosis (peak velocity 3.1 m/s Mean  gradient 22 mmHg DVI 0.26 JESS 1.26 cm2 1.14 cm2 SVI 35 mL/m2 using an LVOT  diameter of 2.5 cm.)  With exercise, aortic valve gradients increased (peak velocity 3.7 m/s, mean  gradient 29 mmHg.) JESS and stroke volume at peak exercise cannot be calculated  as LVOT Doppler interrogation was not performed.     Target heart rate was not achieved. Exercise was terminated after 3:28 at a  heart rate of 89 bpm (66% MPHR) and a workload of 75W due to severe dyspnea.  Blunted heart rate and normal BP response to exercise.  Functional capacity is reduced for age.  Baseline ECG shows sinus rhythm with nonspecific ST/T wave abnormalities. No  ECG evidence of ischemia at a below-diagnostic workload. Frequent PACs were  present throughout the study.     Normal biventricular size and global systolic function at baseline, LVEF=60-  65%.  With exercise at a below-diagnostic workload, LVEF increased to 65-70% and LV  cavity size decreased appropriately.  Protocol is suboptimal for evaluation of regional wall motion and is non-  diagnostic for ischemia due to submaximal heart rate and suboptimal  endocardial definition. No gross regional wall motion abnormalities are  present at rest or with exercise on limited imaging of regional wall motion at  a below-diagnostic workload.  Mildly dilated aortic root and proximal ascending aorta measuring 4.2 cm (2.1  cm/m2) and 4 cm (2 cm/m2) respectively, unchanged from prior studies.     CT TAVR:  IMPRESSION:     1.  See below for TAVR related aortic annular and vascular  measurements.   2.  No acute aortic dissection, intramural hematoma or contained  rupture noted.  3.  Please review detailed radiology report, to follow separately, for  incidental non-cardiovascular findings.     FINDINGS:     1.  Severely calcified tricuspidaortic  valve. Aortic valve calcium  score is 3301 and the aortic valve area by planimetry is 1.14 cm2;  both of these measurements are consistent with severe aortic stenosis.     2.  The LVOT is mildly calcified. The ascending aorta is mildly  calcified, aortic arch is  mildly calcified, the descending thoracic  aorta is moderately calcified. There is mild mitral annular  calcification.  3.  There are no adverse aortic root features, ie coronary heights are  adequate and there is no significant aortic root calcification.  4.  Status post CABG (LIMA-LAD SVG-OM-DIAG) with known proximally  occluded SVG graft. The LIMA-LAD graft is widely patent.   5.  Stents are present in the RCA, PDA, and D1. Images are  non-diagnostic for coronary stenosis or stent patency due to cardiac  motion artifact.   6.  The arch vessel branching pattern is normal variant. There is a  single common origin of the innominate and left common carotid  arteries.   7.  The suprarenal abdominal aorta is severely calcified; infrarenal  abdominal aorta is severely calcified  8.  There is a fusiform infrarenal abdominal aortic aneurysm with  mural thrombus. Maximal axial aortic dimensions are 38.5 mm x 31.9 mm;  the aneurysm measures 56.1 mm in length.  9.  Widely patent origins of celiac trunk, superior mesenteric artery  and inferior mesenteric artery.  There is an accessory left renal  artery. All renal arteries have widely patent origins.   10.  There is no acute aortic pathology, such as dissection,  intramural hematoma, or contained rupture.      MEASUREMENTS:   Representative dimensions of the thoracoabdominal aorta are as  follows:     1. AORTIC ANNULUS MEASUREMENTS:     1.  The average aortic annulus diameter is 27.2 mm, (long diameter is  28.8 mm and short diameter is 26.0 mm)  2.  Aortic annulus area is 5.82 cm2  3.  Aortic annulus perimeter is 86.4 mm  4.  Suggested 3-cusp coaxial angle for aortic valve is (Ethiopian 1 , CAU 8)  and alternate A-P  coaxial angle is (Micronesian 0 , CAU 8 ), these  angles  will vary depending upon the patient's body position  5.  Aortic root angle is 46.1 degrees  6.  Left main - Annulus distance: 15.7 mm, RCA - Annulus distance:  20.4 mm     2. LVOT MEASUREMENTS:     1.  The average LVOT diameter (measured 4 mm below annular plane) is  26.4 mm  2.  LVOT area is 5.47 cm2  3.  LVOT perimeter is 84.7 mm     3. AORTA MEASUREMENTS     1.  The aortic root at the sinuses of Valsalva (left cusp, right cusp,  non cusp): 41.1 mm x  39.6 mm x 39.6 mm  2.  The sinotubular junction:  31.2 mm x 29.5 mm  3.  Sinotubular junction height: 27.2 mm x 19.5 mm  4.  Proximal ascending aorta: 38.4 mm x 36.6 mm  5.  Distal abdominal aorta proximal to the bifurcation: 16.9 mm x 11.9  mm  6.  Innominate artery 3 cm from ostium: 12.0 mm x 11.4 mm  7.  Left common carotid artery 3 cm from ostium: 7.3 mm x 7.2 mm     4. ILIOFEMORAL MEASUREMENTS:     RIGHT:  1.  Right common iliac artery: 10.4 mm x 10.0 mm   2.  Right external iliac artery: 9.4 mm x 8.2 mm   3.  Right common femoral artery: 8.3 mm x 6.6 mm   4.  Tortuosity: moderate   5.  Calcification: moderate      LEFT:  1.  Left common iliac artery: 10.7 mm x  9.3 mm  2.  Left external iliac artery: 8.8 mm x 6.6 mm  3.  Left common femoral artery: 8.6 mm x 6.4 mm  4.  Tortuosity: moderate   5.  Calcification: moderate      5. SUBCLAVIAN MEASUREMENTS:     RIGHT:  1. Minimal luminal diameter: 8.3 mm x  7.6 mm  2. Tortuosity: mild   3. Calcification: mild      LEFT:  1. Minimal luminal diameter: 7.5 mm x  6.9 mm  2. Tortuosity: mild   3. Calcification: mild      Coronary Angiogram and Right Heart Catheterization:  Coronary angiogram/PCI 6/28/21:  % mid   -D1 stent patent  % prox   -OM1 fills by collaterals from D1  RCA 80% proximal to previously-placed stent, widely patent pRCA stent  LIMA-LAD: widely patent  SVG-OM: 100% occluded  Interventions: PCI+DESx1 to pRCA       Coronary Angiogram  1/20/20  Obstructive coronary artery disease   Vein graft closed, LIMA patent   S/p PCI of pRCA, rPDA, D1 with drug eluting stents     Conclusion   1st Diag lesion is 80% stenosed.  Prox RCA lesion is 70% stenosed.  Mid RCA lesion is 30% stenosed.  RPDA lesion is 80% stenosed.  Mid LAD lesion is 100% stenosed.  Ost Cx to Prox Cx lesion is 99% stenosed.    Carotid Ultrasound: pending     STS RISK SCORE: 3.1   FRAILTY SCORE: 0/6  NYHA CLASS: III     CLINICAL IMPRESSION:     Tima Mckenzie is a 87 year old male with severe symptomatic severe aortic stenosis who is a good candidate for transcatheter aortic valve replacement based on age, frailty, co-morbidities and overall surgical mortality risk estimation of 3.1% based on the STS score.      The pre-procedural TAVR evaluation currently requires carotid U/S additional assessment prior to presentation to the Heart team for discussion during our multi-disciplinary conference for consensus decision and procedural planning. His coronary angiogram from 6/2021 and CT TAVR were reviewed and he appears to have patent HARLEY to LAD, known  of Lcx and patent dominant RCA.     Plan Summary:  1) Severe Aortic Stenosis:  -Presentation of data to the Heart team to assess the optimal treatment of his severe aortic stenosis    Patient was evaluated in clinic with Dr. Hernandez  of interventional cardiology.    Keynaa King   Interventional Cardiology Fellow  [phone]    CC  Patient Care Team:  Julieta Gandhi MD as PCP - General (Family Practice)  Artis Parker MD as Assigned Heart and Vascular Provider  Viki Bishop NP as Assigned PCP            Structural Heart Coordinator visit:  Provided additional education regarding TAVR/MitraClip procedure, after being present for discussion with physician. Explained the work-up process and next steps for patient. Patient provided our direct contact number and instructed to call with any questions.     Frailty  Score: 3/5    Completed frailty testing and KCCQ.       5 meter walk: 5.33s  Trial 1:5s  Trail 2: 6s  Trial 3: 5s    : 52.93  Albumin: 4.0  Eye ball test: pass  ADL: 0/6    Dental Clearance: Pass      KCCQ Results:   1a. 5  1b. 3  1c. 2  2. 4  3. 6  4. 3  5. 5  6. 5  7. 3  8a. 1  8b. 2  8c. 5      Magaly Alexander CMA  Structural Heart   [facility]      Please do not hesitate to contact me if you have any questions/concerns.     Sincerely,     [facility]

## 2022-08-04 NOTE — PATIENT INSTRUCTIONS
You were seen today in the Cardiovascular Clinic at the [facility].      Cardiology Providers you saw during your visit:  Dr. Hernandez    Recommendations:    I will be prsenting your testing information next week to the team of doctors for valve choice. I akash contact you next week after the discussion and get you set you for your procedure      Thank you for your visit today!     Gretchen Hilario, LUCERO  Structural Heart RN Specialists  TAVR, MitraClip and Watchman Programs  [facility]    Alla Catherine, [facility] Office: [phone]

## 2022-08-04 NOTE — NURSING NOTE
Chief Complaint   Patient presents with     New Patient     Sever aortic stenosis      Vitals were taken and medications were reconciled. EKG was performed   RENE Jones  11:48 AM

## 2022-08-04 NOTE — PROGRESS NOTES
Structural Heart Coordinator visit:  Provided additional education regarding TAVR/MitraClip procedure, after being present for discussion with physician. Explained the work-up process and next steps for patient. Patient provided our direct contact number and instructed to call with any questions.     Frailty Score: 3/5    Completed frailty testing and KCCQ.       5 meter walk: 5.33s  Trial 1:5s  Trail 2: 6s  Trial 3: 5s    : 52.93  Albumin: 4.0  Eye ball test: pass  ADL: 0/6    Dental Clearance: Pass      KCCQ Results:   1a. 5  1b. 3  1c. 2  2. 4  3. 6  4. 3  5. 5  6. 5  7. 3  8a. 1  8b. 2  8c. 5      Magaly Alexander CMA  Structural Heart   [facility]

## 2022-08-07 NOTE — PROGRESS NOTES
STRUCTURAL HEART CLINIC  H&P    Referring Provider: Dr. Parker  Primary Cardiologist: Dr. Hernandez    History of Present Illness  Tima Mckenzie who presents for pre-operative H&P in preparation for transcatheter aortic valve replacement on 8/17/22 at [facility].     His aortic stenosis is characterized with an area of 1.2 cm2, mean gradient 22 mmHg, peak velocity of 3.1 m/sec with LVEF 60-65% and DI 0.21 by echocardiogram on 7/15/2022 and an aortic valve Ca score of 3301 and JESS of 1.19 by planimetry consistent with paradoxical low flow low gradient severe aortic stenosis. He reports symptoms of exertional dyspnea and exercise intolerance. Patient was evaluated in structural heart clinic where he was deemed an appropriate TAVR candidate. He was counseled for the above procedure.     Additional notable medical history of HTN, HLD, and CAD with 3VCABG in [place], [place] in 1997 (LIMA to LAD, SVG to OM1 and possibly RCA) s/p PCI of RPDA, D1 and RCA 8/2019, pRCA PCI 6/21.     Feeling alright, stable heart symptoms, no recent infectious symptoms.     History of blood transfusions/reactions: No   History of abnormal bleeding/anti-platelet use: No tolerating DAPT   Steroid use in the last year: No   Personal or family history with difficulty with anesthesia: No   Infection precautions: No   Difficulty w/bedrest: No     Current Medications:  Current Outpatient Medications   Medication Sig Dispense Refill     aspirin 81 MG EC tablet Take 81 mg by mouth       isosorbide mononitrate (IMDUR) 60 MG 24 hr tablet Take 1 tablet (60 mg) by mouth every morning 90 tablet 2     MAGNESIUM PO Take by mouth daily (Patient not taking: No sig reported)       metoprolol succinate ER (TOPROL XL) 25 MG 24 hr tablet Take 1 tablet (25 mg) by mouth daily 90 tablet 3     mometasone (NASONEX) 50 MCG/ACT nasal spray Spray 2 sprays into both nostrils as needed (nasal congestion) 17 g 1     multivitamin,  therapeutic (THERA-VIT) TABS tablet Take 1 tablet by mouth daily       nitroGLYcerin (NITROSTAT) 0.4 MG sublingual tablet Place 1 tablet (0.4 mg) under the tongue every 5 minutes as needed for chest pain up to 3 tablets per episode. 25 tablet 3     rosuvastatin (CRESTOR) 40 MG tablet Take 1 tablet (40 mg) by mouth daily 90 tablet 3     ticagrelor (BRILINTA) 90 MG tablet Take 1 tablet (90 mg) by mouth 2 times daily Start this evening. 180 tablet 3       Allergies:     Allergies   Allergen Reactions     No Known Drug Allergy      Seasonal Allergies        Past Medical History:  Past Medical History:   Diagnosis Date     Arthritis      BPH (benign prostatic hypertrophy)      Coronary artery disease      Diverticulosis      Hypercholesterolemia      Hypertension      MI (myocardial infarction) (H) 3/1997    Anterior Wall MI/LAD/OM1/SVG    CABG X 3     Neuropathy     RT Foot     Seasonal allergic rhinitis 7/1/2013       Past Surgical History:  Past Surgical History:   Procedure Laterality Date     ANGIOGRAM  4/30/2003    No Disease, Clemons,LAD,SVG,OM1- small LT Main, Occlusion of CX- D1 50-70% Stenosis, RCA 30-80%     COLONOSCOPY       COLONOSCOPY N/A 10/14/2014    Procedure: COMBINED COLONOSCOPY, SINGLE BIOPSY/POLYPECTOMY BY BIOPSY;  Surgeon: Konstantin Coates MD;  Location: [facility] OR     COLONOSCOPY WITH CO2 INSUFFLATION N/A 10/14/2014    Procedure: COLONOSCOPY WITH CO2 INSUFFLATION;  Surgeon: Konstantin Coates MD;  Location: [facility] OR     CORONARY ARTERY BYPASS  1997    X 3     CV CORONARY ANGIOGRAM N/A 1/20/2020    Procedure: CV CORONARY ANGIOGRAM;  Surgeon: Magdiel Hernandez MD;  Location: [facility] CARDIAC CATH LAB     CV CORONARY ANGIOGRAM N/A 6/28/2021    Procedure: CV CORONARY ANGIOGRAM;  Surgeon: Ravi Albright MD;  Location: [facility] CARDIAC CATH LAB     CV FRACTIONAL FLOW RATIO WIRE N/A 1/20/2020    Procedure: Fractional Flow Reserve;  Surgeon: Magdiel Hernandez MD;  Location: [facility]  "CARDIAC CATH LAB     CV INTRAVASULAR ULTRASOUND N/A 6/28/2021    Procedure: Intravascular Ultrasound;  Surgeon: Ravi Albright MD;  Location: [facility] CARDIAC CATH LAB     CV PCI STENT DRUG ELUTING N/A 1/20/2020    Procedure: Percutaneous Coronary Intervention Stent Drug Eluting;  Surgeon: Magdiel Hernandez MD;  Location: [facility] CARDIAC CATH LAB     CV PCI STENT DRUG ELUTING N/A 6/28/2021    Procedure: Percutaneous Coronary Intervention Stent Drug Eluting;  Surgeon: Ravi Albright MD;  Location: [facility] CARDIAC CATH LAB     HERNIA REPAIR  2014    [facility]       Family History:  Family History   Problem Relation Age of Onset     Coronary Artery Disease Brother 55        Fatal MI       Social History:  Social History     Socioeconomic History     Marital status:    Tobacco Use     Smoking status: Former Smoker     Smokeless tobacco: Never Used     Tobacco comment: 1997   Vaping Use     Vaping Use: Never used   Substance and Sexual Activity     Alcohol use: Yes     Comment: rarely      Drug use: No     Sexual activity: Yes     Partners: Female       Review of Systems:  Functional status: Independent in ADL's. Able to achieve 4 METS  Constitutional: No fever, chills, or sweats. No weight gain/loss   ENT: No visual disturbance, ear ache, epistaxis, sore throat  Allergies/Immunologic: Negative.   Respiratory: No cough, hemoptysia  Cardiovascular: As per HPI  GI: No nausea, vomiting, hematemesis, melena, or hematochezia  : No urinary frequency, dysuria, or hematuria  Integument: Negative  Psychiatric: Negative  Neuro: Negative  Endocrinology: Negative   Musculoskeletal: Negative    Physical Exam:  Vitals: BP (!) 153/87 (BP Location: Right arm, Patient Position: Supine, Cuff Size: Adult Regular)   Pulse 54   Ht 1.77 m (5' 9.69\")   Wt 82.1 kg (181 lb)   SpO2 97%   BMI 26.21 kg/m     General: NAD  HEENT:  Dentition intact.    Neck: No jugular venous distension. "   Heart: RRR with 3/6 ZENAIDA at RUSB  Lungs: CTA.    Abdomen: Soft, nontender, nondistended.   Extremities: No clubbing, cyanosis, or edema.  The pulses are +4/4 at the radial, brachial, femoral, popliteal, DP, and PT sites bilaterally.  No bruits are noted.  Neurologic: Alert and oriented to person/place/time, normal speech, gait and affect  Skin: No petechiae, purpura or rash.      Diagnostic Studies:      ECG:  Personally reviewed and interpreted by me.  SB HR 53, normal QRS    ECHO:   Reviewed     Laboratory Studies:  Personally reviewed and interpreted by me.    LIPID RESULTS:  Lab Results   Component Value Date    CHOL 132 06/27/2022    CHOL 138 06/17/2021    HDL 46 06/27/2022    HDL 49 06/17/2021    LDL 64 06/27/2022    LDL 66 06/17/2021    TRIG 109 06/27/2022    TRIG 116 06/17/2021    CHOLHDLRATIO 2.8 07/21/2015       LIVER ENZYME RESULTS:  Lab Results   Component Value Date    AST 28 06/27/2022    AST 20 06/17/2021    ALT 31 06/27/2022    ALT 29 06/17/2021       CBC RESULTS:  Lab Results   Component Value Date    WBC 10.4 11/08/2021    WBC 7.3 06/28/2021    RBC 5.02 11/08/2021    RBC 5.22 06/28/2021    HGB 15.5 11/08/2021    HGB 16.5 06/28/2021    HCT 47.2 11/08/2021    HCT 48.7 06/28/2021    MCV 94 11/08/2021    MCV 93 06/28/2021    MCH 30.9 11/08/2021    MCH 31.6 06/28/2021    MCHC 32.8 11/08/2021    MCHC 33.9 06/28/2021    RDW 12.9 11/08/2021    RDW 12.4 06/28/2021     11/08/2021     06/28/2021       BMP RESULTS:  Lab Results   Component Value Date     06/27/2022     06/28/2021    POTASSIUM 4.3 06/27/2022    POTASSIUM 4.4 06/28/2021    CHLORIDE 107 06/27/2022    CHLORIDE 108 06/28/2021    CO2 28 06/27/2022    CO2 26 06/28/2021    ANIONGAP 4 06/27/2022    ANIONGAP 4 06/28/2021    GLC 92 06/27/2022    GLC 92 06/28/2021    BUN 16 06/27/2022    BUN 14 06/28/2021    CR 1.16 06/27/2022    CR 1.11 06/28/2021    GFRESTIMATED 62 06/27/2022    GFRESTIMATED 61 06/28/2021    GFRESTBLACK 70  06/28/2021    CLAUDIA 9.6 06/27/2022    CLAUDIA 9.3 06/28/2021        Assessment and Plan   Tima Mckenzie who presents for pre-operative H&P in preparation for transcatheter aortic valve replacement on 8/17/22 at [facility].     He has the following specific operative considerations:      - RCRI score 2 corresponding with a 2.1% perioperative risk of MACE      - ALLI # of risks 3/8      - VTE risk = 1. If equal to or > 4, pharmacologic prophylaxis is indicated      - RIsk of PONV score = 1. If > 2, anti-emetic intervention recommended    1. Severe aortic valve stenosis: Patient reports progressive exertional dyspnea. His echo shows severe aortic stenosis, this was confirmed by CT TAVR. He has been evaluated by the our structural heart team and has been deemed an appropriate candidate for transcatheter aortic valve replacement. We discussed the procedure in detail, including pre and post-procedure care, restrictions and follow-up. He understands risks including 5-10% risk of heart block leading to permanent pacemaker placement, 3% risk of bleeding, infection, vascular complication, 1-3% risk of stroke and very low risk (<1%) of serious complications such as cardiac perforation, aortic root rupture, dissection or death.     All questions answered  Type and screen orders complete  COVID test ordered  Supplies for scrubbing provided  No known contrast dye allergy  No trouble with anesthesia in the past  Labs pending and will be reviewed day before procedure, no s/s of infection    2. Chronic diastolic heart failure, NYHA class II, Stage B: Secondary to valvular heart disease. No acute volume overload on exam, though patient does endorse significant exertional dyspnea. NT-BNP pending. Not currently on diuretic therapy, recommended treatment is TAVR.    3. CAD:   4. HTN:  5. HLD:  Hx of CAD with 3VCABG in [place], [place] in 1997 (LIMA to LAD, SVG to OM1 and possibly RCA) s/p PCI of RPDA, D1 and RCA  8/2019, pRCA PCI 6/21. Currently denies angina, but does endorse exertional dyspnea. Continue ASA, Brillinta, Metoprolol Imdur and Statin.       Medication Recommendations:  Antiplatelet:  night before and morning of procedure, continue Brilinta morning of   BB: Continue perioperatively without interruption  Supplements: Hold morning of procedure    Patient is optimized and is acceptable candidate for the proposed procedure.  No further diagnostic evaluation is needed. Pre-procedure instructions provided in written format.     MARIA DEL CARMEN Nuñez, CNP  Structural Heart Care Nurse Practitioner  [facility]  Clinic: [phone]  Pager: [phone]    Addendum:   Pre-op labs reviewed and within normal limits. Proceed with TAVR 8/17/22.

## 2022-08-08 ENCOUNTER — OFFICE VISIT (OUTPATIENT)
Dept: CARDIOLOGY | Facility: CLINIC | Age: 87
End: 2022-08-08
Attending: NURSE PRACTITIONER
Payer: COMMERCIAL

## 2022-08-08 VITALS
DIASTOLIC BLOOD PRESSURE: 87 MMHG | SYSTOLIC BLOOD PRESSURE: 153 MMHG | HEART RATE: 54 BPM | OXYGEN SATURATION: 97 % | BODY MASS INDEX: 25.91 KG/M2 | WEIGHT: 181 LBS | HEIGHT: 70 IN

## 2022-08-08 DIAGNOSIS — Z01.818 PRE-OP EXAM: ICD-10-CM

## 2022-08-08 DIAGNOSIS — I50.33 ACUTE ON CHRONIC DIASTOLIC (CONGESTIVE) HEART FAILURE (H): ICD-10-CM

## 2022-08-08 DIAGNOSIS — I35.0 SEVERE AORTIC STENOSIS: Primary | ICD-10-CM

## 2022-08-08 LAB
ATRIAL RATE - MUSE: 60 BPM
DIASTOLIC BLOOD PRESSURE - MUSE: NORMAL MMHG
INTERPRETATION ECG - MUSE: NORMAL
P AXIS - MUSE: 61 DEGREES
PR INTERVAL - MUSE: 200 MS
QRS DURATION - MUSE: 96 MS
QT - MUSE: 382 MS
QTC - MUSE: 382 MS
R AXIS - MUSE: -23 DEGREES
SYSTOLIC BLOOD PRESSURE - MUSE: NORMAL MMHG
T AXIS - MUSE: 109 DEGREES
VENTRICULAR RATE- MUSE: 60 BPM

## 2022-08-08 PROCEDURE — G0463 HOSPITAL OUTPT CLINIC VISIT: HCPCS | Mod: 25

## 2022-08-08 PROCEDURE — 99214 OFFICE O/P EST MOD 30 MIN: CPT | Performed by: NURSE PRACTITIONER

## 2022-08-08 PROCEDURE — 93005 ELECTROCARDIOGRAM TRACING: CPT

## 2022-08-08 ASSESSMENT — PAIN SCALES - GENERAL: PAINLEVEL: NO PAIN (0)

## 2022-08-08 NOTE — PATIENT INSTRUCTIONS
Check in to the hospital Admissions/3C at 5:30 am.     Nothing to eat after midnight; water, apple juice or 7up/Sprite is OK up to two hours prior to your scheduled procedure.  You may take your medications in the morning with a sip of water.     Take a shower, with antibacterial soap/wipes, the night before the procedure, and the morning of the procedure.        Diagnosis: Severe aortic stenosis  Plan:  1. TAVR procedure scheduled for 8/17/22.       2. Medications instructions:  -- Continue your Brilinta  -- Take Asprin 325 mg the day before and the morning of your TAVR procedure.  -- Continue metoprolol and Imdur the morning of your TAVR.  -- Please hold all suplements the morning of your TAVR.     If any questions please contact:  Gretchen Hilario RN  Structural Heart RN Specialists  TAVR, MitraClip and Watchman Programs  [facility]  [phone]

## 2022-08-08 NOTE — LETTER
8/8/2022      RE: Tima Mckenzie  [address]  [facility] [address]       Dear Colleague,    Thank you for the opportunity to participate in the care of your patient, Tima Mckenzie, at the [facility] [place] at [facility]. Please see a copy of my visit note below.        STRUCTURAL HEART CLINIC  H&P    Referring Provider: Dr. Parker  Primary Cardiologist: Dr. Hernandez    History of Present Illness  Tima Mckenzie who presents for pre-operative H&P in preparation for transcatheter aortic valve replacement on 8/17/22 at [facility].     His aortic stenosis is characterized with an area of 1.2 cm2, mean gradient 22 mmHg, peak velocity of 3.1 m/sec with LVEF 60-65% and DI 0.21 by echocardiogram on 7/15/2022 and an aortic valve Ca score of 3301 and JESS of 1.19 by planimetry consistent with severe aortic stenosis. Her reports symptoms of exertional dyspnea and exercise intolerance. Patient was evaluated in structural heart clinic where he/she was deemed an appropriate TAVR candidate. He was counseled for the above procedure.     Additional notable medical history of HTN, HLD, and CAD with 3VCABG in [place], [place] in 1997 (LIMA to LAD, SVG to OM1 and possibly RCA) s/p PCI of RPDA, D1 and RCA 8/2019, pRCA PCI 6/21.     Feeling alright, stable heart symptoms, no recent infectious symptoms.     History of blood transfusions/reactions: No   History of abnormal bleeding/anti-platelet use: No tolerating DAPT   Steroid use in the last year: No   Personal or family history with difficulty with anesthesia: No   Infection precautions: No   Difficulty w/bedrest: No     Current Medications:  Current Outpatient Medications   Medication Sig Dispense Refill     aspirin 81 MG EC tablet Take 81 mg by mouth       isosorbide mononitrate (IMDUR) 60 MG 24 hr tablet Take 1 tablet (60 mg) by mouth every morning 90 tablet 2      MAGNESIUM PO Take by mouth daily (Patient not taking: No sig reported)       metoprolol succinate ER (TOPROL XL) 25 MG 24 hr tablet Take 1 tablet (25 mg) by mouth daily 90 tablet 3     mometasone (NASONEX) 50 MCG/ACT nasal spray Spray 2 sprays into both nostrils as needed (nasal congestion) 17 g 1     multivitamin, therapeutic (THERA-VIT) TABS tablet Take 1 tablet by mouth daily       nitroGLYcerin (NITROSTAT) 0.4 MG sublingual tablet Place 1 tablet (0.4 mg) under the tongue every 5 minutes as needed for chest pain up to 3 tablets per episode. 25 tablet 3     rosuvastatin (CRESTOR) 40 MG tablet Take 1 tablet (40 mg) by mouth daily 90 tablet 3     ticagrelor (BRILINTA) 90 MG tablet Take 1 tablet (90 mg) by mouth 2 times daily Start this evening. 180 tablet 3       Allergies:     Allergies   Allergen Reactions     No Known Drug Allergy      Seasonal Allergies        Past Medical History:  Past Medical History:   Diagnosis Date     Arthritis      BPH (benign prostatic hypertrophy)      Coronary artery disease      Diverticulosis      Hypercholesterolemia      Hypertension      MI (myocardial infarction) (H) 3/1997    Anterior Wall MI/LAD/OM1/SVG    CABG X 3     Neuropathy     RT Foot     Seasonal allergic rhinitis 7/1/2013       Past Surgical History:  Past Surgical History:   Procedure Laterality Date     ANGIOGRAM  4/30/2003    No Disease, Clemons,LAD,SVG,OM1- small LT Main, Occlusion of CX- D1 50-70% Stenosis, RCA 30-80%     COLONOSCOPY       COLONOSCOPY N/A 10/14/2014    Procedure: COMBINED COLONOSCOPY, SINGLE BIOPSY/POLYPECTOMY BY BIOPSY;  Surgeon: Konstantin Coates MD;  Location:  OR     COLONOSCOPY WITH CO2 INSUFFLATION N/A 10/14/2014    Procedure: COLONOSCOPY WITH CO2 INSUFFLATION;  Surgeon: Konstantin Coates MD;  Location: [facility] OR     CORONARY ARTERY BYPASS  1997    X 3     CV CORONARY ANGIOGRAM N/A 1/20/2020    Procedure: CV CORONARY ANGIOGRAM;  Surgeon: Magdiel Hernandez MD;  Location:  HEART  CARDIAC CATH LAB     CV CORONARY ANGIOGRAM N/A 6/28/2021    Procedure: CV CORONARY ANGIOGRAM;  Surgeon: Ravi Albright MD;  Location: [facility] CARDIAC CATH LAB     CV FRACTIONAL FLOW RATIO WIRE N/A 1/20/2020    Procedure: Fractional Flow Reserve;  Surgeon: Magdiel Hernandez MD;  Location: [facility] CARDIAC CATH LAB     CV INTRAVASULAR ULTRASOUND N/A 6/28/2021    Procedure: Intravascular Ultrasound;  Surgeon: Ravi Albright MD;  Location: [facility] CARDIAC CATH LAB     CV PCI STENT DRUG ELUTING N/A 1/20/2020    Procedure: Percutaneous Coronary Intervention Stent Drug Eluting;  Surgeon: Magdiel Hernandez MD;  Location: [facility] CARDIAC CATH LAB     CV PCI STENT DRUG ELUTING N/A 6/28/2021    Procedure: Percutaneous Coronary Intervention Stent Drug Eluting;  Surgeon: Ravi Albright MD;  Location: [facility] CARDIAC CATH LAB     HERNIA REPAIR  [address] [place] [address]       Family History:  Family History   Problem Relation Age of Onset     Coronary Artery Disease Brother 55        Fatal MI       Social History:  Social History     Socioeconomic History     Marital status:    Tobacco Use     Smoking status: Former Smoker     Smokeless tobacco: Never Used     Tobacco comment: 1997   Vaping Use     Vaping Use: Never used   Substance and Sexual Activity     Alcohol use: Yes     Comment: rarely      Drug use: No     Sexual activity: Yes     Partners: Female       Review of Systems:  Functional status: Independent in ADL's. Able to achieve 4 METS  Constitutional: No fever, chills, or sweats. No weight gain/loss   ENT: No visual disturbance, ear ache, epistaxis, sore throat  Allergies/Immunologic: Negative.   Respiratory: No cough, hemoptysia  Cardiovascular: As per HPI  GI: No nausea, vomiting, hematemesis, melena, or hematochezia  : No urinary frequency, dysuria, or hematuria  Integument: Negative  Psychiatric: Negative  Neuro: Negative  Endocrinology: Negative  "  Musculoskeletal: Negative    Physical Exam:  Vitals: BP (!) 153/87 (BP Location: Right arm, Patient Position: Supine, Cuff Size: Adult Regular)   Pulse 54   Ht 1.77 m (5' 9.69\")   Wt 82.1 kg (181 lb)   SpO2 97%   BMI 26.21 kg/m     General: NAD  HEENT:  Dentition intact.    Neck: No jugular venous distension.   Heart: RRR with 3/6 ZENAIDA at RUSB  Lungs: CTA.    Abdomen: Soft, nontender, nondistended.   Extremities: No clubbing, cyanosis, or edema.  The pulses are +4/4 at the radial, brachial, femoral, popliteal, DP, and PT sites bilaterally.  No bruits are noted.  Neurologic: Alert and oriented to person/place/time, normal speech, gait and affect  Skin: No petechiae, purpura or rash.      Diagnostic Studies:      ECG:  Personally reviewed and interpreted by me.  SB HR 53, normal QRS    ECHO:   Reviewed     Laboratory Studies:  Personally reviewed and interpreted by me.    LIPID RESULTS:  Lab Results   Component Value Date    CHOL 132 06/27/2022    CHOL 138 06/17/2021    HDL 46 06/27/2022    HDL 49 06/17/2021    LDL 64 06/27/2022    LDL 66 06/17/2021    TRIG 109 06/27/2022    TRIG 116 06/17/2021    CHOLHDLRATIO 2.8 07/21/2015       LIVER ENZYME RESULTS:  Lab Results   Component Value Date    AST 28 06/27/2022    AST 20 06/17/2021    ALT 31 06/27/2022    ALT 29 06/17/2021       CBC RESULTS:  Lab Results   Component Value Date    WBC 10.4 11/08/2021    WBC 7.3 06/28/2021    RBC 5.02 11/08/2021    RBC 5.22 06/28/2021    HGB 15.5 11/08/2021    HGB 16.5 06/28/2021    HCT 47.2 11/08/2021    HCT 48.7 06/28/2021    MCV 94 11/08/2021    MCV 93 06/28/2021    MCH 30.9 11/08/2021    MCH 31.6 06/28/2021    MCHC 32.8 11/08/2021    MCHC 33.9 06/28/2021    RDW 12.9 11/08/2021    RDW 12.4 06/28/2021     11/08/2021     06/28/2021       BMP RESULTS:  Lab Results   Component Value Date     06/27/2022     06/28/2021    POTASSIUM 4.3 06/27/2022    POTASSIUM 4.4 06/28/2021    CHLORIDE 107 06/27/2022    " CHLORIDE 108 06/28/2021    CO2 28 06/27/2022    CO2 26 06/28/2021    ANIONGAP 4 06/27/2022    ANIONGAP 4 06/28/2021    GLC 92 06/27/2022    GLC 92 06/28/2021    BUN 16 06/27/2022    BUN 14 06/28/2021    CR 1.16 06/27/2022    CR 1.11 06/28/2021    GFRESTIMATED 62 06/27/2022    GFRESTIMATED 61 06/28/2021    GFRESTBLACK 70 06/28/2021    CLAUDIA 9.6 06/27/2022    CLAUDIA 9.3 06/28/2021        Assessment and Plan   Tima Mckenzie who presents for pre-operative H&P in preparation for transcatheter aortic valve replacement on 8/17/22 at [facility].     He has the following specific operative considerations:      - RCRI score 2 corresponding with a 2.1% perioperative risk of MACE      - ALLI # of risks 3/8      - VTE risk = 1. If equal to or > 4, pharmacologic prophylaxis is indicated      - RIsk of PONV score = 1. If > 2, anti-emetic intervention recommended    1. Severe aortic valve stenosis: Patient reports progressive exertional dyspnea. His echo shows severe aortic stenosis, this was confirmed by CT TAVR. He has been evaluated by the our structural heart team and has been deemed an appropriate candidate for transcatheter aortic valve replacement. We discussed the procedure in detail, including pre and post-procedure care, restrictions and follow-up. He understands risks including 5-10% risk of heart block leading to permanent pacemaker placement, 3% risk of bleeding, infection, vascular complication, 1-3% risk of stroke and very low risk (<1%) of serious complications such as cardiac perforation, aortic root rupture, dissection or death.     All questions answered  Type and screen orders complete  COVID test ordered  Supplies for scrubbing provided  No known contrast dye allergy  No trouble with anesthesia in the past  Labs pending and will be reviewed day before procedure, no s/s of infection    2. Chronic diastolic heart failure, NYHA class II, Stage B: Secondary to valvular heart disease. No  acute volume overload on exam, though patient does endorse significant exertional dyspnea. NT-BNP pending. Not currently on diuretic therapy, recommended treatment is TAVR.    3. CAD:   4. HTN:  5. HLD:  Hx of CAD with 3VCABG in [place], [place] in 1997 (LIMA to LAD, SVG to OM1 and possibly RCA) s/p PCI of RPDA, D1 and RCA 8/2019, pRCA PCI 6/21. Currently denies angina, but does endorse exertional dyspnea. Continue ASA, Brillinta, Metoprolol Imdur and Statin.       Medication Recommendations:  Antiplatelet:  night before and morning of procedure, continue Brilinta morning of   BB: Continue perioperatively without interruption  Supplements: Hold morning of procedure    Patient is optimized and is acceptable candidate for the proposed procedure.  No further diagnostic evaluation is needed. Pre-procedure instructions provided in written format.     MARIA DEL CARMEN Nuñez, CNP  Structural Heart Care Nurse Practitioner  [facility]  Clinic: [phone]  Pager: [phone]

## 2022-08-08 NOTE — PROGRESS NOTES
Date: 8/8/2022    Time of Call: 8:22 AM     Diagnosis:  Severe aortic stenosis     [ TORB ] Ordering provider: Doris Poole CNP  Order: TAVR scheduling orders, labs, ECHO     Order received by: Gretchen Hilario RN     Follow-up/additional notes:

## 2022-08-08 NOTE — NURSING NOTE
Chief Complaint   Patient presents with     Follow Up     Return TAVR- 86 yo M, here for history and physicial prior to scheduled TAVR procedure.     Vitals were taken, medications reconciled, and EKG was performed.    RENE Bowles  8:30 AM

## 2022-08-08 NOTE — PROGRESS NOTES
Check in to the hospital Admissions/3C at 5:30 am.    Nothing to eat after midnight; water, apple juice or 7up/Sprite is OK up to two hours prior to your scheduled procedure.  You may take your medications in the morning with a sip of water.    Take a shower, with antibacterial soap/wipes, the night before the procedure, and the morning of the procedure.      Diagnosis: Severe aortic stenosis  Plan:  1. TAVR procedure scheduled for 8/17/22.      2. Medications instructions:  -- Continue your Brilinta  -- Take Asprin 365 mg the day before and the morning of your TAVR procedure.  -- Hold your metoprolol and Imdur the morning of your TAVR.  -- Please hold all suplements the morning of your TAVR.    If any questions please contact:  Gretchen Hilario RN  Structural Heart RN Specialists  TAVR, MitraClip and Watchman Programs  [facility]  [phone]

## 2022-08-09 LAB
ATRIAL RATE - MUSE: 53 BPM
DIASTOLIC BLOOD PRESSURE - MUSE: NORMAL MMHG
INTERPRETATION ECG - MUSE: NORMAL
P AXIS - MUSE: 26 DEGREES
PR INTERVAL - MUSE: 204 MS
QRS DURATION - MUSE: 98 MS
QT - MUSE: 422 MS
QTC - MUSE: 395 MS
R AXIS - MUSE: -22 DEGREES
SYSTOLIC BLOOD PRESSURE - MUSE: NORMAL MMHG
T AXIS - MUSE: 110 DEGREES
VENTRICULAR RATE- MUSE: 53 BPM

## 2022-08-10 DIAGNOSIS — I35.0 SEVERE AORTIC STENOSIS: Primary | ICD-10-CM

## 2022-08-10 RX ORDER — ASPIRIN 325 MG
325 TABLET ORAL DAILY
Status: CANCELLED | OUTPATIENT
Start: 2022-08-10

## 2022-08-10 RX ORDER — SODIUM CHLORIDE 9 MG/ML
INJECTION, SOLUTION INTRAVENOUS CONTINUOUS
Status: CANCELLED | OUTPATIENT
Start: 2022-08-10

## 2022-08-10 RX ORDER — LIDOCAINE 40 MG/G
CREAM TOPICAL
Status: CANCELLED | OUTPATIENT
Start: 2022-08-10

## 2022-08-10 NOTE — PROGRESS NOTES
Date: 8/10/2022    Time of Call: 12:33 PM     Diagnosis:  Severe aortic stenosis     [ TORB ] Ordering provider: Magdiel Hernandez MD  Order: TAVR pre procedure order set, Blood componenets     Order received by: Gretchen Hilario     Follow-up/additional notes:

## 2022-08-11 ENCOUNTER — CARE COORDINATION (OUTPATIENT)
Dept: CARDIOLOGY | Facility: CLINIC | Age: 87
End: 2022-08-11

## 2022-08-11 ENCOUNTER — VIRTUAL VISIT (OUTPATIENT)
Dept: CARDIOLOGY | Facility: CLINIC | Age: 87
End: 2022-08-11
Attending: THORACIC SURGERY (CARDIOTHORACIC VASCULAR SURGERY)
Payer: COMMERCIAL

## 2022-08-11 DIAGNOSIS — I35.0 SEVERE AORTIC STENOSIS: Primary | ICD-10-CM

## 2022-08-11 PROCEDURE — 99204 OFFICE O/P NEW MOD 45 MIN: CPT | Mod: 95 | Performed by: THORACIC SURGERY (CARDIOTHORACIC VASCULAR SURGERY)

## 2022-08-11 NOTE — NURSING NOTE
Chief Complaint   Patient presents with     Consult     TAVR 8/17, no questions while rooming, pts states he has received a lot of information, no other vitals to report, states they were taken recently      Patient declined individual allergy and medication review by support staff because they deny any changes and state that all information remains accurate since last reviewed/verified.   Reviewed this week, no changes per pt     Jeremy Chatterjee, VF/CMA

## 2022-08-11 NOTE — LETTER
8/11/2022      RE: Tima Mckenzie  [address]  [facility] [address]       Dear Colleague,    Thank you for the opportunity to participate in the care of your patient, Tima Mckenzie, at the [facility] [place] [facility]. Please see a copy of my visit note below.    [facility] CARDIOTHORACIC SURGERY CONSULT  Patient Name: Tima Mckenzie  Medical Record Number: [id number]  YOB: 1935  Room Number: Room/bed info not found  Referring Physician: Dr. Hernandez    CC: Severe aortic stenosis    History of Present Illness: Tima Mckenzie is a 87 year old male in preparation for transcatheter aortic valve replacement on 8/17/22 at [facility].      His aortic stenosis is characterized with an area of 1.2 cm2, mean gradient 22 mmHg, peak velocity of 3.1 m/sec with LVEF 60-65% and DI 0.21 by echocardiogram on 7/15/2022 and an aortic valve Ca score of 3301 and JESS of 1.19 by planimetry consistent with severe aortic stenosis. Her reports symptoms of exertional dyspnea and exercise intolerance. Patient was evaluated in structural heart clinic where he/she was deemed an appropriate TAVR candidate. He was counseled for the above procedure.      Additional notable medical history of HTN, HLD, and CAD with 3VCABG in [place], [place] in 1997 (LIMA to LAD, SVG to OM1 and possibly RCA) s/p PCI of RPDA, D1 and RCA 8/2019, pRCA PCI 6/21.     Assessment and Plan:  Tima Mckenzie is a 87 year old male with severe aortic stenosis  1) Recommend TAVR  2) Surgical bailout - yes   3) Please call with questions or concerns.     Thank you for the opportunity to participate in the care of this patient.    Gopi Yeh MD  Cardiothoracic Surgery  [phone]    Past Medical History:  Past Medical History:   Diagnosis Date     Arthritis      BPH (benign prostatic hypertrophy)      Coronary artery disease       Diverticulosis      Hypercholesterolemia      Hypertension      MI (myocardial infarction) (H) 3/1997    Anterior Wall MI/LAD/OM1/SVG    CABG X 3     Neuropathy     RT Foot     Seasonal allergic rhinitis 7/1/2013       Past Surgical History:  Past Surgical History:   Procedure Laterality Date     ANGIOGRAM  4/30/2003    No Disease, Clemons,LAD,SVG,OM1- small LT Main, Occlusion of CX- D1 50-70% Stenosis, RCA 30-80%     COLONOSCOPY       COLONOSCOPY N/A 10/14/2014    Procedure: COMBINED COLONOSCOPY, SINGLE BIOPSY/POLYPECTOMY BY BIOPSY;  Surgeon: Konstantin Coates MD;  Location: [facility] OR     COLONOSCOPY WITH CO2 INSUFFLATION N/A 10/14/2014    Procedure: COLONOSCOPY WITH CO2 INSUFFLATION;  Surgeon: Konstantin Coates MD;  Location: [facility]     CORONARY ARTERY BYPASS  1997    X 3     CV CORONARY ANGIOGRAM N/A 1/20/2020    Procedure: CV CORONARY ANGIOGRAM;  Surgeon: Magdiel Hernandez MD;  Location:  HEART CARDIAC CATH LAB     CV CORONARY ANGIOGRAM N/A 6/28/2021    Procedure: CV CORONARY ANGIOGRAM;  Surgeon: Ravi Albright MD;  Location:  HEART CARDIAC CATH LAB     CV FRACTIONAL FLOW RATIO WIRE N/A 1/20/2020    Procedure: Fractional Flow Reserve;  Surgeon: Magdiel Hernandez MD;  Location:  HEART CARDIAC CATH LAB     CV INTRAVASULAR ULTRASOUND N/A 6/28/2021    Procedure: Intravascular Ultrasound;  Surgeon: Ravi Albright MD;  Location:  HEART CARDIAC CATH LAB     CV PCI STENT DRUG ELUTING N/A 1/20/2020    Procedure: Percutaneous Coronary Intervention Stent Drug Eluting;  Surgeon: Magdiel Hernandez MD;  Location:  HEART CARDIAC CATH LAB     CV PCI STENT DRUG ELUTING N/A 6/28/2021    Procedure: Percutaneous Coronary Intervention Stent Drug Eluting;  Surgeon: Ravi Albright MD;  Location:  HEART CARDIAC CATH LAB     HERNIA REPAIR  2014    [facility]        Family History:   Family History   Problem Relation Age of Onset     Coronary Artery Disease Brother  55        Fatal MI       Social History:  Social History     Socioeconomic History     Marital status:      Spouse name: Not on file     Number of children: Not on file     Years of education: Not on file     Highest education level: Not on file   Occupational History     Not on file   Tobacco Use     Smoking status: Former Smoker     Smokeless tobacco: Never Used     Tobacco comment: 1997   Vaping Use     Vaping Use: Never used   Substance and Sexual Activity     Alcohol use: Yes     Comment: rarely      Drug use: No     Sexual activity: Yes     Partners: Female   Other Topics Concern     Parent/sibling w/ CABG, MI or angioplasty before 65F 55M? Not Asked   Social History Narrative     Not on file     Social Determinants of Health     Financial Resource Strain: Not on file   Food Insecurity: Not on file   Transportation Needs: Not on file   Physical Activity: Not on file   Stress: Not on file   Social Connections: Not on file   Intimate Partner Violence: Not on file   Housing Stability: Not on file       Allergies:   Allergies   Allergen Reactions     No Known Drug Allergy      Seasonal Allergies        Medications:  Current Outpatient Medications   Medication     aspirin 81 MG EC tablet     isosorbide mononitrate (IMDUR) 60 MG 24 hr tablet     MAGNESIUM PO     metoprolol succinate ER (TOPROL XL) 25 MG 24 hr tablet     mometasone (NASONEX) 50 MCG/ACT nasal spray     multivitamin, therapeutic (THERA-VIT) TABS tablet     nitroGLYcerin (NITROSTAT) 0.4 MG sublingual tablet     rosuvastatin (CRESTOR) 40 MG tablet     ticagrelor (BRILINTA) 90 MG tablet     No current facility-administered medications for this visit.       Review of Systems:   A 10 point ROS was performed and is negative other than HPI.    Physical Exam:  Not performed - virtual    Labs:  Lab Results   Component Value Date    WBC 6.3 08/15/2022    WBC 7.3 06/28/2021     Lab Results   Component Value Date    RBC 5.18 08/15/2022    RBC 5.22  06/28/2021     Lab Results   Component Value Date    HGB 16.1 08/15/2022    HGB 16.5 06/28/2021     Lab Results   Component Value Date    HCT 47.8 08/15/2022    HCT 48.7 06/28/2021     No components found for: MCT  Lab Results   Component Value Date    MCV 92 08/15/2022    MCV 93 06/28/2021     Lab Results   Component Value Date    MCH 31.1 08/15/2022    MCH 31.6 06/28/2021     Lab Results   Component Value Date    MCHC 33.7 08/15/2022    MCHC 33.9 06/28/2021     Lab Results   Component Value Date    RDW 12.7 08/15/2022    RDW 12.4 06/28/2021     Lab Results   Component Value Date     08/15/2022     06/28/2021     Last Comprehensive Metabolic Panel:  Sodium   Date Value Ref Range Status   08/15/2022 142 133 - 144 mmol/L Final   06/28/2021 139 133 - 144 mmol/L Final     Potassium   Date Value Ref Range Status   08/15/2022 4.4 3.4 - 5.3 mmol/L Final   06/28/2021 4.4 3.4 - 5.3 mmol/L Final     Chloride   Date Value Ref Range Status   08/15/2022 107 94 - 109 mmol/L Final   06/28/2021 108 94 - 109 mmol/L Final     Carbon Dioxide   Date Value Ref Range Status   06/28/2021 26 20 - 32 mmol/L Final     Carbon Dioxide (CO2)   Date Value Ref Range Status   08/15/2022 28 20 - 32 mmol/L Final     Anion Gap   Date Value Ref Range Status   08/15/2022 7 3 - 14 mmol/L Final   06/28/2021 4 3 - 14 mmol/L Final     Glucose   Date Value Ref Range Status   08/15/2022 106 (H) 70 - 99 mg/dL Final   06/28/2021 92 70 - 99 mg/dL Final     Urea Nitrogen   Date Value Ref Range Status   08/15/2022 24 7 - 30 mg/dL Final   06/28/2021 14 7 - 30 mg/dL Final     Creatinine   Date Value Ref Range Status   08/15/2022 1.06 0.66 - 1.25 mg/dL Final   06/28/2021 1.11 0.66 - 1.25 mg/dL Final     GFR Estimate   Date Value Ref Range Status   08/15/2022 69 >60 mL/min/1.73m2 Final     Comment:     Effective December 21, 2021 eGFRcr in adults is calculated using the 2021 CKD-EPI creatinine equation which includes age and gender (Latasha et al., NEJM,  DOI: 10.1056/SHTFjj7922411)   06/28/2021 61 >60 mL/min/[1.73_m2] Final     Comment:     Non  GFR Calc  Starting 12/18/2018, serum creatinine based estimated GFR (eGFR) will be   calculated using the Chronic Kidney Disease Epidemiology Collaboration   (CKD-EPI) equation.       Calcium   Date Value Ref Range Status   08/15/2022 9.1 8.5 - 10.1 mg/dL Final   06/28/2021 9.3 8.5 - 10.1 mg/dL Final         Imaging:  Echocardiogram:     At least moderate low-flow, low-gradient aortic stenosis. Due to lack of  stroke volume measurement at peak exercise and also due to significant  limitation in exercise capacity, severe low-flow low-gradient aortic stenosis  is not fully excluded by this study. Recommend further characterization by CT  if clinical suspicion warrants.     The aortic valve is severely calcified with restricted leaflet motion.  Baseline Doppler parameters are consistent with moderate paradoxical (normal-  EF) low-flow, low-gradient aortic stenosis (peak velocity 3.1 m/s Mean  gradient 22 mmHg DVI 0.26 JESS 1.26 cm2 1.14 cm2 SVI 35 mL/m2 using an LVOT  diameter of 2.5 cm.)  With exercise, aortic valve gradients increased (peak velocity 3.7 m/s, mean  gradient 29 mmHg.) JESS and stroke volume at peak exercise cannot be calculated  as LVOT Doppler interrogation was not performed.     Target heart rate was not achieved. Exercise was terminated after 3:28 at a  heart rate of 89 bpm (66% MPHR) and a workload of 75W due to severe dyspnea.  Blunted heart rate and normal BP response to exercise.  Functional capacity is reduced for age.  Baseline ECG shows sinus rhythm with nonspecific ST/T wave abnormalities. No  ECG evidence of ischemia at a below-diagnostic workload. Frequent PACs were  present throughout the study.     Normal biventricular size and global systolic function at baseline, LVEF=60-  65%.  With exercise at a below-diagnostic workload, LVEF increased to 65-70% and LV  cavity size decreased  appropriately.  Protocol is suboptimal for evaluation of regional wall motion and is non-  diagnostic for ischemia due to submaximal heart rate and suboptimal  endocardial definition. No gross regional wall motion abnormalities are  present at rest or with exercise on limited imaging of regional wall motion at  a below-diagnostic workload.  Mildly dilated aortic root and proximal ascending aorta measuring 4.2 cm (2.1  cm/m2) and 4 cm (2 cm/m2) respectively, unchanged from prior studies.       Coronary Angiogram 1/20/20  Obstructive coronary artery disease   Vein graft closed, LIMA patent   S/p PCI of pRCA, rPDA, D1 with drug eluting stents     Conclusion   1st Diag lesion is 80% stenosed.  Prox RCA lesion is 70% stenosed.  Mid RCA lesion is 30% stenosed.  RPDA lesion is 80% stenosed.  Mid LAD lesion is 100% stenosed.  Ost Cx to Prox Cx lesion is 99% stenosed.     Carotid Ultrasound: pending      STS RISK SCORE: 3.1   FRAILTY SCORE: 0/6  NYHA CLASS: III      Please do not hesitate to contact me if you have any questions/concerns.     Sincerely,     Gopi Yeh MD

## 2022-08-11 NOTE — PROGRESS NOTES
[facility] CARDIOTHORACIC SURGERY CONSULT  Patient Name: Tima Mckenzie  Medical Record Number: [id number]  YOB: 1935  Room Number: Room/bed info not found  Referring Physician: Dr. Hernandez    CC: Severe aortic stenosis    History of Present Illness: Tima Mckenzie is a 87 year old male in preparation for transcatheter aortic valve replacement on 8/17/22 at [facility].      His aortic stenosis is characterized with an area of 1.2 cm2, mean gradient 22 mmHg, peak velocity of 3.1 m/sec with LVEF 60-65% and DI 0.21 by echocardiogram on 7/15/2022 and an aortic valve Ca score of 3301 and JESS of 1.19 by planimetry consistent with severe aortic stenosis. Her reports symptoms of exertional dyspnea and exercise intolerance. Patient was evaluated in structural heart clinic where he/she was deemed an appropriate TAVR candidate. He was counseled for the above procedure.      Additional notable medical history of HTN, HLD, and CAD with 3VCABG in [place], [place] in 1997 (LIMA to LAD, SVG to OM1 and possibly RCA) s/p PCI of RPDA, D1 and RCA 8/2019, pRCA PCI 6/21.     Assessment and Plan:  Tima Mckenzie is a 87 year old male with severe aortic stenosis  1) Recommend TAVR  2) Surgical bailout - yes   3) Please call with questions or concerns.     Thank you for the opportunity to participate in the care of this patient.    Gopi Yeh MD  Cardiothoracic Surgery  [phone]    Past Medical History:  Past Medical History:   Diagnosis Date     Arthritis      BPH (benign prostatic hypertrophy)      Coronary artery disease      Diverticulosis      Hypercholesterolemia      Hypertension      MI (myocardial infarction) (H) 3/1997    Anterior Wall MI/LAD/OM1/SVG    CABG X 3     Neuropathy     RT Foot     Seasonal allergic rhinitis 7/1/2013       Past Surgical History:  Past Surgical History:   Procedure Laterality Date     ANGIOGRAM  4/30/2003    No Disease, Clemons,LAD,SVG,OM1- small LT  Main, Occlusion of CX- D1 50-70% Stenosis, RCA 30-80%     COLONOSCOPY       COLONOSCOPY N/A 10/14/2014    Procedure: COMBINED COLONOSCOPY, SINGLE BIOPSY/POLYPECTOMY BY BIOPSY;  Surgeon: Konstantin Coates MD;  Location: [facility] OR     COLONOSCOPY WITH CO2 INSUFFLATION N/A 10/14/2014    Procedure: COLONOSCOPY WITH CO2 INSUFFLATION;  Surgeon: Konstantin Coates MD;  Location: [facility] OR     CORONARY ARTERY BYPASS  1997    X 3     CV CORONARY ANGIOGRAM N/A 1/20/2020    Procedure: CV CORONARY ANGIOGRAM;  Surgeon: Magdiel Hernandez MD;  Location:  HEART CARDIAC CATH LAB     CV CORONARY ANGIOGRAM N/A 6/28/2021    Procedure: CV CORONARY ANGIOGRAM;  Surgeon: Ravi Albright MD;  Location: [facility] CARDIAC CATH LAB     CV FRACTIONAL FLOW RATIO WIRE N/A 1/20/2020    Procedure: Fractional Flow Reserve;  Surgeon: Magdiel Hernandez MD;  Location:  HEART CARDIAC CATH LAB     CV INTRAVASULAR ULTRASOUND N/A 6/28/2021    Procedure: Intravascular Ultrasound;  Surgeon: Ravi Albright MD;  Location:  HEART CARDIAC CATH LAB     CV PCI STENT DRUG ELUTING N/A 1/20/2020    Procedure: Percutaneous Coronary Intervention Stent Drug Eluting;  Surgeon: Magdiel Hernandez MD;  Location:  HEART CARDIAC CATH LAB     CV PCI STENT DRUG ELUTING N/A 6/28/2021    Procedure: Percutaneous Coronary Intervention Stent Drug Eluting;  Surgeon: Ravi Albright MD;  Location:  HEART CARDIAC CATH LAB     HERNIA REPAIR  [address] [place] [address]        Family History:   Family History   Problem Relation Age of Onset     Coronary Artery Disease Brother 55        Fatal MI       Social History:  Social History     Socioeconomic History     Marital status:      Spouse name: Not on file     Number of children: Not on file     Years of education: Not on file     Highest education level: Not on file   Occupational History     Not on file   Tobacco Use     Smoking status: Former Smoker     Smokeless  tobacco: Never Used     Tobacco comment: 1997   Vaping Use     Vaping Use: Never used   Substance and Sexual Activity     Alcohol use: Yes     Comment: rarely      Drug use: No     Sexual activity: Yes     Partners: Female   Other Topics Concern     Parent/sibling w/ CABG, MI or angioplasty before 65F 55M? Not Asked   Social History Narrative     Not on file     Social Determinants of Health     Financial Resource Strain: Not on file   Food Insecurity: Not on file   Transportation Needs: Not on file   Physical Activity: Not on file   Stress: Not on file   Social Connections: Not on file   Intimate Partner Violence: Not on file   Housing Stability: Not on file       Allergies:   Allergies   Allergen Reactions     No Known Drug Allergy      Seasonal Allergies        Medications:  Current Outpatient Medications   Medication     aspirin 81 MG EC tablet     isosorbide mononitrate (IMDUR) 60 MG 24 hr tablet     MAGNESIUM PO     metoprolol succinate ER (TOPROL XL) 25 MG 24 hr tablet     mometasone (NASONEX) 50 MCG/ACT nasal spray     multivitamin, therapeutic (THERA-VIT) TABS tablet     nitroGLYcerin (NITROSTAT) 0.4 MG sublingual tablet     rosuvastatin (CRESTOR) 40 MG tablet     ticagrelor (BRILINTA) 90 MG tablet     No current facility-administered medications for this visit.       Review of Systems:   A 10 point ROS was performed and is negative other than HPI.    Physical Exam:  Not performed - virtual    Labs:  Lab Results   Component Value Date    WBC 6.3 08/15/2022    WBC 7.3 06/28/2021     Lab Results   Component Value Date    RBC 5.18 08/15/2022    RBC 5.22 06/28/2021     Lab Results   Component Value Date    HGB 16.1 08/15/2022    HGB 16.5 06/28/2021     Lab Results   Component Value Date    HCT 47.8 08/15/2022    HCT 48.7 06/28/2021     No components found for: MCT  Lab Results   Component Value Date    MCV 92 08/15/2022    MCV 93 06/28/2021     Lab Results   Component Value Date    MCH 31.1 08/15/2022    MCH  31.6 06/28/2021     Lab Results   Component Value Date    MCHC 33.7 08/15/2022    MCHC 33.9 06/28/2021     Lab Results   Component Value Date    RDW 12.7 08/15/2022    RDW 12.4 06/28/2021     Lab Results   Component Value Date     08/15/2022     06/28/2021     Last Comprehensive Metabolic Panel:  Sodium   Date Value Ref Range Status   08/15/2022 142 133 - 144 mmol/L Final   06/28/2021 139 133 - 144 mmol/L Final     Potassium   Date Value Ref Range Status   08/15/2022 4.4 3.4 - 5.3 mmol/L Final   06/28/2021 4.4 3.4 - 5.3 mmol/L Final     Chloride   Date Value Ref Range Status   08/15/2022 107 94 - 109 mmol/L Final   06/28/2021 108 94 - 109 mmol/L Final     Carbon Dioxide   Date Value Ref Range Status   06/28/2021 26 20 - 32 mmol/L Final     Carbon Dioxide (CO2)   Date Value Ref Range Status   08/15/2022 28 20 - 32 mmol/L Final     Anion Gap   Date Value Ref Range Status   08/15/2022 7 3 - 14 mmol/L Final   06/28/2021 4 3 - 14 mmol/L Final     Glucose   Date Value Ref Range Status   08/15/2022 106 (H) 70 - 99 mg/dL Final   06/28/2021 92 70 - 99 mg/dL Final     Urea Nitrogen   Date Value Ref Range Status   08/15/2022 24 7 - 30 mg/dL Final   06/28/2021 14 7 - 30 mg/dL Final     Creatinine   Date Value Ref Range Status   08/15/2022 1.06 0.66 - 1.25 mg/dL Final   06/28/2021 1.11 0.66 - 1.25 mg/dL Final     GFR Estimate   Date Value Ref Range Status   08/15/2022 69 >60 mL/min/1.73m2 Final     Comment:     Effective December 21, 2021 eGFRcr in adults is calculated using the 2021 CKD-EPI creatinine equation which includes age and gender (Latasha torres al., NEJM, DOI: 10.1056/JYQZcr2401968)   06/28/2021 61 >60 mL/min/[1.73_m2] Final     Comment:     Non  GFR Calc  Starting 12/18/2018, serum creatinine based estimated GFR (eGFR) will be   calculated using the Chronic Kidney Disease Epidemiology Collaboration   (CKD-EPI) equation.       Calcium   Date Value Ref Range Status   08/15/2022 9.1 8.5 - 10.1  mg/dL Final   06/28/2021 9.3 8.5 - 10.1 mg/dL Final         Imaging:  Echocardiogram:     At least moderate low-flow, low-gradient aortic stenosis. Due to lack of  stroke volume measurement at peak exercise and also due to significant  limitation in exercise capacity, severe low-flow low-gradient aortic stenosis  is not fully excluded by this study. Recommend further characterization by CT  if clinical suspicion warrants.     The aortic valve is severely calcified with restricted leaflet motion.  Baseline Doppler parameters are consistent with moderate paradoxical (normal-  EF) low-flow, low-gradient aortic stenosis (peak velocity 3.1 m/s Mean  gradient 22 mmHg DVI 0.26 JESS 1.26 cm2 1.14 cm2 SVI 35 mL/m2 using an LVOT  diameter of 2.5 cm.)  With exercise, aortic valve gradients increased (peak velocity 3.7 m/s, mean  gradient 29 mmHg.) JESS and stroke volume at peak exercise cannot be calculated  as LVOT Doppler interrogation was not performed.     Target heart rate was not achieved. Exercise was terminated after 3:28 at a  heart rate of 89 bpm (66% MPHR) and a workload of 75W due to severe dyspnea.  Blunted heart rate and normal BP response to exercise.  Functional capacity is reduced for age.  Baseline ECG shows sinus rhythm with nonspecific ST/T wave abnormalities. No  ECG evidence of ischemia at a below-diagnostic workload. Frequent PACs were  present throughout the study.     Normal biventricular size and global systolic function at baseline, LVEF=60-  65%.  With exercise at a below-diagnostic workload, LVEF increased to 65-70% and LV  cavity size decreased appropriately.  Protocol is suboptimal for evaluation of regional wall motion and is non-  diagnostic for ischemia due to submaximal heart rate and suboptimal  endocardial definition. No gross regional wall motion abnormalities are  present at rest or with exercise on limited imaging of regional wall motion at  a below-diagnostic workload.  Mildly dilated  aortic root and proximal ascending aorta measuring 4.2 cm (2.1  cm/m2) and 4 cm (2 cm/m2) respectively, unchanged from prior studies.       Coronary Angiogram 1/20/20  Obstructive coronary artery disease   Vein graft closed, LIMA patent   S/p PCI of pRCA, rPDA, D1 with drug eluting stents     Conclusion   1st Diag lesion is 80% stenosed.  Prox RCA lesion is 70% stenosed.  Mid RCA lesion is 30% stenosed.  RPDA lesion is 80% stenosed.  Mid LAD lesion is 100% stenosed.  Ost Cx to Prox Cx lesion is 99% stenosed.     Carotid Ultrasound: pending      STS RISK SCORE: 3.1   FRAILTY SCORE: 0/6  NYHA CLASS: III

## 2022-08-11 NOTE — PROGRESS NOTES
Patient's case was discussed at Valve conference, attended by structural heart cardiologists, CV surgeons, CNP's, and structural heart care coordinators, on August 11, 2022    Patient is appropriate to proceed with TAVR   Khoury Valve  Size 29 mm  Approach: Right TF  [place]:  Yes  Special considerations:  No special considerations at this time.    Contacted patient to review discussion at Valve Conference.  Through shared decision making, patient agrees with the plan as outlined above.       Gretchen Hilario RN  Structural Heart RN [facility]  TAVR, MitraClip and Watchman Programs  [facility]  Office: [phone]

## 2022-08-13 NOTE — H&P
[facility]      CSI History and Physicial  Tima Mckenzie MRN: [id number]  1935  Date of Admission: (Not on file)  Primary care provider: Julieta Gandhi         Chief Complaint:   S/p TAVR    Assessment and Plan:     Tima Mckenzie is a 87 year old male with a PMHx of HFpEF, CAD s/p 3V CABG, PCI, HTN, HLD, and severe aortic stenosis who was admitted 8/17 for planned TAVR.      # Low Flow Low Gradient Severe aortic stenosis  # s/p TAVR  # Chronic Diastolic Heart Failure  Prior to procedure, pt noted to have a mean gradient 22 mmHG, JESS 1.2 cm^2, PV 3.1 m/s, echo findings c/w LFLG aortic stenosis. Now s/p TAVR with 29 mm ES3 valve. Procedure was uncomplicated. While in PACU, patient had small hematoma form RFA, manual pressure held, area marked. No arrhythmias noted; pt HR currently 55-60, sinus with incomplete RBBB (baseline). Pt feeling well.    - Volume Status: euvolemic, no need for diuretics at this time  - Bedrest per protocol.    - Neuro checks, per protocol.  - Monitor groin sites.   - EKG in morning.   - Echocardiogram tomorrow.   - Aspirin 81 mg and Brilinta 90 mg BID given recent PCI  - Cardiac rehab/PT/OT.  - Daily weights.   - Strict intake/output.     # CAD with 3V CABG in 1997 (LIMA to LAD, SVG to OM1 and possibly RCA) s/p PCI of RPDA, D1 and RCA 8/2019, pRCA PCI 6/2021  # HTN  # HLD  Currently denies angina symptoms   - DAPT: continue ASA and Brilinta  - Continue PTA Imdur for blood pressure  - Hold PTA Metoprolol due to high risk of AVB post TAVR, pending HR tomorrow may discontinue   - Continue PTA Statin    # Skin Abrasion   2/2 pre-TAVR shave. Several areas of skin tears on chest, pt reports some pain with this.   - No creams, lotions, powders to avoid infection  - Keep dry  - Cold compress ok    Diet: Regular  CODE: Full  Disposition: Anticipate discharge home in 1-2 days pending PT/OT assessment    Anastasiya Whitehead, APRN, CNP  [facility] Cardiology Team    Clinically  "Significant Risk Factors Present on Admission                 # Overweight: Estimated body mass index is 26.21 kg/m  as calculated from the following:    Height as of 8/8/22: 1.77 m (5' 9.69\").    Weight as of 8/8/22: 82.1 kg (181 lb).      Cardiovascular: Aortic valve stenosis, CAD, HTN, HLD, HFpEF            History of Present Illness:   Tima Mckenzie is a 87 year old male with a PMH significant for HFpEF, CAD s/p 3vCABG + PCI, HTN, HLD and severe aortic stenosis who presents for planned TAVR procedure with Dr. Hernandez.     Prior to procedure, pt noted to have a mean gradient 22 mmHG, JESS 1.2 cm^2, PV 3.1 m/s, echo findings c/w LFLG aortic stenosis. Patient reports had RAE and increased fatigue prior to procedure.     Underwent planned TAVR with 29 mm Khoury Miky 4 valve. Procedure was uncomplicated. RFA closed with Manta device, LFA with Angioseal.    Patient seen in PACU and denies any concerns. Just prior to transfer to cardiac floor, patient developed small hematoma to right groin. Manual pressure held, area marked. No headache, visual changes, numbness, weakness, or speech difficulties. No dyspnea, chest pain, or palpitations. No pain around access sites. No bleeding.          Review of Systems:    10 point review of systems negative except for stated above in HPI.          Past Medical History:   Medical History reviewed.   Past Medical History:   Diagnosis Date     Arthritis      BPH (benign prostatic hypertrophy)      Coronary artery disease      Diverticulosis      Hypercholesterolemia      Hypertension      MI (myocardial infarction) (H) 3/1997    Anterior Wall MI/LAD/OM1/SVG    CABG X 3     Neuropathy     RT Foot     Seasonal allergic rhinitis 7/1/2013             Past Surgical History:   Surgical History reviewed.   Past Surgical History:   Procedure Laterality Date     ANGIOGRAM  4/30/2003    No Disease, Clemons,LAD,SVG,OM1- small LT Main, Occlusion of CX- D1 50-70% Stenosis, RCA 30-80%     " COLONOSCOPY       COLONOSCOPY N/A 10/14/2014    Procedure: COMBINED COLONOSCOPY, SINGLE BIOPSY/POLYPECTOMY BY BIOPSY;  Surgeon: Konstantin Coates MD;  Location: [facility] OR     COLONOSCOPY WITH CO2 INSUFFLATION N/A 10/14/2014    Procedure: COLONOSCOPY WITH CO2 INSUFFLATION;  Surgeon: Konstantni Coates MD;  Location: [facility] OR     CORONARY ARTERY BYPASS  1997    X 3     CV CORONARY ANGIOGRAM N/A 1/20/2020    Procedure: CV CORONARY ANGIOGRAM;  Surgeon: Magdiel Hernandez MD;  Location:  HEART CARDIAC CATH LAB     CV CORONARY ANGIOGRAM N/A 6/28/2021    Procedure: CV CORONARY ANGIOGRAM;  Surgeon: Ravi Albright MD;  Location: [facility] CARDIAC CATH LAB     CV FRACTIONAL FLOW RATIO WIRE N/A 1/20/2020    Procedure: Fractional Flow Reserve;  Surgeon: Magdiel Hernandez MD;  Location:  HEART CARDIAC CATH LAB     CV INTRAVASULAR ULTRASOUND N/A 6/28/2021    Procedure: Intravascular Ultrasound;  Surgeon: Ravi Albright MD;  Location: [facility] CARDIAC CATH LAB     CV PCI STENT DRUG ELUTING N/A 1/20/2020    Procedure: Percutaneous Coronary Intervention Stent Drug Eluting;  Surgeon: Magdiel Hernandez MD;  Location:  HEART CARDIAC CATH LAB     CV PCI STENT DRUG ELUTING N/A 6/28/2021    Procedure: Percutaneous Coronary Intervention Stent Drug Eluting;  Surgeon: Ravi Albright MD;  Location:  HEART CARDIAC CATH LAB     HERNIA REPAIR  2014    [facility]             Social History:   Social History reviewed.  Social History     Tobacco Use     Smoking status: Former Smoker     Smokeless tobacco: Never Used     Tobacco comment: 1997   Substance Use Topics     Alcohol use: Yes     Comment: rarely              Family History:   Family History reviewed.   Family History   Problem Relation Age of Onset     Coronary Artery Disease Brother 55        Fatal MI             Allergies:     Allergies   Allergen Reactions     No Known Drug Allergy      Seasonal Allergies               "Medications:   Medications Reviewed.   No current facility-administered medications for this encounter.     Current Outpatient Medications   Medication Sig     aspirin 81 MG EC tablet Take 81 mg by mouth     isosorbide mononitrate (IMDUR) 60 MG 24 hr tablet Take 1 tablet (60 mg) by mouth every morning     MAGNESIUM PO Take by mouth daily (Patient not taking: No sig reported)     metoprolol succinate ER (TOPROL XL) 25 MG 24 hr tablet Take 1 tablet (25 mg) by mouth daily     mometasone (NASONEX) 50 MCG/ACT nasal spray Spray 2 sprays into both nostrils as needed (nasal congestion)     multivitamin, therapeutic (THERA-VIT) TABS tablet Take 1 tablet by mouth daily     nitroGLYcerin (NITROSTAT) 0.4 MG sublingual tablet Place 1 tablet (0.4 mg) under the tongue every 5 minutes as needed for chest pain up to 3 tablets per episode.     rosuvastatin (CRESTOR) 40 MG tablet Take 1 tablet (40 mg) by mouth daily     ticagrelor (BRILINTA) 90 MG tablet Take 1 tablet (90 mg) by mouth 2 times daily Start this evening.             Physical Exam:   Vitals were reviewed.  Blood pressure (!) 123/93, pulse 59, temperature 97.9  F (36.6  C), resp. rate 15, height 1.778 m (5' 10\"), weight 81.8 kg (180 lb 5.4 oz), SpO2 94 %.    General: Pleasant and well appearing elderly male. Appears comfortable and in no acute distress. Alert and interactive.   Skin: Not jaundiced, no rash, no ecchymoses, warm and dry to touch.  HEENT: NCAT. EOMI. Sclera clear.   CV: RRR, normal S1S2, no murmur appreciated. Bilateral radial, femoral, and pedal pulses palpable. RFA with small hematoma present, area marked, slightly tender to touch. LFA CDI, soft, non-tender to touch, no hematoma  Resp: Lungs clear to auscultation bilaterally, no wheezes, rhonchi  Extremities: warm and well perfused, no edema or cyanosis   Neuro: Alert and oriented x 3, CN grossly intact. Moves all extremities. Speech normal. No lateralizing symptoms or focal neurologic deficits  Psych: Mood " and affect appropriate.           Labs:     CMP  Recent Labs   Lab 08/17/22  0549 08/15/22  1034   NA  --  142   POTASSIUM  --  4.4   CHLORIDE  --  107   CO2  --  28   ANIONGAP  --  7   GLC 90 106*   BUN  --  24   CR  --  1.06   GFRESTIMATED  --  69   CLAUDIA  --  9.1   PROTTOTAL  --  7.6   ALBUMIN  --  3.7   BILITOTAL  --  0.6   ALKPHOS  --  54   AST  --  26   ALT  --  31     CBC  Recent Labs   Lab 08/15/22  1034   WBC 6.3   RBC 5.18   HGB 16.1   HCT 47.8   MCV 92   MCH 31.1   MCHC 33.7   RDW 12.7        INR  Recent Labs   Lab 08/15/22  1034   INR 1.07           Diagnostics:    EKG 8/17/2022: SB, HR 55, with incomplete RBBB      ECHO 7/11/22:  Interpretation Summary     Left ventricular size, wall motion and function are normal. The ejection  fraction is 60-65%.  Right ventricular function, chamber size, wall motion, and thickness are  normal.  Moderate aortic valve stenosis, aortic valve area 1.2 cm2, DVI 0.25, peak  velocity 3.0 m/s, mean gradient 22 mmHg, stroke volume index 46 ml/m2.  Mild dilatation of the aorta is present. Ascending aorta 4.0 cm (2 cm/m2).  No pericardial effusion is present.     This study was compared with the study from 6/28/2021. Aortic stenosis  severity and gradients are similar.    Coronary Angiogram/RHC 6/2021  Conclusion  Three vessel severe CAD with CABG (LIMA to LAD and SVG to OM), PCI to the RCA and D1.  Patent stents in the RCA, rPDA, D1.  Patent LIMA to LAD.  Occluded SVG to OM.  Severe lesions just proximal to the proximal RCA stent ~80%.  PCI with one drug eluting stent to the proximal RCA.        Plan    Follow bedrest per protocol    Continued medical management and lifestyle modifications for cardiovascular risk factor optimizations.    Arterial sheath removed from femoral artery with closure device.    Femoral angiogram identifies arterial sheath placement suitable for closure device.    Cardiac rehabilitation.    Discharge today per protocol    Continuation of dual  antiplatelet therapy for 12 months   Post antiplatelet therapy of    Aspirin; give 81 mg qd .     Ticagrelor; and 90 mg BID.      Continue high dose statin therapy       Recommendations  General Recommendations:  - Patient given specific instructions regarding care of arteriotomy site, activity restrictions, signs and symptoms of cardiac or vascular complications and to seek immediate medical evaluation should they occur.   - Arterial sheath removed from the femoral artery with closure device.   - Femoral angiogram identifies arterial sheath placement is suitable for closure device.     Medications:  - Continue dual antiplatelet therapy for 12 month(s).   - Continue high dose statin therapy indefinitely.   - Risk factor management for atherosclerosis.       Coronary Findings  DiagnosticDominance: Right  Left Main   The vessel is moderate in size.   Left Anterior Descending   The vessel is moderate in size.   Mid LAD lesion is 100% stenosed.   First Diagonal Branch   The vessel is moderate in size.   Previously placed 1st Diag drug eluting stent is widely patent.   Second Diagonal Branch   The vessel is moderate in size. The vessel exhibits minimal luminal irregularities.   Third Diagonal Branch   The vessel is small. The vessel exhibits minimal luminal irregularities.   Left Circumflex   The vessel is moderate in size.   Collaterals   Dist Cx filled by collaterals from RPAV.      Prox Cx lesion is 100% stenosed.   First Obtuse Marginal Branch   The vessel is moderate in size. The vessel exhibits minimal luminal irregularities.   Collaterals   1st Mrg filled by collaterals from 1st Diag.      Right Coronary Artery   The vessel is large.   Prox RCA-1 lesion is 80% stenosed.   Previously placed Prox RCA-2 drug eluting stent is widely patent.   Mid RCA lesion is 30% stenosed.   Right Posterior Descending Artery   The vessel is moderate in size.   Previously placed RPDA drug eluting stent is widely patent.   First Right  Posterolateral Branch   The vessel is small. The vessel exhibits minimal luminal irregularities.   Second Right Posterolateral Branch   The vessel is small. The vessel exhibits minimal luminal irregularities.   Third Right Posterolateral Branch   The vessel is moderate in size. The vessel exhibits minimal luminal irregularities.   Graft To 1st Mrg   The graft is large.   Origin to Insertion lesion is 100% stenosed.   LIMA Graft To Dist LAD   The graft is large. The graft is angiographically normal.       Intervention  Prox RCA-1 lesion   Stent (Also treats lesions: Prox RCA-2)   The pre-interventional distal flow is normal (VALENTIN 3). A stent was successfully placed. The post-interventional distal flow is normal (VALENTIN 3). A 6 Fr JR 4 GC was placed in the RCA. A runthrough was used to wire the RCA. A 4.0x12mm Synergy was deployed and post dilated with a 4.0x12mm NC Emerge. IVUS was performed and confirmed a well sized, well apposed stent. Final angiography showed VALENTIN III flow, no residual stenosis, no perforation or dissection.   There is a 0% residual stenosis post intervention.   Prox RCA-2 lesion   Stent (Also treats lesions: Prox RCA-1)   See details in Prox RCA-1 lesion.   There is a 0% residual stenosis post intervention.           Time Spent on this Encounter   I spent 60 minutes on the unit/floor managing the care of Tima Mckenzie. Over 50% of my time was spent on the following:   - Counseling the patient and/or family regarding: diagnostic results, prognosis, risks and benefits of treatment options and recommended follow-up  - Coordination of care with the: consultant(s) and nurse    Blanka Whitehead, CNP

## 2022-08-15 ENCOUNTER — LAB (OUTPATIENT)
Dept: LAB | Facility: CLINIC | Age: 87
End: 2022-08-15
Payer: COMMERCIAL

## 2022-08-15 DIAGNOSIS — I35.0 SEVERE AORTIC STENOSIS: ICD-10-CM

## 2022-08-15 DIAGNOSIS — I50.33 ACUTE ON CHRONIC DIASTOLIC (CONGESTIVE) HEART FAILURE (H): ICD-10-CM

## 2022-08-15 LAB
ABO/RH(D): NORMAL
ALBUMIN SERPL-MCNC: 3.7 G/DL (ref 3.4–5)
ALP SERPL-CCNC: 54 U/L (ref 40–150)
ALT SERPL W P-5'-P-CCNC: 31 U/L (ref 0–70)
ANION GAP SERPL CALCULATED.3IONS-SCNC: 7 MMOL/L (ref 3–14)
ANTIBODY SCREEN: NEGATIVE
AST SERPL W P-5'-P-CCNC: 26 U/L (ref 0–45)
BILIRUB SERPL-MCNC: 0.6 MG/DL (ref 0.2–1.3)
BUN SERPL-MCNC: 24 MG/DL (ref 7–30)
CALCIUM SERPL-MCNC: 9.1 MG/DL (ref 8.5–10.1)
CHLORIDE BLD-SCNC: 107 MMOL/L (ref 94–109)
CO2 SERPL-SCNC: 28 MMOL/L (ref 20–32)
CREAT SERPL-MCNC: 1.06 MG/DL (ref 0.66–1.25)
ERYTHROCYTE [DISTWIDTH] IN BLOOD BY AUTOMATED COUNT: 12.7 % (ref 10–15)
GFR SERPL CREATININE-BSD FRML MDRD: 69 ML/MIN/1.73M2
GLUCOSE BLD-MCNC: 106 MG/DL (ref 70–99)
HCT VFR BLD AUTO: 47.8 % (ref 40–53)
HGB BLD-MCNC: 16.1 G/DL (ref 13.3–17.7)
INR PPP: 1.07 (ref 0.85–1.15)
MCH RBC QN AUTO: 31.1 PG (ref 26.5–33)
MCHC RBC AUTO-ENTMCNC: 33.7 G/DL (ref 31.5–36.5)
MCV RBC AUTO: 92 FL (ref 78–100)
NT-PROBNP SERPL-MCNC: 310 PG/ML (ref 0–1800)
PLATELET # BLD AUTO: 151 10E3/UL (ref 150–450)
POTASSIUM BLD-SCNC: 4.4 MMOL/L (ref 3.4–5.3)
PROT SERPL-MCNC: 7.6 G/DL (ref 6.8–8.8)
RBC # BLD AUTO: 5.18 10E6/UL (ref 4.4–5.9)
SARS-COV-2 RNA RESP QL NAA+PROBE: NEGATIVE
SODIUM SERPL-SCNC: 142 MMOL/L (ref 133–144)
SPECIMEN EXPIRATION DATE: NORMAL
WBC # BLD AUTO: 6.3 10E3/UL (ref 4–11)

## 2022-08-15 PROCEDURE — 85610 PROTHROMBIN TIME: CPT | Performed by: PATHOLOGY

## 2022-08-15 PROCEDURE — U0005 INFEC AGEN DETEC AMPLI PROBE: HCPCS | Performed by: PATHOLOGY

## 2022-08-15 PROCEDURE — 80053 COMPREHEN METABOLIC PANEL: CPT | Performed by: PATHOLOGY

## 2022-08-15 PROCEDURE — 83880 ASSAY OF NATRIURETIC PEPTIDE: CPT | Performed by: PATHOLOGY

## 2022-08-15 PROCEDURE — 86850 RBC ANTIBODY SCREEN: CPT | Performed by: PATHOLOGY

## 2022-08-15 PROCEDURE — 36415 COLL VENOUS BLD VENIPUNCTURE: CPT | Performed by: PATHOLOGY

## 2022-08-15 PROCEDURE — 86901 BLOOD TYPING SEROLOGIC RH(D): CPT | Performed by: PATHOLOGY

## 2022-08-15 PROCEDURE — 86900 BLOOD TYPING SEROLOGIC ABO: CPT | Performed by: PATHOLOGY

## 2022-08-15 PROCEDURE — U0003 INFECTIOUS AGENT DETECTION BY NUCLEIC ACID (DNA OR RNA); SEVERE ACUTE RESPIRATORY SYNDROME CORONAVIRUS 2 (SARS-COV-2) (CORONAVIRUS DISEASE [COVID-19]), AMPLIFIED PROBE TECHNIQUE, MAKING USE OF HIGH THROUGHPUT TECHNOLOGIES AS DESCRIBED BY CMS-2020-01-R: HCPCS | Performed by: PATHOLOGY

## 2022-08-15 PROCEDURE — 85027 COMPLETE CBC AUTOMATED: CPT | Performed by: PATHOLOGY

## 2022-08-15 PROCEDURE — 99000 SPECIMEN HANDLING OFFICE-LAB: CPT | Performed by: PATHOLOGY

## 2022-08-16 ENCOUNTER — ANESTHESIA EVENT (OUTPATIENT)
Dept: SURGERY | Facility: CLINIC | Age: 87
DRG: 267 | End: 2022-08-16
Payer: COMMERCIAL

## 2022-08-17 ENCOUNTER — ANESTHESIA (OUTPATIENT)
Dept: SURGERY | Facility: CLINIC | Age: 87
DRG: 267 | End: 2022-08-17
Payer: COMMERCIAL

## 2022-08-17 ENCOUNTER — HOSPITAL ENCOUNTER (INPATIENT)
Facility: CLINIC | Age: 87
LOS: 1 days | Discharge: HOME | DRG: 267 | End: 2022-08-18
Attending: INTERNAL MEDICINE | Admitting: INTERNAL MEDICINE
Payer: COMMERCIAL

## 2022-08-17 ENCOUNTER — HOSPITAL ENCOUNTER (OUTPATIENT)
Dept: CARDIOLOGY | Facility: CLINIC | Age: 87
Discharge: HOME | DRG: 267 | End: 2022-08-17
Attending: NURSE PRACTITIONER | Admitting: INTERNAL MEDICINE
Payer: COMMERCIAL

## 2022-08-17 DIAGNOSIS — Z95.2 S/P TAVR (TRANSCATHETER AORTIC VALVE REPLACEMENT): ICD-10-CM

## 2022-08-17 DIAGNOSIS — I35.0 NONRHEUMATIC AORTIC VALVE STENOSIS: Primary | ICD-10-CM

## 2022-08-17 DIAGNOSIS — I35.0 SEVERE AORTIC STENOSIS: ICD-10-CM

## 2022-08-17 LAB
ACT BLD: 228 SECONDS (ref 74–150)
ACT BLD: 245 SECONDS (ref 74–150)
ACT BLD: 267 SECONDS (ref 74–150)
ACT BLD: 637 SECONDS (ref 74–150)
BLD PROD TYP BPU: NORMAL
BLD PROD TYP BPU: NORMAL
BLOOD COMPONENT TYPE: NORMAL
BLOOD COMPONENT TYPE: NORMAL
CODING SYSTEM: NORMAL
CODING SYSTEM: NORMAL
CROSSMATCH: NORMAL
CROSSMATCH: NORMAL
GLUCOSE BLDC GLUCOMTR-MCNC: 90 MG/DL (ref 70–99)
ISSUE DATE AND TIME: NORMAL
ISSUE DATE AND TIME: NORMAL
UNIT ABO/RH: NORMAL
UNIT ABO/RH: NORMAL
UNIT NUMBER: NORMAL
UNIT NUMBER: NORMAL
UNIT STATUS: NORMAL
UNIT STATUS: NORMAL
UNIT TYPE ISBT: 5100
UNIT TYPE ISBT: 5100

## 2022-08-17 PROCEDURE — 93005 ELECTROCARDIOGRAM TRACING: CPT

## 2022-08-17 PROCEDURE — 258N000003 HC RX IP 258 OP 636: Performed by: INTERNAL MEDICINE

## 2022-08-17 PROCEDURE — 33361 REPLACE AORTIC VALVE PERQ: CPT | Mod: 62 | Performed by: SURGERY

## 2022-08-17 PROCEDURE — C1769 GUIDE WIRE: HCPCS | Performed by: INTERNAL MEDICINE

## 2022-08-17 PROCEDURE — 250N000009 HC RX 250: Performed by: INTERNAL MEDICINE

## 2022-08-17 PROCEDURE — 99222 1ST HOSP IP/OBS MODERATE 55: CPT | Mod: 25 | Performed by: NURSE PRACTITIONER

## 2022-08-17 PROCEDURE — 278N000051 HC OR IMPLANT GENERAL: Performed by: INTERNAL MEDICINE

## 2022-08-17 PROCEDURE — 250N000013 HC RX MED GY IP 250 OP 250 PS 637: Performed by: INTERNAL MEDICINE

## 2022-08-17 PROCEDURE — 250N000011 HC RX IP 250 OP 636: Performed by: INTERNAL MEDICINE

## 2022-08-17 PROCEDURE — 93010 ELECTROCARDIOGRAM REPORT: CPT | Mod: 76 | Performed by: INTERNAL MEDICINE

## 2022-08-17 PROCEDURE — 272N000001 HC OR GENERAL SUPPLY STERILE: Performed by: INTERNAL MEDICINE

## 2022-08-17 PROCEDURE — 999N000141 HC STATISTIC PRE-PROCEDURE NURSING ASSESSMENT: Performed by: INTERNAL MEDICINE

## 2022-08-17 PROCEDURE — 710N000010 HC RECOVERY PHASE 1, LEVEL 2, PER MIN: Performed by: INTERNAL MEDICINE

## 2022-08-17 PROCEDURE — 258N000003 HC RX IP 258 OP 636

## 2022-08-17 PROCEDURE — 85347 COAGULATION TIME ACTIVATED: CPT

## 2022-08-17 PROCEDURE — 250N000011 HC RX IP 250 OP 636: Performed by: NURSE PRACTITIONER

## 2022-08-17 PROCEDURE — C1730 CATH, EP, 19 OR FEW ELECT: HCPCS | Performed by: INTERNAL MEDICINE

## 2022-08-17 PROCEDURE — C1760 CLOSURE DEV, VASC: HCPCS | Performed by: INTERNAL MEDICINE

## 2022-08-17 PROCEDURE — 410N000003 HC PER-PERFUSION 1ST 30 MIN: Performed by: INTERNAL MEDICINE

## 2022-08-17 PROCEDURE — 250N000013 HC RX MED GY IP 250 OP 250 PS 637: Performed by: NURSE PRACTITIONER

## 2022-08-17 PROCEDURE — 250N000011 HC RX IP 250 OP 636

## 2022-08-17 PROCEDURE — 272N000088 HC PUMP APP ADULT PERFUSION: Performed by: INTERNAL MEDICINE

## 2022-08-17 PROCEDURE — 370N000017 HC ANESTHESIA TECHNICAL FEE, PER MIN: Performed by: INTERNAL MEDICINE

## 2022-08-17 PROCEDURE — 33361 REPLACE AORTIC VALVE PERQ: CPT | Performed by: INTERNAL MEDICINE

## 2022-08-17 PROCEDURE — 214N000001 HC R&B CCU UMMC

## 2022-08-17 PROCEDURE — 93306 TTE W/DOPPLER COMPLETE: CPT | Mod: 26 | Performed by: STUDENT IN AN ORGANIZED HEALTH CARE EDUCATION/TRAINING PROGRAM

## 2022-08-17 PROCEDURE — 93306 TTE W/DOPPLER COMPLETE: CPT

## 2022-08-17 PROCEDURE — 86923 COMPATIBILITY TEST ELECTRIC: CPT | Performed by: INTERNAL MEDICINE

## 2022-08-17 PROCEDURE — C1894 INTRO/SHEATH, NON-LASER: HCPCS | Performed by: INTERNAL MEDICINE

## 2022-08-17 PROCEDURE — P9016 RBC LEUKOCYTES REDUCED: HCPCS | Performed by: INTERNAL MEDICINE

## 2022-08-17 DEVICE — DEVICE CLOSURE VASCULAR MANTA 18FR 2115: Type: IMPLANTABLE DEVICE | Status: FUNCTIONAL

## 2022-08-17 DEVICE — VALVE HEART SAPIEN 3 29MM 9600TFX29A: Type: IMPLANTABLE DEVICE | Site: CHEST | Status: FUNCTIONAL

## 2022-08-17 RX ORDER — ONDANSETRON 4 MG/1
4 TABLET, ORALLY DISINTEGRATING ORAL EVERY 6 HOURS PRN
Status: DISCONTINUED | OUTPATIENT
Start: 2022-08-17 | End: 2022-08-18 | Stop reason: HOSPADM

## 2022-08-17 RX ORDER — PHENYLEPHRINE HCL IN 0.9% NACL 50MG/250ML
.5-1.25 PLASTIC BAG, INJECTION (ML) INTRAVENOUS CONTINUOUS
Status: DISCONTINUED | OUTPATIENT
Start: 2022-08-17 | End: 2022-08-17

## 2022-08-17 RX ORDER — HYDROMORPHONE HYDROCHLORIDE 1 MG/ML
.3-.5 INJECTION, SOLUTION INTRAMUSCULAR; INTRAVENOUS; SUBCUTANEOUS
Status: DISCONTINUED | OUTPATIENT
Start: 2022-08-17 | End: 2022-08-18 | Stop reason: HOSPADM

## 2022-08-17 RX ORDER — ONDANSETRON 2 MG/ML
INJECTION INTRAMUSCULAR; INTRAVENOUS PRN
Status: DISCONTINUED | OUTPATIENT
Start: 2022-08-17 | End: 2022-08-17

## 2022-08-17 RX ORDER — DEXMEDETOMIDINE HYDROCHLORIDE 4 UG/ML
.1-1.2 INJECTION, SOLUTION INTRAVENOUS CONTINUOUS
Status: DISCONTINUED | OUTPATIENT
Start: 2022-08-17 | End: 2022-08-17 | Stop reason: HOSPADM

## 2022-08-17 RX ORDER — ROSUVASTATIN CALCIUM 40 MG/1
40 TABLET, COATED ORAL DAILY
Status: DISCONTINUED | OUTPATIENT
Start: 2022-08-18 | End: 2022-08-18 | Stop reason: HOSPADM

## 2022-08-17 RX ORDER — NALOXONE HYDROCHLORIDE 0.4 MG/ML
0.4 INJECTION, SOLUTION INTRAMUSCULAR; INTRAVENOUS; SUBCUTANEOUS
Status: DISCONTINUED | OUTPATIENT
Start: 2022-08-17 | End: 2022-08-18 | Stop reason: HOSPADM

## 2022-08-17 RX ORDER — ISOSORBIDE MONONITRATE 60 MG/1
60 TABLET, EXTENDED RELEASE ORAL EVERY MORNING
Status: DISCONTINUED | OUTPATIENT
Start: 2022-08-18 | End: 2022-08-18 | Stop reason: HOSPADM

## 2022-08-17 RX ORDER — HYDRALAZINE HYDROCHLORIDE 20 MG/ML
10 INJECTION INTRAMUSCULAR; INTRAVENOUS
Status: DISCONTINUED | OUTPATIENT
Start: 2022-08-17 | End: 2022-08-18 | Stop reason: HOSPADM

## 2022-08-17 RX ORDER — NITROGLYCERIN 0.4 MG/1
0.4 TABLET SUBLINGUAL EVERY 5 MIN PRN
Status: DISCONTINUED | OUTPATIENT
Start: 2022-08-17 | End: 2022-08-18 | Stop reason: HOSPADM

## 2022-08-17 RX ORDER — FENTANYL CITRATE 50 UG/ML
INJECTION, SOLUTION INTRAMUSCULAR; INTRAVENOUS PRN
Status: DISCONTINUED | OUTPATIENT
Start: 2022-08-17 | End: 2022-08-17

## 2022-08-17 RX ORDER — CEFAZOLIN SODIUM/WATER 2 G/20 ML
SYRINGE (ML) INTRAVENOUS PRN
Status: DISCONTINUED | OUTPATIENT
Start: 2022-08-17 | End: 2022-08-17

## 2022-08-17 RX ORDER — ASPIRIN 81 MG/1
81 TABLET ORAL DAILY
Status: DISCONTINUED | OUTPATIENT
Start: 2022-08-18 | End: 2022-08-18 | Stop reason: HOSPADM

## 2022-08-17 RX ORDER — MULTIVITAMIN,THERAPEUTIC
1 TABLET ORAL DAILY
Status: DISCONTINUED | OUTPATIENT
Start: 2022-08-17 | End: 2022-08-17

## 2022-08-17 RX ORDER — FLUTICASONE PROPIONATE 50 MCG
2 SPRAY, SUSPENSION (ML) NASAL DAILY
Refills: 1 | Status: DISCONTINUED | OUTPATIENT
Start: 2022-08-17 | End: 2022-08-18 | Stop reason: HOSPADM

## 2022-08-17 RX ORDER — NALOXONE HYDROCHLORIDE 0.4 MG/ML
0.2 INJECTION, SOLUTION INTRAMUSCULAR; INTRAVENOUS; SUBCUTANEOUS
Status: DISCONTINUED | OUTPATIENT
Start: 2022-08-17 | End: 2022-08-18 | Stop reason: HOSPADM

## 2022-08-17 RX ORDER — MULTIVITAMIN,THERAPEUTIC
1 TABLET ORAL DAILY
Status: DISCONTINUED | OUTPATIENT
Start: 2022-08-17 | End: 2022-08-18 | Stop reason: HOSPADM

## 2022-08-17 RX ORDER — FENTANYL CITRATE 50 UG/ML
25 INJECTION, SOLUTION INTRAMUSCULAR; INTRAVENOUS EVERY 5 MIN PRN
Status: DISCONTINUED | OUTPATIENT
Start: 2022-08-17 | End: 2022-08-17 | Stop reason: HOSPADM

## 2022-08-17 RX ORDER — ASPIRIN 81 MG/1
81 TABLET ORAL DAILY
Status: CANCELLED | OUTPATIENT
Start: 2022-08-18

## 2022-08-17 RX ORDER — IOPAMIDOL 755 MG/ML
INJECTION, SOLUTION INTRAVASCULAR
Status: DISCONTINUED | OUTPATIENT
Start: 2022-08-17 | End: 2022-08-17 | Stop reason: HOSPADM

## 2022-08-17 RX ORDER — OXYCODONE HYDROCHLORIDE 5 MG/1
5 TABLET ORAL EVERY 4 HOURS PRN
Status: DISCONTINUED | OUTPATIENT
Start: 2022-08-17 | End: 2022-08-17 | Stop reason: HOSPADM

## 2022-08-17 RX ORDER — PHENYLEPHRINE HCL IN 0.9% NACL 50MG/250ML
.1-1 PLASTIC BAG, INJECTION (ML) INTRAVENOUS CONTINUOUS
Status: DISCONTINUED | OUTPATIENT
Start: 2022-08-17 | End: 2022-08-17 | Stop reason: HOSPADM

## 2022-08-17 RX ORDER — SODIUM CHLORIDE 9 MG/ML
INJECTION, SOLUTION INTRAVENOUS CONTINUOUS
Status: DISCONTINUED | OUTPATIENT
Start: 2022-08-17 | End: 2022-08-17 | Stop reason: HOSPADM

## 2022-08-17 RX ORDER — PROTAMINE SULFATE 10 MG/ML
INJECTION, SOLUTION INTRAVENOUS PRN
Status: DISCONTINUED | OUTPATIENT
Start: 2022-08-17 | End: 2022-08-17

## 2022-08-17 RX ORDER — DEXMEDETOMIDINE HYDROCHLORIDE 4 UG/ML
.1-1.2 INJECTION, SOLUTION INTRAVENOUS CONTINUOUS
Status: DISCONTINUED | OUTPATIENT
Start: 2022-08-17 | End: 2022-08-17

## 2022-08-17 RX ORDER — ONDANSETRON 4 MG/1
4 TABLET, ORALLY DISINTEGRATING ORAL EVERY 30 MIN PRN
Status: DISCONTINUED | OUTPATIENT
Start: 2022-08-17 | End: 2022-08-17 | Stop reason: HOSPADM

## 2022-08-17 RX ORDER — HEPARIN SODIUM 1000 [USP'U]/ML
INJECTION, SOLUTION INTRAVENOUS; SUBCUTANEOUS PRN
Status: DISCONTINUED | OUTPATIENT
Start: 2022-08-17 | End: 2022-08-17

## 2022-08-17 RX ORDER — ONDANSETRON 2 MG/ML
4 INJECTION INTRAMUSCULAR; INTRAVENOUS EVERY 30 MIN PRN
Status: DISCONTINUED | OUTPATIENT
Start: 2022-08-17 | End: 2022-08-17 | Stop reason: HOSPADM

## 2022-08-17 RX ORDER — LIDOCAINE 40 MG/G
CREAM TOPICAL
Status: DISCONTINUED | OUTPATIENT
Start: 2022-08-17 | End: 2022-08-17 | Stop reason: HOSPADM

## 2022-08-17 RX ORDER — NOREPINEPHRINE BITARTRATE 0.06 MG/ML
.01-.6 INJECTION, SOLUTION INTRAVENOUS CONTINUOUS
Status: DISCONTINUED | OUTPATIENT
Start: 2022-08-17 | End: 2022-08-17

## 2022-08-17 RX ORDER — HYDROMORPHONE HYDROCHLORIDE 1 MG/ML
0.2 INJECTION, SOLUTION INTRAMUSCULAR; INTRAVENOUS; SUBCUTANEOUS EVERY 5 MIN PRN
Status: DISCONTINUED | OUTPATIENT
Start: 2022-08-17 | End: 2022-08-17 | Stop reason: HOSPADM

## 2022-08-17 RX ORDER — ONDANSETRON 2 MG/ML
4 INJECTION INTRAMUSCULAR; INTRAVENOUS EVERY 6 HOURS PRN
Status: DISCONTINUED | OUTPATIENT
Start: 2022-08-17 | End: 2022-08-18 | Stop reason: HOSPADM

## 2022-08-17 RX ORDER — SODIUM CHLORIDE, SODIUM LACTATE, POTASSIUM CHLORIDE, CALCIUM CHLORIDE 600; 310; 30; 20 MG/100ML; MG/100ML; MG/100ML; MG/100ML
INJECTION, SOLUTION INTRAVENOUS CONTINUOUS
Status: DISCONTINUED | OUTPATIENT
Start: 2022-08-17 | End: 2022-08-17 | Stop reason: HOSPADM

## 2022-08-17 RX ORDER — ACETAMINOPHEN 325 MG/1
650 TABLET ORAL EVERY 4 HOURS PRN
Status: DISCONTINUED | OUTPATIENT
Start: 2022-08-17 | End: 2022-08-18 | Stop reason: HOSPADM

## 2022-08-17 RX ORDER — SODIUM CHLORIDE, SODIUM LACTATE, POTASSIUM CHLORIDE, CALCIUM CHLORIDE 600; 310; 30; 20 MG/100ML; MG/100ML; MG/100ML; MG/100ML
INJECTION, SOLUTION INTRAVENOUS CONTINUOUS PRN
Status: DISCONTINUED | OUTPATIENT
Start: 2022-08-17 | End: 2022-08-17

## 2022-08-17 RX ORDER — ASPIRIN 325 MG
325 TABLET ORAL DAILY
Status: DISCONTINUED | OUTPATIENT
Start: 2022-08-17 | End: 2022-08-17

## 2022-08-17 RX ADMIN — FENTANYL CITRATE 50 MCG: 50 INJECTION, SOLUTION INTRAMUSCULAR; INTRAVENOUS at 08:39

## 2022-08-17 RX ADMIN — ONDANSETRON 4 MG: 2 INJECTION INTRAMUSCULAR; INTRAVENOUS at 09:58

## 2022-08-17 RX ADMIN — ASPIRIN 325 MG ORAL TABLET 325 MG: 325 PILL ORAL at 06:57

## 2022-08-17 RX ADMIN — PROTAMINE SULFATE 40 MG: 10 INJECTION, SOLUTION INTRAVENOUS at 10:11

## 2022-08-17 RX ADMIN — MIDAZOLAM 1 MG: 1 INJECTION INTRAMUSCULAR; INTRAVENOUS at 08:30

## 2022-08-17 RX ADMIN — THERA TABS 1 TABLET: TAB at 18:54

## 2022-08-17 RX ADMIN — FENTANYL CITRATE 25 MCG: 50 INJECTION, SOLUTION INTRAMUSCULAR; INTRAVENOUS at 09:26

## 2022-08-17 RX ADMIN — HEPARIN SODIUM 12000 UNITS: 1000 INJECTION INTRAVENOUS; SUBCUTANEOUS at 09:28

## 2022-08-17 RX ADMIN — FENTANYL CITRATE 25 MCG: 50 INJECTION, SOLUTION INTRAMUSCULAR; INTRAVENOUS at 09:04

## 2022-08-17 RX ADMIN — MIDAZOLAM 0.5 MG: 1 INJECTION INTRAMUSCULAR; INTRAVENOUS at 08:56

## 2022-08-17 RX ADMIN — PROTAMINE SULFATE 5 MG: 10 INJECTION, SOLUTION INTRAVENOUS at 09:57

## 2022-08-17 RX ADMIN — PROTAMINE SULFATE 145 MG: 10 INJECTION, SOLUTION INTRAVENOUS at 10:00

## 2022-08-17 RX ADMIN — Medication 2 G: at 08:30

## 2022-08-17 RX ADMIN — TICAGRELOR 90 MG: 90 TABLET ORAL at 20:14

## 2022-08-17 RX ADMIN — SODIUM CHLORIDE, POTASSIUM CHLORIDE, SODIUM LACTATE AND CALCIUM CHLORIDE: 600; 310; 30; 20 INJECTION, SOLUTION INTRAVENOUS at 08:25

## 2022-08-17 RX ADMIN — HYDROMORPHONE HYDROCHLORIDE 0.5 MG: 1 INJECTION, SOLUTION INTRAMUSCULAR; INTRAVENOUS; SUBCUTANEOUS at 13:21

## 2022-08-17 ASSESSMENT — ACTIVITIES OF DAILY LIVING (ADL)
ADLS_ACUITY_SCORE: 20

## 2022-08-17 NOTE — PROCEDURES
Brief Procedure Note     Preprocedure diagnosis: Severe aortic stenosis, chronic diastolic heart failure   Procedure: s/p TAVR     Access:   Right femoral artery 16 [place] Khoury Esheath for valve deployment.  Left femoral artery 7 [place] arterial sheath.  Left femoral vein 6 [place] venous sheath.     TAVR:   29 mm Khoury Miky valve at 90/10 deployment.  Post procedure TTE trivial PVL.     Closure:  Right femoral artery: 18 Fr Manta closure device  Left femoral artery: Angio-Seal closure device  Left femoral vein: Manual compression     Complications: None     Doris MARIA DEL CARMEN Poole, CNP  Structural Heart Nurse Practitioner  [facility]  Clinic: [phone]  Pager: [phone]      I was present for the entire procedure.     Magdiel Hernandez M.D.  Interventional Cardiology  [facility]  Pager: [phone]

## 2022-08-17 NOTE — ANESTHESIA PREPROCEDURE EVALUATION
Anesthesia Pre-Procedure Evaluation    Patient: Tima Mckenzie   MRN: [id number] : 1935        Procedure : Procedure(s):  Transfemoral Transcatheter Aortic Valve Replacement (Khoury)  with possible open heart bypass and or balloon pump placement and any indicated procedure,          Past Medical History:   Diagnosis Date     Arthritis      BPH (benign prostatic hypertrophy)      Coronary artery disease      Diverticulosis      Hypercholesterolemia      Hypertension      MI (myocardial infarction) (H) 3/1997    Anterior Wall MI/LAD/OM1/SVG    CABG X 3     Neuropathy     RT Foot     Seasonal allergic rhinitis 2013      Past Surgical History:   Procedure Laterality Date     ANGIOGRAM  2003    No Disease, Clemons,LAD,SVG,OM1- small LT Main, Occlusion of CX- D1 50-70% Stenosis, RCA 30-80%     COLONOSCOPY       COLONOSCOPY N/A 10/14/2014    Procedure: COMBINED COLONOSCOPY, SINGLE BIOPSY/POLYPECTOMY BY BIOPSY;  Surgeon: Konstantin Coates MD;  Location: [facility] OR     COLONOSCOPY WITH CO2 INSUFFLATION N/A 10/14/2014    Procedure: COLONOSCOPY WITH CO2 INSUFFLATION;  Surgeon: Konstantin Coates MD;  Location: [facility] OR     CORONARY ARTERY BYPASS  1997    X 3     CV CORONARY ANGIOGRAM N/A 2020    Procedure: CV CORONARY ANGIOGRAM;  Surgeon: Magdiel Hernandez MD;  Location:  HEART CARDIAC CATH LAB     CV CORONARY ANGIOGRAM N/A 2021    Procedure: CV CORONARY ANGIOGRAM;  Surgeon: Ravi Albright MD;  Location:  HEART CARDIAC CATH LAB     CV FRACTIONAL FLOW RATIO WIRE N/A 2020    Procedure: Fractional Flow Reserve;  Surgeon: Magdiel Hernandez MD;  Location:  HEART CARDIAC CATH LAB     CV INTRAVASULAR ULTRASOUND N/A 2021    Procedure: Intravascular Ultrasound;  Surgeon: Ravi Albright MD;  Location:  HEART CARDIAC CATH LAB     CV PCI STENT DRUG ELUTING N/A 2020    Procedure: Percutaneous Coronary Intervention Stent Drug Eluting;  Surgeon:  Magdiel Hernandez MD;  Location:  HEART CARDIAC CATH LAB     CV PCI STENT DRUG ELUTING N/A 6/28/2021    Procedure: Percutaneous Coronary Intervention Stent Drug Eluting;  Surgeon: Ravi Albright MD;  Location:  HEART CARDIAC CATH LAB     HERNIA REPAIR  2014    [facility].      Allergies   Allergen Reactions     No Known Drug Allergy      Seasonal Allergies       Social History     Tobacco Use     Smoking status: Former Smoker     Smokeless tobacco: Never Used     Tobacco comment: 1997   Substance Use Topics     Alcohol use: Yes     Comment: rarely       Wt Readings from Last 1 Encounters:   08/17/22 81.8 kg (180 lb 5.4 oz)        Anesthesia Evaluation   Pt has had prior anesthetic. Type: General.    No history of anesthetic complications       ROS/MED HX  ENT/Pulmonary:  - neg pulmonary ROS     Neurologic:  - neg neurologic ROS     Cardiovascular:     (+) hypertension--CAD -past MI --valvular problems/murmurs type: AS     METS/Exercise Tolerance:     Hematologic:    (-) anemia   Musculoskeletal:       GI/Hepatic:    (-) GERD   Renal/Genitourinary:       Endo:       Psychiatric/Substance Use:       Infectious Disease:       Malignancy:       Other:            Physical Exam    Airway        Mallampati: I   TM distance: > 3 FB   Neck ROM: full   Mouth opening: > 3 cm    Respiratory Devices and Support         Dental  no notable dental history         Cardiovascular          Rhythm and rate: regular and normal     Pulmonary   pulmonary exam normal                OUTSIDE LABS:  CBC:   Lab Results   Component Value Date    WBC 6.3 08/15/2022    WBC 10.4 11/08/2021    HGB 16.1 08/15/2022    HGB 15.5 11/08/2021    HCT 47.8 08/15/2022    HCT 47.2 11/08/2021     08/15/2022     11/08/2021     BMP:   Lab Results   Component Value Date     08/15/2022     06/27/2022    POTASSIUM 4.4 08/15/2022    POTASSIUM 4.3 06/27/2022    CHLORIDE 107 08/15/2022    CHLORIDE 107 06/27/2022     CO2 28 08/15/2022    CO2 28 06/27/2022    BUN 24 08/15/2022    BUN 16 06/27/2022    CR 1.06 08/15/2022    CR 1.16 06/27/2022    GLC 90 08/17/2022     (H) 08/15/2022     COAGS:   Lab Results   Component Value Date    INR 1.07 08/15/2022     POC: No results found for: BGM, HCG, HCGS  HEPATIC:   Lab Results   Component Value Date    ALBUMIN 3.7 08/15/2022    PROTTOTAL 7.6 08/15/2022    ALT 31 08/15/2022    AST 26 08/15/2022    ALKPHOS 54 08/15/2022    BILITOTAL 0.6 08/15/2022     OTHER:   Lab Results   Component Value Date    CLAUDIA 9.1 08/15/2022    LIPASE 70 (L) 11/08/2021    TSH 3.39 10/08/2010    CRP 17.6 (H) 11/08/2021       Anesthesia Plan    ASA Status:  4   NPO Status:  NPO Appropriate    Anesthesia Type: MAC.     - Reason for MAC: immobility needed         Techniques and Equipment:     - Lines/Monitors: 2nd IV (Arterial and central access from Cardiology )     Consents    Anesthesia Plan(s) and associated risks, benefits, and realistic alternatives discussed. Questions answered and patient/representative(s) expressed understanding.     - Discussed: Risks, Benefits and Alternatives for BOTH SEDATION and the PROCEDURE were discussed     - Discussed with:  Patient      - Extended Intubation/Ventilatory Support Discussed: No.      - Patient is DNR/DNI Status: No    Use of blood products discussed: Yes.     - Discussed with: Patient.     - Consented: consented to blood products            Reason for refusal: other.     Postoperative Care    Pain management: IV analgesics.        Comments:    Other Comments: MAC with versed and fentanyl            Kenny Moore MD

## 2022-08-17 NOTE — DISCHARGE SUMMARY
[address] [address]  p: [phone]    Discharge Summary: Cardiology Service    Tima Mckenzie MRN# [id number]   YOB: 1935 Age: 87 year old       Admission Date: 08/17/2022  Discharge Date: 08/18/2022    Discharge Diagnoses:  # Low Flow Low Gradient Severe aortic stenosis  # s/p TAVR  # Chronic Diastolic Heart Failure  # CAD with 3V CABG in 1997 (LIMA to LAD, SVG to OM1 and possibly RCA) s/p PCI of RPDA, D1 and RCA 8/2019, pRCA PCI 6/2021  # HTN  # HLD  # Skin Abrasion     Brief HPI:  Tima Mckenzie is a 87 year old male with a PMHx of HFpEF, CAD s/p 3V CABG, PCI, HTN, HLD, and severe aortic stenosis who was admitted 8/17 for planned TAVR.     Hospital Course by Diagnosis:  # Low Flow Low Gradient Severe aortic stenosis  # s/p TAVR  # Chronic Diastolic Heart Failure  Prior to procedure, pt noted to have a mean gradient 22 mmHG, JESS 1.2 cm^2, PV 3.1 m/s, echo findings c/w LFLG aortic stenosis. Now s/p TAVR with 29 mm ES3 valve. Procedure was uncomplicated. While in PACU, patient had small hematoma form RFA, manual pressure held, area marked. No arrhythmias noted; pt HR currently 55-60, sinus with incomplete RBBB (baseline). Pt feeling well. POD 1 Echo with MG 7 mmHg.      - Volume Status: euvolemic, no need for diuretics at this time  - Aspirin 81 mg and Brilinta 90 mg BID given recent PCI  - OP Cardiac rehab  - Daily weights.   - Structural Cardiology Follow up 2 weeks and 1 month with repeat echo prior to 1 month appointment for TAVR follow up     # CAD with 3V CABG in 1997 (LIMA to LAD, SVG to OM1 and possibly RCA) s/p PCI of RPDA, D1 and RCA 8/2019, pRCA PCI 6/2021  # HTN  # HLD  Currently denies angina symptoms   - DAPT: continue ASA and Brilinta  - Continue PTA Imdur for blood pressure  - Hold PTA Metoprolol due to high risk of AVB post TAVR, pending HR and EKG could restart in OP setting  - Continue PTA Statin     #  Skin Abrasion   2/2 pre-TAVR shave. Several areas of skin tears on chest, pt reports some pain with this.   - No creams, lotions, powders to avoid infection  - Keep dry  - Cold compress ok    Pertinent Procedures:  1. TAVR 8/17/2022    Medication Changes:  STOP Metoprolol    Discharge medications:   Current Discharge Medication List      CONTINUE these medications which have NOT CHANGED    Details   aspirin 81 MG EC tablet Take 81 mg by mouth      isosorbide mononitrate (IMDUR) 60 MG 24 hr tablet Take 1 tablet (60 mg) by mouth every morning  Qty: 90 tablet, Refills: 2    Associated Diagnoses: Essential hypertension with goal blood pressure less than 130/80      mometasone (NASONEX) 50 MCG/ACT nasal spray Spray 2 sprays into both nostrils as needed (nasal congestion)  Qty: 17 g, Refills: 1    Comments: Don't fill patient will call when he needs refill  Associated Diagnoses: Nasal congestion      multivitamin, therapeutic (THERA-VIT) TABS tablet Take 1 tablet by mouth daily      nitroGLYcerin (NITROSTAT) 0.4 MG sublingual tablet Place 1 tablet (0.4 mg) under the tongue every 5 minutes as needed for chest pain up to 3 tablets per episode.  Qty: 25 tablet, Refills: 3    Associated Diagnoses: Coronary artery disease involving native coronary artery of native heart without angina pectoris      rosuvastatin (CRESTOR) 40 MG tablet Take 1 tablet (40 mg) by mouth daily  Qty: 90 tablet, Refills: 3    Comments: Patient will call when he needs refill  Associated Diagnoses: Hyperlipidemia LDL goal <100      ticagrelor (BRILINTA) 90 MG tablet Take 1 tablet (90 mg) by mouth 2 times daily Start this evening.  Qty: 180 tablet, Refills: 3    Associated Diagnoses: 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)         STOP taking these medications       metoprolol succinate ER (TOPROL XL) 25 MG 24 hr tablet Comments:   Reason for Stopping:               Follow-up:  - PCP 7-10 days for post  hospitalization visit  - Structural Cardiology 1-2 weeks for TAVR follow up, 1 month with echo prior for TAVR follow up    Code status:  Full    Condition on discharge  Temp:  [97  F (36.1  C)-98.5  F (36.9  C)] 98.5  F (36.9  C)  Pulse:  [52-77] 77  Resp:  [12-16] 14  BP: ()/(61-94) 121/61  SpO2:  [92 %-98 %] 93 %  General: Alert, interactive, NAD  Eyes: sclera anicteric, EOMI  Neck: JVP not elevated, carotid 2+ bilaterally  Cardiovascular: regular rate and rhythm, normal S1 and S2, no murmurs, gallops, or rubs  Resp: clear to auscultation bilaterally, no rales, wheezes, or rhonchi  GI: Soft, nontender, nondistended. +BS.  No HSM or masses, no rebound or guarding.  Extremities: trace edema, no cyanosis or clubbing, dorsalis pedis and posterior tibialis pulses 2+ bilaterally  Skin: Warm and dry, no jaundice or rash, stable superficial anterior chest wall abrasions  Neuro: CN 2-12 intact, moves all extremities equally  Psych: Alert & oriented x 3    Imaging with results:  Echocardiogram (Intra Op JOHNNIE) 8/17/2022:  Interpretation Summary  PROCEDURE  Intra-procedural transthoracic echocardiogram for transfemoral transcatheter  aortic valve replacement with Miky 3 29mm Aortic Valve. Image acquisition  by sonographer.     PRE-PROCEDURAL FINDINGS:  1. Severe calcific aortic stenosis.  2. Normal systolic function. Visual LVEF 55%.     POST-PROCEDURAL SURVEY:  1. A Miky 3 29mm Aortic Valve was successfully deployed.  2. Valve is well seated. There is trivial periprosthetic regurgitation.  3. Post-procedure valve hemodynamics - mean gradient 3.0 mmHg.  4. No pericardial effusion    Echocardiogram 8/18/2022 (POD 1):  Interpretation Summary  S/P Miky 3 29mm Aortic Valve. Valve is well seated. No AI. Normal Doppler  interrogation. MG is 7 mmHg.     Normal biventricular function.  No pericardial effusion is present.    Other imaging studies:  EKG 8/17/2022: SB, HR 55, with incomplete RBBB    EKG 8/18/22 (POD  1):      Patient Care Team:  Julieta Gandhi MD as PCP - General (Family Practice)  Artis Parker MD as Assigned Heart and Vascular Provider  Viki Bishop NP as Assigned PCP    MARIA DEL CARMEN Hunter, CNP  [facility]    Time Spent on this Encounter   I, MARIA DEL CARMEN Harris CNP, personally saw the patient today and spent greater than 30 minutes discharging this patient.       Physician Attestation   I have reviewed and discussed with the advanced practice provider their history, physical and plan for Tima Mckenzie. I did not participate in a shared visit by interviewing or examining the patient and this should be billed as an advanced practice provider only visit.    Magdiel Hernandez MD

## 2022-08-17 NOTE — ANESTHESIA CARE TRANSFER NOTE
Patient: Tima Mckenzie    Procedure: Procedure(s):  Transfemoral Transcatheter Aortic Valve Replacement (Khoury)  with possible open heart bypass and or balloon pump placement and any indicated procedure,       Diagnosis: Severe aortic stenosis [I35.0]  Diagnosis Additional Information: No value filed.    Anesthesia Type:   MAC     Note:    Oropharynx: oropharynx clear of all foreign objects and spontaneously breathing  Level of Consciousness: awake  Oxygen Supplementation: room air    Independent Airway: airway patency satisfactory and stable  Dentition: dentition unchanged  Vital Signs Stable: post-procedure vital signs reviewed and stable  Report to RN Given: handoff report given  Patient transferred to: PACU    Handoff Report: Identifed the Patient, Identified the Reponsible Provider, Reviewed the pertinent medical history, Discussed the surgical course, Reviewed Intra-OP anesthesia mangement and issues during anesthesia, Set expectations for post-procedure period and Allowed opportunity for questions and acknowledgement of understanding      Vitals:  Vitals Value Taken Time   /74 08/17/22 1057   Temp     Pulse 56 08/17/22 1059   Resp 15 08/17/22 1059   SpO2 96 % 08/17/22 1059   Vitals shown include unvalidated device data.    Electronically Signed By: MARIA DEL CARMEN Hall CRNA  August 17, 2022  11:00 AM

## 2022-08-17 NOTE — PRE-PROCEDURE
GENERAL PRE-PROCEDURE:   Procedure:  Transcatheter aortic valve replacement   Date/Time:  8/17/2022 10:44 AM    Verbal consent obtained?: Yes    Risks and benefits: Risks, benefits and alternatives were discussed    Consent given by:  Patient  Patient states understanding of procedure being performed: Yes    Patient's understanding of procedure matches consent: Yes    Procedure consent matches procedure scheduled: Yes    Expected level of sedation:  Moderate  Appropriately NPO:  Yes  Mallampati  :  Grade 2- soft palate, base of uvula, tonsillar pillars, and portion of posterior pharyngeal wall visible  Lungs:  Lungs clear with good breath sounds bilaterally  Heart:  Normal heart sounds and rate  History & Physical reviewed:  History and physical reviewed and no updates needed  Statement of review:  I have reviewed the lab findings, diagnostic data, medications, and the plan for sedation

## 2022-08-17 NOTE — Clinical Note
Temporary pacemaker Rate= 100bpmPaced mA= 10Pacer wire was captured appropriately, Pacer is set and Demand on Standby

## 2022-08-17 NOTE — ANESTHESIA PROCEDURE NOTES
Airway       Patient location during procedure: OR  Staff -        CRNA: Eloy Huston APRN CRNA       Performed By: CRNA  Consent for Airway        Urgency: elective  Indications and Patient Condition       Indications for airway management: santos-procedural       Induction type:intravenous       Mask difficulty assessment: 1 - vent by mask    Final Airway Details       Final airway type: endotracheal airway       Successful airway: ETT - single  Endotracheal Airway Details        Successful intubation technique: direct laryngoscopy       DL Blade Type: Shah 2       Grade View of Cords: 1       Adjucts: stylet       Position: Right       Measured from: gums/teeth       Bite block used: None    Post intubation assessment        Placement verified by: capnometry, equal breath sounds and chest rise        Number of attempts at approach: 1       Number of other approaches attempted: 0       Secured with: silk tape       Ease of procedure: easy       Dentition: Intact and Unchanged

## 2022-08-18 ENCOUNTER — APPOINTMENT (OUTPATIENT)
Dept: OCCUPATIONAL THERAPY | Facility: CLINIC | Age: 87
DRG: 267 | End: 2022-08-18
Attending: NURSE PRACTITIONER
Payer: COMMERCIAL

## 2022-08-18 ENCOUNTER — APPOINTMENT (OUTPATIENT)
Dept: CARDIOLOGY | Facility: CLINIC | Age: 87
DRG: 267 | End: 2022-08-18
Attending: NURSE PRACTITIONER
Payer: COMMERCIAL

## 2022-08-18 VITALS
HEART RATE: 77 BPM | SYSTOLIC BLOOD PRESSURE: 121 MMHG | DIASTOLIC BLOOD PRESSURE: 61 MMHG | OXYGEN SATURATION: 93 % | TEMPERATURE: 98.5 F | BODY MASS INDEX: 25.82 KG/M2 | WEIGHT: 180.34 LBS | RESPIRATION RATE: 14 BRPM | HEIGHT: 70 IN

## 2022-08-18 DIAGNOSIS — Z95.2 S/P TAVR (TRANSCATHETER AORTIC VALVE REPLACEMENT): Primary | ICD-10-CM

## 2022-08-18 LAB
ANION GAP SERPL CALCULATED.3IONS-SCNC: 12 MMOL/L (ref 7–15)
ATRIAL RATE - MUSE: 55 BPM
ATRIAL RATE - MUSE: 55 BPM
ATRIAL RATE - MUSE: 78 BPM
BUN SERPL-MCNC: 16.3 MG/DL (ref 8–23)
CALCIUM SERPL-MCNC: 9.1 MG/DL (ref 8.8–10.2)
CHLORIDE SERPL-SCNC: 104 MMOL/L (ref 98–107)
CREAT SERPL-MCNC: 1.12 MG/DL (ref 0.67–1.17)
DEPRECATED HCO3 PLAS-SCNC: 23 MMOL/L (ref 22–29)
DIASTOLIC BLOOD PRESSURE - MUSE: NORMAL MMHG
ERYTHROCYTE [DISTWIDTH] IN BLOOD BY AUTOMATED COUNT: 12.8 % (ref 10–15)
GFR SERPL CREATININE-BSD FRML MDRD: 64 ML/MIN/1.73M2
GLUCOSE SERPL-MCNC: 113 MG/DL (ref 70–99)
HCT VFR BLD AUTO: 41.8 % (ref 40–53)
HGB BLD-MCNC: 14.2 G/DL (ref 13.3–17.7)
INTERPRETATION ECG - MUSE: NORMAL
LVEF ECHO: NORMAL
MAGNESIUM SERPL-MCNC: 1.8 MG/DL (ref 1.7–2.3)
MCH RBC QN AUTO: 31.8 PG (ref 26.5–33)
MCHC RBC AUTO-ENTMCNC: 34 G/DL (ref 31.5–36.5)
MCV RBC AUTO: 94 FL (ref 78–100)
P AXIS - MUSE: 44 DEGREES
P AXIS - MUSE: 58 DEGREES
P AXIS - MUSE: 72 DEGREES
PLATELET # BLD AUTO: 131 10E3/UL (ref 150–450)
POTASSIUM SERPL-SCNC: 4.4 MMOL/L (ref 3.4–5.3)
PR INTERVAL - MUSE: 190 MS
PR INTERVAL - MUSE: 194 MS
PR INTERVAL - MUSE: 198 MS
QRS DURATION - MUSE: 100 MS
QRS DURATION - MUSE: 102 MS
QRS DURATION - MUSE: 98 MS
QT - MUSE: 372 MS
QT - MUSE: 432 MS
QT - MUSE: 438 MS
QTC - MUSE: 413 MS
QTC - MUSE: 419 MS
QTC - MUSE: 424 MS
R AXIS - MUSE: -13 DEGREES
R AXIS - MUSE: 7 DEGREES
R AXIS - MUSE: 9 DEGREES
RBC # BLD AUTO: 4.47 10E6/UL (ref 4.4–5.9)
SODIUM SERPL-SCNC: 139 MMOL/L (ref 136–145)
SYSTOLIC BLOOD PRESSURE - MUSE: NORMAL MMHG
T AXIS - MUSE: 101 DEGREES
T AXIS - MUSE: 104 DEGREES
T AXIS - MUSE: 117 DEGREES
VENTRICULAR RATE- MUSE: 55 BPM
VENTRICULAR RATE- MUSE: 55 BPM
VENTRICULAR RATE- MUSE: 78 BPM
WBC # BLD AUTO: 10.1 10E3/UL (ref 4–11)

## 2022-08-18 PROCEDURE — 250N000013 HC RX MED GY IP 250 OP 250 PS 637: Performed by: INTERNAL MEDICINE

## 2022-08-18 PROCEDURE — 97110 THERAPEUTIC EXERCISES: CPT | Mod: GO

## 2022-08-18 PROCEDURE — 93010 ELECTROCARDIOGRAM REPORT: CPT | Performed by: INTERNAL MEDICINE

## 2022-08-18 PROCEDURE — 93321 DOPPLER ECHO F-UP/LMTD STD: CPT | Mod: 26 | Performed by: INTERNAL MEDICINE

## 2022-08-18 PROCEDURE — 36415 COLL VENOUS BLD VENIPUNCTURE: CPT | Performed by: NURSE PRACTITIONER

## 2022-08-18 PROCEDURE — 93005 ELECTROCARDIOGRAM TRACING: CPT

## 2022-08-18 PROCEDURE — 93308 TTE F-UP OR LMTD: CPT | Mod: 26 | Performed by: INTERNAL MEDICINE

## 2022-08-18 PROCEDURE — 83735 ASSAY OF MAGNESIUM: CPT | Performed by: NURSE PRACTITIONER

## 2022-08-18 PROCEDURE — 97165 OT EVAL LOW COMPLEX 30 MIN: CPT | Mod: GO

## 2022-08-18 PROCEDURE — 93308 TTE F-UP OR LMTD: CPT

## 2022-08-18 PROCEDURE — 82374 ASSAY BLOOD CARBON DIOXIDE: CPT | Performed by: NURSE PRACTITIONER

## 2022-08-18 PROCEDURE — 85027 COMPLETE CBC AUTOMATED: CPT | Performed by: NURSE PRACTITIONER

## 2022-08-18 PROCEDURE — 250N000013 HC RX MED GY IP 250 OP 250 PS 637: Performed by: NURSE PRACTITIONER

## 2022-08-18 PROCEDURE — 02RF38Z REPLACEMENT OF AORTIC VALVE WITH ZOOPLASTIC TISSUE, PERCUTANEOUS APPROACH: ICD-10-PCS | Performed by: INTERNAL MEDICINE

## 2022-08-18 PROCEDURE — 93325 DOPPLER ECHO COLOR FLOW MAPG: CPT

## 2022-08-18 PROCEDURE — 82310 ASSAY OF CALCIUM: CPT | Performed by: NURSE PRACTITIONER

## 2022-08-18 PROCEDURE — 250N000013 HC RX MED GY IP 250 OP 250 PS 637

## 2022-08-18 PROCEDURE — 93325 DOPPLER ECHO COLOR FLOW MAPG: CPT | Mod: 26 | Performed by: INTERNAL MEDICINE

## 2022-08-18 PROCEDURE — 99239 HOSP IP/OBS DSCHRG MGMT >30: CPT | Mod: 25 | Performed by: NURSE PRACTITIONER

## 2022-08-18 RX ORDER — ASPIRIN 81 MG/1
81 TABLET ORAL DAILY
COMMUNITY
Start: 2022-08-18 | End: 2023-09-19

## 2022-08-18 RX ORDER — LANOLIN ALCOHOL/MO/W.PET/CERES
3 CREAM (GRAM) TOPICAL
Status: DISCONTINUED | OUTPATIENT
Start: 2022-08-18 | End: 2022-08-18 | Stop reason: HOSPADM

## 2022-08-18 RX ADMIN — TICAGRELOR 90 MG: 90 TABLET ORAL at 09:04

## 2022-08-18 RX ADMIN — THERA TABS 1 TABLET: TAB at 09:04

## 2022-08-18 RX ADMIN — ROSUVASTATIN CALCIUM 40 MG: 40 TABLET, COATED ORAL at 09:04

## 2022-08-18 RX ADMIN — ISOSORBIDE MONONITRATE 60 MG: 60 TABLET, EXTENDED RELEASE ORAL at 09:04

## 2022-08-18 RX ADMIN — Medication 3 MG: at 00:48

## 2022-08-18 RX ADMIN — ASPIRIN 81 MG: 81 TABLET, COATED ORAL at 09:04

## 2022-08-18 ASSESSMENT — ACTIVITIES OF DAILY LIVING (ADL)
ADLS_ACUITY_SCORE: 20
ADLS_ACUITY_SCORE: 20
PREVIOUS_RESPONSIBILITIES: MEAL PREP;HOUSEKEEPING;LAUNDRY;SHOPPING;YARDWORK;MEDICATION MANAGEMENT;FINANCES;DRIVING
ADLS_ACUITY_SCORE: 20

## 2022-08-18 NOTE — PLAN OF CARE
Occupational Therapy Discharge Summary    Reason for therapy discharge:    Discharged to home with OP CR.    Progress towards therapy goal(s). See goals on Care Plan in [facility] electronic health record for goal details.  Goals not met.  Barriers to achieving goals:   discharge on same date as initial evaluation.    Therapy recommendation(s):    Continued therapy is recommended.  Rationale/Recommendations:  Pt would benefit from OP CR for cardiovascular related health, strength, conditioning, and endurance.

## 2022-08-18 NOTE — PLAN OF CARE
Goal Outcome Evaluation:      HX:87 year old male with a PMHx of HFpEF, CAD s/p 3V CABG, PCI, HTN, HLD, and severe aortic stenosis who was admitted 8/17 for planned TAVR.    Cardiac:SB/SR 50-60s.  VS:VSS  IV:PIVx2-SL  Tubes:NA  Neuro:Q2 hour neuro checks. Neuros intact.  Resp:Denies shortness of breath. O2 at 1.5L/NC while on bedrest. Sats 94-97%.  GI/:Voiding well via urinal. BM prior to admission.   Nutrition:Sips of water until off BR, then regular diet. Good PO intake.   Endo:NA  Skin:Right groin with stable hematoma. Required manual pressure for 20 minutes upon transfer from PACU. Left groin with primapore, marked dressing at time of transfer, no increase in size.   Activity:on bedrest. Up in chair at 1830. No issues with groin sites.   Pain:C/O pain when pressure held on groin. Received 0.5 mg hydromorphone with good relief.   Social:Wife present for 30 minutes at time of transfer.  Plan:Monitor neuro status every 2 hours until 1300 tomorrow. Assess groin sites per post-TAVR orders. Continue current cares and notify providers with questions or concerns.

## 2022-08-18 NOTE — DISCHARGE SUMMARY
DISCHARGE 8/18/22 Post-TAVR  Discharged to: Home  Via: Automobile  Accompanied by: Family  Discharge Instructions: principal diagnosis, major findings/procedures, diet, activity, medications, follow up appointments, when to call the MD, and what to watchout for (i.e. s/s of infection, increasing SOB, palpitations, Angina). Pt states understanding of all discharge instructions.   Prescriptions: Pt has medicine at home.  Follow Up Appointments: See discharge instructions.  Belongings: All sent with pt. Pt verifies that they are taking all belongings with them.   IV: discontinued.   Telemetry: discontinued.   Pt exhibits understanding of above discharge instructions; all questions answered.  Discharge Paperwork: faxed to discharge planner.

## 2022-08-18 NOTE — PROGRESS NOTES
"1900-0730    NEURO: A&O x4; q2 neuro checks intact   RESPIRATORY: Room air; sats >94%; denies SOB, mild RAE during ambulation   CARDIAC: SB/SR; 50-60s  GI/: Voiding well in urinal at bedside; no BM this shift    SKIN: Scattered bruising; L groin site with primapore and marked drainage, unchanged; R groin site with liquid bandage, unchanged drainage, marked hematoma, stable; denies numbness, tingling, tenderness, or pain in either site   PAIN: Denies   TESTS/PROCEDURES: EKG, ECHO, PT/OT, Speech   MOBILITY: SBA/Independent; very steady   DIET: Regular    LDAs: PIV x2 in each hand     PLAN: Continue POC; notify the primary team with any concerns/updates.     I/O this shift:  In: -   Out: 500 [Urine:500]     /64   Pulse 77   Temp 97.8  F (36.6  C) (Oral)   Resp 14   Ht 1.778 m (5' 10\")   Wt 81.8 kg (180 lb 5.4 oz)   SpO2 93%   BMI 25.88 kg/m          "

## 2022-08-18 NOTE — PROGRESS NOTES
08/18/22 0800   Quick Adds   Type of Visit Initial Occupational Therapy Evaluation   Living Environment   People in Home spouse;parent(s)   Current Living Arrangements house   Home Accessibility stairs to enter home;stairs within home   Number of Stairs, Main Entrance 4   Stair Railings, Main Entrance railings safe and in good condition   Number of Stairs, Within Home, Primary greater than 10 stairs   Stair Railings, Within Home, Primary railings safe and in good condition   Transportation Anticipated family or friend will provide   Living Environment Comments Pt lives in a house with his spouse and his mother in law. Pt reported no concerns regarding his home enviroment.   Self-Care   Usual Activity Tolerance excellent   Current Activity Tolerance good   Regular Exercise Yes   Activity/Exercise Type other (see comments)  (Pt reported that he completes senior exercise videos on [employer] daily for 15-20 minutes.)   Exercise Amount/Frequency 20 mins   Equipment Currently Used at Home none   Fall history within last six months no   Activity/Exercise/Self-Care Comment IND with ADLs   Instrumental Activities of Daily Living (IADL)   Previous Responsibilities meal prep;housekeeping;laundry;shopping;yardwork;medication management;finances;driving   IADL Comments IND with IALDs   General Information   Onset of Illness/Injury or Date of Surgery 08/17/22   Referring Physician Magdiel Hernandez MD   Patient/Family Therapy Goal Statement (OT) Continue to imporve   Additional Occupational Profile Info/Pertinent History of Current Problem Tima Mckenzie is a 87 year old male with a PMHx of HFpEF, CAD s/p 3V CABG, PCI, HTN, HLD, and severe aortic stenosis who was admitted 8/17 for planned TAVR.   Existing Precautions/Restrictions cardiac;lifting   Left Upper Extremity (Weight-bearing Status) partial weight-bearing (PWB)  (10 lbs)   Right Upper Extremity (Weight-bearing Status) partial weight-bearing (PWB)  (1o lbs)   Left  Lower Extremity (Weight-bearing Status) full weight-bearing (FWB)   Right Lower Extremity (Weight-bearing Status) full weight-bearing (FWB)   Heart Disease Risk Factors Medical history   Cognitive Status Examination   Orientation Status orientation to person, place and time   Visual Perception   Visual Impairment/Limitations WNL   Sensory   Sensory Quick Adds No deficits were identified   Pain Assessment   Patient Currently in Pain No   Posture   Posture not impaired   Range of Motion Comprehensive   General Range of Motion no range of motion deficits identified   Strength Comprehensive (MMT)   General Manual Muscle Testing (MMT) Assessment no strength deficits identified   Muscle Tone Assessment   Muscle Tone Quick Adds No deficits were identified   Bed Mobility   Bed Mobility No deficits identified   Transfers   Transfers No deficits identified   Balance   Balance Assessment no deficits were identified   Activities of Daily Living   BADL Assessment/Intervention no deficits identified   Clinical Impression   Criteria for Skilled Therapeutic Interventions Met (OT) Yes, treatment indicated   OT Diagnosis Pt would benefit from Phase I of cardiac rehab for cardio related heallth.   OT Problem List-Impairments impacting ADL problems related to;strength;activity tolerance impaired   Assessment of Occupational Performance 1-3 Performance Deficits   Identified Performance Deficits decreased endurance due to post TAVR. Pt would benefit from Phase 1 of CR   Planned Therapy Interventions (OT)   (Phase 1 of CR)   Clinical Decision Making Complexity (OT) low complexity   Risk & Benefits of therapy have been explained evaluation/treatment results reviewed;care plan/treatment goals reviewed;risks/benefits reviewed;current/potential barriers reviewed;participants voiced agreement with care plan;participants included;patient   OT Discharge Planning   OT Discharge Recommendation (DC Rec) home with outpatient occupational therapy    OT Rationale for DC Rec Pt is post TAVR and would benefit from OP CR for cardiovascular related health.   OT Brief overview of current status IND   Total Evaluation Time (Minutes)   Total Evaluation Time (Minutes) 10   OT Goals   Therapy Frequency (OT) 3 times/wk   OT: Understanding of cardiac education to maximize quality of life, condition management, and health outcomes Patient   OT: Perform aerobic activity with stable cardiovascular response continuous;20 minutes   OT: Functional/aerobic ambulation tolerance with stable cardiovascular response in order to return to home and community environment Independent   OT: Navigation of stairs simulating home set up with stable cardiovascular response in order to return to home and community environment Independent

## 2022-08-18 NOTE — DISCHARGE INSTRUCTIONS
Going home after Transcatheter Aortic Valve Replacement (TAVR)  Femoral Access    You have small procedure sites at both groins, the one on the right is slightly bigger. You may have small amounts of bruising or discomfort, this is expected. If you develop worse swelling, redness and warmth that does not go away, fever and chills, Increasing numbness in your legs, worsening pain at the site then you need to contact your nurse coordinator.    You may shower, but do not soak in a bath tub or pool or apply lotions, ointments, powder, etc for 3-5 days or until sites look healed.     Activity: No lifting, pushing, pulling more than 10 pounds (examples to avoid: groceries, vacuuming, gardening, golfing): For one week with a procedure through the groin.    After the initial healing process of the access site, we recommend cardiac rehabilitation for all TAVR patients. Cardiac rehabilitation will help you:  - Rebuild stamina, strength and balance.  - Learn how to participate in activities safely, as well as help you regain confidence to do so.  - Return to activities of daily living and leisure.  Please contact their central scheduling to arrange at [phone]    We prefer you to follow up with your primary care provider within 1 week. You will be arranged to see the TAVR clinic in month. At that time you will have a repeat ultrasound of the heart to look at the valve.     Should you have any questions or concerns please contact your assigned TAVR nurse coordinator:  Hannah [phone] (at the first prompt enter #1, second prompt enter #3, then ask the triage nurse if you can speak with Hannah).

## 2022-08-19 ENCOUNTER — PATIENT OUTREACH (OUTPATIENT)
Dept: CARE COORDINATION | Facility: CLINIC | Age: 87
End: 2022-08-19

## 2022-08-19 ENCOUNTER — CARE COORDINATION (OUTPATIENT)
Dept: CARDIOLOGY | Facility: CLINIC | Age: 87
End: 2022-08-19

## 2022-08-19 NOTE — PROGRESS NOTES
Clinic Care Coordination Contact  [facility]: Post-Discharge Note  SITUATION                                                      Admission:    Admission Date: 08/17/22   Reason for Admission: Low Flow Low Gradient Severe aortic stenosis  Discharge:   Discharge Date: 08/18/22  Discharge Diagnosis: Low Flow Low Gradient Severe aortic stenosis    BACKGROUND                                                      Per hospital discharge summary and inpatient provider notes:    Tima Mckenzie is a 87 year old male with a PMHx of HFpEF, CAD s/p 3V CABG, PCI, HTN, HLD, and severe aortic stenosis who was admitted 8/17 for planned TAVR.     ASSESSMENT           Discharge Assessment  How are you doing now that you are home?: Patient reports he is doing well but sore.  He is wondering if he can take Tylenol, writer directed pt to RN Coordinator Hannah.  Pt agree to call Hannah with questions  How are your symptoms? (Red Flag symptoms escalate to triage hotline per guidelines): Improved  Do you feel your condition is stable enough to be safe at home until your provider visit?: Yes  Does the patient have their discharge instructions? : Yes  Does the patient have questions regarding their discharge instructions? : No  Were you started on any new medications or were there changes to any of your previous medications? : Yes  Does the patient have all of their medications?: Yes  Do you have questions regarding any of your medications? : No  Discharge follow-up appointment scheduled within 14 calendar days? : Yes  Discharge Follow Up Appointment Date: 08/29/22  Discharge Follow Up Appointment Scheduled with?: Specialty Care Provider    Post-op ([facility] Only)  If the patient had a surgery or procedure, do they have any questions for a nurse?: No             PLAN                                                      Outpatient Plan:      Follow-up:  - PCP 7-10 days for post hospitalization visit  - Structural Cardiology 1-2 weeks  for TAVR follow up, 1 month with echo prior for TAVR follow up    Future Appointments   Date Time Provider Department Center   8/29/2022  1:00 PM Doris Poole NP [facility]   8/30/2022 10:00 AM 1, Ur Cardiac Rehab [facility]   10/13/2022 11:00 AM Artis Parker MD [facility]         For any urgent concerns, please contact our 24 hour nurse triage line: [phone] ([phone])         Nereida Geronimo  [facility]

## 2022-08-19 NOTE — PROGRESS NOTES
Return telephone call made to Cameron. He said he wasn't hurting, just some minor aches and pains.  He was wondering what pain medicine he can take for this. He was advised that Tylenol/acetaminophan was the best medication for him to take. Cameron verbalized understanding to this information.

## 2022-08-21 NOTE — OP NOTE
Procedure Date: 2022    PREOPERATIVE DIAGNOSIS:  Severe aortic stenosis.    POSTOPERATIVE DIAGNOSIS:  Severe aortic stenosis.    PROCEDURE:  Transfemoral transcatheter aortic valve replacement with 29 mm Khoury Miky valve.    CARDIOLOGY TEAM:  Magdiel Hernandez MD    CARDIAC SURGEON:  Karl Webster MD    DESCRIPTION OF PROCEDURE:  The patient was brought to the operating room in stable condition for administration of anesthesia, patient's chest, abdomen, lower extremities were prepped and draped in usual manner.  Percutaneous access to the right and left common femoral artery and the left common femoral vein was obtained by the Seldinger technique.  Transvenous pacemaker was placed in the right ventricle.  Optimal implantation angle and optimal electrical thresholds obtained.  Pigtail catheter was placed in the aortic root and optimal implantation angle obtained via a J-wire, followed by a pigtail catheter.  A Safari wire was placed in the left ventricle.  The 29 mm Khoury valve was placed across the level of the native aortic valve and deployed under rapid ventricular pacing There was excellent position of valve with no paravalvular leak.  The sheath was removed and protamine was given.  Careful hemostasis was obtained.  The patient was transferred to ICU in stable, but critical condition.    Karl Webster MD        D: 2022   T: 2022   MT: [id number]    Name:     CHATA NAGEL  MRN:      [id number]        Account:        [id number]   :      1935           Procedure Date: 2022     Document: [id number]

## 2022-08-28 ENCOUNTER — MYC MEDICAL ADVICE (OUTPATIENT)
Dept: FAMILY MEDICINE | Facility: CLINIC | Age: 87
End: 2022-08-28

## 2022-08-28 NOTE — PROGRESS NOTES
[facility]  CARDIOVASCULAR DIVISION    VALVE CLINIC RETURN VISIT    PRIMARY CARDIOLOGIST: Dr. Parker       PERTINENT CLINICAL HISTORY & REASON FOR CONSULTATION:     Tiam Mckenzie is a very pleasant 87 year old male who presents for 1 week TAVR follow-up. 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  The TAVR and post-procedural course were notable for no complications or development of conduction abnormalities. Post procedure echo showed mean PG 7 without AI. EKG showed SB with incomplete RBBB. His beta blocker was discontinued. He was continued on ASA and Brillinta.    Mr. Mckenzie says that he has been feeling well since his valve replacement. He has been walking short distances without chest pain or dyspnea. He hasn't really noticed a significant difference in his general overall health since the procedure because he hasn't really pushed himself. He anticipates symptomatic improvement when he starts exercising. He starts cardiac rehab tomorrow. He otherwise denies weight gain, leg swelling, orthopnea or PND. He had one brief episode of irregular heart beat and lightheadedness last week, this has not recurred. No syncope.  Right groin site is bruised but seems to be improving. Has skin tears on his chest from pre procedure shaving and adhesive from drape, mostly healed.     PAST MEDICAL HISTORY:     Past Medical History:   Diagnosis Date     Arthritis      BPH (benign prostatic hypertrophy)      Coronary artery disease      Diverticulosis      Hypercholesterolemia      Hypertension      MI (myocardial infarction) (H) 3/1997    Anterior Wall MI/LAD/OM1/SVG    CABG X 3     Neuropathy     RT Foot     Seasonal allergic  rhinitis 7/1/2013        PAST SURGICAL HISTORY:     Past Surgical History:   Procedure Laterality Date     ANGIOGRAM  4/30/2003    No Disease, [place],LAD,SVG,OM1- small LT Main, Occlusion of CX- D1 50-70% Stenosis, RCA 30-80%     COLONOSCOPY       COLONOSCOPY N/A 10/14/2014    Procedure: COMBINED COLONOSCOPY, SINGLE BIOPSY/POLYPECTOMY BY BIOPSY;  Surgeon: Konstantin Coates MD;  Location: [facility] OR     COLONOSCOPY WITH CO2 INSUFFLATION N/A 10/14/2014    Procedure: COLONOSCOPY WITH CO2 INSUFFLATION;  Surgeon: Konstantin Coates MD;  Location: [facility] OR     CORONARY ARTERY BYPASS  1997    X 3     CV CORONARY ANGIOGRAM N/A 1/20/2020    Procedure: CV CORONARY ANGIOGRAM;  Surgeon: Magdiel Hernandez MD;  Location:  HEART CARDIAC CATH LAB     CV CORONARY ANGIOGRAM N/A 6/28/2021    Procedure: CV CORONARY ANGIOGRAM;  Surgeon: Ravi Albright MD;  Location:  HEART CARDIAC CATH LAB     CV FRACTIONAL FLOW RATIO WIRE N/A 1/20/2020    Procedure: Fractional Flow Reserve;  Surgeon: Magdiel Hernandez MD;  Location: [facility] CARDIAC CATH LAB     CV INTRAVASULAR ULTRASOUND N/A 6/28/2021    Procedure: Intravascular Ultrasound;  Surgeon: Ravi Albright MD;  Location: [facility] CARDIAC CATH LAB     CV PCI STENT DRUG ELUTING N/A 1/20/2020    Procedure: Percutaneous Coronary Intervention Stent Drug Eluting;  Surgeon: Magdiel Hernandez MD;  Location: [facility] CARDIAC CATH LAB     CV PCI STENT DRUG ELUTING N/A 6/28/2021    Procedure: Percutaneous Coronary Intervention Stent Drug Eluting;  Surgeon: Ravi Albright MD;  Location: [facility] CARDIAC CATH LAB     CV TRANSCATHETER AORTIC VALVE REPLACEMENT N/A 8/17/2022    Procedure: Transfemoral Transcatheter Aortic Valve Replacement (Khoury);  Surgeon: Magdiel Hernandez MD;  Location:  OR     HERNIA REPAIR  2014    [facility]     OR TRANSCATHETER AORTIC VALVE REPLACEMENT, FEMORAL PERCUTANEOUS APPROACH (STANDBY) N/A 8/17/2022     Procedure: with possible open heart bypass and or balloon pump placement and any indicated procedure,;  Surgeon: Karl Webster MD;  Location: [facility] OR        CURRENT MEDICATIONS:     Current Outpatient Medications   Medication Sig Dispense Refill     aspirin 81 MG EC tablet Take 1 tablet (81 mg) by mouth daily       isosorbide mononitrate (IMDUR) 60 MG 24 hr tablet Take 1 tablet (60 mg) by mouth every morning 90 tablet 2     mometasone (NASONEX) 50 MCG/ACT nasal spray Spray 2 sprays into both nostrils as needed (nasal congestion) 17 g 1     multivitamin, therapeutic (THERA-VIT) TABS tablet Take 1 tablet by mouth daily       nitroGLYcerin (NITROSTAT) 0.4 MG sublingual tablet Place 1 tablet (0.4 mg) under the tongue every 5 minutes as needed for chest pain up to 3 tablets per episode. 25 tablet 3     rosuvastatin (CRESTOR) 40 MG tablet Take 1 tablet (40 mg) by mouth daily 90 tablet 3     ticagrelor (BRILINTA) 90 MG tablet Take 1 tablet (90 mg) by mouth 2 times daily Start this evening. 180 tablet 3        ALLERGIES:     Allergies   Allergen Reactions     No Known Drug Allergy      Seasonal Allergies         FAMILY HISTORY:     Family History   Problem Relation Age of Onset     Coronary Artery Disease Brother 55        Fatal MI        SOCIAL HISTORY:     Social History     Socioeconomic History     Marital status:    Tobacco Use     Smoking status: Former Smoker     Smokeless tobacco: Never Used     Tobacco comment: 1997   Vaping Use     Vaping Use: Never used   Substance and Sexual Activity     Alcohol use: Yes     Comment: rarely      Drug use: No     Sexual activity: Yes     Partners: Female        REVIEW OF SYSTEMS:     Constitutional: No fevers or chills  Skin: No new rash or itching  Eyes: No acute change in vision  Ears/Nose/Throat: No purulent rhinorrhea, new hearing loss, or new vertigo  Respiratory: No cough or hemoptysis  Cardiovascular: See HPI  Gastrointestinal: No change in appetite, vomiting,  "hematemesis or diarrhea  Genitourinary: No dysuria or hematuria  Musculoskeletal: No new back pain, neck pain or muscle pain  Neurologic: No new headaches, focal weakness or behavior changes  Psychiatric: No hallucinations, excessive alcohol consumption or illegal drug usage  Hematologic/Lymphatic/Immunologic: No bleeding, chills, fever, night sweats or weight loss  Endocrine: No new cold intolerance, heat intolerance, polyphagia, polydipsia or polyuria      PHYSICAL EXAMINATION:     /75 (BP Location: Right arm, Patient Position: Chair, Cuff Size: Adult Regular)   Pulse 60   Ht 1.791 m (5' 10.51\")   Wt 82.2 kg (181 lb 4.8 oz)   SpO2 97%   BMI 25.64 kg/m      GENERAL: No acute distress.  HEENT: EOMI. Sclerae white, not injected. Nares clear. Pharynx without erythema or exudate.   Neck: No adenopathy. No thyromegaly. No jugular venous distension.   Heart: Regular rate and rhythm. No murmur.   Lungs: Clear to auscultation. No ronchi, wheezes, rales.   Abdomen: Soft, nontender, nondistended. Bowel sounds present.  Extremities: No clubbing, cyanosis, or edema.   Neurologic: Alert and oriented to person/place/time, normal speech and affect. No focal deficits.  Skin: No petechiae, purpura or rash.     LABORATORY DATA:     LIPID RESULTS:  Lab Results   Component Value Date    CHOL 132 06/27/2022    CHOL 138 06/17/2021    HDL 46 06/27/2022    HDL 49 06/17/2021    LDL 64 06/27/2022    LDL 66 06/17/2021    TRIG 109 06/27/2022    TRIG 116 06/17/2021    CHOLHDLRATIO 2.8 07/21/2015       LIVER ENZYME RESULTS:  Lab Results   Component Value Date    AST 26 08/15/2022    AST 20 06/17/2021    ALT 31 08/15/2022    ALT 29 06/17/2021       CBC RESULTS:  Lab Results   Component Value Date    WBC 10.1 08/18/2022    WBC 7.3 06/28/2021    RBC 4.47 08/18/2022    RBC 5.22 06/28/2021    HGB 14.2 08/18/2022    HGB 16.5 06/28/2021    HCT 41.8 08/18/2022    HCT 48.7 06/28/2021    MCV 94 08/18/2022    MCV 93 06/28/2021    MCH 31.8 " 08/18/2022    MCH 31.6 06/28/2021    MCHC 34.0 08/18/2022    MCHC 33.9 06/28/2021    RDW 12.8 08/18/2022    RDW 12.4 06/28/2021     (L) 08/18/2022     06/28/2021       BMP RESULTS:  Lab Results   Component Value Date     08/18/2022     06/28/2021    POTASSIUM 4.4 08/18/2022    POTASSIUM 4.4 08/15/2022    POTASSIUM 4.4 06/28/2021    CHLORIDE 104 08/18/2022    CHLORIDE 107 08/15/2022    CHLORIDE 108 06/28/2021    CO2 23 08/18/2022    CO2 28 08/15/2022    CO2 26 06/28/2021    ANIONGAP 12 08/18/2022    ANIONGAP 7 08/15/2022    ANIONGAP 4 06/28/2021     (H) 08/18/2022    GLC 90 08/17/2022     (H) 08/15/2022    GLC 92 06/28/2021    BUN 16.3 08/18/2022    BUN 24 08/15/2022    BUN 14 06/28/2021    CR 1.12 08/18/2022    CR 1.11 06/28/2021    GFRESTIMATED 64 08/18/2022    GFRESTIMATED 61 06/28/2021    GFRESTBLACK 70 06/28/2021    CLAUDIA 9.1 08/18/2022    CLAUDIA 9.3 06/28/2021        A1C RESULTS:  No results found for: A1C    INR RESULTS:  Lab Results   Component Value Date    INR 1.07 08/15/2022          PROCEDURES & FURTHER ASSESSMENTS:     ECG dated 8/18/22:  NSR with PACs - wave abnormality unchanged       Echocardiogram dated 8/18/22:  Interpretation Summary  S/P Miky 3 29mm Aortic Valve. Valve is well seated. No AI. Normal Doppler  interrogation. MG is 7 mmHg.     Normal biventricular function.  No pericardial effusion is present.  ______________________________________________________________________________  Left Ventricle  Left ventricular size is normal. Global and regional left ventricular function  is normal with an EF of 60-65%.     Right Ventricle  The right ventricle is normal size. Global right ventricular function is  normal.     Mitral Valve  The mitral valve is normal.     Aortic Valve  The mean AoV pressure gradient is 7.5 mmHg. The calculated aortic valve are is  3.0 cm^2. S/P Miky 3 29mm Aortic Valve.     Vessels  The inferior vena cava was normal in size with  preserved respiratory  variability. The aorta root is normal.     Pericardium  No pericardial effusion is present.     Compared to Previous Study  This study was compared with the study from 8/17/2022 . No significant changes  noted.    NYHA Class: I     CLINICAL IMPRESSION:   Tima Mckenzie is a very pleasant 87 year old male who presents for 1 week TAVR follow-up. 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  The TAVR and post-procedural course were notable for no complications or development of conduction abnormalities. Post procedure echo showed mean PG 7 without AI. EKG showed SB with incomplete RBBB. His beta blocker was discontinued. He was continued on ASA and Brillinta.    Patient reports feeling well without significant cardiac symptoms. Groin site is healing well. He is tolerating medications without side effects. Plan is to continue current medicine regimen w/ plans to follow-up with valve clinic in 1 month but he can follow with his primary cardiologist or [facility] for management of his chronic cardiovascular issues in the meantime as needed.    Plan Summary:  1) Aspirin 81 mg daily lifelong  2) Brilinta 90 mg twice daily  3) Lifelong antibiotic prophylaxis prior to all dental procedures  4) Follow-up 1 month with echo, CT, labs and EKG prior        MARIA DEL CARMEN Nuñez, CNP  [facility] Cardiology Team      CC  Patient Care Team:  Julieta Gandhi MD as PCP - General (Family Practice)  Artis Parker MD as Assigned Heart and Vascular Provider  Viki Bishop NP as Assigned PCP

## 2022-08-29 ENCOUNTER — OFFICE VISIT (OUTPATIENT)
Dept: CARDIOLOGY | Facility: CLINIC | Age: 87
End: 2022-08-29
Attending: NURSE PRACTITIONER
Payer: COMMERCIAL

## 2022-08-29 VITALS
OXYGEN SATURATION: 97 % | SYSTOLIC BLOOD PRESSURE: 131 MMHG | HEART RATE: 60 BPM | HEIGHT: 71 IN | DIASTOLIC BLOOD PRESSURE: 75 MMHG | BODY MASS INDEX: 25.38 KG/M2 | WEIGHT: 181.3 LBS

## 2022-08-29 DIAGNOSIS — I25.10 CORONARY ARTERY DISEASE INVOLVING NATIVE CORONARY ARTERY OF NATIVE HEART WITHOUT ANGINA PECTORIS: ICD-10-CM

## 2022-08-29 DIAGNOSIS — Z95.2 S/P TAVR (TRANSCATHETER AORTIC VALVE REPLACEMENT): Primary | ICD-10-CM

## 2022-08-29 DIAGNOSIS — I51.89 OTHER ILL-DEFINED HEART DISEASES: ICD-10-CM

## 2022-08-29 PROCEDURE — G0463 HOSPITAL OUTPT CLINIC VISIT: HCPCS

## 2022-08-29 PROCEDURE — 99214 OFFICE O/P EST MOD 30 MIN: CPT | Performed by: NURSE PRACTITIONER

## 2022-08-29 ASSESSMENT — PAIN SCALES - GENERAL: PAINLEVEL: NO PAIN (0)

## 2022-08-29 NOTE — PATIENT INSTRUCTIONS
You were seen today in the Structural Heart Clinic at the [facility].    Cardiology provider you saw during your visit: Doris Poole NP    Instructions:   Continue aspirin 81 mg daily lifelong.  Continue Brilinta 90 mg BID   Delay any nonurgent dental procedures for the first 6 month post TAVR.  For all future dental cleanings and procedures you will need to take antibiotics prior - see instructions below.   Follow-up in Valve Clinic in 1 month with echo, CT, labs and ECG prior   Follow-up with primary cardiologist 6 months   Start cardiac rehab tomorrow     Prevention of Infective (Bacterial) Endocarditis:  You are at increased risk for developing adverse outcomes from infective endocarditis (IE), also known as bacterial endocarditis (BE) because of the new device in your heart. The guidelines for prevention of IE are to give patients antibiotics prior to any dental procedures that involve manipulation of gingival tissue or the periapical region of teeth, or perforation of the oral mucosa:      It is recommended to take Amoxicillin 2 gm by mouth as a single dose 30 to 60 minutes before procedure.     OR if allergic to Penicillin or Ampicillin:     Cephalexin 2 gm by mouth, or  Clindamycin 600 mg by mouth, or  Azithromycin or Clarithromycin 500 mg PO       Questions and scheduling:   First call: Structural Heart  Alla Alexander [phone]    General scheduling line: [phone].   First press #1 for the [employer] and then press #3 for Medical Questions to reach the Cardiology triage nurse.     On Call Cardiologist for after hours or on weekends: [phone], press option #4 and ask to speak to the on-call Cardiologist.

## 2022-08-29 NOTE — NURSING NOTE
Chief Complaint   Patient presents with     Follow Up      86 yo M, here for 1 week s/p TAVR follow-up.       Vitals were taken and medications reconciled.    Jony Ruano, EMT  12:56 PM

## 2022-08-29 NOTE — LETTER
8/29/2022      RE: Tima Mckenzie  [address]  [facility] [address]       Dear Colleague,    Thank you for the opportunity to participate in the care of your patient, Tima Mckenzie, at the [facility] [place] at . Please see a copy of my visit note below.        [facility] HEART CARE  CARDIOVASCULAR DIVISION    VALVE CLINIC RETURN VISIT    PRIMARY CARDIOLOGIST: Dr. Parker       PERTINENT CLINICAL HISTORY & REASON FOR CONSULTATION:     Tima Mckenzie is a very pleasant 87 year old male who presents for 1 week TAVR follow-up. 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  The TAVR and post-procedural course were notable for no complications or development of conduction abnormalities. Post procedure echo showed mean PG 7 without AI. EKG showed SB with incomplete RBBB. His beta blocker was discontinued. He was continued on ASA and Brillinta.    Mr. Mckenzie says that he has been feeling well since his valve replacement. He has been walking short distances without chest pain or dyspnea. He hasn't really noticed a significant difference in his general overall health since the procedure because he hasn't really pushed himself. He anticipates symptomatic improvement when he starts exercising. He starts cardiac rehab tomorrow. He otherwise denies weight gain, leg swelling, orthopnea or PND. He had one brief episode of irregular heart beat and lightheadedness last week, this has not recurred. No syncope.  Right groin site is bruised but seems to be improving. Has skin tears on his chest from pre procedure shaving and adhesive from drape, mostly healed.      PAST MEDICAL HISTORY:     Past Medical History:   Diagnosis Date     Arthritis      BPH (benign prostatic hypertrophy)      Coronary artery disease      Diverticulosis      Hypercholesterolemia      Hypertension      MI (myocardial infarction) (H) 3/1997    Anterior Wall MI/LAD/OM1/SVG    CABG X 3     Neuropathy     RT Foot     Seasonal allergic rhinitis 7/1/2013        PAST SURGICAL HISTORY:     Past Surgical History:   Procedure Laterality Date     ANGIOGRAM  4/30/2003    No Disease, [place],LAD,SVG,OM1- small LT Main, Occlusion of CX- D1 50-70% Stenosis, RCA 30-80%     COLONOSCOPY       COLONOSCOPY N/A 10/14/2014    Procedure: COMBINED COLONOSCOPY, SINGLE BIOPSY/POLYPECTOMY BY BIOPSY;  Surgeon: Konstantin Coates MD;  Location: [facility] OR     COLONOSCOPY WITH CO2 INSUFFLATION N/A 10/14/2014    Procedure: COLONOSCOPY WITH CO2 INSUFFLATION;  Surgeon: Konstantin Coates MD;  Location: [facility] OR     CORONARY ARTERY BYPASS  1997    X 3     CV CORONARY ANGIOGRAM N/A 1/20/2020    Procedure: CV CORONARY ANGIOGRAM;  Surgeon: Magdiel Hernandez MD;  Location:  HEART CARDIAC CATH LAB     CV CORONARY ANGIOGRAM N/A 6/28/2021    Procedure: CV CORONARY ANGIOGRAM;  Surgeon: Ravi Albright MD;  Location:  HEART CARDIAC CATH LAB     CV FRACTIONAL FLOW RATIO WIRE N/A 1/20/2020    Procedure: Fractional Flow Reserve;  Surgeon: Magdiel Hernandez MD;  Location:  HEART CARDIAC CATH LAB     CV INTRAVASULAR ULTRASOUND N/A 6/28/2021    Procedure: Intravascular Ultrasound;  Surgeon: Ravi Albright MD;  Location:  HEART CARDIAC CATH LAB     CV PCI STENT DRUG ELUTING N/A 1/20/2020    Procedure: Percutaneous Coronary Intervention Stent Drug Eluting;  Surgeon: Magdiel Hernandez MD;  Location:  HEART CARDIAC CATH LAB     CV PCI STENT DRUG ELUTING N/A 6/28/2021    Procedure: Percutaneous Coronary Intervention Stent Drug Eluting;  Surgeon: Ravi Albright MD;  Location:  HEART CARDIAC CATH  LAB     CV TRANSCATHETER AORTIC VALVE REPLACEMENT N/A 8/17/2022    Procedure: Transfemoral Transcatheter Aortic Valve Replacement (Khoury);  Surgeon: Magdiel Hernandez MD;  Location:  OR     HERNIA REPAIR  2014    [facility]     OR TRANSCATHETER AORTIC VALVE REPLACEMENT, FEMORAL PERCUTANEOUS APPROACH (STANDBY) N/A 8/17/2022    Procedure: with possible open heart bypass and or balloon pump placement and any indicated procedure,;  Surgeon: Karl Webster MD;  Location:  OR        CURRENT MEDICATIONS:     Current Outpatient Medications   Medication Sig Dispense Refill     aspirin 81 MG EC tablet Take 1 tablet (81 mg) by mouth daily       isosorbide mononitrate (IMDUR) 60 MG 24 hr tablet Take 1 tablet (60 mg) by mouth every morning 90 tablet 2     mometasone (NASONEX) 50 MCG/ACT nasal spray Spray 2 sprays into both nostrils as needed (nasal congestion) 17 g 1     multivitamin, therapeutic (THERA-VIT) TABS tablet Take 1 tablet by mouth daily       nitroGLYcerin (NITROSTAT) 0.4 MG sublingual tablet Place 1 tablet (0.4 mg) under the tongue every 5 minutes as needed for chest pain up to 3 tablets per episode. 25 tablet 3     rosuvastatin (CRESTOR) 40 MG tablet Take 1 tablet (40 mg) by mouth daily 90 tablet 3     ticagrelor (BRILINTA) 90 MG tablet Take 1 tablet (90 mg) by mouth 2 times daily Start this evening. 180 tablet 3        ALLERGIES:     Allergies   Allergen Reactions     No Known Drug Allergy      Seasonal Allergies         FAMILY HISTORY:     Family History   Problem Relation Age of Onset     Coronary Artery Disease Brother 55        Fatal MI        SOCIAL HISTORY:     Social History     Socioeconomic History     Marital status:    Tobacco Use     Smoking status: Former Smoker     Smokeless tobacco: Never Used     Tobacco comment: 1997   Vaping Use     Vaping Use: Never used   Substance and Sexual Activity     Alcohol use: Yes     Comment: rarely      Drug use: No     Sexual activity: Yes      "Partners: Female        REVIEW OF SYSTEMS:     Constitutional: No fevers or chills  Skin: No new rash or itching  Eyes: No acute change in vision  Ears/Nose/Throat: No purulent rhinorrhea, new hearing loss, or new vertigo  Respiratory: No cough or hemoptysis  Cardiovascular: See HPI  Gastrointestinal: No change in appetite, vomiting, hematemesis or diarrhea  Genitourinary: No dysuria or hematuria  Musculoskeletal: No new back pain, neck pain or muscle pain  Neurologic: No new headaches, focal weakness or behavior changes  Psychiatric: No hallucinations, excessive alcohol consumption or illegal drug usage  Hematologic/Lymphatic/Immunologic: No bleeding, chills, fever, night sweats or weight loss  Endocrine: No new cold intolerance, heat intolerance, polyphagia, polydipsia or polyuria      PHYSICAL EXAMINATION:     /75 (BP Location: Right arm, Patient Position: Chair, Cuff Size: Adult Regular)   Pulse 60   Ht 1.791 m (5' 10.51\")   Wt 82.2 kg (181 lb 4.8 oz)   SpO2 97%   BMI 25.64 kg/m      GENERAL: No acute distress.  HEENT: EOMI. Sclerae white, not injected. Nares clear. Pharynx without erythema or exudate.   Neck: No adenopathy. No thyromegaly. No jugular venous distension.   Heart: Regular rate and rhythm. No murmur.   Lungs: Clear to auscultation. No ronchi, wheezes, rales.   Abdomen: Soft, nontender, nondistended. Bowel sounds present.  Extremities: No clubbing, cyanosis, or edema.   Neurologic: Alert and oriented to person/place/time, normal speech and affect. No focal deficits.  Skin: No petechiae, purpura or rash.     LABORATORY DATA:     LIPID RESULTS:  Lab Results   Component Value Date    CHOL 132 06/27/2022    CHOL 138 06/17/2021    HDL 46 06/27/2022    HDL 49 06/17/2021    LDL 64 06/27/2022    LDL 66 06/17/2021    TRIG 109 06/27/2022    TRIG 116 06/17/2021    CHOLHDLRATIO 2.8 07/21/2015       LIVER ENZYME RESULTS:  Lab Results   Component Value Date    AST 26 08/15/2022    AST 20 06/17/2021    " ALT 31 08/15/2022    ALT 29 06/17/2021       CBC RESULTS:  Lab Results   Component Value Date    WBC 10.1 08/18/2022    WBC 7.3 06/28/2021    RBC 4.47 08/18/2022    RBC 5.22 06/28/2021    HGB 14.2 08/18/2022    HGB 16.5 06/28/2021    HCT 41.8 08/18/2022    HCT 48.7 06/28/2021    MCV 94 08/18/2022    MCV 93 06/28/2021    MCH 31.8 08/18/2022    MCH 31.6 06/28/2021    MCHC 34.0 08/18/2022    MCHC 33.9 06/28/2021    RDW 12.8 08/18/2022    RDW 12.4 06/28/2021     (L) 08/18/2022     06/28/2021       BMP RESULTS:  Lab Results   Component Value Date     08/18/2022     06/28/2021    POTASSIUM 4.4 08/18/2022    POTASSIUM 4.4 08/15/2022    POTASSIUM 4.4 06/28/2021    CHLORIDE 104 08/18/2022    CHLORIDE 107 08/15/2022    CHLORIDE 108 06/28/2021    CO2 23 08/18/2022    CO2 28 08/15/2022    CO2 26 06/28/2021    ANIONGAP 12 08/18/2022    ANIONGAP 7 08/15/2022    ANIONGAP 4 06/28/2021     (H) 08/18/2022    GLC 90 08/17/2022     (H) 08/15/2022    GLC 92 06/28/2021    BUN 16.3 08/18/2022    BUN 24 08/15/2022    BUN 14 06/28/2021    CR 1.12 08/18/2022    CR 1.11 06/28/2021    GFRESTIMATED 64 08/18/2022    GFRESTIMATED 61 06/28/2021    GFRESTBLACK 70 06/28/2021    CLAUDIA 9.1 08/18/2022    CLAUDIA 9.3 06/28/2021        A1C RESULTS:  No results found for: A1C    INR RESULTS:  Lab Results   Component Value Date    INR 1.07 08/15/2022          PROCEDURES & FURTHER ASSESSMENTS:     ECG dated 8/18/22:  NSR with PACs - wave abnormality unchanged       Echocardiogram dated 8/18/22:  Interpretation Summary  S/P Miky 3 29mm Aortic Valve. Valve is well seated. No AI. Normal Doppler  interrogation. MG is 7 mmHg.     Normal biventricular function.  No pericardial effusion is present.  ______________________________________________________________________________  Left Ventricle  Left ventricular size is normal. Global and regional left ventricular function  is normal with an EF of 60-65%.     Right Ventricle  The  right ventricle is normal size. Global right ventricular function is  normal.     Mitral Valve  The mitral valve is normal.     Aortic Valve  The mean AoV pressure gradient is 7.5 mmHg. The calculated aortic valve are is  3.0 cm^2. S/P Miky 3 29mm Aortic Valve.     Vessels  The inferior vena cava was normal in size with preserved respiratory  variability. The aorta root is normal.     Pericardium  No pericardial effusion is present.     Compared to Previous Study  This study was compared with the study from 8/17/2022 . No significant changes  noted.    NYHA Class: I     CLINICAL IMPRESSION:   Tima Mckenzie is a very pleasant 87 year old male who presents for 1 week TAVR follow-up. 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  The TAVR and post-procedural course were notable for no complications or development of conduction abnormalities. Post procedure echo showed mean PG 7 without AI. EKG showed SB with incomplete RBBB. His beta blocker was discontinued. He was continued on ASA and Brillinta.    Patient reports feeling well without significant cardiac symptoms. Groin site is healing well. He is tolerating medications without side effects. Plan is to continue current medicine regimen w/ plans to follow-up with valve clinic in 1 month but he can follow with his primary cardiologist or [facility] for management of his chronic cardiovascular issues in the meantime as needed.    Plan Summary:  1) Aspirin 81 mg daily lifelong  2) Brilinta 90 mg twice daily  3) Lifelong antibiotic prophylaxis prior to all dental procedures  4) Follow-up 1 month with echo, CT, labs and EKG prior        MARIA DEL CARMEN Nuñez, CNP  [facility] Cardiology Team      CC  Patient Care  Team:  Julieta Gandhi MD as PCP - General (Family Practice)  Artis Parker MD as Assigned Heart and Vascular Provider  Viki Bishop NP as Assigned PCP

## 2022-08-30 ENCOUNTER — HOSPITAL ENCOUNTER (OUTPATIENT)
Dept: CARDIAC REHAB | Facility: CLINIC | Age: 87
Discharge: HOME | End: 2022-08-30
Attending: NURSE PRACTITIONER
Payer: COMMERCIAL

## 2022-08-30 DIAGNOSIS — Z95.2 S/P TAVR (TRANSCATHETER AORTIC VALVE REPLACEMENT): ICD-10-CM

## 2022-08-30 PROCEDURE — 93798 PHYS/QHP OP CAR RHAB W/ECG: CPT

## 2022-08-31 NOTE — TELEPHONE ENCOUNTER
Spoke with patient and set up an annual visit with Dr. Gandhi in Two Weeks.    Patient states his cardiac rehab advised him to see his primary provider and did not know if Oksana needed/wanted to see him for an office visit or annual.    Provider please advise if you do no want a annual visit.

## 2022-09-06 ENCOUNTER — HOSPITAL ENCOUNTER (OUTPATIENT)
Dept: CARDIAC REHAB | Facility: CLINIC | Age: 87
Discharge: HOME | End: 2022-09-06
Attending: SURGERY
Payer: COMMERCIAL

## 2022-09-06 PROCEDURE — 93798 PHYS/QHP OP CAR RHAB W/ECG: CPT

## 2022-09-08 ENCOUNTER — HOSPITAL ENCOUNTER (OUTPATIENT)
Dept: CARDIAC REHAB | Facility: CLINIC | Age: 87
Discharge: HOME | End: 2022-09-08
Attending: SURGERY
Payer: COMMERCIAL

## 2022-09-08 PROCEDURE — 93798 PHYS/QHP OP CAR RHAB W/ECG: CPT

## 2022-09-13 ENCOUNTER — HOSPITAL ENCOUNTER (OUTPATIENT)
Dept: CARDIAC REHAB | Facility: CLINIC | Age: 87
Discharge: HOME | End: 2022-09-13
Attending: SURGERY
Payer: COMMERCIAL

## 2022-09-13 PROCEDURE — 93798 PHYS/QHP OP CAR RHAB W/ECG: CPT

## 2022-09-15 ENCOUNTER — HOSPITAL ENCOUNTER (OUTPATIENT)
Dept: CARDIAC REHAB | Facility: CLINIC | Age: 87
Discharge: HOME | End: 2022-09-15
Attending: SURGERY
Payer: COMMERCIAL

## 2022-09-15 PROCEDURE — 93798 PHYS/QHP OP CAR RHAB W/ECG: CPT

## 2022-09-20 ENCOUNTER — HOSPITAL ENCOUNTER (OUTPATIENT)
Dept: CARDIAC REHAB | Facility: CLINIC | Age: 87
Discharge: HOME | End: 2022-09-20
Attending: SURGERY
Payer: COMMERCIAL

## 2022-09-20 PROCEDURE — 93798 PHYS/QHP OP CAR RHAB W/ECG: CPT

## 2022-09-27 ENCOUNTER — HOSPITAL ENCOUNTER (OUTPATIENT)
Dept: CARDIAC REHAB | Facility: CLINIC | Age: 87
Discharge: HOME | End: 2022-09-27
Attending: SURGERY
Payer: COMMERCIAL

## 2022-09-27 PROCEDURE — 93798 PHYS/QHP OP CAR RHAB W/ECG: CPT

## 2022-09-29 ENCOUNTER — HOSPITAL ENCOUNTER (OUTPATIENT)
Dept: CARDIOLOGY | Facility: CLINIC | Age: 87
Discharge: HOME | End: 2022-09-29
Attending: NURSE PRACTITIONER
Payer: COMMERCIAL

## 2022-09-29 ENCOUNTER — LAB (OUTPATIENT)
Dept: LAB | Facility: CLINIC | Age: 87
End: 2022-09-29
Attending: NURSE PRACTITIONER
Payer: COMMERCIAL

## 2022-09-29 ENCOUNTER — HOSPITAL ENCOUNTER (OUTPATIENT)
Dept: CT IMAGING | Facility: CLINIC | Age: 87
Discharge: HOME | End: 2022-09-29
Attending: NURSE PRACTITIONER
Payer: COMMERCIAL

## 2022-09-29 DIAGNOSIS — I51.89 OTHER ILL-DEFINED HEART DISEASES: ICD-10-CM

## 2022-09-29 DIAGNOSIS — Z95.2 S/P TAVR (TRANSCATHETER AORTIC VALVE REPLACEMENT): ICD-10-CM

## 2022-09-29 DIAGNOSIS — I25.10 CORONARY ARTERY DISEASE INVOLVING NATIVE CORONARY ARTERY OF NATIVE HEART WITHOUT ANGINA PECTORIS: ICD-10-CM

## 2022-09-29 LAB
ANION GAP SERPL CALCULATED.3IONS-SCNC: 9 MMOL/L (ref 7–15)
BUN SERPL-MCNC: 17.5 MG/DL (ref 8–23)
CALCIUM SERPL-MCNC: 9.1 MG/DL (ref 8.8–10.2)
CHLORIDE SERPL-SCNC: 102 MMOL/L (ref 98–107)
CREAT SERPL-MCNC: 1.07 MG/DL (ref 0.67–1.17)
DEPRECATED HCO3 PLAS-SCNC: 26 MMOL/L (ref 22–29)
ERYTHROCYTE [DISTWIDTH] IN BLOOD BY AUTOMATED COUNT: 12.7 % (ref 10–15)
GFR SERPL CREATININE-BSD FRML MDRD: 68 ML/MIN/1.73M2
GLUCOSE SERPL-MCNC: 83 MG/DL (ref 70–99)
HCT VFR BLD AUTO: 43.2 % (ref 40–53)
HGB BLD-MCNC: 14.3 G/DL (ref 13.3–17.7)
LVEF ECHO: NORMAL
MCH RBC QN AUTO: 31 PG (ref 26.5–33)
MCHC RBC AUTO-ENTMCNC: 33.1 G/DL (ref 31.5–36.5)
MCV RBC AUTO: 94 FL (ref 78–100)
PLATELET # BLD AUTO: 149 10E3/UL (ref 150–450)
POTASSIUM SERPL-SCNC: 4.1 MMOL/L (ref 3.4–5.3)
RBC # BLD AUTO: 4.61 10E6/UL (ref 4.4–5.9)
SODIUM SERPL-SCNC: 137 MMOL/L (ref 136–145)
WBC # BLD AUTO: 6.4 10E3/UL (ref 4–11)

## 2022-09-29 PROCEDURE — 93306 TTE W/DOPPLER COMPLETE: CPT

## 2022-09-29 PROCEDURE — 80048 BASIC METABOLIC PNL TOTAL CA: CPT

## 2022-09-29 PROCEDURE — 93306 TTE W/DOPPLER COMPLETE: CPT | Mod: 26 | Performed by: STUDENT IN AN ORGANIZED HEALTH CARE EDUCATION/TRAINING PROGRAM

## 2022-09-29 PROCEDURE — 85014 HEMATOCRIT: CPT

## 2022-09-29 PROCEDURE — 36415 COLL VENOUS BLD VENIPUNCTURE: CPT

## 2022-09-29 PROCEDURE — 75572 CT HRT W/3D IMAGE: CPT

## 2022-09-29 PROCEDURE — 250N000011 HC RX IP 250 OP 636: Performed by: INTERNAL MEDICINE

## 2022-09-29 PROCEDURE — 75572 CT HRT W/3D IMAGE: CPT | Mod: 26 | Performed by: INTERNAL MEDICINE

## 2022-09-29 RX ORDER — IOPAMIDOL 755 MG/ML
120 INJECTION, SOLUTION INTRAVASCULAR ONCE
Status: COMPLETED | OUTPATIENT
Start: 2022-09-29 | End: 2022-09-29

## 2022-09-29 RX ADMIN — IOPAMIDOL 120 ML: 755 INJECTION, SOLUTION INTRAVENOUS at 09:34

## 2022-09-29 NOTE — PROGRESS NOTES
[facility] HEART [facility]  CARDIOVASCULAR DIVISION    VALVE CLINIC RETURN VISIT    PRIMARY CARDIOLOGIST: Dr. Parker       PERTINENT CLINICAL HISTORY:     Tima Mckenzie is a very pleasant 87 year old male who presents for 1 month TAVR follow-up. 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  The TAVR and post-procedural course were notable for no complications or development of conduction abnormalities. Post procedure echo showed mean PG 7 without AI. EKG showed SB with incomplete RBBB (unchanged). His beta blocker was discontinued. He was continued on ASA and Brillinta.    Mr. Mckenzie says that he has been feeling well since his valve replacement. He has been participating in cardiac rehab twice a week where he is able to walk on the treadmill and use the Nustep for about 30 minutes total. He denies exertional chest pain. He does get a little out of breath with activity but it has improved since his valve replacement. He otherwise denies weight gain, leg swelling, orthopnea or PND. He reports occasional abnormal heart beat for 1/2 second, no sustained palpitations, lightheadedness or syncope. His groin sites are healing well and skin tears on his chest have fully resolved. He does not check home blood pressure but it has been well controlled at appts.        PAST MEDICAL HISTORY:     Past Medical History:   Diagnosis Date     Arthritis      BPH (benign prostatic hypertrophy)      Coronary artery disease      Diverticulosis      Hypercholesterolemia      Hypertension      MI (myocardial infarction) (H) 3/1997    Anterior Wall MI/LAD/OM1/SVG    CABG X 3     Neuropathy     RT Foot     Seasonal allergic rhinitis 7/1/2013        PAST SURGICAL  HISTORY:     Past Surgical History:   Procedure Laterality Date     ANGIOGRAM  4/30/2003    No Disease, [place],LAD,SVG,OM1- small LT Main, Occlusion of CX- D1 50-70% Stenosis, RCA 30-80%     COLONOSCOPY       COLONOSCOPY N/A 10/14/2014    Procedure: COMBINED COLONOSCOPY, SINGLE BIOPSY/POLYPECTOMY BY BIOPSY;  Surgeon: Konstantin Coates MD;  Location: [facility] OR     COLONOSCOPY WITH CO2 INSUFFLATION N/A 10/14/2014    Procedure: COLONOSCOPY WITH CO2 INSUFFLATION;  Surgeon: Konstantin Coates MD;  Location: [facility] OR     CORONARY ARTERY BYPASS  1997    X 3     CV CORONARY ANGIOGRAM N/A 1/20/2020    Procedure: CV CORONARY ANGIOGRAM;  Surgeon: Magdiel Hernandez MD;  Location: [facility] CARDIAC CATH LAB     CV CORONARY ANGIOGRAM N/A 6/28/2021    Procedure: CV CORONARY ANGIOGRAM;  Surgeon: Ravi Albright MD;  Location: [facility] CARDIAC CATH LAB     CV FRACTIONAL FLOW RATIO WIRE N/A 1/20/2020    Procedure: Fractional Flow Reserve;  Surgeon: Magdiel Hernandez MD;  Location: [facility] CARDIAC CATH LAB     CV INTRAVASULAR ULTRASOUND N/A 6/28/2021    Procedure: Intravascular Ultrasound;  Surgeon: Ravi Albright MD;  Location: [facility] CARDIAC CATH LAB     CV PCI STENT DRUG ELUTING N/A 1/20/2020    Procedure: Percutaneous Coronary Intervention Stent Drug Eluting;  Surgeon: Magdiel Hernandez MD;  Location: [facility] CARDIAC CATH LAB     CV PCI STENT DRUG ELUTING N/A 6/28/2021    Procedure: Percutaneous Coronary Intervention Stent Drug Eluting;  Surgeon: Ravi Albright MD;  Location: [facility] CARDIAC CATH LAB     CV TRANSCATHETER AORTIC VALVE REPLACEMENT N/A 8/17/2022    Procedure: Transfemoral Transcatheter Aortic Valve Replacement (Khoury);  Surgeon: Magdiel Hernandez MD;  Location:  OR     HERNIA REPAIR  2014    [facility]     OR TRANSCATHETER AORTIC VALVE REPLACEMENT, FEMORAL PERCUTANEOUS APPROACH (STANDBY) N/A 8/17/2022    Procedure: with possible open heart bypass and  or balloon pump placement and any indicated procedure,;  Surgeon: Karl Webster MD;  Location:  OR        CURRENT MEDICATIONS:     Current Outpatient Medications   Medication Sig Dispense Refill     aspirin 81 MG EC tablet Take 1 tablet (81 mg) by mouth daily       isosorbide mononitrate (IMDUR) 60 MG 24 hr tablet Take 1 tablet (60 mg) by mouth every morning 90 tablet 2     mometasone (NASONEX) 50 MCG/ACT nasal spray Spray 2 sprays into both nostrils as needed (nasal congestion) 17 g 1     multivitamin, therapeutic (THERA-VIT) TABS tablet Take 1 tablet by mouth daily       nitroGLYcerin (NITROSTAT) 0.4 MG sublingual tablet Place 1 tablet (0.4 mg) under the tongue every 5 minutes as needed for chest pain up to 3 tablets per episode. 25 tablet 3     rosuvastatin (CRESTOR) 40 MG tablet Take 1 tablet (40 mg) by mouth daily 90 tablet 3     ticagrelor (BRILINTA) 90 MG tablet Take 1 tablet (90 mg) by mouth 2 times daily Start this evening. 180 tablet 3        ALLERGIES:     Allergies   Allergen Reactions     No Known Drug Allergy      Seasonal Allergies         FAMILY HISTORY:     Family History   Problem Relation Age of Onset     Coronary Artery Disease Brother 55        Fatal MI        SOCIAL HISTORY:     Social History     Socioeconomic History     Marital status:    Tobacco Use     Smoking status: Former Smoker     Smokeless tobacco: Never Used     Tobacco comment: 1997   Vaping Use     Vaping Use: Never used   Substance and Sexual Activity     Alcohol use: Yes     Comment: rarely      Drug use: No     Sexual activity: Yes     Partners: Female        REVIEW OF SYSTEMS:     Constitutional: No fevers or chills  Skin: No new rash or itching  Eyes: No acute change in vision  Ears/Nose/Throat: No purulent rhinorrhea, new hearing loss, or new vertigo  Respiratory: No cough or hemoptysis  Cardiovascular: See HPI  Gastrointestinal: No change in appetite, vomiting, hematemesis or diarrhea  Genitourinary: No dysuria or  "hematuria  Musculoskeletal: No new back pain, neck pain or muscle pain  Neurologic: No new headaches, focal weakness or behavior changes  Psychiatric: No hallucinations, excessive alcohol consumption or illegal drug usage  Hematologic/Lymphatic/Immunologic: No bleeding, chills, fever, night sweats or weight loss  Endocrine: No new cold intolerance, heat intolerance, polyphagia, polydipsia or polyuria      PHYSICAL EXAMINATION:     /79 (BP Location: Right arm, Patient Position: Chair, Cuff Size: Adult Regular)   Pulse 64   Ht 1.796 m (5' 10.71\")   Wt 83.1 kg (183 lb 3.2 oz)   SpO2 97%   BMI 25.76 kg/m      GENERAL: No acute distress.  HEENT: EOMI. Sclerae white, not injected. Nares clear. Pharynx without erythema or exudate.   Neck: No adenopathy. No thyromegaly. No jugular venous distension.   Heart: Regular rate and rhythm. No murmur.   Lungs: Clear to auscultation. No ronchi, wheezes, rales.   Abdomen: Soft, nontender, nondistended. Bowel sounds present.  Extremities: No clubbing, cyanosis, or edema.   Neurologic: Alert and oriented to person/place/time, normal speech and affect. No focal deficits.  Skin: No petechiae, purpura or rash.     LABORATORY DATA:     LIPID RESULTS:  Lab Results   Component Value Date    CHOL 132 06/27/2022    CHOL 138 06/17/2021    HDL 46 06/27/2022    HDL 49 06/17/2021    LDL 64 06/27/2022    LDL 66 06/17/2021    TRIG 109 06/27/2022    TRIG 116 06/17/2021    CHOLHDLRATIO 2.8 07/21/2015       LIVER ENZYME RESULTS:  Lab Results   Component Value Date    AST 26 08/15/2022    AST 20 06/17/2021    ALT 31 08/15/2022    ALT 29 06/17/2021       CBC RESULTS:  Lab Results   Component Value Date    WBC 6.4 09/29/2022    WBC 7.3 06/28/2021    RBC 4.61 09/29/2022    RBC 5.22 06/28/2021    HGB 14.3 09/29/2022    HGB 16.5 06/28/2021    HCT 43.2 09/29/2022    HCT 48.7 06/28/2021    MCV 94 09/29/2022    MCV 93 06/28/2021    MCH 31.0 09/29/2022    MCH 31.6 06/28/2021    MCHC 33.1 09/29/2022    " MCHC 33.9 06/28/2021    RDW 12.7 09/29/2022    RDW 12.4 06/28/2021     (L) 09/29/2022     06/28/2021       BMP RESULTS:  Lab Results   Component Value Date     09/29/2022     06/28/2021    POTASSIUM 4.1 09/29/2022    POTASSIUM 4.4 08/15/2022    POTASSIUM 4.4 06/28/2021    CHLORIDE 102 09/29/2022    CHLORIDE 107 08/15/2022    CHLORIDE 108 06/28/2021    CO2 26 09/29/2022    CO2 28 08/15/2022    CO2 26 06/28/2021    ANIONGAP 9 09/29/2022    ANIONGAP 7 08/15/2022    ANIONGAP 4 06/28/2021    GLC 83 09/29/2022    GLC 90 08/17/2022     (H) 08/15/2022    GLC 92 06/28/2021    BUN 17.5 09/29/2022    BUN 24 08/15/2022    BUN 14 06/28/2021    CR 1.07 09/29/2022    CR 1.11 06/28/2021    GFRESTIMATED 68 09/29/2022    GFRESTIMATED 61 06/28/2021    GFRESTBLACK 70 06/28/2021    CLAUDIA 9.1 09/29/2022    CLAUDIA 9.3 06/28/2021         PROCEDURES & FURTHER ASSESSMENTS:     EKG 8/30/22  Personally reviewed  NSR HR 61, no conduction delay      Cardiac CT 9/29/22:    IMPRESSIONS:     1.  There is a transcatheter valve in the aortic position, with  minimal HALT involving all 3 leaflets.   2.  Please review Radiology report for incidental noncardiac findings  that will follow separately.     FINDINGS:      1.  There is a Miky 3 Ultra 29 mm  transcatheter valve in the aortic  position. It is well-seated.  2.  There is minimal hypoattenuating leaflet thickening involving all  3 leaflets.  There is mild restriction of leaflet opening (right and  left cusps).   3.  The visualized portions of the aortic root and ascending aorta are  normal in size  4.  Coronary arteries originate from their respective cusps.  Subendocardial apical myocardial infarction noted. Prior CABG, LIMA  graft visualized partially, no other grafts seen.  5.  There is no pericardial effusion.   6.  There is no intracardiac thrombus.     ANTHONY UPTON MD      ECHO 9/29/22  Interpretation Summary  Status post Miky 3 Ultra 29 mm TAVR on  08/17/2022.  The TAVR is well-seated. Leaflet opening is not clearly visualized. There is  mild valvular regurgitation and trace paravalvular regurgitation. The Vmax is  1.8 m/s, the mean gradient is 7 mmHg, the dimensionless index is 0.50, and the  acceleration time is 100 ms.  Global and regional left ventricular function is normal with an EF of 60-65%.  Global right ventricular function is normal. The right ventricle is normal  size.  The estimated PA systolic pressure is 23 mmHg.  IVC diameter <2.1 cm collapsing >50% with sniff suggests a normal RA pressure  of 3 mmHg.  This study was compared with the study from 8/18/22. The central/valvular  aortic regurgitation was also seen on the prior study, but is better  visualized on this study. This is likely due to the patient having a higher BP  during this examination.      ECG dated 8/18/22:  NSR with PACs - wave abnormality unchanged       Echocardiogram dated 8/18/22:  Interpretation Summary  S/P Miky 3 29mm Aortic Valve. Valve is well seated. No AI. Normal Doppler  interrogation. MG is 7 mmHg.     Normal biventricular function.  No pericardial effusion is present.  ______________________________________________________________________________  Left Ventricle  Left ventricular size is normal. Global and regional left ventricular function  is normal with an EF of 60-65%.     Right Ventricle  The right ventricle is normal size. Global right ventricular function is  normal.     Mitral Valve  The mitral valve is normal.     Aortic Valve  The mean AoV pressure gradient is 7.5 mmHg. The calculated aortic valve are is  3.0 cm^2. S/P Miky 3 29mm Aortic Valve.     Vessels  The inferior vena cava was normal in size with preserved respiratory  variability. The aorta root is normal.     Pericardium  No pericardial effusion is present.     Compared to Previous Study  This study was compared with the study from 8/17/2022 . No significant changes  noted.    NYHA Class:  I     CLINICAL IMPRESSION:   Tima Mckenzie is a very pleasant 87 year old male who presents for 1 month TAVR follow-up. 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  The TAVR and post-procedural course were notable for no complications or development of conduction abnormalities. Post procedure echo showed mean PG 7 without AI. EKG showed SB with incomplete RBBB (unchanged). His beta blocker was discontinued. He was continued on ASA and Brillinta.    Patient reports feeling well with improvement in dyspnea post TAVR, otherwise no major cardiac symptoms. His ECHO shows normal TAVR function with mean PG of 7 mmHg with mild central AI and trace paravalvular AI. However, his cardiac CT does show evidence of mild hypoattenuating leaflet thickening (HALT) on all three leaflets. Plan to stop Brilinta given > 12 months from last PCI and start Eliquis for treatment of HALT. Will continue ASA for CAD. Otherwise his EKG and labs are stable today. His groin site is fully healed. He is tolerating medications without side effects.     Plan Summary:  1) Continue Aspirin 81 mg daily lifelong  2) Stop Brilinta (given 18 months post PCI), start Eliquis 5 mg BID for HALT  3) Lifelong antibiotic prophylaxis prior to all dental procedures  4) Repeat CT scan in 3 months then follow-up with Dr. Parker   5) valve clinic follow-up in 1 year with echo, labs and ECG prior       MARIA DEL CARMEN Nuñez, CNP  [facility] Cardiology Team      CC  Patient Care Team:  Julieta Gandhi MD as PCP - General (Family Practice)  Artis Parker MD as Assigned Heart and Vascular Provider  Viki Bishop NP as Assigned PCP

## 2022-09-30 ENCOUNTER — OFFICE VISIT (OUTPATIENT)
Dept: CARDIOLOGY | Facility: CLINIC | Age: 87
End: 2022-09-30
Attending: NURSE PRACTITIONER
Payer: COMMERCIAL

## 2022-09-30 VITALS
WEIGHT: 183.2 LBS | OXYGEN SATURATION: 97 % | BODY MASS INDEX: 25.65 KG/M2 | DIASTOLIC BLOOD PRESSURE: 79 MMHG | SYSTOLIC BLOOD PRESSURE: 127 MMHG | HEART RATE: 64 BPM | HEIGHT: 71 IN

## 2022-09-30 DIAGNOSIS — Z95.2 S/P TAVR (TRANSCATHETER AORTIC VALVE REPLACEMENT): Primary | ICD-10-CM

## 2022-09-30 DIAGNOSIS — R94.39 ABNORMAL RESULT OF OTHER CARDIOVASCULAR FUNCTION STUDY: ICD-10-CM

## 2022-09-30 DIAGNOSIS — I25.10 CORONARY ARTERY DISEASE INVOLVING NATIVE CORONARY ARTERY OF NATIVE HEART WITHOUT ANGINA PECTORIS: ICD-10-CM

## 2022-09-30 PROCEDURE — 93005 ELECTROCARDIOGRAM TRACING: CPT

## 2022-09-30 PROCEDURE — 99214 OFFICE O/P EST MOD 30 MIN: CPT | Performed by: NURSE PRACTITIONER

## 2022-09-30 PROCEDURE — G0463 HOSPITAL OUTPT CLINIC VISIT: HCPCS | Mod: 25

## 2022-09-30 ASSESSMENT — PAIN SCALES - GENERAL: PAINLEVEL: NO PAIN (0)

## 2022-09-30 NOTE — PROGRESS NOTES
[place] Cardiomyopathy Questionnaire  (CQ-12)  Date: 09/30/22    30-day post-TAVR      1.  A.  5      B.  5       C.  3  2.  3  3.  5  4.  5  5.  5  6.  5    7.  5  8.  A. 4     B.  4     C.  5

## 2022-09-30 NOTE — NURSING NOTE
Chief Complaint   Patient presents with     Follow Up     86 yo M, here for one month s/p TAVR follow-up       Vitals were taken, medications reconciled, and EKG performed.    Jony Ruano  9:22 AM

## 2022-09-30 NOTE — LETTER
9/30/2022      RE: Tima Mckenzie  [address]  [facility] [address]       Dear Colleague,    Thank you for the opportunity to participate in the care of your patient, Tima Mckenzie, at the [facility] [place] at [facility]. Please see a copy of my visit note below.        [facility] HEART CARE  CARDIOVASCULAR DIVISION    VALVE CLINIC RETURN VISIT    PRIMARY CARDIOLOGIST: Dr. Parker       PERTINENT CLINICAL HISTORY:     Tima Mckenzie is a very pleasant 87 year old male who presents for 1 month TAVR follow-up. 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  The TAVR and post-procedural course were notable for no complications or development of conduction abnormalities. Post procedure echo showed mean PG 7 without AI. EKG showed SB with incomplete RBBB (unchanged). His beta blocker was discontinued. He was continued on ASA and Brillinta.    Mr. Mckenzie says that he has been feeling well since his valve replacement. He has been participating in cardiac rehab twice a week where he is able to walk on the treadmill and use the Nustep for about 30 minutes total. He denies exertional chest pain. He does get a little out of breath with activity but it has improved since his valve replacement. He otherwise denies weight gain, leg swelling, orthopnea or PND. He reports occasional abnormal heart beat for 1/2 second, no sustained palpitations, lightheadedness or syncope. His groin sites are healing well and skin tears on his chest have fully resolved. He does not check home blood pressure but it has been well controlled at [facility].        PAST MEDICAL HISTORY:     Past  Medical History:   Diagnosis Date     Arthritis      BPH (benign prostatic hypertrophy)      Coronary artery disease      Diverticulosis      Hypercholesterolemia      Hypertension      MI (myocardial infarction) (H) 3/1997    Anterior Wall MI/LAD/OM1/SVG    CABG X 3     Neuropathy     RT Foot     Seasonal allergic rhinitis 7/1/2013        PAST SURGICAL HISTORY:     Past Surgical History:   Procedure Laterality Date     ANGIOGRAM  4/30/2003    No Disease, [place],LAD,SVG,OM1- small LT Main, Occlusion of CX- D1 50-70% Stenosis, RCA 30-80%     COLONOSCOPY       COLONOSCOPY N/A 10/14/2014    Procedure: COMBINED COLONOSCOPY, SINGLE BIOPSY/POLYPECTOMY BY BIOPSY;  Surgeon: Konstantin Coates MD;  Location: [facility] OR     COLONOSCOPY WITH CO2 INSUFFLATION N/A 10/14/2014    Procedure: COLONOSCOPY WITH CO2 INSUFFLATION;  Surgeon: Konstantin Coates MD;  Location: [facility] OR     CORONARY ARTERY BYPASS  1997    X 3     CV CORONARY ANGIOGRAM N/A 1/20/2020    Procedure: CV CORONARY ANGIOGRAM;  Surgeon: Magdiel Hernandez MD;  Location:  HEART CARDIAC CATH LAB     CV CORONARY ANGIOGRAM N/A 6/28/2021    Procedure: CV CORONARY ANGIOGRAM;  Surgeon: Ravi Albright MD;  Location:  HEART CARDIAC CATH LAB     CV FRACTIONAL FLOW RATIO WIRE N/A 1/20/2020    Procedure: Fractional Flow Reserve;  Surgeon: Magdiel Hernandez MD;  Location:  HEART CARDIAC CATH LAB     CV INTRAVASULAR ULTRASOUND N/A 6/28/2021    Procedure: Intravascular Ultrasound;  Surgeon: Ravi Albright MD;  Location:  HEART CARDIAC CATH LAB     CV PCI STENT DRUG ELUTING N/A 1/20/2020    Procedure: Percutaneous Coronary Intervention Stent Drug Eluting;  Surgeon: Magdiel Hernandez MD;  Location:  HEART CARDIAC CATH LAB     CV PCI STENT DRUG ELUTING N/A 6/28/2021    Procedure: Percutaneous Coronary Intervention Stent Drug Eluting;  Surgeon: Ravi Albright MD;  Location: [facility] CARDIAC CATH LAB     CV TRANSCATHETER AORTIC  VALVE REPLACEMENT N/A 8/17/2022    Procedure: Transfemoral Transcatheter Aortic Valve Replacement (Khoury);  Surgeon: Magdiel Hernandez MD;  Location:  OR     HERNIA REPAIR  2014    [facility]     OR TRANSCATHETER AORTIC VALVE REPLACEMENT, FEMORAL PERCUTANEOUS APPROACH (STANDBY) N/A 8/17/2022    Procedure: with possible open heart bypass and or balloon pump placement and any indicated procedure,;  Surgeon: Karl Webster MD;  Location:  OR        CURRENT MEDICATIONS:     Current Outpatient Medications   Medication Sig Dispense Refill     aspirin 81 MG EC tablet Take 1 tablet (81 mg) by mouth daily       isosorbide mononitrate (IMDUR) 60 MG 24 hr tablet Take 1 tablet (60 mg) by mouth every morning 90 tablet 2     mometasone (NASONEX) 50 MCG/ACT nasal spray Spray 2 sprays into both nostrils as needed (nasal congestion) 17 g 1     multivitamin, therapeutic (THERA-VIT) TABS tablet Take 1 tablet by mouth daily       nitroGLYcerin (NITROSTAT) 0.4 MG sublingual tablet Place 1 tablet (0.4 mg) under the tongue every 5 minutes as needed for chest pain up to 3 tablets per episode. 25 tablet 3     rosuvastatin (CRESTOR) 40 MG tablet Take 1 tablet (40 mg) by mouth daily 90 tablet 3     ticagrelor (BRILINTA) 90 MG tablet Take 1 tablet (90 mg) by mouth 2 times daily Start this evening. 180 tablet 3        ALLERGIES:     Allergies   Allergen Reactions     No Known Drug Allergy      Seasonal Allergies         FAMILY HISTORY:     Family History   Problem Relation Age of Onset     Coronary Artery Disease Brother 55        Fatal MI        SOCIAL HISTORY:     Social History     Socioeconomic History     Marital status:    Tobacco Use     Smoking status: Former Smoker     Smokeless tobacco: Never Used     Tobacco comment: 1997   Vaping Use     Vaping Use: Never used   Substance and Sexual Activity     Alcohol use: Yes     Comment: rarely      Drug use: No     Sexual activity: Yes     Partners: Female        REVIEW OF  "SYSTEMS:     Constitutional: No fevers or chills  Skin: No new rash or itching  Eyes: No acute change in vision  Ears/Nose/Throat: No purulent rhinorrhea, new hearing loss, or new vertigo  Respiratory: No cough or hemoptysis  Cardiovascular: See HPI  Gastrointestinal: No change in appetite, vomiting, hematemesis or diarrhea  Genitourinary: No dysuria or hematuria  Musculoskeletal: No new back pain, neck pain or muscle pain  Neurologic: No new headaches, focal weakness or behavior changes  Psychiatric: No hallucinations, excessive alcohol consumption or illegal drug usage  Hematologic/Lymphatic/Immunologic: No bleeding, chills, fever, night sweats or weight loss  Endocrine: No new cold intolerance, heat intolerance, polyphagia, polydipsia or polyuria      PHYSICAL EXAMINATION:     /79 (BP Location: Right arm, Patient Position: Chair, Cuff Size: Adult Regular)   Pulse 64   Ht 1.796 m (5' 10.71\")   Wt 83.1 kg (183 lb 3.2 oz)   SpO2 97%   BMI 25.76 kg/m      GENERAL: No acute distress.  HEENT: EOMI. Sclerae white, not injected. Nares clear. Pharynx without erythema or exudate.   Neck: No adenopathy. No thyromegaly. No jugular venous distension.   Heart: Regular rate and rhythm. No murmur.   Lungs: Clear to auscultation. No ronchi, wheezes, rales.   Abdomen: Soft, nontender, nondistended. Bowel sounds present.  Extremities: No clubbing, cyanosis, or edema.   Neurologic: Alert and oriented to person/place/time, normal speech and affect. No focal deficits.  Skin: No petechiae, purpura or rash.     LABORATORY DATA:     LIPID RESULTS:  Lab Results   Component Value Date    CHOL 132 06/27/2022    CHOL 138 06/17/2021    HDL 46 06/27/2022    HDL 49 06/17/2021    LDL 64 06/27/2022    LDL 66 06/17/2021    TRIG 109 06/27/2022    TRIG 116 06/17/2021    CHOLHDLRATIO 2.8 07/21/2015       LIVER ENZYME RESULTS:  Lab Results   Component Value Date    AST 26 08/15/2022    AST 20 06/17/2021    ALT 31 08/15/2022    ALT 29 " 06/17/2021       CBC RESULTS:  Lab Results   Component Value Date    WBC 6.4 09/29/2022    WBC 7.3 06/28/2021    RBC 4.61 09/29/2022    RBC 5.22 06/28/2021    HGB 14.3 09/29/2022    HGB 16.5 06/28/2021    HCT 43.2 09/29/2022    HCT 48.7 06/28/2021    MCV 94 09/29/2022    MCV 93 06/28/2021    MCH 31.0 09/29/2022    MCH 31.6 06/28/2021    MCHC 33.1 09/29/2022    MCHC 33.9 06/28/2021    RDW 12.7 09/29/2022    RDW 12.4 06/28/2021     (L) 09/29/2022     06/28/2021       BMP RESULTS:  Lab Results   Component Value Date     09/29/2022     06/28/2021    POTASSIUM 4.1 09/29/2022    POTASSIUM 4.4 08/15/2022    POTASSIUM 4.4 06/28/2021    CHLORIDE 102 09/29/2022    CHLORIDE 107 08/15/2022    CHLORIDE 108 06/28/2021    CO2 26 09/29/2022    CO2 28 08/15/2022    CO2 26 06/28/2021    ANIONGAP 9 09/29/2022    ANIONGAP 7 08/15/2022    ANIONGAP 4 06/28/2021    GLC 83 09/29/2022    GLC 90 08/17/2022     (H) 08/15/2022    GLC 92 06/28/2021    BUN 17.5 09/29/2022    BUN 24 08/15/2022    BUN 14 06/28/2021    CR 1.07 09/29/2022    CR 1.11 06/28/2021    GFRESTIMATED 68 09/29/2022    GFRESTIMATED 61 06/28/2021    GFRESTBLACK 70 06/28/2021    CLAUDIA 9.1 09/29/2022    CLAUDIA 9.3 06/28/2021         PROCEDURES & FURTHER ASSESSMENTS:     EKG 8/30/22  Personally reviewed  NSR HR 61, no conduction delay      Cardiac CT 9/29/22:    IMPRESSIONS:     1.  There is a transcatheter valve in the aortic position, with  minimal HALT involving all 3 leaflets.   2.  Please review Radiology report for incidental noncardiac findings  that will follow separately.     FINDINGS:      1.  There is a Miky 3 Ultra 29 mm  transcatheter valve in the aortic  position. It is well-seated.  2.  There is minimal hypoattenuating leaflet thickening involving all  3 leaflets.  There is mild restriction of leaflet opening (right and  left cusps).   3.  The visualized portions of the aortic root and ascending aorta are  normal in size  4.  Coronary  arteries originate from their respective cusps.  Subendocardial apical myocardial infarction noted. Prior CABG, LIMA  graft visualized partially, no other grafts seen.  5.  There is no pericardial effusion.   6.  There is no intracardiac thrombus.     ANTHONY UPTON MD      ECHO 9/29/22  Interpretation Summary  Status post Miky 3 Ultra 29 mm TAVR on 08/17/2022.  The TAVR is well-seated. Leaflet opening is not clearly visualized. There is  mild valvular regurgitation and trace paravalvular regurgitation. The Vmax is  1.8 m/s, the mean gradient is 7 mmHg, the dimensionless index is 0.50, and the  acceleration time is 100 ms.  Global and regional left ventricular function is normal with an EF of 60-65%.  Global right ventricular function is normal. The right ventricle is normal  size.  The estimated PA systolic pressure is 23 mmHg.  IVC diameter <2.1 cm collapsing >50% with sniff suggests a normal RA pressure  of 3 mmHg.  This study was compared with the study from 8/18/22. The central/valvular  aortic regurgitation was also seen on the prior study, but is better  visualized on this study. This is likely due to the patient having a higher BP  during this examination.      ECG dated 8/18/22:  NSR with PACs - wave abnormality unchanged       Echocardiogram dated 8/18/22:  Interpretation Summary  S/P Miky 3 29mm Aortic Valve. Valve is well seated. No AI. Normal Doppler  interrogation. MG is 7 mmHg.     Normal biventricular function.  No pericardial effusion is present.  ______________________________________________________________________________  Left Ventricle  Left ventricular size is normal. Global and regional left ventricular function  is normal with an EF of 60-65%.     Right Ventricle  The right ventricle is normal size. Global right ventricular function is  normal.     Mitral Valve  The mitral valve is normal.     Aortic Valve  The mean AoV pressure gradient is 7.5 mmHg. The calculated aortic valve are  is  3.0 cm^2. S/P Miky 3 29mm Aortic Valve.     Vessels  The inferior vena cava was normal in size with preserved respiratory  variability. The aorta root is normal.     Pericardium  No pericardial effusion is present.     Compared to Previous Study  This study was compared with the study from 8/17/2022 . No significant changes  noted.    NYHA Class: I     CLINICAL IMPRESSION:   Tima Mckenzie is a very pleasant 87 year old male who presents for 1 month TAVR follow-up. 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  The TAVR and post-procedural course were notable for no complications or development of conduction abnormalities. Post procedure echo showed mean PG 7 without AI. EKG showed SB with incomplete RBBB (unchanged). His beta blocker was discontinued. He was continued on ASA and Brillinta.    Patient reports feeling well with improvement in dyspnea post TAVR, otherwise no major cardiac symptoms. His ECHO shows normal TAVR function with mean PG of 7 mmHg with mild central AI and trace paravalvular AI. However, his cardiac CT does show evidence of mild hypoattenuating leaflet thickening (HALT) on all three leaflets. Plan to stop Brilinta given > 12 months from last PCI and start Eliquis for treatment of HALT. Will continue ASA for CAD. Otherwise his EKG and labs are stable today. His groin site is fully healed. He is tolerating medications without side effects.     Plan Summary:  1) Continue Aspirin 81 mg daily lifelong  2) Stop Brilinta (given 18 months post PCI), start Eliquis 5 mg BID for HALT  3) Lifelong antibiotic prophylaxis prior to all dental procedures  4) Repeat CT scan in 3 months then follow-up with Dr. Parker   5) valve  clinic follow-up in 1 year with echo, labs and ECG prior       MARIA DEL CARMEN Nuñez CNP  [facility] Cardiology Team      CC  Patient Care Team:  Julieta Gandhi MD as PCP - General (Family Practice)  Artis Parker MD as Assigned Heart and Vascular Provider  Viki Bishop NP as Assigned PCP          [place] Cardiomyopathy Questionnaire  (CQ-12)  Date: 09/30/22    30-day post-TAVR      1.  A.  5      B.  5       C.  3  2.  3  3.  5  4.  5  5.  5  6.  5    7.  5  8.  A. 4     B.  4     C.  5                 Please do not hesitate to contact me if you have any questions/concerns.     Sincerely,     Doris Poole NP

## 2022-10-03 LAB
ATRIAL RATE - MUSE: 61 BPM
DIASTOLIC BLOOD PRESSURE - MUSE: NORMAL MMHG
INTERPRETATION ECG - MUSE: NORMAL
P AXIS - MUSE: 56 DEGREES
PR INTERVAL - MUSE: 188 MS
QRS DURATION - MUSE: 100 MS
QT - MUSE: 428 MS
QTC - MUSE: 430 MS
R AXIS - MUSE: -15 DEGREES
SYSTOLIC BLOOD PRESSURE - MUSE: NORMAL MMHG
T AXIS - MUSE: 94 DEGREES
VENTRICULAR RATE- MUSE: 61 BPM

## 2022-10-04 ENCOUNTER — HOSPITAL ENCOUNTER (OUTPATIENT)
Dept: CARDIAC REHAB | Facility: CLINIC | Age: 87
Discharge: HOME | End: 2022-10-04
Attending: SURGERY
Payer: COMMERCIAL

## 2022-10-04 PROCEDURE — 93798 PHYS/QHP OP CAR RHAB W/ECG: CPT

## 2022-10-11 ENCOUNTER — HOSPITAL ENCOUNTER (OUTPATIENT)
Dept: CARDIAC REHAB | Facility: CLINIC | Age: 87
Discharge: HOME | End: 2022-10-11
Attending: SURGERY
Payer: COMMERCIAL

## 2022-10-11 PROCEDURE — 93798 PHYS/QHP OP CAR RHAB W/ECG: CPT

## 2022-10-13 ENCOUNTER — HOSPITAL ENCOUNTER (OUTPATIENT)
Dept: CARDIAC REHAB | Facility: CLINIC | Age: 87
Discharge: HOME | End: 2022-10-13
Attending: SURGERY
Payer: COMMERCIAL

## 2022-10-13 PROCEDURE — 93798 PHYS/QHP OP CAR RHAB W/ECG: CPT

## 2022-10-17 ENCOUNTER — RESEARCH ENCOUNTER (OUTPATIENT)
Dept: CARDIOLOGY | Facility: CLINIC | Age: 87
End: 2022-10-17

## 2022-10-17 NOTE — PROGRESS NOTES
Research Consent Note:    Study Title: Aortic Annulus Dilatation after Variable Pressure Inflation on Multi Slice ComputedTomography for the balloon expandable Transcatheter Aortic Valve Replacement (ADaPT - TAVR)  IRB # [id number]    : Ravi Albright, [phone]  : Deja Lopez RN, [phone]     Estimated duration of study: 12 months    The IRB approved consent form was reviewed with the patient over the phone. The purpose, risks, and benefits of study participation were discussed and study requirements for participation. Confidentiality issues and release of Personal Health Information were reviewed. It was discussed that study participation is voluntary and that refusal to participate will involve no penalty or decrease benefits. The patient had the opportunity to read the entire electronic consent, ask questions, and offered sufficient time to consider the research trial. Subject verbalized understanding and was able to state what study participation involved. Patient signed the electronic consent/HIPAA form prior to study participation and was provided an electronic copy.    I attest that patient verbally reported signing consent.

## 2022-10-18 ENCOUNTER — HOSPITAL ENCOUNTER (OUTPATIENT)
Dept: CARDIAC REHAB | Facility: CLINIC | Age: 87
Discharge: HOME | End: 2022-10-18
Attending: SURGERY
Payer: COMMERCIAL

## 2022-10-18 PROCEDURE — 93798 PHYS/QHP OP CAR RHAB W/ECG: CPT

## 2022-10-20 ENCOUNTER — HOSPITAL ENCOUNTER (OUTPATIENT)
Dept: CARDIAC REHAB | Facility: CLINIC | Age: 87
Discharge: HOME | End: 2022-10-20
Attending: SURGERY
Payer: COMMERCIAL

## 2022-10-20 PROCEDURE — 93798 PHYS/QHP OP CAR RHAB W/ECG: CPT

## 2022-10-25 ENCOUNTER — HOSPITAL ENCOUNTER (OUTPATIENT)
Dept: CARDIAC REHAB | Facility: CLINIC | Age: 87
Discharge: HOME | End: 2022-10-25
Attending: SURGERY
Payer: COMMERCIAL

## 2022-10-25 PROCEDURE — 93798 PHYS/QHP OP CAR RHAB W/ECG: CPT

## 2022-10-27 ENCOUNTER — HOSPITAL ENCOUNTER (OUTPATIENT)
Dept: CARDIAC REHAB | Facility: CLINIC | Age: 87
Discharge: HOME | End: 2022-10-27
Attending: SURGERY
Payer: COMMERCIAL

## 2022-10-27 PROCEDURE — 93798 PHYS/QHP OP CAR RHAB W/ECG: CPT

## 2022-11-01 ENCOUNTER — HOSPITAL ENCOUNTER (OUTPATIENT)
Dept: CARDIAC REHAB | Facility: CLINIC | Age: 87
Discharge: HOME | End: 2022-11-01
Attending: SURGERY
Payer: COMMERCIAL

## 2022-11-01 PROCEDURE — 93798 PHYS/QHP OP CAR RHAB W/ECG: CPT

## 2022-11-03 ENCOUNTER — HOSPITAL ENCOUNTER (OUTPATIENT)
Dept: CARDIAC REHAB | Facility: CLINIC | Age: 87
Discharge: HOME | End: 2022-11-03
Attending: SURGERY
Payer: COMMERCIAL

## 2022-11-03 PROCEDURE — 93798 PHYS/QHP OP CAR RHAB W/ECG: CPT

## 2022-11-10 ENCOUNTER — HOSPITAL ENCOUNTER (OUTPATIENT)
Dept: CARDIAC REHAB | Facility: CLINIC | Age: 87
Discharge: HOME | End: 2022-11-10
Attending: SURGERY
Payer: COMMERCIAL

## 2022-11-10 PROCEDURE — 93798 PHYS/QHP OP CAR RHAB W/ECG: CPT

## 2022-12-07 ENCOUNTER — TELEPHONE (OUTPATIENT)
Dept: CARDIOLOGY | Facility: CLINIC | Age: 87
End: 2022-12-07

## 2022-12-07 DIAGNOSIS — R94.39 ABNORMAL RESULT OF OTHER CARDIOVASCULAR FUNCTION STUDY: ICD-10-CM

## 2022-12-07 DIAGNOSIS — Z95.2 S/P TAVR (TRANSCATHETER AORTIC VALVE REPLACEMENT): ICD-10-CM

## 2022-12-07 NOTE — TELEPHONE ENCOUNTER
Health Call Center    Phone Message    May a detailed message be left on voicemail: yes     Reason for Call: Medication Question or concern regarding medication   Prescription Clarification  Name of Medication: Eloquis  Prescribing Provider: Doris Poole   Pharmacy:    What on the order needs clarification? Patient is just about done with his 3 months of Eloquis. Does he need to continue ? Please call patient to discuss.           Action Taken: Other: cardiology    Travel Screening: Not Applicable   Thank you!  [facility]

## 2022-12-21 NOTE — Clinical Note
Ate 4 pm and pain started 6 pm right upper quadrant  Pain , vomited last night      Triage Assessment     Row Name 12/21/22 0642       Triage Assessment (Adult)    Airway WDL WDL       Respiratory WDL    Respiratory WDL WDL       Skin Circulation/Temperature WDL    Skin Circulation/Temperature WDL WDL       Cardiac WDL    Cardiac WDL WDL       Peripheral/Neurovascular WDL    Peripheral Neurovascular WDL WDL       Cognitive/Neuro/Behavioral WDL    Cognitive/Neuro/Behavioral WDL WDL               saphenous vein graft Cine(s)  visualized

## 2022-12-26 DIAGNOSIS — R94.39 ABNORMAL RESULT OF OTHER CARDIOVASCULAR FUNCTION STUDY: ICD-10-CM

## 2022-12-26 DIAGNOSIS — Z95.2 S/P TAVR (TRANSCATHETER AORTIC VALVE REPLACEMENT): ICD-10-CM

## 2022-12-27 ENCOUNTER — E-VISIT (OUTPATIENT)
Dept: FAMILY MEDICINE | Facility: CLINIC | Age: 87
End: 2022-12-27
Payer: COMMERCIAL

## 2022-12-27 DIAGNOSIS — R06.2 WHEEZING: Primary | ICD-10-CM

## 2022-12-27 PROCEDURE — 99421 OL DIG E/M SVC 5-10 MIN: CPT | Performed by: NURSE PRACTITIONER

## 2022-12-27 RX ORDER — PREDNISONE 20 MG/1
20 TABLET ORAL DAILY
Qty: 5 TABLET | Refills: 0 | Status: SHIPPED | OUTPATIENT
Start: 2022-12-27 | End: 2023-01-01

## 2022-12-27 RX ORDER — ALBUTEROL SULFATE 90 UG/1
2 AEROSOL, METERED RESPIRATORY (INHALATION) EVERY 4 HOURS PRN
Qty: 18 G | Refills: 0 | Status: SHIPPED | OUTPATIENT
Start: 2022-12-27 | End: 2023-06-28

## 2022-12-27 NOTE — PATIENT INSTRUCTIONS
"  Dear Tima Mckenzie    After reviewing your responses, I've been able to diagnose you with \"Bronchitis\" which is a common infection of your lungs that is nearly always caused by a virus. The virus causes swelling and irritation of the air passages of your lungs which leads to cough. The illness spreads from your nose and throat to your windpipe and airways. It is often called a \"chest cold\" and can last up to 2 weeks, but is not a serious illness. Exposure to cigarette smoke usually makes this significantly worse.      To treat bronchitis, the main thing to do is drink lots of fluids and rest. Cough medications over-the-counter such as mucinex, robitussin or \"cold and sinus\" medications can be helpful. Ibuprofen and Tylenol also help with fevers or aching feelings that you often have with this kind of illness. Do not take ibuprofen if you have kidney disease, stomach ulcers or allergy to aspirin.     Bronchitis is most often highly contagious as viruses are spread through the air or touch. Avoid contact with others who may become infected, particularly children, the elderly and those whose immune systems might be weak.     If your symptoms worsen, you develop chest pain or shortness of breath, fevers over 101, or are not improving in 5 days, please contact your primary care provider for an appointment or visit any of our convenient Walk-in Care or Urgent Care Centers to be seen which can be found on our website here.    Thanks again for choosing us as your health care partner,    Viki Bishop NP  "

## 2022-12-28 RX ORDER — APIXABAN 5 MG/1
TABLET, FILM COATED ORAL
Qty: 180 TABLET | Refills: 3 | Status: SHIPPED | OUTPATIENT
Start: 2022-12-28 | End: 2023-01-26

## 2022-12-28 NOTE — TELEPHONE ENCOUNTER
apixaban ANTICOAGULANT (ELIQUIS) 5 MG tablet  Last Written Prescription Date:  12/7/22  Last Fill Quantity: 120,   # refills: 0  Last Office Visit :9/30/22  [facility]  Future Office visit:  1/26/23  [facility]    Platelets L    Reviewed By    Doris Poole NP on 10/4/2022  8:28 PM         Routing refill request to provider for review/approval because:age

## 2023-01-04 DIAGNOSIS — I10 ESSENTIAL HYPERTENSION WITH GOAL BLOOD PRESSURE LESS THAN 130/80: ICD-10-CM

## 2023-01-04 RX ORDER — ISOSORBIDE MONONITRATE 60 MG/1
60 TABLET, EXTENDED RELEASE ORAL EVERY MORNING
Qty: 90 TABLET | Refills: 2 | Status: SHIPPED | OUTPATIENT
Start: 2023-01-04 | End: 2023-09-28

## 2023-01-26 ENCOUNTER — OFFICE VISIT (OUTPATIENT)
Dept: CARDIOLOGY | Facility: CLINIC | Age: 88
End: 2023-01-26
Attending: INTERNAL MEDICINE
Payer: COMMERCIAL

## 2023-01-26 ENCOUNTER — HOSPITAL ENCOUNTER (OUTPATIENT)
Dept: CT IMAGING | Facility: CLINIC | Age: 88
Discharge: HOME | End: 2023-01-26
Attending: NURSE PRACTITIONER | Admitting: NURSE PRACTITIONER
Payer: COMMERCIAL

## 2023-01-26 VITALS
DIASTOLIC BLOOD PRESSURE: 72 MMHG | SYSTOLIC BLOOD PRESSURE: 146 MMHG | OXYGEN SATURATION: 98 % | BODY MASS INDEX: 26.07 KG/M2 | HEART RATE: 60 BPM | WEIGHT: 185.4 LBS

## 2023-01-26 DIAGNOSIS — I25.810 CORONARY ARTERY DISEASE INVOLVING CORONARY BYPASS GRAFT OF NATIVE HEART WITHOUT ANGINA PECTORIS: ICD-10-CM

## 2023-01-26 DIAGNOSIS — R94.39 ABNORMAL RESULT OF OTHER CARDIOVASCULAR FUNCTION STUDY: ICD-10-CM

## 2023-01-26 DIAGNOSIS — I35.0 NONRHEUMATIC AORTIC VALVE STENOSIS: Primary | ICD-10-CM

## 2023-01-26 DIAGNOSIS — Z95.2 S/P TAVR (TRANSCATHETER AORTIC VALVE REPLACEMENT): ICD-10-CM

## 2023-01-26 DIAGNOSIS — E78.5 HYPERLIPIDEMIA LDL GOAL <100: ICD-10-CM

## 2023-01-26 DIAGNOSIS — I11.0 HYPERTENSIVE HEART DISEASE WITH HEART FAILURE (H): ICD-10-CM

## 2023-01-26 DIAGNOSIS — I10 ESSENTIAL HYPERTENSION WITH GOAL BLOOD PRESSURE LESS THAN 130/80: ICD-10-CM

## 2023-01-26 DIAGNOSIS — I71.43 INFRARENAL ABDOMINAL AORTIC ANEURYSM (AAA) WITHOUT RUPTURE (H): ICD-10-CM

## 2023-01-26 PROBLEM — I25.708 CORONARY ARTERY DISEASE OF BYPASS GRAFT OF NATIVE HEART WITH STABLE ANGINA PECTORIS (H): Status: RESOLVED | Noted: 2020-01-16 | Resolved: 2023-01-26

## 2023-01-26 PROCEDURE — 250N000011 HC RX IP 250 OP 636: Performed by: NURSE PRACTITIONER

## 2023-01-26 PROCEDURE — 99215 OFFICE O/P EST HI 40 MIN: CPT | Mod: 25 | Performed by: INTERNAL MEDICINE

## 2023-01-26 PROCEDURE — 75572 CT HRT W/3D IMAGE: CPT

## 2023-01-26 PROCEDURE — 75572 CT HRT W/3D IMAGE: CPT | Mod: 26 | Performed by: INTERNAL MEDICINE

## 2023-01-26 RX ORDER — AMLODIPINE BESYLATE 5 MG/1
5 TABLET ORAL DAILY
Qty: 90 TABLET | Refills: 3 | Status: SHIPPED | OUTPATIENT
Start: 2023-01-26 | End: 2023-03-20

## 2023-01-26 RX ORDER — IOPAMIDOL 755 MG/ML
120 INJECTION, SOLUTION INTRAVASCULAR ONCE
Status: COMPLETED | OUTPATIENT
Start: 2023-01-26 | End: 2023-01-26

## 2023-01-26 RX ADMIN — IOPAMIDOL 120 ML: 755 INJECTION, SOLUTION INTRAVENOUS at 11:42

## 2023-01-26 NOTE — PROGRESS NOTES
Chief complaint: aortic stenosis, CAD    HPI:   Tima Mckenzie is a 87 year old male with history of CAD s/p 3vCABG (LIMA-LAD SVG-OM1-D1) 1997 and PCI w/ BYRON to pRCA/rPDA/D1 1/20/20 and PCI+BYRON to RCA 6/28/21, severe aortic stenosis s/p TAVR 8/17/22 with 29 mm Khoury Miky 3 prosthesis who presents for follow up of his chronic cardiovascular conditions.    Cardiac history:  He has history of a 3vCABG in [place], [place] in 1997 ([facility].) Operative report is not available (we attempted to obtain op report and angiogram images but were informed that all records from that time have been destroyed), but our EMR and records from [facility] report LIMA-LAD and SVG-OM; Mr. Mckenzie reports that his SVG bypassed 2 vessels (possibly a jump graft.) He had an angiogram 4/2003 (just before moving to [place]) at [facility] for abnormal stress test which repotedly showed a single diseased vessel (unclear which) for which he was prescribed Imdur. We attempted to obtain these images and were told that they had been destroyed.   He had a single episode of chest pain shortly after moving to [place] in 2003 and was admittted to [facility], where he had a stress test which was normal.      He established care with me 8/1/19. At that time, he was at a primary care visit and was noted to have a systolic murmur prompting TTE which showed moderate aortic stenosis, prompting referral for further management. He reported new onset RAE that had been progressive at his initial visit.     He underwent coronary angiogram which showed widely patent LIMA-LAD and 100% proximal occlusion of his single SVG (likely a touchdown graft to OM and RCA system); he underwent PCI to native rPDA, D1, and RCA.     3/5/2020:  Since his PCI, he feels his dyspnea is dramatically improved. He has been playing tennis without difficulty.     5/21/20:  He had been sedentary since his angiogram and went for a walk last week and felt dyspneic. This  was initially concerning to him, but since that time he has been doing a 15-minute exercise program from [employer] and walking for 25 minutes without chest discomfort or dyspnea. He does still have exertional dyspnea walking at a brisk pace or with vigorous yardwork. He feels this is gradually improving as he increases his activity.     2/25/21:  He reports exertional dyspnea walking up 1/2 flight of stairs and he has to rest after walking up 1 flight of stairs; he feels that this is very slightly worse compared with his last visit. He does light calisthenics but no cardio; this makes him slightly dyspneic also. He does admit that he has been significantly more sedentary due to the COVID-19 pandemic and is unsure how much of his reduction in exertional capacity may be related to this.    Imdur was increased to 60 mg daily at this time.     05/27/21:  Since his last visit, he reports feeling generally worse. Last fall, he could walk around the block for 1.5 miles without difficulty. He now has chest tightness and dyspnea walking <1 block. He also has chest tightness and dyspnea with <1 flight of stairs. He is not sure whether increasing Imdur improved his symptoms. He denies exertional lightheadedness.     11/11/21:  Since his last visit, he underwent coronary angiogram 6/28/21 which showed new severe pRCA lesion for which he underwent PCI+BYRON. TTE was done at that time and showed stably moderate aortic stenosis.  He has been participating in cardiac rehab and his exertional capacity has been gradually improving. He does still report some modest residual exertional dyspnea. Chest tightness has resolved. He is able to to walk 1.5 miles daily without difficulty. No exertional lightheadedness or syncope.     He unfortunately was recently diagnosed with diverticulitis (CT 11/9/21 reviewed) and was also noted to have 3.7 cm AAA at that time.      07/14/22:  Since his last visit, he feels his exertional dyspnea has gotten  worse. He has dyspnea lifting weights, walking at a brisk pace, and walking up stairs. He is walking up to 1 mile/day and sometimes cuts his walks short and only walks around the block due to dyspnea; this is decreased compared with last visit, when he could walk 1.5 miles. He does sometimes have chest pressure with his exertional dyspnea.   He does not feel that his dyspnea improved noticeably after his PCI.   He denies any palpitations, PND, orthopnea, peripheral edema, lightheadedness, or syncope.     01/26/23:  Since his last visit, Cameron was found to have severe aortic stenosis and underwent TAVR with 29 mm Khoury Miky 3 prosthesis 8/17/22 without complication. He was seen by Doris Poole NP 9/30/22 (note reviewed) and reported feeling generally well and dneied exertional chest pain. Exertional dyspnea was still present but somewhat improved. He was participating in cardiac rehab.     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 He is able to shovel snow now. His exertional capacity continues to gradually improve and is markedly improved compared with prior to his TAVR.     TTE 9/29/22:   The TAVR is well-seated. Leaflet opening is not clearly visualized. There is  mild valvular regurgitation and trace paravalvular regurgitation. The Vmax is  1.8 m/s, the mean gradient is 7 mmHg, the dimensionless index is 0.50, and the  acceleration time is 100 ms.  Global and regional left ventricular function is normal with an EF of 60-65%.  Global right ventricular function is normal. The right     CTA post-TAVR 9/29/22: minimal HALT involving all 3 leaflets with mildly restricted leaflet motion    CTA post-TAVR 01/26/23: normal appearance of the TAVR prosthesis. Compared with 9/29/22, HALT has resolved.       PAST MEDICAL  HISTORY:  Past Medical History:   Diagnosis Date     Arthritis      BPH (benign prostatic hypertrophy)      Coronary artery disease      Diverticulosis      Hypercholesterolemia      Hypertension      MI (myocardial infarction) (H) 3/1997    Anterior Wall MI/LAD/OM1/SVG    CABG X 3     Neuropathy     RT Foot     Seasonal allergic rhinitis 7/1/2013       CURRENT MEDICATIONS:  Current Outpatient Medications   Medication Sig Dispense Refill     albuterol (PROAIR HFA/PROVENTIL HFA/VENTOLIN HFA) 108 (90 Base) MCG/ACT inhaler Inhale 2 puffs into the lungs every 4 hours as needed for shortness of breath, wheezing or cough 18 g 0     aspirin 81 MG EC tablet Take 1 tablet (81 mg) by mouth daily       ELIQUIS ANTICOAGULANT 5 MG tablet TAKE 1 TABLET BY MOUTH TWICE A  tablet 3     isosorbide mononitrate (IMDUR) 60 MG 24 hr tablet Take 1 tablet (60 mg) by mouth every morning 90 tablet 2     mometasone (NASONEX) 50 MCG/ACT nasal spray Spray 2 sprays into both nostrils as needed (nasal congestion) 17 g 1     multivitamin, therapeutic (THERA-VIT) TABS tablet Take 1 tablet by mouth daily       nitroGLYcerin (NITROSTAT) 0.4 MG sublingual tablet Place 1 tablet (0.4 mg) under the tongue every 5 minutes as needed for chest pain up to 3 tablets per episode. 25 tablet 3     rosuvastatin (CRESTOR) 40 MG tablet Take 1 tablet (40 mg) by mouth daily 90 tablet 3       PAST SURGICAL HISTORY:  Past Surgical History:   Procedure Laterality Date     ANGIOGRAM  4/30/2003    No Disease, [place],LAD,SVG,OM1- small LT Main, Occlusion of CX- D1 50-70% Stenosis, RCA 30-80%     COLONOSCOPY       COLONOSCOPY N/A 10/14/2014    Procedure: COMBINED COLONOSCOPY, SINGLE BIOPSY/POLYPECTOMY BY BIOPSY;  Surgeon: Konstantin Coates MD;  Location: [facility] OR     COLONOSCOPY WITH CO2 INSUFFLATION N/A 10/14/2014    Procedure: COLONOSCOPY WITH CO2 INSUFFLATION;  Surgeon: Konstantin Coates MD;  Location: [facility] OR     CORONARY ARTERY BYPASS  1997    X 3     CV  CORONARY ANGIOGRAM N/A 1/20/2020    Procedure: CV CORONARY ANGIOGRAM;  Surgeon: Magdiel Hernandez MD;  Location: [facility] CARDIAC CATH LAB     CV CORONARY ANGIOGRAM N/A 6/28/2021    Procedure: CV CORONARY ANGIOGRAM;  Surgeon: Ravi Albright MD;  Location: [facility] CARDIAC CATH LAB     CV FRACTIONAL FLOW RATIO WIRE N/A 1/20/2020    Procedure: Fractional Flow Reserve;  Surgeon: Magdiel Hernandez MD;  Location: [facility] CARDIAC CATH LAB     CV INTRAVASULAR ULTRASOUND N/A 6/28/2021    Procedure: Intravascular Ultrasound;  Surgeon: Ravi Albright MD;  Location: [facility] CARDIAC CATH LAB     CV PCI STENT DRUG ELUTING N/A 1/20/2020    Procedure: Percutaneous Coronary Intervention Stent Drug Eluting;  Surgeon: Magdiel Hernandez MD;  Location: [facility] CARDIAC CATH LAB     CV PCI STENT DRUG ELUTING N/A 6/28/2021    Procedure: Percutaneous Coronary Intervention Stent Drug Eluting;  Surgeon: Ravi Albright MD;  Location: [facility] CARDIAC CATH LAB     CV TRANSCATHETER AORTIC VALVE REPLACEMENT N/A 8/17/2022    Procedure: Transfemoral Transcatheter Aortic Valve Replacement (Khoury);  Surgeon: Magdiel Hernandez MD;  Location:  OR     HERNIA REPAIR  [address] [place] [address]     OR TRANSCATHETER AORTIC VALVE REPLACEMENT, FEMORAL PERCUTANEOUS APPROACH (STANDBY) N/A 8/17/2022    Procedure: with possible open heart bypass and or balloon pump placement and any indicated procedure,;  Surgeon: Karl Webster MD;  Location:  OR       ALLERGIES:     Allergies   Allergen Reactions     No Known Drug Allergy      Seasonal Allergies        FAMILY HISTORY:  Brother with fatal MI at 55.  No family history of premature CAD.    SOCIAL HISTORY:  Social History     Tobacco Use     Smoking status: Former     Smokeless tobacco: Never     Tobacco comments:     1997   Vaping Use     Vaping Use: Never used   Substance Use Topics     Alcohol use: Yes     Comment: rarely      Drug use: No        ROS:   A comprehensive 14 point review of systems is negative other than as mentioned in HPI.    Exam:  BP (!) 146/72 (BP Location: Left arm, Patient Position: Sitting, Cuff Size: Adult Regular)   Pulse 60   Wt 84.1 kg (185 lb 6.4 oz)   SpO2 98%   BMI 26.07 kg/m    GENERAL APPEARANCE: healthy, alert and no distress  EYES: no icterus, no xanthelasmas  ENT: normal palate, mucosa moist, no central cyanosis  NECK: JVP not elevated  RESPIRATORY: lungs clear to auscultation - no rales, rhonchi or wheezes, no use of accessory muscles, no retractions, respirations are unlabored, normal respiratory rate  CARDIOVASCULAR: regular rhythm, +II/VI systolic murmur RUSB, no S3 or S4 and no murmur, click or rub.  GI: soft, non tender, bowel sounds normal,no abdominal bruits  EXTREMITIES: no edema, no bruits  NEURO: alert and oriented to person/place/time, normal speech, gait and affect  VASC: Radial, dorsalis pedis and posterior tibialis pulses 2+ bilaterally.  SKIN: no ecchymoses, no rashes.  PSYCH: cooperative, affect appropriate.       Labs:  Reviewed.     Testing/Procedures:    Coronary angiogram/PCI 6/28/21:  % mid   -D1 stent patent  % prox   -OM1 fills by collaterals from D1  RCA 80% proximal to previously-placed stent, widely patent pRCA stent  LIMA-LAD: widely patent  SVG-OM: 100% occluded  Interventions: PCI+DESx1 to pRCA    I personally visualized and interpreted:  TTE 9/29/22: S/P TAVR with 29 mm Miky 3 prosthesis. PV 1.8 m/s MG 7 mmHg DVI 0.50  msec. Mild valvular AI. No paravalvular AI. Normal LV and RV size/function, LVEF=60-65%.     CTA post-TAVR 9/29/22: minimal HALT involving all 3 leaflets with mildly restricted leaflet motion    CTA post-TAVR 01/26/23: normal appearance of the TAVR prosthesis. Compared with 9/29/22, HALT has resolved.     Outside results of note:  Outside records from [facility]  were obtained, and relevant results/notes have been incorporated into  HPI.    Assessment and Plan:   1. S/P 29 mm Miky 3 TAVR 8/17/22 for severe aortic stenosis:  2. 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 Coronary artery disease status post three-vessel coronary bypass graft (LIMA-LAD SVG-OM-D1) s/p PCI w/ BYRON to pRCA/rPDA/D1 1/20/20 and PCI+BYRON to RCA 6/28/21 without angina  4. Exertional dyspnea, greatly improved (nearly resolved) s/p TAVR  Exertional dyspnea has been his anginal equivalent (initially resolved after his 1st PCI; he did have more classic chest tightness prior to his 2nd PCI) and has also been his symptom of severe aortic stenosis. Cameron has experienced dramatic improvement in his previous exertional dyspnea since his TAVR with continued improvement as he has gradually increased his exercise.     CTA today shows resolution of previous HALT (which was asymptomatic and not associated with significant aortic stenosis) seen on CTA 9/29/22. The appropriate duration of anticoagulation in this setting is somewhat unclear. Plan to continue apixaban for a total of 6 months to ensure stable resolution (stop date 3/29/23.)     Appropriate antithrombotic therapy is somewhat unclear. Given the complexity of his PCI, there is a case to be made for indefinite DAPT. At a minimum, if monotherapy is chosen, I would opt for a P2Y12 inhibitor as the single agent. Plan to transition from ASA+DOAC back to DAPT at least initially and revisit at his next follow up with me.    -continue apixaban 5 mg BID for a total of 6 months for HALT (stop date 3/29/23)  -resume ticagrelor 90 mg BID when apixaban is stopped (1st dose 3/30/23)  -resume ASA 81 mg daily pending follow up with me -- final decision re: indefinite DAPT vs. P2Y12 monotherapy TBD at that time  -continue rosuvastatin 40 mg and Imdur 60 mg daily  -follow up with me in 6 months   -follow up with Doris Poole in post-TAVR clinic ~9/2023  -SBE prophylaxis with dental procedures      3.  Hypertension, uncontrolled:    -start amlodipine 5 mg daily    4. Hyperlipidemia, controlled: continue present management.     5. AAA 3.7 on CT abdomen 11/2021:    -US aorta prior to next visit    6. Venous insufficiency: continue compression hose and counseled regarding elevation    Follow up TBD based on testing results, if aortic stenosis is not yet severe, would follow up in 3 months with echocardiogram    Plan in brief:  -continue apixaban 5 mg BID thro 3/29/23 (6 months after HALT CT)  -resume ticagrelor 90 mg BID when apixaban is stopped   -continue ASA 81 mg untl next follow up-- may consider stopping vs. indefinite DAPT  -start amlodipine 5 mg daily  -f/u with me in 6 months, Valve clinic in 9/2023 (anticipate TTE prior to 9/2023 visit)  -SBE prophylaxis with dental procedures  -US aorta prior to 6 month follow up visit with me    Review of the result(s) of each unique test - TTE, CTA post-TAVR 9/2022, CTA post-TAVR 1/2023  Independent interpretation of a test performed by another physician/other qualified health care professional (not separately reported) -  TTE, CTA post-TAVR 9/2022, CTA post-TAVR 1/2023  Discussion of management or test interpretation with external physician/other qualified healthcare professional/appropriate source - discussion of anticoagulation with structural heart team  Ordering of each unique test  Prescription drug management  I spent a total of 62 minutes on the day of the visit.  Time spent doing chart review, history and exam, documentation and further activities per the note    Artis Parker MD  Cardiology

## 2023-01-26 NOTE — PATIENT INSTRUCTIONS
The following is a summary of your office visit today:  Start Amlodipine 5mg  Continue Eliquis through March 29th. Then back to Brilinta  Continue Aspirin until Return to clinic  Return to clinic in 6 months  See Post TAVR clinic in September of 2023      Nurse contact information:  Cardiology Care Coordinators:  Marti Roger, RN and Piper Epstein, RN   Phone  [phone]    Fax [phone]       HOW TO CHECK YOUR BLOOD PRESSURE AT HOME:     Avoid eating, smoking, and exercising for at least 30 minutes before taking a reading.    Be sure you have taken your BP medication at least 2-3 hours before you check it.     Sit quietly for 10 minutes before a reading.     Sit in a chair with your feet flat on the floor. Rest your  arm on a table so that the arm cuff is at the same level as your heart.    Remain still during the reading.    Record your blood pressure and pulse in a log and bring to your next appointment.     If you have had any blood work, imaging or other testing completed we will be in touch within 1-2 weeks regarding the results. If you have any questions, concerns or need to schedule a follow up, please contact us at [phone]. If you are needing refills please contact your pharmacy. For urgent after hour care please call the [facility] at [phone] or the [facility] at [phone] and ask to speak to the cardiologist on call.    It was a pleasure meeting with you today. Please let us know if there is anything else we can do for you so that we can be sure you are leaving completely satisfied with your care experience.     Your Cardiology Team at [facility]  RN Care Coordinators: Ru  Support Staff: Charity

## 2023-01-26 NOTE — PROGRESS NOTES
Tima Mckenzie's goals for this visit include: Checking in.     He requests these members of his care team be copied on today's visit information: PCP    PCP: Julieta Gadnhi    Referring Provider:  Artis Parker MD  [address] [place] [address]    BP (!) 154/77 (BP Location: Left arm, Patient Position: Sitting, Cuff Size: Adult Regular)   Pulse 60   Wt 84.1 kg (185 lb 6.4 oz)   SpO2 98%   BMI 26.07 kg/m      Do you need any medication refills at today's visit? No.    Luis Enrique De Souza, EMT  Clinic Support  [facility]    [phone]    Employed by [facility] [employer]

## 2023-02-03 ENCOUNTER — MYC MEDICAL ADVICE (OUTPATIENT)
Dept: CARDIOLOGY | Facility: CLINIC | Age: 88
End: 2023-02-03

## 2023-02-03 DIAGNOSIS — I35.0 NONRHEUMATIC AORTIC VALVE STENOSIS: Primary | ICD-10-CM

## 2023-03-03 ENCOUNTER — MYC MEDICAL ADVICE (OUTPATIENT)
Dept: CARDIOLOGY | Facility: CLINIC | Age: 88
End: 2023-03-03
Payer: COMMERCIAL

## 2023-03-03 DIAGNOSIS — Z95.2 S/P TAVR (TRANSCATHETER AORTIC VALVE REPLACEMENT): Primary | ICD-10-CM

## 2023-03-03 RX ORDER — AMOXICILLIN 500 MG/1
2000 CAPSULE ORAL ONCE
Qty: 4 CAPSULE | Refills: 1 | Status: SHIPPED | OUTPATIENT
Start: 2023-03-03 | End: 2023-03-03

## 2023-03-03 NOTE — TELEPHONE ENCOUNTER
Patient has upcoming dental appointment and needs SBE prophylaxis.   RX ordered and sent to pharmacy.       Marti Roger RN  Cardiology Care Coordinator  [facility]  Phone: [phone]

## 2023-03-20 ENCOUNTER — OFFICE VISIT (OUTPATIENT)
Dept: FAMILY MEDICINE | Facility: CLINIC | Age: 88
End: 2023-03-20
Payer: COMMERCIAL

## 2023-03-20 VITALS
RESPIRATION RATE: 16 BRPM | HEIGHT: 69 IN | WEIGHT: 184.4 LBS | BODY MASS INDEX: 27.31 KG/M2 | HEART RATE: 65 BPM | DIASTOLIC BLOOD PRESSURE: 67 MMHG | SYSTOLIC BLOOD PRESSURE: 136 MMHG | OXYGEN SATURATION: 97 % | TEMPERATURE: 97.6 F

## 2023-03-20 DIAGNOSIS — I35.0 AORTIC STENOSIS, SEVERE: ICD-10-CM

## 2023-03-20 DIAGNOSIS — D69.6 THROMBOCYTOPENIA (H): ICD-10-CM

## 2023-03-20 DIAGNOSIS — I10 ESSENTIAL HYPERTENSION WITH GOAL BLOOD PRESSURE LESS THAN 130/80: ICD-10-CM

## 2023-03-20 DIAGNOSIS — R42 LIGHTHEADEDNESS: Primary | ICD-10-CM

## 2023-03-20 DIAGNOSIS — I25.10 CORONARY ARTERY DISEASE INVOLVING NATIVE CORONARY ARTERY OF NATIVE HEART WITHOUT ANGINA PECTORIS: ICD-10-CM

## 2023-03-20 PROBLEM — Z98.890 STATUS POST CORONARY ANGIOGRAM: Status: RESOLVED | Noted: 2020-01-20 | Resolved: 2023-03-20

## 2023-03-20 PROCEDURE — 93000 ELECTROCARDIOGRAM COMPLETE: CPT | Performed by: FAMILY MEDICINE

## 2023-03-20 PROCEDURE — 99214 OFFICE O/P EST MOD 30 MIN: CPT | Performed by: FAMILY MEDICINE

## 2023-03-20 ASSESSMENT — PAIN SCALES - GENERAL: PAINLEVEL: NO PAIN (0)

## 2023-03-20 NOTE — Clinical Note
Artis Van, Saw Cameron in the office today and he was very lightheaded.  That seem to start when you guys added the amlodipine back in January.  He stopped it 4 to 5 days ago and symptoms have improved.  Blood pressure still looks great.  So we will keep him off it for a bit and see how he does.  Kg

## 2023-03-20 NOTE — PROGRESS NOTES
"  Assessment & Plan     Lightheadedness  Patient has been experiencing some lightheadedness for the past few weeks.  Is not vertiginous.  Really seems to be a positional issue and in his mind it seemed to start when he began amlodipine in late January.  This was started by Dr. Parker his blood pressure was running a little bit high.  Patient stopped this about 4 days ago and symptoms have improved about 70%.  Blood pressure and heart rate still look normal.  Had a few extra beats and his EKG showed PACs.  So I think between the amlodipine and the Imdur he was likely very vasodilated and having associated symptoms.  So we will keep him off the amlodipine and I will let Dr. Parker know about this as well.  - EKG 12-lead complete w/read - Clinics    Essential hypertension with goal blood pressure less than 130/80  Blood pressure seems to be running fine now.  Patient said he is actually stopped caffeine as well and perhaps his blood pressure runs a bit low.    Coronary artery disease involving native coronary artery of native heart without angina pectoris  Stable and continuing to follow    Aortic stenosis, severe  Status post TAVR and doing well    Thrombocytopenia (H)  Last couple of CBCs have shown some mildly low platelets and we will continue to follow.  May simply be a post heart valve issue but given that he is on blood thinners we will keep an eye on this.           BMI:   Estimated body mass index is 27 kg/m  as calculated from the following:    Height as of this encounter: 1.76 m (5' 9.29\").    Weight as of this encounter: 83.6 kg (184 lb 6.4 oz).       See Patient Instructions    Return in about 3 days (around 3/23/2023) for Contact me with Aga roy message.    Julieta Gandhi MD  [facility] KATHLEEN Barnes is a 87 year old, presenting for the following health issues:  Dizziness      History of Present Illness       Reason for visit:  Dizzyness  Symptom " "onset:  3-4 weeks ago  Symptom intensity:  Moderate  Symptom progression:  Staying the same  Had these symptoms before:  No  What makes it worse:  No  What makes it better:  No    He eats 2-3 servings of fruits and vegetables daily.He consumes 0 sweetened beverage(s) daily.He exercises with enough effort to increase his heart rate 30 to 60 minutes per day.    He is taking medications regularly.         Here today for evaluation of dizziness as above.    Review of Systems   Constitutional, HEENT, cardiovascular, pulmonary, gi and gu systems are negative, except as otherwise noted.      Objective    /67   Pulse 65   Temp 97.6  F (36.4  C) (Oral)   Resp 16   Ht 1.76 m (5' 9.29\")   Wt 83.6 kg (184 lb 6.4 oz)   SpO2 97%   BMI 27.00 kg/m    Body mass index is 27 kg/m .  Physical Exam   Alert, pleasant, upbeat, and in no apparent discomfort.  Heart regular rate and rhythm with some extra beats  Lungs clear to auscultation bilaterally  No peripheral edema  Past labs reviewed with the patient.     EKG - Reviewed and interpreted by me appears normal, NSR, frequent PACs, normal axis, normal intervals, no acute ST/T changes c/w ischemia                "

## 2023-04-03 NOTE — TELEPHONE ENCOUNTER
"Clinic visit on 1/26/23 with Dr Parker:  -continue apixaban 5 mg BID for a total of 6 months for HALT (stop date 3/29/23)  -resume ticagrelor 90 mg BID when apixaban is stopped (1st dose 3/30/23)    Requested Prescriptions   Pending Prescriptions Disp Refills     ticagrelor (BRILINTA) 90 MG tablet 180 tablet 1     Sig: Take 1 tablet (90 mg) by mouth 2 times daily       Platelet Inhibitors Failed - 4/3/2023 12:57 PM        Failed - Normal Platelets on file in past 12 months     Recent Labs   Lab Test 09/29/22  0959   *               Failed - Medication is active on med list        Passed - Normal ALT on file in past 12 months     Recent Labs   Lab Test 08/15/22  1034   ALT 31             Passed - Normal HGB on file in past 12 months     Recent Labs   Lab Test 09/29/22  0959   HGB 14.3               Passed - Normal AST on file in past 12 months     Recent Labs   Lab Test 08/15/22  1034   AST 26             Passed - Recent (12 mo) or future (30 days) visit within the authorizing provider's specialty     Patient has had an office visit with the authorizing provider or a provider within the authorizing providers department within the previous 12 mos or has a future within next 30 days. See \"Patient Info\" tab in inbasket, or \"Choose Columns\" in Meds & Orders section of the refill encounter.              Passed - Patient is age 18 or older        Passed - Normal serum creatinine on file in past 12 months     Recent Labs   Lab Test 09/29/22  0959   CR 1.07       Ok to refill medication if creatinine is low             Failed refill protocol : no recent platelet  Will route to provider    Marti Roger RN  Cardiology Care Coordinator  [facility]  Phone: [phone]    "

## 2023-06-19 DIAGNOSIS — E78.5 HYPERLIPIDEMIA LDL GOAL <100: ICD-10-CM

## 2023-06-19 RX ORDER — ROSUVASTATIN CALCIUM 40 MG/1
40 TABLET, COATED ORAL DAILY
Qty: 90 TABLET | Refills: 0 | Status: SHIPPED | OUTPATIENT
Start: 2023-06-19 | End: 2023-09-18

## 2023-06-28 ENCOUNTER — OFFICE VISIT (OUTPATIENT)
Dept: URGENT CARE | Facility: URGENT CARE | Age: 88
End: 2023-06-28
Payer: COMMERCIAL

## 2023-06-28 ENCOUNTER — NURSE TRIAGE (OUTPATIENT)
Dept: FAMILY MEDICINE | Facility: CLINIC | Age: 88
End: 2023-06-28
Payer: COMMERCIAL

## 2023-06-28 VITALS
DIASTOLIC BLOOD PRESSURE: 82 MMHG | OXYGEN SATURATION: 96 % | BODY MASS INDEX: 26.39 KG/M2 | HEART RATE: 66 BPM | SYSTOLIC BLOOD PRESSURE: 144 MMHG | RESPIRATION RATE: 16 BRPM | TEMPERATURE: 98.9 F | WEIGHT: 180.19 LBS

## 2023-06-28 DIAGNOSIS — I11.0 HYPERTENSIVE HEART DISEASE WITH HEART FAILURE (H): ICD-10-CM

## 2023-06-28 DIAGNOSIS — I35.0 NONRHEUMATIC AORTIC VALVE STENOSIS: ICD-10-CM

## 2023-06-28 DIAGNOSIS — I25.10 CORONARY ARTERY DISEASE INVOLVING NATIVE CORONARY ARTERY OF NATIVE HEART WITHOUT ANGINA PECTORIS: ICD-10-CM

## 2023-06-28 DIAGNOSIS — R42 EPISODES OF VERTIGO OCCURRING DAILY: Primary | ICD-10-CM

## 2023-06-28 LAB
ALBUMIN SERPL-MCNC: 3.8 G/DL (ref 3.4–5)
ALP SERPL-CCNC: 59 U/L (ref 40–150)
ALT SERPL W P-5'-P-CCNC: 16 U/L (ref 0–70)
ANION GAP SERPL CALCULATED.3IONS-SCNC: 7 MMOL/L (ref 3–14)
AST SERPL W P-5'-P-CCNC: 23 U/L (ref 0–45)
BASOPHILS # BLD AUTO: 0.1 10E3/UL (ref 0–0.2)
BASOPHILS NFR BLD AUTO: 1 %
BILIRUB SERPL-MCNC: 0.6 MG/DL (ref 0.2–1.3)
BUN SERPL-MCNC: 20 MG/DL (ref 7–30)
CALCIUM SERPL-MCNC: 9.5 MG/DL (ref 8.5–10.1)
CHLORIDE BLD-SCNC: 105 MMOL/L (ref 94–109)
CO2 SERPL-SCNC: 27 MMOL/L (ref 20–32)
CREAT SERPL-MCNC: 1.3 MG/DL (ref 0.66–1.25)
EOSINOPHIL # BLD AUTO: 0.2 10E3/UL (ref 0–0.7)
EOSINOPHIL NFR BLD AUTO: 3 %
ERYTHROCYTE [DISTWIDTH] IN BLOOD BY AUTOMATED COUNT: 12.1 % (ref 10–15)
GFR SERPL CREATININE-BSD FRML MDRD: 54 ML/MIN/1.73M2
GLUCOSE BLD-MCNC: 102 MG/DL (ref 70–99)
HCT VFR BLD AUTO: 45 % (ref 40–53)
HGB BLD-MCNC: 15.4 G/DL (ref 13.3–17.7)
IMM GRANULOCYTES # BLD: 0 10E3/UL
IMM GRANULOCYTES NFR BLD: 0 %
LYMPHOCYTES # BLD AUTO: 1.9 10E3/UL (ref 0.8–5.3)
LYMPHOCYTES NFR BLD AUTO: 24 %
MCH RBC QN AUTO: 31.8 PG (ref 26.5–33)
MCHC RBC AUTO-ENTMCNC: 34.2 G/DL (ref 31.5–36.5)
MCV RBC AUTO: 93 FL (ref 78–100)
MONOCYTES # BLD AUTO: 0.7 10E3/UL (ref 0–1.3)
MONOCYTES NFR BLD AUTO: 9 %
NEUTROPHILS # BLD AUTO: 4.8 10E3/UL (ref 1.6–8.3)
NEUTROPHILS NFR BLD AUTO: 63 %
PLATELET # BLD AUTO: 131 10E3/UL (ref 150–450)
POTASSIUM BLD-SCNC: 4.5 MMOL/L (ref 3.4–5.3)
PROT SERPL-MCNC: 7.2 G/DL (ref 6.8–8.8)
RBC # BLD AUTO: 4.85 10E6/UL (ref 4.4–5.9)
SODIUM SERPL-SCNC: 139 MMOL/L (ref 133–144)
WBC # BLD AUTO: 7.7 10E3/UL (ref 4–11)

## 2023-06-28 PROCEDURE — 99214 OFFICE O/P EST MOD 30 MIN: CPT | Performed by: FAMILY MEDICINE

## 2023-06-28 PROCEDURE — 80053 COMPREHEN METABOLIC PANEL: CPT | Performed by: FAMILY MEDICINE

## 2023-06-28 PROCEDURE — 85025 COMPLETE CBC W/AUTO DIFF WBC: CPT | Performed by: FAMILY MEDICINE

## 2023-06-28 PROCEDURE — 93000 ELECTROCARDIOGRAM COMPLETE: CPT | Performed by: FAMILY MEDICINE

## 2023-06-28 PROCEDURE — 36415 COLL VENOUS BLD VENIPUNCTURE: CPT | Performed by: FAMILY MEDICINE

## 2023-06-28 RX ORDER — ALBUTEROL SULFATE 90 UG/1
2 AEROSOL, METERED RESPIRATORY (INHALATION) EVERY 4 HOURS PRN
Qty: 18 G | Refills: 0 | Status: SHIPPED | OUTPATIENT
Start: 2023-06-28 | End: 2023-06-28

## 2023-06-28 RX ORDER — AMOXICILLIN 500 MG/1
CAPSULE ORAL
COMMUNITY
Start: 2023-04-21 | End: 2023-06-28

## 2023-06-28 RX ORDER — MOMETASONE FUROATE MONOHYDRATE 50 UG/1
2 SPRAY, METERED NASAL PRN
Qty: 17 G | Refills: 1 | COMMUNITY
Start: 2023-06-28

## 2023-06-28 RX ORDER — ALBUTEROL SULFATE 90 UG/1
2 AEROSOL, METERED RESPIRATORY (INHALATION) EVERY 4 HOURS PRN
Qty: 18 G | Refills: 0 | COMMUNITY
Start: 2023-06-28 | End: 2023-07-10

## 2023-06-28 RX ORDER — AMOXICILLIN 500 MG/1
CAPSULE ORAL
COMMUNITY
Start: 2023-06-28 | End: 2023-09-08

## 2023-06-28 NOTE — PROGRESS NOTES
ASSESSMENT/PLAN:      ICD-10-CM    1. Episodes of vertigo occurring daily  R42 EKG 12-lead, tracing only     EKG 12-lead complete w/read - Clinics     CBC with platelets and differential     Comprehensive metabolic panel     Adult Cardiology Eval  Referral     CBC with platelets and differential     Comprehensive metabolic panel    now 1 month, asymptomatic at present, nml exam and ekg,,nml labs ? etiology,? be cardiac base on  hx,fu in next 1-2 weeks with cardiology and PCP or ER if worse      2. Coronary artery disease involving native coronary artery of native heart without angina pectoris  I25.10 Adult Cardiology Eval  Referral      3. Nonrheumatic aortic valve stenosis  I35.0 Adult Cardiology Eval  Referral    TAVR 2022      4. 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)  I11.0 Adult Cardiology Eval  Referral          Patient Instructions     Will call you to update your medications-list you have is not the same as the medication list on chart and information from your last visit with      Labs completed today-if abnormal will need to go to ER    If episodes more frequent, nearly pass out, or pass out to ER      If you need to go to the ER, go to the [facility] Emergency room-on [address]       Reviewed medication instructions and side effects. Follow up if experiences side effects.     I reviewed supportive care, otc meds to use if needed, expected course, and signs of concern.  Follow up as needed or if he does not improve within  1-2 days or if worsens in any way.  Reviewed red flag symptoms and is to go to the ER if experiences any of these.     The use of Dragon/OrderUpation services may have been used to construct the content in this note; any grammatical or spelling errors are non-intentional. Please contact the author of this note directly if you are in need of any clarification.      On the day of the encounter, time spend on chart  review, patient visit, review of testing, documentation was 40  minutes        Nathan    Called and updated medication list with patient via phone-family member confirmed list as noted now is correct -the list he reviewed with me in the to clinic was not correct-  based on current medication list, EKG and labs no acute reasons patient is currently have intermittent vertigo-last episode this am, normal ent exam, asymptomatic at present ,exam within normal limits,      He will contact cardiology- referral placed  to be seen in the next 2-3 days to determine further plan to rule out cardiac reason for episodes-may need heart monitor-holter/zio patch to determine if possible arrhythmia  causing intermittent symptoms-patient agrees to go to ER if next episode is worse, near syncope/syncope/chest pain/pressure or stroke like symptoms      Called and reviewed lab results with patient via phone-results and plan as noted below-this is a copy of the my chart message I sent to patient that outline our phone call and plan-      Here are the results as we discussed on the phone.  Please increase your water intake to improve your kidney function which is the creatinine which has increased to 1.30. ( last CR 1.07)  This lab will need to be repeated when you see your primary care provider-straight drink plenty of water before you have your labs drawn -even if you are fasting you can still drink water.. Please schedule an appointment with your primary care provider as we discussed.    In addition your platelets are slightly low at 131-most likely secondary to your Eliquis-no significant change over the last 10 months-and can follow-up with your primary care provider and cardiology if this lab needs to be  repeated in the future.         Patient presents with:  Urgent Care: Urgent care visit for dizziness.  Dizziness: The patient spoke w/ RN triage for Dr. Gandhi's office this morning. He c/o dizzy spells for  "approximately 1 month that happen at least once per day. During the spells his vision becomes \"swirly\" and he feels like he is going to all when he walks. After peak of dizzy spell he has a \"hang over\" feeling and continues to be mildly dizzy for about an hour post spell. His last spell was 10am this morning.  The dizzy spells and their after effects are becoming longer so he was told to come in  to be seen.       Subjective     Tima Mckenzie is a 88 year old male who presents to clinic today for the following health issues:    HPI       Dizziness      Duration: 1 month    Episodes a couple of times a week, now in last week--happening daily -last episode this am-lasted a little longer, but resolved,     Description   Feeling faint:  no   Feeling like the surroundings are moving: YES  Loss of consciousness or falls: no LOC  -but feels off balance like he going to fall  Patient with hx of CAD,TAVR,HTN, CKD,     Accompanying signs and symptoms:   Nausea/vomitting: YES-with vertigo-nausea, no emesis  Palpitations: no   Weakness in arms or legs: no   Vision or speech changes: no   Ringing in ears (Tinnitus): no   Hearing loss related to dizziness: no   Other (fevers/chills/sweating/dyspnea): headaches occasional for years, no change in headaches, not associated with vertigo   No chest pain or pressure associated with episodes, no low or high blood pressure associated with episodes   no recent n/v/d, or decrease in fluid intake/no change in appetite     History (similar episodes/head trauma/previous evaluation/recent bleeding):  No hx of head trauma in present or in the past     Precipitating or alleviating factors (new meds/chemicals): None  Worse with activity/head movement: no     Therapies tried and outcome: None    At present, no vertigo, no chest pain/pressure, no palpitations, no heart racing, no URI symptoms/chills sore throat, BP mildly elevated 144/82 -but as noted no symptoms as present     Patient list " of medications from his wallet not the same as list on chart- he is uncertain if he has stopped some of the meds on his list vs meds listed on Epic       Patient Active Problem List   Diagnosis     BPH (benign prostatic hypertrophy)     Hyperlipidemia LDL goal <70     Advanced directives, counseling/discussion     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     Bilateral inguinal hernia     Senile nuclear sclerosis     Posterior vitreous detachment of both eyes     Essential hypertension with goal blood pressure less than 130/80     Seasonal allergic rhinitis, unspecified allergic rhinitis trigger     Coronary artery disease involving native coronary artery of native heart without angina pectoris     Nonrheumatic aortic valve stenosis     Abdominal aortic aneurysm (AAA) without rupture (H)     Aortic stenosis, severe     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)     Thrombocytopenia (H)       Past Medical History:   Diagnosis Date     Arthritis      BPH (benign prostatic hypertrophy)      Coronary artery disease      Diverticulosis      Hypercholesterolemia      Hypertension      MI (myocardial infarction) (H) 3/1997    Anterior Wall MI/LAD/OM1/SVG    CABG X 3     Neuropathy     RT Foot     Seasonal allergic rhinitis 7/1/2013     Social History     Tobacco Use     Smoking status: Former     Passive exposure: Past (Parents smoked while growing up. Wife also smoked.)     Smokeless tobacco: Never     Tobacco comments:     1997   Substance Use Topics     Alcohol use: Yes     Comment: rarely        Current Outpatient Medications   Medication Sig Dispense Refill     albuterol (PROAIR HFA/PROVENTIL HFA/VENTOLIN HFA) 108 (90 Base) MCG/ACT inhaler Inhale 2 puffs into the lungs every 4 hours as needed for shortness of breath, wheezing or cough 18 g 0     amoxicillin (AMOXIL) 500 MG capsule TAKE 4 CAPSULES (2,000 MG) BY MOUTH ONCE FOR 1 DOSE 30-60 MINUTES PRIOR TO DENTAL APPT       Apixaban (ELIQUIS PO) Take 5 mg  by mouth 2 times daily       aspirin 81 MG EC tablet Take 1 tablet (81 mg) by mouth daily       isosorbide mononitrate (IMDUR) 60 MG 24 hr tablet Take 1 tablet (60 mg) by mouth every morning 90 tablet 2     mometasone (NASONEX) 50 MCG/ACT nasal spray Spray 2 sprays into both nostrils as needed (nasal congestion) 17 g 1     multivitamin, therapeutic (THERA-VIT) TABS tablet Take 1 tablet by mouth daily       rosuvastatin (CRESTOR) 40 MG tablet Take 1 tablet (40 mg) by mouth daily 90 tablet 0     nitroGLYcerin (NITROSTAT) 0.4 MG sublingual tablet Place 1 tablet (0.4 mg) under the tongue every 5 minutes as needed for chest pain up to 3 tablets per episode. (Patient not taking: Reported on 6/28/2023) 25 tablet 3     Allergies   Allergen Reactions     No Known Drug Allergy      Seasonal Allergies              ROS are negative, except as otherwise noted HPI      Objective    BP (!) 144/82 (BP Location: Left arm, Patient Position: Sitting, Cuff Size: Adult Regular)   Pulse 66   Temp 98.9  F (37.2  C) (Tympanic)   Resp 16   Wt 81.7 kg (180 lb 3 oz)   SpO2 96%   BMI 26.39 kg/m    Body mass index is 26.39 kg/m .  Physical Exam   GENERAL: healthy, alert and no distress  EYES: Eyes grossly normal to inspection, PERRL and conjunctivae and sclerae normal  HENT: ear canals and TM's normal, nose and mouth without ulcers or lesions  NECK: no adenopathy, no asymmetry, masses, or scars and thyroid normal to palpation  RESP: lungs clear to auscultation - no rales, rhonchi or wheezes  CV: regular rate and rhythm, normal S1 S2, no S3 or S4, no murmur, click or rub, no peripheral edema and peripheral pulses strong  ABDOMEN: soft, nontender, no hepatosplenomegaly, no masses and bowel sounds normal  MS: no gross musculoskeletal defects noted, no edema  NEURO: Normal strength and tone, mentation intact and speech normal  NEURO: sensory exam grossly normal, cranial nerves 2-12 intact, DTR's normal and symmetric at knees, gait normal  including heel/toe/tandem walking, Romberg normal and rapid alternating movements normal           EKG Interpretation:      Interpreted by Dari Ralph MD  [facility] reading-normal sinus rhythm, within normal limits    Symptoms at time of EKG: None   Rhythm: Normal sinus   Rate: Normal  Axis: Normal  Ectopy: None  Conduction: Normal  ST Segments/ T Waves: No ST-T wave changes and No acute ischemic changes  Comparison to prior: compared to previous ekg-now NSR, PACs have resolved     Clinical Impression: normal EKG      Diagnostic Test Results:  Labs reviewed in Epic  Results for orders placed or performed in visit on 06/28/23   Comprehensive metabolic panel     Status: Abnormal   Result Value Ref Range    Sodium 139 133 - 144 mmol/L    Potassium 4.5 3.4 - 5.3 mmol/L    Chloride 105 94 - 109 mmol/L    Carbon Dioxide (CO2) 27 20 - 32 mmol/L    Anion Gap 7 3 - 14 mmol/L    Urea Nitrogen 20 7 - 30 mg/dL    Creatinine 1.30 (H) 0.66 - 1.25 mg/dL    Calcium 9.5 8.5 - 10.1 mg/dL    Glucose 102 (H) 70 - 99 mg/dL    Alkaline Phosphatase 59 40 - 150 U/L    AST 23 0 - 45 U/L    ALT 16 0 - 70 U/L    Protein Total 7.2 6.8 - 8.8 g/dL    Albumin 3.8 3.4 - 5.0 g/dL    Bilirubin Total 0.6 0.2 - 1.3 mg/dL    GFR Estimate 54 (L) >60 mL/min/1.73m2   CBC with platelets and differential     Status: Abnormal   Result Value Ref Range    WBC Count 7.7 4.0 - 11.0 10e3/uL    RBC Count 4.85 4.40 - 5.90 10e6/uL    Hemoglobin 15.4 13.3 - 17.7 g/dL    Hematocrit 45.0 40.0 - 53.0 %    MCV 93 78 - 100 fL    MCH 31.8 26.5 - 33.0 pg    MCHC 34.2 31.5 - 36.5 g/dL    RDW 12.1 10.0 - 15.0 %    Platelet Count 131 (L) 150 - 450 10e3/uL    % Neutrophils 63 %    % Lymphocytes 24 %    % Monocytes 9 %    % Eosinophils 3 %    % Basophils 1 %    % Immature Granulocytes 0 %    Absolute Neutrophils 4.8 1.6 - 8.3 10e3/uL    Absolute Lymphocytes 1.9 0.8 - 5.3 10e3/uL    Absolute Monocytes 0.7 0.0 - 1.3 10e3/uL    Absolute Eosinophils 0.2 0.0 - 0.7 10e3/uL     Absolute Basophils 0.1 0.0 - 0.2 10e3/uL    Absolute Immature Granulocytes 0.0 <=0.4 10e3/uL   CBC with platelets and differential     Status: Abnormal    Narrative    The following orders were created for panel order CBC with platelets and differential.  Procedure                               Abnormality         Status                     ---------                               -----------         ------                     CBC with platelets and d...[[id number]]  Abnormal            Final result                 Please view results for these tests on the individual orders.

## 2023-06-28 NOTE — NURSING NOTE
He states he has also had a CAB about 25 years ago. The valve surgery was 1 to 2 years ago.   Renata Otero MA

## 2023-06-28 NOTE — TELEPHONE ENCOUNTER
"Patient reports that he has been having dizziness once a day for the last month.  He has a swirling feeling and if he tried to walk he would fall down.  He does have nausea with this. They have a lot going on and that is why he didn't call sooner. Has a minor headache on and off and he does feel that it started with the dizziness. After the peak of the spell he then has a \"hang over\" from it.  He explains there is a continued dizziness, but not as significant as the spell, for about 1 hour or so after. He recovers and is then able to go on with his day as normal. He has not had this before. The spells can happen any time of the day, he can't find a pattern. Activity does not make it worse. He can't pin why they are happening. He has had on and off numbness in 2 fingers for a year or so. The last time he had a spell was this morning about 10:00 am. The spells and after effects  are becoming longer.     Currently denies: Chest pain, SOB, trouble breathing, irregular heart rate, numbness or weakness, ear pain, cold of congestion, fever.    Nursing advice: Due to the worsening of his symptoms, age and his medical history patient is to be assessed in urgent care now. Patient was given signs and symptoms to go to the E.R..  Patient verbalizes good understanding, agrees with plan and states he needs no further support. Samina DONOVAN.NNatalia      Reason for Disposition    MODERATE dizziness (e.g., interferes with normal activities) (Exception: dizziness caused by heat exposure, sudden standing, or poor fluid intake)     Not now but happens daily and is significant when it happens    Additional Information    Negative: SEVERE difficulty breathing (e.g., struggling for each breath, speaks in single words)    Negative: Shock suspected (e.g., cold/pale/clammy skin, too weak to stand, low BP, rapid pulse)    Negative: Difficult to awaken or acting confused (e.g., disoriented, slurred speech)    Negative: Fainted, and still feels " dizzy afterwards    Negative: Overdose (accidental or intentional) of medications    Negative: New neurologic deficit that is present now: * Weakness of the face, arm, or leg on one side of the body * Numbness of the face, arm, or leg on one side of the body * Loss of speech or garbled speech    Negative: Heart beating < 50 beats per minute OR > 140 beats per minute    Negative: Sounds like a life-threatening emergency to the triager    Negative: SEVERE dizziness (e.g., unable to stand, requires support to walk, feels like passing out now)    Negative: SEVERE headache or neck pain    Negative: Spinning or tilting sensation (vertigo) present now and one or more stroke risk factors (i.e., hypertension, diabetes mellitus, prior stroke/TIA, heart attack, age over 60) (Exception: prior physician evaluation for this AND no different/worse than usual)    Negative: Neurologic deficit that was brief (now gone), ANY of the following:* Weakness of the face, arm, or leg on one side of the body* Numbness of the face, arm, or leg on one side of the body* Loss of speech or garbled speech    Negative: Loss of vision or double vision  (Exception: Similar to previous migraines.)    Negative: Extra heart beats OR irregular heart beating (i.e., 'palpitations')    Negative: Difficulty breathing    Negative: Drinking very little and has signs of dehydration (e.g., no urine > 12 hours, very dry mouth, very lightheaded)    Negative: Follows bleeding (e.g., stomach, rectum, vagina)  (Exception: Became dizzy from sight of small amount blood.)    Negative: Patient sounds very sick or weak to the triager    Negative: Lightheadedness (dizziness) present now, after 2 hours of rest and fluids    Negative: Spinning or tilting sensation (vertigo) present now    Negative: Fever > 103 F (39.4 C)    Negative: Fever > 100.0 F (37.8 C) and has diabetes mellitus or a weak immune system (e.g., HIV positive, cancer chemotherapy, organ transplant,  splenectomy, chronic steroids)    Protocols used: DIZZINESS-A-OH

## 2023-06-28 NOTE — NURSING NOTE
The patient has not recently been ill. He had COVID-19 very mildly for the first time this past Spring of 2023 while on a cruise ship. He has not had any recent illnesses etc. per his report. In the last couple of months he had a slight change in his heart medications. He stopped Brilinta and started taking Eliquis.  Renata Otero MA

## 2023-06-29 ENCOUNTER — TELEPHONE (OUTPATIENT)
Dept: CARDIOLOGY | Facility: CLINIC | Age: 88
End: 2023-06-29
Payer: COMMERCIAL

## 2023-06-29 NOTE — TELEPHONE ENCOUNTER
[facility] Call Center    Phone Message    May a detailed message be left on voicemail: yes     Reason for Call: Appointment Intake    Referring Provider Name: Dari Ralph MD  Diagnosis and/or Symptoms: Dizziness [R42]; 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) [I11.0]; Nonrheumatic aortic valve stenosis [I35.0]; Coronary artery disease involving native coronary artery of native heart without angina pectoris [I25.10], nothing in 3-5 days. Please review and reach out to patient to schedule.     Action Taken: Message routed to:  Adult Clinics: Cardiology p [id number]    Travel Screening: Not Applicable

## 2023-06-29 NOTE — PATIENT INSTRUCTIONS
Will call you to update your medications-list you have is not the same as the medication list on chart and information from your last visit with      Labs completed today-if abnormal will need to go to ER    If episodes more frequent, nearly pass out, or pass out to ER      If you need to go to the ER, go to the [facility] Emergency room-on [address]

## 2023-06-30 NOTE — TELEPHONE ENCOUNTER
Reached out to LUCERO Cordero and Dr. Parker in regard to patient. Spot available on Dr. Strickland's schedule on July 14th. Dr. Parker reviewed information and stated okay for patient to wait until July 14th to see Dr. Strickland. Dr. Strickland updated in clinic and agreed with plan. Patient should go to ER if patient faints or if symptoms worsen.    Called and offered to schedule patient for visit on July 14th at 3pm with Dr. Strickland. Patient agreeable with plan. Patient scheduled for appointment. Reviewed with patient of when to go to the ER. Patient verbalized understanding. He states he is feeling that he does not need to go to ER at this moment but that he will go to ER if he worsens. Plan for patient to be seen on July 14th.    Patricia Alvarado RN, BSN  Cardiology RN Care Coordinator   [place]   Phone: [phone]  Fax: [phone] ([place]) [phone] ([place])

## 2023-07-10 RX ORDER — ALBUTEROL SULFATE 90 UG/1
2 AEROSOL, METERED RESPIRATORY (INHALATION) EVERY 4 HOURS PRN
Qty: 18 G | Refills: 0 | COMMUNITY
Start: 2023-07-10 | End: 2023-07-10

## 2023-07-10 RX ORDER — ALBUTEROL SULFATE 90 UG/1
2 AEROSOL, METERED RESPIRATORY (INHALATION) EVERY 4 HOURS PRN
Qty: 18 G | Refills: 0 | COMMUNITY
Start: 2023-07-10 | End: 2023-11-22

## 2023-07-13 NOTE — PROGRESS NOTES
[facility] Cardiology Consultation:    Assessment and Plan:     1. 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 Subclinical leaflet thrombosis of TAVR bioprosthesis (HALT) on CTA 9/29/22, resolved    2. Coronary artery disease status post three-vessel coronary bypass graft (LIMA-LAD SVG-OM-D1) s/p PCI w/ BYRON to pRCA/rPDA/D1 1/20/20 and PCI+BYRON to RCA 6/28/21 without angina     3. Hypertension   Continue amlodipine and Toprol.  Advised to monitor his BP at home and send us numbers via [employer] in a few weeks     4. Hyperlipidemia     5. AAA 3.7 on CT abdomen 11/2021:      6. Venous insufficiency    7.  Dizziness with mild KATLYN, resolved with hydration    Howard Strickland MD    Cardiac Imaging and Prevention  [facility]  [email or web address] Pager: [phone]    HPI: CAD s/p 3vCABG (LIMA-LAD SVG-OM1-D1) 1997 and PCI w/ BYRON to pRCA/rPDA/D1 1/20/20 and PCI+BYRON to RCA 6/28/21, severe aortic stenosis s/p TAVR 8/17/22 with 29 mm Khoury Miky 3 prosthesis who presents for follow up. He sees my colleague Dr Parker, and is now seen as an urgent add-on due to complaints of dizziness.  He went to the urgent care with these complaints last month.  Labs were checked that time and showed mild KATLYN, and he was advised to drink more water.  He has been doing it and this led to resolution of his dizziness.  He feels like he is back to his baseline.  He is tolerating of all his medications.  Of note, he is still taking the Toprol, though it appears it was discontinued from his list after his TAVR admission.    EXAM:  BP (!) 141/71 (BP Location: Right arm, Patient Position: Sitting, Cuff Size: Adult Regular)   Pulse 64   Wt 81.1 kg (178 lb 14.4 oz)   SpO2 97%   BMI 26.20 kg/m    GEN/CONSTITUIONAL: Appears comfortable, in no apparent distress   EYES: No icterus  ENT/MOUTH: Normal  JVP:  Not visible  RESPIRATORY: Clear to auscultation bilaterally   CARDIOVASCULAR: Regular  S1 and S2, no murmurs, rubs, or gallops.   ABDOMEN: Soft, non-tender, positive bowel sounds   NEUROLOGIC: Grossly non-focal   PSYCHIATRIC: Normal affect  EXT: No cyanosis, clubbing, edema. Normal pedal pulses.  Skin: No petechiae, purpura or rash    PAST MEDICAL HISTORY:  Past Medical History:   Diagnosis Date     Arthritis      BPH (benign prostatic hypertrophy)      Coronary artery disease      Diverticulosis      Hypercholesterolemia      Hypertension      MI (myocardial infarction) (H) 3/1997    Anterior Wall MI/LAD/OM1/SVG    CABG X 3     Neuropathy     RT Foot     Seasonal allergic rhinitis 7/1/2013       CURRENT MEDICATIONS:  Current Outpatient Medications   Medication     albuterol (PROAIR HFA/PROVENTIL HFA/VENTOLIN HFA) 108 (90 Base) MCG/ACT inhaler     amoxicillin (AMOXIL) 500 MG capsule     Apixaban (ELIQUIS PO)     aspirin 81 MG EC tablet     isosorbide mononitrate (IMDUR) 60 MG 24 hr tablet     mometasone (NASONEX) 50 MCG/ACT nasal spray     multivitamin, therapeutic (THERA-VIT) TABS tablet     nitroGLYcerin (NITROSTAT) 0.4 MG sublingual tablet     rosuvastatin (CRESTOR) 40 MG tablet     No current facility-administered medications for this visit.       PAST SURGICAL HISTORY:  Past Surgical History:   Procedure Laterality Date     ANGIOGRAM  4/30/2003    No Disease, Clemons,LAD,SVG,OM1- small LT Main, Occlusion of CX- D1 50-70% Stenosis, RCA 30-80%     COLONOSCOPY       COLONOSCOPY N/A 10/14/2014    Procedure: COMBINED COLONOSCOPY, SINGLE BIOPSY/POLYPECTOMY BY BIOPSY;  Surgeon: Konstantin Coates MD;  Location: [facility] OR     COLONOSCOPY WITH CO2 INSUFFLATION N/A 10/14/2014    Procedure: COLONOSCOPY WITH CO2 INSUFFLATION;  Surgeon: Konstantin Coates MD;  Location: [facility] OR     CORONARY ARTERY BYPASS  1997    X 3     CV CORONARY ANGIOGRAM N/A 1/20/2020    Procedure: CV CORONARY ANGIOGRAM;  Surgeon: Magdiel Hernandez MD;  Location:  [facility] CARDIAC CATH LAB     CV CORONARY ANGIOGRAM N/A 6/28/2021     Procedure: CV CORONARY ANGIOGRAM;  Surgeon: Ravi Albright MD;  Location:  HEART CARDIAC CATH LAB     CV FRACTIONAL FLOW RATIO WIRE N/A 1/20/2020    Procedure: Fractional Flow Reserve;  Surgeon: Magdiel Hernandez MD;  Location: [facility] CARDIAC CATH LAB     CV INTRAVASULAR ULTRASOUND N/A 6/28/2021    Procedure: Intravascular Ultrasound;  Surgeon: Ravi Albright MD;  Location: [facility] CARDIAC CATH LAB     CV PCI STENT DRUG ELUTING N/A 1/20/2020    Procedure: Percutaneous Coronary Intervention Stent Drug Eluting;  Surgeon: Magdiel Hernandez MD;  Location:  HEART CARDIAC CATH LAB     CV PCI STENT DRUG ELUTING N/A 6/28/2021    Procedure: Percutaneous Coronary Intervention Stent Drug Eluting;  Surgeon: Ravi Albright MD;  Location:  HEART CARDIAC CATH LAB     CV TRANSCATHETER AORTIC VALVE REPLACEMENT N/A 8/17/2022    Procedure: Transfemoral Transcatheter Aortic Valve Replacement (Khoury);  Surgeon: Magdiel Hernandez MD;  Location:  OR     HERNIA REPAIR  [address] [place] [address]     OR TRANSCATHETER AORTIC VALVE REPLACEMENT, FEMORAL PERCUTANEOUS APPROACH (STANDBY) N/A 8/17/2022    Procedure: with possible open heart bypass and or balloon pump placement and any indicated procedure,;  Surgeon: Karl Webster MD;  Location:  OR       ALLERGIES     Allergies   Allergen Reactions     No Known Drug Allergy      Seasonal Allergies        FAMILY HISTORY:  Family History   Problem Relation Age of Onset     Coronary Artery Disease Brother 55        Fatal MI       SOCIAL HISTORY:  Social History     Socioeconomic History     Marital status:      Spouse name: Not on file     Number of children: Not on file     Years of education: Not on file     Highest education level: Not on file   Occupational History     Not on file   Tobacco Use     Smoking status: Former     Passive exposure: Past (Parents smoked while growing up. Wife also smoked.)     Smokeless tobacco:  Never     Tobacco comments:     1997   Vaping Use     Vaping Use: Never used   Substance and Sexual Activity     Alcohol use: Yes     Comment: rarely      Drug use: No     Sexual activity: Yes     Partners: Female   Other Topics Concern     Parent/sibling w/ CABG, MI or angioplasty before 65F 55M? Not Asked   Social History Narrative     Not on file     Social Determinants of Health     Financial Resource Strain: Not on file   Food Insecurity: Not on file   Transportation Needs: Not on file   Physical Activity: Not on file   Stress: Not on file   Social Connections: Not on file   Intimate Partner Violence: Not on file   Housing Stability: Not on file       ROS:   Constitutional: No fever, chills, or sweats. No weight gain/loss   ENT: No visual disturbance, ear ache, epistaxis, sore throat  Allergies/Immunologic: Negative.   Respiratory: No cough, hemoptysia  Cardiovascular: As per HPI  GI: No nausea, vomiting, hematemesis, melena, or hematochezia  : No urinary frequency, dysuria, or hematuria  Integument: Negative  Psychiatric: Negative  Neuro: Negative  Endocrinology: Negative   Musculoskeletal: Negative    ADDITIONAL COMMENTS:     I reviewed the patient's medications:     I reviewed the patient's pertinent clinical laboratory studies:     I reviewed the patient's pertinent imaging studies:   I reviewed the patient's ECG:

## 2023-07-14 ENCOUNTER — OFFICE VISIT (OUTPATIENT)
Dept: CARDIOLOGY | Facility: CLINIC | Age: 88
End: 2023-07-14
Payer: COMMERCIAL

## 2023-07-14 VITALS
DIASTOLIC BLOOD PRESSURE: 71 MMHG | OXYGEN SATURATION: 97 % | HEART RATE: 64 BPM | WEIGHT: 178.9 LBS | BODY MASS INDEX: 26.2 KG/M2 | SYSTOLIC BLOOD PRESSURE: 141 MMHG

## 2023-07-14 DIAGNOSIS — I10 ESSENTIAL HYPERTENSION WITH GOAL BLOOD PRESSURE LESS THAN 130/80: ICD-10-CM

## 2023-07-14 PROCEDURE — 99214 OFFICE O/P EST MOD 30 MIN: CPT | Performed by: INTERNAL MEDICINE

## 2023-07-14 RX ORDER — METOPROLOL SUCCINATE 25 MG/1
25 TABLET, EXTENDED RELEASE ORAL DAILY
Qty: 90 TABLET | Refills: 3
Start: 2023-07-14 | End: 2023-11-22

## 2023-07-14 NOTE — PROGRESS NOTES
Tima Mckenzie's goals for this visit include:     He requests these members of his care team be copied on today's visit information: PCP    PCP: Julieta Gandhi    Referring Provider:  No referring provider defined for this encounter.    BP (!) 141/71 (BP Location: Right arm, Patient Position: Sitting, Cuff Size: Adult Regular)   Pulse 64   Wt 81.1 kg (178 lb 14.4 oz)   SpO2 97%   BMI 26.20 kg/m      Do you need any medication refills at today's visit? No.    Luis Enrique De Souza, EMT  Clinic Support  [facility]    [phone]    Employed by [facility] [employer]

## 2023-07-17 DIAGNOSIS — Z95.2 S/P TAVR (TRANSCATHETER AORTIC VALVE REPLACEMENT): Primary | ICD-10-CM

## 2023-07-17 NOTE — TELEPHONE ENCOUNTER
[facility] Call Center    Phone Message    May a detailed message be left on voicemail: yes     Reason for Call: Other: Please follwo up with Pt      Action Taken: Other: cardio    Travel Screening: Not Applicable   Thank you!  [facility]

## 2023-07-17 NOTE — TELEPHONE ENCOUNTER
Patient called requesting refills for 90 day supply.   Requested Prescriptions   Pending Prescriptions Disp Refills     apixaban ANTICOAGULANT (ELIQUIS ANTICOAGULANT) 5 MG tablet 180 tablet 3     Sig: Take 1 tablet (5 mg) by mouth 2 times daily       Direct Oral Anticoagulant Agents Failed - 7/17/2023  4:27 PM        Failed - Normal Platelets on file in past 12 months     Recent Labs   Lab Test 06/28/23 1927   *               Failed - Patient is 18-79 years of age        Failed - Recent (6 mo) or future (30 days) visit within the authorizing provider's specialty        Passed - Medication is active on med list        Passed - Serum creatinine less than or equal to 1.4 on file in past 12 mos     Recent Labs   Lab Test 06/28/23 1927   CR 1.30*       Ok to refill medication if creatinine is low          Passed - Weight is greater than 60 kg for the past year     Wt Readings from Last 3 Encounters:   07/14/23 81.1 kg (178 lb 14.4 oz)   06/28/23 81.7 kg (180 lb 3 oz)   03/20/23 83.6 kg (184 lb 6.4 oz)                    Routing refill request to provider for review/approval because:  Labs out of range: Platelets    LUCERO Stone

## 2023-08-09 ENCOUNTER — TELEPHONE (OUTPATIENT)
Dept: FAMILY MEDICINE | Facility: CLINIC | Age: 88
End: 2023-08-09
Payer: COMMERCIAL

## 2023-08-09 NOTE — TELEPHONE ENCOUNTER
Patient Quality Outreach    Patient is due for the following:   Hypertension -  Hypertension follow-up visit  Physical Annual Wellness Visit      Topic Date Due    Zoster (Shingles) Vaccine (1 of 2) Never done    Diptheria Tetanus Pertussis (DTAP/TDAP/TD) Vaccine (1 - Tdap) 07/13/2018    COVID-19 Vaccine (6 - Pfizer series) 02/17/2023       Next Steps:   Schedule a Annual Wellness Visit    Type of outreach:    Sent [employer] message.      Questions for provider review:    None           Elisabeth Cervantes MA

## 2023-08-23 ENCOUNTER — NURSE TRIAGE (OUTPATIENT)
Dept: FAMILY MEDICINE | Facility: CLINIC | Age: 88
End: 2023-08-23

## 2023-08-23 ENCOUNTER — TELEPHONE (OUTPATIENT)
Dept: FAMILY MEDICINE | Facility: CLINIC | Age: 88
End: 2023-08-23

## 2023-08-23 ENCOUNTER — OFFICE VISIT (OUTPATIENT)
Dept: URGENT CARE | Facility: URGENT CARE | Age: 88
End: 2023-08-23
Payer: COMMERCIAL

## 2023-08-23 VITALS
RESPIRATION RATE: 16 BRPM | HEART RATE: 76 BPM | BODY MASS INDEX: 26.29 KG/M2 | WEIGHT: 179.5 LBS | OXYGEN SATURATION: 96 % | TEMPERATURE: 97.3 F | SYSTOLIC BLOOD PRESSURE: 128 MMHG | DIASTOLIC BLOOD PRESSURE: 72 MMHG

## 2023-08-23 DIAGNOSIS — R31.9 HEMATURIA, UNSPECIFIED TYPE: Primary | ICD-10-CM

## 2023-08-23 DIAGNOSIS — R31.0 GROSS HEMATURIA: Primary | ICD-10-CM

## 2023-08-23 DIAGNOSIS — R30.0 DYSURIA: ICD-10-CM

## 2023-08-23 LAB
ALBUMIN UR-MCNC: 100 MG/DL
APPEARANCE UR: ABNORMAL
BILIRUB UR QL STRIP: NEGATIVE
COLOR UR AUTO: ABNORMAL
GLUCOSE UR STRIP-MCNC: NEGATIVE MG/DL
HGB UR QL STRIP: ABNORMAL
KETONES UR STRIP-MCNC: NEGATIVE MG/DL
LEUKOCYTE ESTERASE UR QL STRIP: NEGATIVE
NITRATE UR QL: NEGATIVE
PH UR STRIP: 6.5 [PH] (ref 5–7)
RBC #/AREA URNS AUTO: ABNORMAL /HPF
SP GR UR STRIP: 1.01 (ref 1–1.03)
UROBILINOGEN UR STRIP-ACNC: 1 E.U./DL
WBC #/AREA URNS AUTO: ABNORMAL /HPF

## 2023-08-23 PROCEDURE — 81001 URINALYSIS AUTO W/SCOPE: CPT | Performed by: PHYSICIAN ASSISTANT

## 2023-08-23 PROCEDURE — 99212 OFFICE O/P EST SF 10 MIN: CPT | Performed by: PHYSICIAN ASSISTANT

## 2023-08-23 ASSESSMENT — ENCOUNTER SYMPTOMS
MUSCULOSKELETAL NEGATIVE: 1
HEMATURIA: 1
ABDOMINAL PAIN: 0
ALLERGIC/IMMUNOLOGIC NEGATIVE: 1
COUGH: 0
VOMITING: 0
CARDIOVASCULAR NEGATIVE: 1
FREQUENCY: 0
FEVER: 0
MYALGIAS: 0
CONSTITUTIONAL NEGATIVE: 1
GASTROINTESTINAL NEGATIVE: 1
WHEEZING: 0
CHILLS: 0
PALPITATIONS: 0
RESPIRATORY NEGATIVE: 1
NEUROLOGICAL NEGATIVE: 1
HEADACHES: 0
SORE THROAT: 0
CHEST TIGHTNESS: 0
DYSURIA: 0
NAUSEA: 0
SHORTNESS OF BREATH: 0
DIARRHEA: 0

## 2023-08-23 ASSESSMENT — PAIN SCALES - GENERAL: PAINLEVEL: NO PAIN (0)

## 2023-08-23 NOTE — TELEPHONE ENCOUNTER
"Patient calling to report blood in urine, needing further advice. States he noticed 10 minutes ago (8:45am roughly). He states he noticed urine was initially darker than normal before he observed an episode with blood. No general pain, abdominal pain, no trouble urinating, not urinating pure blood, but is on blood thinners. Recommended urgent care, but they are not open until 10. Virtual appointment made at 11, as patient had concerns over possible wait times in urgent care. Patient agreed to plan of care.    Anibal Muñoz RN    Reason for Disposition   Taking Coumadin (warfarin) or other strong blood thinner, or known bleeding disorder (e.g., thrombocytopenia)    Additional Information   Negative: Shock suspected (e.g., cold/pale/clammy skin, too weak to stand, low BP, rapid pulse)   Negative: Sounds like a life-threatening emergency to the triager   Negative: Urinary catheter, questions about   Negative: Recent back or abdominal injury   Negative: Recent genital injury   Negative: Unable to urinate (or only a few drops) > 4 hours and bladder feels very full (e.g., palpable bladder or strong urge to urinate)   Negative: Passing pure blood or large blood clots (i.e., larger than a dime or grape)   Negative: Fever > 100.4 F (38.0 C)   Negative: Patient sounds very sick or weak to the triager   Negative: Known sickle cell disease    Answer Assessment - Initial Assessment Questions  1. COLOR of URINE: \"Describe the color of the urine.\"  (e.g., tea-colored, pink, red, blood clots, bloody)      Very red  2. ONSET: \"When did the bleeding start?\"       Noticed this morning (8:45am)  3. EPISODES: \"How many times has there been blood in the urine?\" or \"How many times today?\"      First time this morning, just happened.  4. PAIN with URINATION: \"Is there any pain with passing your urine?\" If Yes, ask: \"How bad is the pain?\"  (Scale 1-10; or mild, moderate, severe)     - MILD - complains slightly about urination hurting     - " "MODERATE - interferes with normal activities       - SEVERE - excruciating, unwilling or unable to urinate because of the pain       0  5. FEVER: \"Do you have a fever?\" If Yes, ask: \"What is your temperature, how was it measured, and when did it start?\"      Does not know  6. ASSOCIATED SYMPTOMS: \"Are you passing urine more frequently than usual?\"      No  7. OTHER SYMPTOMS: \"Do you have any other symptoms?\" (e.g., back/flank pain, abdominal pain, vomiting)      None  8. PREGNANCY: \"Is there any chance you are pregnant?\" \"When was your last menstrual period?\"      N/A    Protocols used: Urine - Blood In-A-OH    "

## 2023-08-23 NOTE — PROGRESS NOTES
"Chief Complaint:    Chief Complaint   Patient presents with    Hematuria     Patient reports hematuria this morning. Patient reports he had \"strings\" of clots as well. Patient denies dysuria. Patient reports he normally goes frequently. Denies flank pain.     ASSESSMENT  1. Hematuria, unspecified type    2. Dysuria         PLAN    Urinalysis discussed with patient.  This was unremarkable for UTI.  Follow up with PCP in 1 week if symptoms are not improving.  Worrisome symptoms discussed with instructions to go to the ED.  Patient verbalized understanding and agreed with this plan.    Labs:     Results for orders placed or performed in visit on 08/23/23   UA Macroscopic with reflex to Microscopic and Culture - Lab Collect     Status: Abnormal    Specimen: Urine, Midstream   Result Value Ref Range    Color Urine Red (A) Colorless, Straw, Light Yellow, Yellow    Appearance Urine Cloudy (A) Clear    Glucose Urine Negative Negative mg/dL    Bilirubin Urine Negative Negative    Ketones Urine Negative Negative mg/dL    Specific Gravity Urine 1.010 1.003 - 1.035    Blood Urine Large (A) Negative    pH Urine 6.5 5.0 - 7.0    Protein Albumin Urine 100 (A) Negative mg/dL    Urobilinogen Urine 1.0 0.2, 1.0 E.U./dL    Nitrite Urine Negative Negative    Leukocyte Esterase Urine Negative Negative    Narrative    Dip performed on not spun and spun urine.    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    Bilateral inguinal hernia    Senile nuclear sclerosis    Posterior vitreous detachment of both eyes    Essential hypertension with goal blood " pressure less than 130/80    Seasonal allergic rhinitis, unspecified allergic rhinitis trigger    Coronary artery disease involving native coronary artery of native heart without angina pectoris    Nonrheumatic aortic valve stenosis    Abdominal aortic aneurysm (AAA) without rupture (H)    Aortic stenosis, severe    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)    Thrombocytopenia (H)       Current Meds    Current Outpatient Medications:     albuterol (PROAIR HFA/PROVENTIL HFA/VENTOLIN HFA) 108 (90 Base) MCG/ACT inhaler, Inhale 2 puffs into the lungs every 4 hours as needed for shortness of breath, wheezing or cough, Disp: 18 g, Rfl: 0    amoxicillin (AMOXIL) 500 MG capsule, TAKE 4 CAPSULES (2,000 MG) BY MOUTH ONCE FOR 1 DOSE 30-60 MINUTES PRIOR TO DENTAL APPT, Disp: , Rfl:     apixaban ANTICOAGULANT (ELIQUIS ANTICOAGULANT) 5 MG tablet, Take 1 tablet (5 mg) by mouth 2 times daily, Disp: 180 tablet, Rfl: 3    aspirin 81 MG EC tablet, Take 1 tablet (81 mg) by mouth daily, Disp: , Rfl:     isosorbide mononitrate (IMDUR) 60 MG 24 hr tablet, Take 1 tablet (60 mg) by mouth every morning, Disp: 90 tablet, Rfl: 2    metoprolol succinate ER (TOPROL XL) 25 MG 24 hr tablet, Take 1 tablet (25 mg) by mouth daily, Disp: 90 tablet, Rfl: 3    mometasone (NASONEX) 50 MCG/ACT nasal spray, Spray 2 sprays into both nostrils as needed (nasal congestion), Disp: 17 g, Rfl: 1    multivitamin, therapeutic (THERA-VIT) TABS tablet, Take 1 tablet by mouth daily, Disp: , Rfl:     nitroGLYcerin (NITROSTAT) 0.4 MG sublingual tablet, Place 1 tablet (0.4 mg) under the tongue every 5 minutes as needed for chest pain up to 3 tablets per episode. (Patient not taking: Reported on 6/28/2023), Disp: 25 tablet, Rfl: 3    rosuvastatin (CRESTOR) 40 MG tablet, Take 1 tablet (40 mg) by mouth daily, Disp: 90 tablet, Rfl: 0    Allergies  Allergies   Allergen Reactions    No Known Drug Allergy     Seasonal Allergies        SUBJECTIVE    HPI:  Tima BONNER  Jonah is a 88 year old male who has symptoms of hematuria.  Symptoms started this morning and have not changed.  No difficulties urinating.  He denies back pain, nausea, vomiting, fever, and chills, flank pain.    ROS:      Review of Systems   Constitutional: Negative.  Negative for chills and fever.   HENT: Negative.  Negative for sore throat.    Respiratory: Negative.  Negative for cough, chest tightness, shortness of breath and wheezing.    Cardiovascular: Negative.  Negative for chest pain and palpitations.   Gastrointestinal: Negative.  Negative for abdominal pain, diarrhea, nausea and vomiting.   Genitourinary:  Positive for hematuria. Negative for dysuria, frequency, penile discharge, penile pain, penile swelling, scrotal swelling, testicular pain and urgency.   Musculoskeletal: Negative.  Negative for myalgias.   Skin:  Negative for rash.   Allergic/Immunologic: Negative.  Negative for immunocompromised state.   Neurological: Negative.  Negative for headaches.       Family History   Family History   Problem Relation Age of Onset    Coronary Artery Disease Brother 55        Fatal MI        Social History  Social History     Socioeconomic History    Marital status:      Spouse name: Not on file    Number of children: Not on file    Years of education: Not on file    Highest education level: Not on file   Occupational History    Not on file   Tobacco Use    Smoking status: Former     Passive exposure: Past (Parents smoked while growing up. Wife also smoked.)    Smokeless tobacco: Never    Tobacco comments:     1997   Vaping Use    Vaping Use: Never used   Substance and Sexual Activity    Alcohol use: Yes     Comment: rarely     Drug use: No    Sexual activity: Yes     Partners: Female   Other Topics Concern    Parent/sibling w/ CABG, MI or angioplasty before 65F 55M? Not Asked   Social History Narrative    Not on file     Social Determinants of Health     Financial Resource Strain: Not on file    Food Insecurity: Not on file   Transportation Needs: Not on file   Physical Activity: Not on file   Stress: Not on file   Social Connections: Not on file   Intimate Partner Violence: Not on file   Housing Stability: Not on file           OBJECTIVE     Vital signs noted and reviewed by Jesus Aguilera PA-C  /72 (BP Location: Left arm, Patient Position: Sitting, Cuff Size: Adult Regular)   Pulse 76   Temp 97.3  F (36.3  C) (Tympanic)   Resp 16   Wt 81.4 kg (179 lb 8 oz)   SpO2 96%   BMI 26.29 kg/m       Physical Exam  Vitals and nursing note reviewed.   Constitutional:       General: He is not in acute distress.     Appearance: He is well-developed. He is not ill-appearing, toxic-appearing or diaphoretic.   HENT:      Head: Normocephalic and atraumatic.      Right Ear: Hearing, tympanic membrane, ear canal and external ear normal. Tympanic membrane is not perforated, erythematous, retracted or bulging.      Left Ear: Hearing, tympanic membrane, ear canal and external ear normal. Tympanic membrane is not perforated, erythematous, retracted or bulging.      Nose: Nose normal. No mucosal edema, congestion or rhinorrhea.      Mouth/Throat:      Pharynx: No oropharyngeal exudate or posterior oropharyngeal erythema.      Tonsils: No tonsillar exudate or tonsillar abscesses. 0 on the right. 0 on the left.   Eyes:      Pupils: Pupils are equal, round, and reactive to light.   Cardiovascular:      Rate and Rhythm: Normal rate and regular rhythm.      Heart sounds: Normal heart sounds, S1 normal and S2 normal. Heart sounds not distant. No murmur heard.     No friction rub. No gallop.   Pulmonary:      Effort: Pulmonary effort is normal. No respiratory distress.      Breath sounds: Normal breath sounds. No decreased breath sounds, wheezing, rhonchi or rales.   Abdominal:      General: Bowel sounds are normal. There is no distension.      Palpations: Abdomen is soft.      Tenderness: There is no abdominal  tenderness.   Musculoskeletal:      Cervical back: Normal range of motion and neck supple.   Lymphadenopathy:      Cervical: No cervical adenopathy.   Skin:     General: Skin is warm and dry.      Findings: No rash.   Neurological:      Mental Status: He is alert.      Cranial Nerves: No cranial nerve deficit.   Psychiatric:         Attention and Perception: He is attentive.         Speech: Speech normal.         Behavior: Behavior normal. Behavior is cooperative.         Thought Content: Thought content normal.         Judgment: Judgment normal.             Jesus Aguilera PA-C  8/23/2023, 10:56 AM

## 2023-09-07 ENCOUNTER — MYC REFILL (OUTPATIENT)
Dept: FAMILY MEDICINE | Facility: CLINIC | Age: 88
End: 2023-09-07
Payer: COMMERCIAL

## 2023-09-07 ENCOUNTER — MYC MEDICAL ADVICE (OUTPATIENT)
Dept: CARDIOLOGY | Facility: CLINIC | Age: 88
End: 2023-09-07
Payer: COMMERCIAL

## 2023-09-07 DIAGNOSIS — Z95.2 S/P TAVR (TRANSCATHETER AORTIC VALVE REPLACEMENT): Primary | ICD-10-CM

## 2023-09-07 DIAGNOSIS — Z95.2 S/P TAVR (TRANSCATHETER AORTIC VALVE REPLACEMENT): ICD-10-CM

## 2023-09-07 RX ORDER — AMOXICILLIN 500 MG/1
CAPSULE ORAL
Status: CANCELLED | OUTPATIENT
Start: 2023-09-07

## 2023-09-08 RX ORDER — AMOXICILLIN 500 MG/1
CAPSULE ORAL
Qty: 4 CAPSULE | Refills: 1 | Status: SHIPPED | OUTPATIENT
Start: 2023-09-08 | End: 2023-09-08

## 2023-09-08 RX ORDER — AMOXICILLIN 500 MG/1
CAPSULE ORAL
Qty: 4 CAPSULE | Refills: 5 | Status: SHIPPED | OUTPATIENT
Start: 2023-09-08 | End: 2023-11-22

## 2023-09-08 NOTE — TELEPHONE ENCOUNTER
"Situation   Cameron needs SBE prophylaxis lifelong right? is that ok to order amoxicillin 2000 mg 30-60 minutes prior to dental work ?     Date: 9/8/2023    Time of Call: 8:18 AM     Diagnosis: See last cardiology visit notes      [ TORB ] Ordering provider: Dr Parker   Order: \" please do, yes \"     Order received by: Consuelo Guy RN       Follow-up/additional notes: Prescription sent. Patient notified via [employer] with instructions.      "

## 2023-09-12 ENCOUNTER — ANCILLARY PROCEDURE (OUTPATIENT)
Dept: ULTRASOUND IMAGING | Facility: CLINIC | Age: 88
End: 2023-09-12
Attending: INTERNAL MEDICINE
Payer: COMMERCIAL

## 2023-09-12 DIAGNOSIS — I71.43 INFRARENAL ABDOMINAL AORTIC ANEURYSM (AAA) WITHOUT RUPTURE (H): ICD-10-CM

## 2023-09-12 PROCEDURE — 76775 US EXAM ABDO BACK WALL LIM: CPT | Performed by: RADIOLOGY

## 2023-09-14 ENCOUNTER — OFFICE VISIT (OUTPATIENT)
Dept: CARDIOLOGY | Facility: CLINIC | Age: 88
End: 2023-09-14
Payer: COMMERCIAL

## 2023-09-14 VITALS — DIASTOLIC BLOOD PRESSURE: 58 MMHG | SYSTOLIC BLOOD PRESSURE: 125 MMHG

## 2023-09-14 DIAGNOSIS — R94.39 ABNORMAL RESULT OF OTHER CARDIOVASCULAR FUNCTION STUDY: ICD-10-CM

## 2023-09-14 DIAGNOSIS — Z95.2 S/P TAVR (TRANSCATHETER AORTIC VALVE REPLACEMENT): Primary | ICD-10-CM

## 2023-09-14 DIAGNOSIS — I25.10 CORONARY ARTERY DISEASE INVOLVING NATIVE CORONARY ARTERY OF NATIVE HEART WITHOUT ANGINA PECTORIS: ICD-10-CM

## 2023-09-14 DIAGNOSIS — I71.43 INFRARENAL ABDOMINAL AORTIC ANEURYSM (AAA) WITHOUT RUPTURE (H): ICD-10-CM

## 2023-09-14 PROCEDURE — 99214 OFFICE O/P EST MOD 30 MIN: CPT | Performed by: INTERNAL MEDICINE

## 2023-09-14 NOTE — PROGRESS NOTES
Chief complaint: aortic stenosis, CAD    HPI:   Tima Mckenzie is a 88 year old male with history of CAD s/p 3vCABG (LIMA-LAD SVG-OM1-D1) 1997 and PCI w/ BYRON to pRCA/rPDA/D1 1/20/20 and PCI+BYRON to RCA 6/28/21, severe aortic stenosis s/p TAVR 8/17/22 with 29 mm Khoury Miky 3 prosthesis who presents for follow up of his chronic cardiovascular conditions.    Cardiac history:  He has history of a 3vCABG in [place], [place] in 1997 ([facility] Operative report is not available (we attempted to obtain op report and angiogram images but were informed that all records from that time have been destroyed), but our EMR and records from [facility] report LIMA-LAD and SVG-OM; Mr. Mckenzie reports that his SVG bypassed 2 vessels (possibly a jump graft.) He had an angiogram 4/2003 (just before moving to [place]) at [facility] for abnormal stress test which repotedly showed a single diseased vessel (unclear which) for which he was prescribed Imdur. We attempted to obtain these images and were told that they had been destroyed.   He had a single episode of chest pain shortly after moving to [place] in 2003 and was admittted to [facility], where he had a stress test which was normal.      He established care with me 8/1/19. At that time, he was at a primary care visit and was noted to have a systolic murmur prompting TTE which showed moderate aortic stenosis, prompting referral for further management. He reported new onset RAE that had been progressive at his initial visit.     He underwent coronary angiogram which showed widely patent LIMA-LAD and 100% proximal occlusion of his single SVG (likely a touchdown graft to OM and RCA system); he underwent PCI to native rPDA, D1, and RCA.     3/5/2020:  Since his PCI, he feels his dyspnea is dramatically improved. He has been playing tennis without difficulty.     5/21/20:  He had been sedentary since his angiogram and went for a walk last week and felt dyspneic. This  was initially concerning to him, but since that time he has been doing a 15-minute exercise program from [employer] and walking for 25 minutes without chest discomfort or dyspnea. He does still have exertional dyspnea walking at a brisk pace or with vigorous yardwork. He feels this is gradually improving as he increases his activity.     2/25/21:  He reports exertional dyspnea walking up 1/2 flight of stairs and he has to rest after walking up 1 flight of stairs; he feels that this is very slightly worse compared with his last visit. He does light calisthenics but no cardio; this makes him slightly dyspneic also. He does admit that he has been significantly more sedentary due to the COVID-19 pandemic and is unsure how much of his reduction in exertional capacity may be related to this.    Imdur was increased to 60 mg daily at this time.     05/27/21:  Since his last visit, he reports feeling generally worse. Last fall, he could walk around the block for 1.5 miles without difficulty. He now has chest tightness and dyspnea walking <1 block. He also has chest tightness and dyspnea with <1 flight of stairs. He is not sure whether increasing Imdur improved his symptoms. He denies exertional lightheadedness.     11/11/21:  Since his last visit, he underwent coronary angiogram 6/28/21 which showed new severe pRCA lesion for which he underwent PCI+BYRON. TTE was done at that time and showed stably moderate aortic stenosis.  He has been participating in cardiac rehab and his exertional capacity has been gradually improving. He does still report some modest residual exertional dyspnea. Chest tightness has resolved. He is able to to walk 1.5 miles daily without difficulty. No exertional lightheadedness or syncope.     He unfortunately was recently diagnosed with diverticulitis (CT 11/9/21 reviewed) and was also noted to have 3.7 cm AAA at that time.      07/14/22:  Since his last visit, he feels his exertional dyspnea has gotten  worse. He has dyspnea lifting weights, walking at a brisk pace, and walking up stairs. He is walking up to 1 mile/day and sometimes cuts his walks short and only walks around the block due to dyspnea; this is decreased compared with last visit, when he could walk 1.5 miles. He does sometimes have chest pressure with his exertional dyspnea.   He does not feel that his dyspnea improved noticeably after his PCI.   He denies any palpitations, PND, orthopnea, peripheral edema, lightheadedness, or syncope.     01/26/23:  Since his last visit, Cameron was found to have severe aortic stenosis and underwent TAVR with 29 mm Khoury Miky 3 prosthesis 8/17/22 without complication. He was seen by Doris Poole NP 9/30/22 (note reviewed) and reported feeling generally well and dneied exertional chest pain. Exertional dyspnea was still present but somewhat improved. He was participating in cardiac rehab.     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 He is able to shovel snow now. His exertional capacity continues to gradually improve and is markedly improved compared with prior to his TAVR.     09/14/23:  He was seen by Dr. Strickland 7/14/23 for evaluation of dizziness. At that time, he felt back to baseline after increasing hydration. He has been much more attentive to his hydration and episodes of dizziness have resolved entirely without recurrence.     Since his last visit, he has continued to increase his activity and is doing strenuous yardwork and riding his bicycle for 6-8 miles/day. He plans to resume using the treadmill when the weather gets colder. He feels his exercise capacity has been steadily increasing.     He denies chest pain/pressure.       PAST MEDICAL HISTORY:  Past Medical History:   Diagnosis Date    Arthritis     BPH  (benign prostatic hypertrophy)     Coronary artery disease     Diverticulosis     Hypercholesterolemia     Hypertension     MI (myocardial infarction) (H) 3/1997    Anterior Wall MI/LAD/OM1/SVG    CABG X 3    Neuropathy     RT Foot    Seasonal allergic rhinitis 7/1/2013       CURRENT MEDICATIONS:  Current Outpatient Medications   Medication Sig Dispense Refill    albuterol (PROAIR HFA/PROVENTIL HFA/VENTOLIN HFA) 108 (90 Base) MCG/ACT inhaler Inhale 2 puffs into the lungs every 4 hours as needed for shortness of breath, wheezing or cough 18 g 0    amoxicillin (AMOXIL) 500 MG capsule Take 4 capsules (2000 mg) by mouth 30-60 minutes prior to dental work. 4 capsule 5    apixaban ANTICOAGULANT (ELIQUIS ANTICOAGULANT) 5 MG tablet Take 1 tablet (5 mg) by mouth 2 times daily 180 tablet 3    aspirin 81 MG EC tablet Take 1 tablet (81 mg) by mouth daily      isosorbide mononitrate (IMDUR) 60 MG 24 hr tablet Take 1 tablet (60 mg) by mouth every morning 90 tablet 2    metoprolol succinate ER (TOPROL XL) 25 MG 24 hr tablet Take 1 tablet (25 mg) by mouth daily 90 tablet 3    mometasone (NASONEX) 50 MCG/ACT nasal spray Spray 2 sprays into both nostrils as needed (nasal congestion) 17 g 1    multivitamin, therapeutic (THERA-VIT) TABS tablet Take 1 tablet by mouth daily      nitroGLYcerin (NITROSTAT) 0.4 MG sublingual tablet Place 1 tablet (0.4 mg) under the tongue every 5 minutes as needed for chest pain up to 3 tablets per episode. (Patient not taking: Reported on 6/28/2023) 25 tablet 3    rosuvastatin (CRESTOR) 40 MG tablet Take 1 tablet (40 mg) by mouth daily 90 tablet 0       PAST SURGICAL HISTORY:  Past Surgical History:   Procedure Laterality Date    ANGIOGRAM  4/30/2003    No Disease, Clemons,LAD,SVG,OM1- small LT Main, Occlusion of CX- D1 50-70% Stenosis, RCA 30-80%    COLONOSCOPY      COLONOSCOPY N/A 10/14/2014    Procedure: COMBINED COLONOSCOPY, SINGLE BIOPSY/POLYPECTOMY BY BIOPSY;  Surgeon: Konstantin Coates MD;   Location: [facility] OR    COLONOSCOPY WITH CO2 INSUFFLATION N/A 10/14/2014    Procedure: COLONOSCOPY WITH CO2 INSUFFLATION;  Surgeon: Konstantin Coates MD;  Location: [facility] OR    CORONARY ARTERY BYPASS  1997    X 3    CV CORONARY ANGIOGRAM N/A 1/20/2020    Procedure: CV CORONARY ANGIOGRAM;  Surgeon: Magdiel Hernandez MD;  Location:  HEART CARDIAC CATH LAB    CV CORONARY ANGIOGRAM N/A 6/28/2021    Procedure: CV CORONARY ANGIOGRAM;  Surgeon: Ravi Albright MD;  Location: [facility] CARDIAC CATH LAB    CV FRACTIONAL FLOW RATIO WIRE N/A 1/20/2020    Procedure: Fractional Flow Reserve;  Surgeon: Magdiel Hernandez MD;  Location: [facility] CARDIAC CATH LAB    CV INTRAVASULAR ULTRASOUND N/A 6/28/2021    Procedure: Intravascular Ultrasound;  Surgeon: Ravi Albright MD;  Location:  HEART CARDIAC CATH LAB    CV PCI STENT DRUG ELUTING N/A 1/20/2020    Procedure: Percutaneous Coronary Intervention Stent Drug Eluting;  Surgeon: Magdiel Hernandez MD;  Location: [facility] CARDIAC CATH LAB    CV PCI STENT DRUG ELUTING N/A 6/28/2021    Procedure: Percutaneous Coronary Intervention Stent Drug Eluting;  Surgeon: Ravi Albright MD;  Location:  HEART CARDIAC CATH LAB    CV TRANSCATHETER AORTIC VALVE REPLACEMENT N/A 8/17/2022    Procedure: Transfemoral Transcatheter Aortic Valve Replacement (Khoury);  Surgeon: Magdiel Hernandez MD;  Location:  OR    HERNIA REPAIR  2014    [facility]    OR TRANSCATHETER AORTIC VALVE REPLACEMENT, FEMORAL PERCUTANEOUS APPROACH (STANDBY) N/A 8/17/2022    Procedure: with possible open heart bypass and or balloon pump placement and any indicated procedure,;  Surgeon: Karl Webster MD;  Location:  OR       ALLERGIES:     Allergies   Allergen Reactions    No Known Drug Allergy     Seasonal Allergies        FAMILY HISTORY:  Brother with fatal MI at 55.  No family history of premature CAD.    SOCIAL HISTORY:  Social History     Tobacco Use    Smoking  status: Former     Passive exposure: Past (Parents smoked while growing up. Wife also smoked.)    Smokeless tobacco: Never    Tobacco comments:     1997   Vaping Use    Vaping Use: Never used   Substance Use Topics    Alcohol use: Yes     Comment: rarely     Drug use: No       ROS:   A comprehensive 14 point review of systems is negative other than as mentioned in HPI.    Exam:  There were no vitals taken for this visit.  GENERAL APPEARANCE: healthy, alert and no distress  EYES: no icterus, no xanthelasmas  ENT: normal palate, mucosa moist, no central cyanosis  NECK: JVP not elevated  RESPIRATORY: lungs clear to auscultation - no rales, rhonchi or wheezes, no use of accessory muscles, no retractions, respirations are unlabored, normal respiratory rate  CARDIOVASCULAR: regular rhythm, +II/VI systolic murmur RUSB, no S3 or S4 and no murmur, click or rub.  GI: soft, non tender, bowel sounds normal,no abdominal bruits  EXTREMITIES: no edema, no bruits  NEURO: alert and oriented to person/place/time, normal speech, gait and affect  VASC: Radial, dorsalis pedis and posterior tibialis pulses 2+ bilaterally.  SKIN: no ecchymoses, no rashes.  PSYCH: cooperative, affect appropriate.       Labs:  Reviewed.     Testing/Procedures:    Coronary angiogram/PCI 6/28/21:  % mid   -D1 stent patent  % prox   -OM1 fills by collaterals from D1  RCA 80% proximal to previously-placed stent, widely patent pRCA stent  LIMA-LAD: widely patent  SVG-OM: 100% occluded  Interventions: PCI+DESx1 to pRCA    TTE 9/29/22: S/P TAVR with 29 mm Miky 3 prosthesis. PV 1.8 m/s MG 7 mmHg DVI 0.50  msec. Mild valvular AI. No paravalvular AI. Normal LV and RV size/function, LVEF=60-65%.     CTA post-TAVR 9/29/22: minimal HALT involving all 3 leaflets with mildly restricted leaflet motion    CTA post-TAVR 01/26/23: normal appearance of the TAVR prosthesis. Compared with 9/29/22, HALT has resolved.     I personally visualized:  US aorta  9/12/23: 4.0 cm infranrenal AAA      Outside results of note:  Outside records from [facility]  were obtained, and relevant results/notes have been incorporated into HPI.    Assessment and Plan:   1. S/P 29 mm Miky 3 TAVR 8/17/22 for severe aortic stenosis:  2. 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 Coronary artery disease status post three-vessel coronary bypass graft (LIMA-LAD SVG-OM-D1) s/p PCI w/ BYRON to pRCA/rPDA/D1 1/20/20 and PCI+BYRON to RCA 6/28/21 without angina  4. Exertional dyspnea, greatly improved (nearly resolved) s/p TAVR  Exertional dyspnea has been his anginal equivalent (initially resolved after his 1st PCI; he did have more classic chest tightness prior to his 2nd PCI) and has also been his symptom of severe aortic stenosis. Cameron has experienced dramatic improvement in his previous exertional dyspnea since his TAVR with continued improvement as he has gradually increased his exercise.     CTA today showed resolution of previous HALT (which was asymptomatic and not associated with significant aortic stenosis) seen on CTA 9/29/22. The appropriate duration of anticoagulation in this setting is somewhat unclear. Completed apixaban for a total >6 months to ensure stable resolution (stop date had been planned for 3/29/23.)     Plan to transition back to P2Y12 inhibitor monotherapy, to be continued indefinitely.    -stop apixaban (completed 6 months of DOAC on 3/29/23 for HALT)  -stop ASA 81 mg daily  -restart ticagrelor 90 mg BID (single 180 mg loading dose on 1st day) and continue P2Y12 inhibitor monotherapy indefinitely  -continue rosuvastatin 40 mg and Imdur 60 mg daily  -SBE prophylaxis with dental procedures  -follow up with me in 12 months with echocardiogram and US aorta prior      3. Hypertension, controlled:    -continue amlodipine 5 mg daily and Toprol XL at present dose    4. Hyperlipidemia, controlled: continue present management.     5.  AAA, 4.0 cm on US aorta 9/12/23 and 3.7 on CT abdomen 11/2021:   -US aorta 9/12/23 with 4.0 cm AAA  -repeat US aorta 1 year    6. Venous insufficiency: continue compression hose and counseled regarding elevation    Follow up TBD based on testing results, if aortic stenosis is not yet severe, would follow up in 3 months with echocardiogram    Plan in brief:  -stop apixaban (completed 6 months of DOAC on 3/29/23 for HALT)  -stop ASA 81 mg daily  -restart ticagrelor 180 mg x1, then 90 mg BID thereafter and continue P2Y12 inhibitor monotherapy indefinitely  -continue rosuvastatin 40 mg and Imdur 60 mg daily  -SBE prophylaxis with dental procedures  -f/u with me in 12 months with the following testing prior:  -TTE   -US aorta  -BMP/lipids    I spent a total of 39 minutes on the day of the visit.  Time spent doing chart review, history and exam, documentation and further activities per the note    Artis Parker MD  Cardiology

## 2023-09-14 NOTE — PROGRESS NOTES
Tima Mckenzie's goals for this visit include:     He requests these members of his care team be copied on today's visit information: PCP    PCP: Julieta Gadnhi    Referring Provider:  Doris Poole NP  [address] [facility]  [place] [address]    /58 (BP Location: Right arm, Patient Position: Sitting, Cuff Size: Adult Regular)     Do you need any medication refills at today's visit? Yes. Nitroglycerin.    Luis Enrique De Souza, EMT  Clinic Support  [facility]    [phone]    Employed by [facility] [employer]

## 2023-09-14 NOTE — PATIENT INSTRUCTIONS
Take your medicines every day, as directed     Changes made today:  Stop Eliquis  Stop aspirin  Start Brilinta - On First Day - Loading dose of 180 mg in AM and 90 mg in PM. Then start 90 mg twice a day       Cardiology Care Coordinators:      Patricia JEAN BAPTISTE RN     Cardiology Rooming staff:  Luis Enrique LÓPEZ      Phone  [phone]      Fax [phone]    To Contact us     During Business Hours:  [phone]     If you are needing refills please contact your pharmacy.     For urgent after hour care please call the [facility] at [phone] or the [facility] at [phone] and ask to speak to the cardiologist on call.            HOW TO CHECK YOUR BLOOD PRESSURE AT HOME:     Avoid eating, smoking, and exercising for at least 30 minutes before taking a reading.     Be sure you have taken your BP medication at least 2-3 hours before you check it.      Sit quietly for 10 minutes before a reading.      Sit in a chair with your feet flat on the floor. Rest your  arm on a table so that the arm cuff is at the same level as your heart.     Remain still during the reading.  Record your blood pressure and pulse in a log and bring to your next appointment.       Use [employer] allows you to communicate directly with your heart team through secure messaging.  [email or web address] can be accessed any time on your phone, computer, or tablet.  If you need assistance, we'd be happy to help!             Keep your Heart Appointments:     Follow up in 1 year with echo, labs and US abdominal aorta prior

## 2023-09-17 DIAGNOSIS — E78.5 HYPERLIPIDEMIA LDL GOAL <100: ICD-10-CM

## 2023-09-18 RX ORDER — ROSUVASTATIN CALCIUM 40 MG/1
40 TABLET, COATED ORAL DAILY
Qty: 90 TABLET | Refills: 0 | Status: SHIPPED | OUTPATIENT
Start: 2023-09-18 | End: 2023-11-22

## 2023-09-19 ENCOUNTER — OFFICE VISIT (OUTPATIENT)
Dept: UROLOGY | Facility: CLINIC | Age: 88
End: 2023-09-19
Attending: FAMILY MEDICINE
Payer: COMMERCIAL

## 2023-09-19 VITALS
BODY MASS INDEX: 25.92 KG/M2 | HEART RATE: 68 BPM | DIASTOLIC BLOOD PRESSURE: 73 MMHG | WEIGHT: 177 LBS | SYSTOLIC BLOOD PRESSURE: 144 MMHG | OXYGEN SATURATION: 95 %

## 2023-09-19 DIAGNOSIS — R31.0 GROSS HEMATURIA: ICD-10-CM

## 2023-09-19 DIAGNOSIS — I10 ESSENTIAL HYPERTENSION WITH GOAL BLOOD PRESSURE LESS THAN 130/80: Primary | ICD-10-CM

## 2023-09-19 PROCEDURE — 99203 OFFICE O/P NEW LOW 30 MIN: CPT | Performed by: UROLOGY

## 2023-09-19 NOTE — PROGRESS NOTES
Urology Note            S:  Tima Mckenzie is a 88 year old male who was seen in a consultation at the request of Dr. Gandhi for hematuria.   Patient has gross hematuria several weeks ago that lasted for several days.  He has no other voiding symptoms in addition to hematuria.  He has no flank pain.  He has no history of kidney stone.  He denies any trauma.  Recent UA showed 25-50 rbc/hpf.  Bleeding has since stopped.  He is a former smoker.  He is s/p TUNA many years ago.  Past Medical History:   Diagnosis Date    Arthritis     BPH (benign prostatic hypertrophy)     Coronary artery disease     Diverticulosis     Hypercholesterolemia     Hypertension     MI (myocardial infarction) (H) 3/1997    Anterior Wall MI/LAD/OM1/SVG    CABG X 3    Neuropathy     RT Foot    Seasonal allergic rhinitis 7/1/2013     Current Outpatient Medications   Medication Sig Dispense Refill    albuterol (PROAIR HFA/PROVENTIL HFA/VENTOLIN HFA) 108 (90 Base) MCG/ACT inhaler Inhale 2 puffs into the lungs every 4 hours as needed for shortness of breath, wheezing or cough 18 g 0    amoxicillin (AMOXIL) 500 MG capsule Take 4 capsules (2000 mg) by mouth 30-60 minutes prior to dental work. 4 capsule 5    isosorbide mononitrate (IMDUR) 60 MG 24 hr tablet Take 1 tablet (60 mg) by mouth every morning 90 tablet 2    metoprolol succinate ER (TOPROL XL) 25 MG 24 hr tablet Take 1 tablet (25 mg) by mouth daily 90 tablet 3    mometasone (NASONEX) 50 MCG/ACT nasal spray Spray 2 sprays into both nostrils as needed (nasal congestion) 17 g 1    multivitamin, therapeutic (THERA-VIT) TABS tablet Take 1 tablet by mouth daily      nitroGLYcerin (NITROSTAT) 0.4 MG sublingual tablet Place 1 tablet (0.4 mg) under the tongue every 5 minutes as needed for chest pain up to 3 tablets per episode. 25 tablet 3    rosuvastatin (CRESTOR) 40 MG tablet TAKE 1 TABLET BY MOUTH EVERY DAY 90 tablet 0    ticagrelor (BRILINTA) 90 MG tablet Take 1 tablet (90 mg) by mouth 2 times  daily 180 tablet 3     Past Surgical History:   Procedure Laterality Date    ANGIOGRAM  4/30/2003    No Disease, Clemons,LAD,SVG,OM1- small LT Main, Occlusion of CX- D1 50-70% Stenosis, RCA 30-80%    COLONOSCOPY      COLONOSCOPY N/A 10/14/2014    Procedure: COMBINED COLONOSCOPY, SINGLE BIOPSY/POLYPECTOMY BY BIOPSY;  Surgeon: Konstantin Coates MD;  Location: [facility] OR    COLONOSCOPY WITH CO2 INSUFFLATION N/A 10/14/2014    Procedure: COLONOSCOPY WITH CO2 INSUFFLATION;  Surgeon: Konstantin Coates MD;  Location: [facility] OR    CORONARY ARTERY BYPASS  1997    X 3    CV CORONARY ANGIOGRAM N/A 1/20/2020    Procedure: CV CORONARY ANGIOGRAM;  Surgeon: Magdiel Hernandez MD;  Location:  HEART CARDIAC CATH LAB    CV CORONARY ANGIOGRAM N/A 6/28/2021    Procedure: CV CORONARY ANGIOGRAM;  Surgeon: Ravi Albright MD;  Location: [facility] CARDIAC CATH LAB    CV FRACTIONAL FLOW RATIO WIRE N/A 1/20/2020    Procedure: Fractional Flow Reserve;  Surgeon: Magdiel Hernandez MD;  Location: [facility] CARDIAC CATH LAB    CV INTRAVASULAR ULTRASOUND N/A 6/28/2021    Procedure: Intravascular Ultrasound;  Surgeon: Ravi Albright MD;  Location: [facility] CARDIAC CATH LAB    CV PCI STENT DRUG ELUTING N/A 1/20/2020    Procedure: Percutaneous Coronary Intervention Stent Drug Eluting;  Surgeon: Magdiel Hernandez MD;  Location: [facility] CARDIAC CATH LAB    CV PCI STENT DRUG ELUTING N/A 6/28/2021    Procedure: Percutaneous Coronary Intervention Stent Drug Eluting;  Surgeon: Ravi Albright MD;  Location: [facility] CARDIAC CATH LAB    CV TRANSCATHETER AORTIC VALVE REPLACEMENT N/A 8/17/2022    Procedure: Transfemoral Transcatheter Aortic Valve Replacement (Khoury);  Surgeon: Magdiel Hernandez MD;  Location:  OR    HERNIA REPAIR  2014    [facility]    OR TRANSCATHETER AORTIC VALVE REPLACEMENT, FEMORAL PERCUTANEOUS APPROACH (STANDBY) N/A 8/17/2022    Procedure: with possible open heart bypass and or  balloon pump placement and any indicated procedure,;  Surgeon: Karl Webster MD;  Location: [facility] OR      Social History     Socioeconomic History    Marital status:      Spouse name: Not on file    Number of children: Not on file    Years of education: Not on file    Highest education level: Not on file   Occupational History    Not on file   Tobacco Use    Smoking status: Former     Passive exposure: Past (Parents smoked while growing up. Wife also smoked.)    Smokeless tobacco: Never    Tobacco comments:     1997   Vaping Use    Vaping Use: Never used   Substance and Sexual Activity    Alcohol use: Yes     Comment: rarely     Drug use: No    Sexual activity: Yes     Partners: Female   Other Topics Concern    Parent/sibling w/ CABG, MI or angioplasty before 65F 55M? Not Asked   Social History Narrative    Not on file     Social Determinants of Health     Financial Resource Strain: Not on file   Food Insecurity: Not on file   Transportation Needs: Not on file   Physical Activity: Not on file   Stress: Not on file   Social Connections: Not on file   Intimate Partner Violence: Not on file   Housing Stability: Not on file     Family History   Problem Relation Age of Onset    Coronary Artery Disease Brother 55        Fatal MI          REVIEW OF SYSTEMS  =================  C: NEGATIVE for fever, chills, change in weight  I: NEGATIVE for worrisome rashes, moles or lesions  E/M: NEGATIVE for ear, mouth and throat problems  R: NEGATIVE for significant cough or SHORTNESS OF BREATH  CV:  NEGATIVE for chest pain, palpitations or peripheral edema  GI: NEGATIVE for nausea, abdominal pain, heartburn, or change in bowel habits  NEURO: NEGATIVE numbness/weakness  : see HPI  PSYCH: NEGATIVE depression/anxiety  LYmph: no new enlarged lymph nodes  Ortho: no new trauma/movements           O: Exam:BP (!) 144/73 (BP Location: Right arm, Patient Position: Chair, Cuff Size: Adult Regular)   Pulse 68   Wt 80.3 kg (177 lb)   SpO2  95%   BMI 25.92 kg/m     GENERAL: Healthy, alert and no distress  EYES: Eyes grossly normal to inspection.  No discharge or erythema, or obvious scleral/conjunctival abnormalities.  RESP: No audible wheeze, cough, or visible cyanosis.  No visible retractions or increased work of breathing.    SKIN: Visible skin clear. No significant rash, abnormal pigmentation or lesions.  NEURO: Cranial nerves grossly intact.  Mentation and speech appropriate for age.  PSYCH: Mentation appears normal, affect normal/bright, judgement and insight intact, normal speech and appearance well-groomed.     Assessment:  Hematuria, gross                           Former smoker    Recommendation: Schedule patient for CT urogram/cysto next.    HTN: Cameron to follow up with Primary Care provider regarding elevated blood pressure.

## 2023-09-19 NOTE — PATIENT INSTRUCTIONS
Please call [facility] @ [phone] to schedule a CT scan.   Please arrange follow up with Dr. Hand for an office visit with a cystoscopy at [phone].  If needed, contact your insurance company to make sure the CT scan is covered under your insurance plan.

## 2023-09-21 ENCOUNTER — ANCILLARY ORDERS (OUTPATIENT)
Dept: UROLOGY | Facility: CLINIC | Age: 88
End: 2023-09-21

## 2023-09-21 ENCOUNTER — ANCILLARY PROCEDURE (OUTPATIENT)
Dept: CT IMAGING | Facility: CLINIC | Age: 88
End: 2023-09-21
Attending: UROLOGY
Payer: COMMERCIAL

## 2023-09-21 DIAGNOSIS — I10 ESSENTIAL HYPERTENSION WITH GOAL BLOOD PRESSURE LESS THAN 130/80: Primary | ICD-10-CM

## 2023-09-21 DIAGNOSIS — R31.0 GROSS HEMATURIA: ICD-10-CM

## 2023-09-21 LAB
CREAT BLD-MCNC: 1.2 MG/DL (ref 0.7–1.3)
EGFRCR SERPLBLD CKD-EPI 2021: 59 ML/MIN/1.73M2

## 2023-09-21 PROCEDURE — 82565 ASSAY OF CREATININE: CPT

## 2023-09-21 PROCEDURE — 74178 CT ABD&PLV WO CNTR FLWD CNTR: CPT | Mod: GC | Performed by: RADIOLOGY

## 2023-09-21 RX ORDER — IOPAMIDOL 755 MG/ML
97 INJECTION, SOLUTION INTRAVASCULAR ONCE
Status: COMPLETED | OUTPATIENT
Start: 2023-09-21 | End: 2023-09-21

## 2023-09-21 RX ADMIN — IOPAMIDOL 97 ML: 755 INJECTION, SOLUTION INTRAVASCULAR at 14:30

## 2023-09-21 NOTE — TELEPHONE ENCOUNTER
Echo order placed per Dr Parker.     DBaalex, RN     Referred To Mid-Level For Closure Text (Leave Blank If You Do Not Want): After obtaining clear surgical margins the patient was sent to a mid-level provider for surgical repair.  The patient understands they will receive post-surgical care and follow-up from the mid-level provider.

## 2023-09-24 ENCOUNTER — HEALTH MAINTENANCE LETTER (OUTPATIENT)
Age: 88
End: 2023-09-24

## 2023-09-25 ENCOUNTER — TELEPHONE (OUTPATIENT)
Dept: UROLOGY | Facility: CLINIC | Age: 88
End: 2023-09-25

## 2023-09-25 ENCOUNTER — OFFICE VISIT (OUTPATIENT)
Dept: UROLOGY | Facility: CLINIC | Age: 88
End: 2023-09-25
Payer: COMMERCIAL

## 2023-09-25 ENCOUNTER — PREP FOR PROCEDURE (OUTPATIENT)
Dept: UROLOGY | Facility: CLINIC | Age: 88
End: 2023-09-25

## 2023-09-25 VITALS
HEART RATE: 73 BPM | BODY MASS INDEX: 25.83 KG/M2 | WEIGHT: 176.4 LBS | OXYGEN SATURATION: 96 % | SYSTOLIC BLOOD PRESSURE: 153 MMHG | DIASTOLIC BLOOD PRESSURE: 82 MMHG

## 2023-09-25 DIAGNOSIS — R93.89 ABNORMAL CT SCAN: Primary | ICD-10-CM

## 2023-09-25 DIAGNOSIS — R93.89 ABNORMAL CT SCAN: ICD-10-CM

## 2023-09-25 DIAGNOSIS — R31.0 GROSS HEMATURIA: Primary | ICD-10-CM

## 2023-09-25 PROCEDURE — 99213 OFFICE O/P EST LOW 20 MIN: CPT | Mod: 25 | Performed by: UROLOGY

## 2023-09-25 PROCEDURE — 52000 CYSTOURETHROSCOPY: CPT | Performed by: UROLOGY

## 2023-09-25 NOTE — PATIENT INSTRUCTIONS
Please call our office if you have questions or need to report any information, [phone].  Surgery Preparation    You will receive a phone call from the [facility] within 1-2 days. If you do not receive a call within 2 days, contact [phone] to schedule the procedure. You can write the location/date/time of surgery in the space provided below for your records.    Location of Surgery:                                          Date of Surgery:                                                 Time of Arrival:                                                   Time of Surgery:                                                   Please arrange an appointment with a primary care provider for a pre-op physical. This is a mandatory appointment that needs to be completed within the two weeks prior to your surgery date. This gives clearance for you to receive anesthesia during your procedure. If you have had a pre-op physical within 30 days of your surgery date, you may not need another one. Check with your provider.    If you are having a Same Day Surgery, you must have a  to and from the procedure. Someone needs to stay with you post-operatively for 12 hours.     Do not eat or drink any solids, milk products, or pulpy juices after midnight the night before your surgery. You may have clear liquids up to 8 hours prior to the surgery. This would include water, soda, coffee (without cream), tea, broth, and jello.    Please stop all over the counter blood thinners such as Aspirin, Advil, Aleve, Ibuprofen, Motrin, and Excedrin one week prior to surgery. Please discontinue prescription blood thinners 5-7 days prior to surgery, per your primary physician's orders.     Please check with your insurance company to confirm that your procedure is covered, and that the facility is in-network.     Call the Urology Department @ [phone] if you have questions about your surgery, or if you need to reschedule  your procedure.

## 2023-09-25 NOTE — PROGRESS NOTES
S: Tima Mckenzie is a 88 year old male returns for hematuria.    Patient is draped and prepped.  Flexible cystoscopy placed under direct vision.      The anterior urethra is normal   The prostatic urethra showed trilobar enlargement.     The length is 1cm,  the coaptation is 1 cm.     In the bladder there is trabeculation grade 2.    Study Result    Narrative & Impression   EXAM: CT UROGRAM WO & W CONTRAST  9/21/2023 2:42 PM      HISTORY:  Gross hematuria        COMPARISON:  CT 11/9/2021.     TECHNIQUE: Helical technique CT abdomen and pelvis with IV contrast  per CT urogram protocol (97 cc IV Isovue 370); axial slices with  sagittal and coronal reconstructions. Total DLP: 1321 mGy*cm.     FINDINGS:  LUNG BASES:  Unchanged lingular atelectasis/scarring since 2021. Similar mild  streaky right lower lobe medial paravertebral atelectasis/reactive  fibrotic changes. Moderate posterior predominance lower lobe  paraseptal and scattered centrilobular emphysematous changes. 3-4 mm  right lower lobe solid pulmonary nodule (series 3 image 72). No  pleural effusion.     Lower mediastinum demonstrates coronary arterial atherosclerotic  calcification.     ABDOMEN:  Liver: No focal liver lesion/mass.     Gallbladder: Normal in appearance.     Pancreas: Mild/moderate diffuse fatty pancreatic atrophy without main  ductal dilatation.     Spleen: Within normal limits. Small anterior spinal.     Adrenal glands: Unchanged moderate lipomatous hypertrophy of the left  adrenal gland and to a lesser degree the right adrenal gland. No focal  nodule/mass.     Kidneys/ureters/bladder: Normal bilateral renal cortical enhancement.  Stable 2.0 x 2.0 cm right superior pole renal cortical cyst. Tiny  subcentimeter left upper pole hypodensity too small to fully  characterize (series 6 image 124). Subcentimeter inferior renal  cortical cyst (series 6 image 198), too small to definitely  characterize. Bilateral extrarenal pelves without  hydronephrosis. 2 mm  calculus in the right interpolar (4/59). The bladder is grossly normal  without wall thickening or adjacent inflammatory change. There is a  filling defect in the left distal ureter(12/354) with possible  enhancement(6/353).     Bowel: Normal calibers of the large and small bowel with a normal  appendix. Moderate diffuse colonic stool burden through to the rectum.  Underlying mild chronic wall thickening involving the sigmoid colon  with significant sigmoid diverticulosis, without evidence of acute  diverticulitis/colitis. The mesentery is grossly unremarkable.     PELVIS:  No significant pelvic free fluid. Normal pelvic structures.     VASCULATURE AND LYMPH NODES:  Stable 4.1 x 3.6 cm distal infrarenal abdominal aortic fusiform  aneurysm, with grossly patent lumen with similar mild circumferential  noncalcified plaque. Additional marked aortoiliac atherosclerotic  calcification.     No enlarged or suspicious intraperitoneal or retroperitoneal lymph  nodes.     BONES AND SOFT TISSUES:  No acute osseous abnormality. Nonspecific likely degenerative air foci  involving the right SI joint (series 6 image 274). Additional  scattered degenerative changes. No aggressive or suspicious  osseous/soft tissue lesion identified.                                                                      IMPRESSION:  1. 11mm elongated partial filling defect in the left distal ureter.  Recommend ureteroscopy. Right bladder diverticulum. Punctate right  renal calculus.  2. Chronic lung base changes without acute process. 3-4 mm right lower  lobe solid pulmonary nodule. If patient is at high risk for lung  cancer, consider annual CT follow-up.  3. Stable 4.1 x 3.6 cm distal infrarenal abdominal aortic fusiform  aneurysm.  4. Sigmoid diverticulosis without evidence of acute diverticulitis.  5. Additional benign and/or chronic findings in the main body of the  report.     I have personally reviewed the examination and  initial interpretation  and I agree with the findings.     ADRIEL GAITAN MD           Assessment/Plan:  (R31.0) Gross hematuria  (primary encounter diagnosis)  Comment:    Plan: CYSTOURETHROSCOPY (75357)          Neg cysto      (R93.89) Abnormal CT scan  Comment: left filling defect distal ureter  Plan: schedule for ureteroscopy/biopsy

## 2023-09-25 NOTE — TELEPHONE ENCOUNTER
Type of surgery: CYSTOURETEROSCOPY with retrograde pyelogram/possible ureteral mass biopsy/possible stent placement   Location of surgery: [facility]  Date and time of surgery: 11/09/2023  Surgeon: AIMEE  Pre-Op Appt Date: pt will schedule   Post-Op Appt Date: urology will schedule    Packet sent out: Yes  Pre-cert/Authorization completed:  No  Date:

## 2023-09-27 ENCOUNTER — MYC MEDICAL ADVICE (OUTPATIENT)
Dept: CARDIOLOGY | Facility: CLINIC | Age: 88
End: 2023-09-27
Payer: COMMERCIAL

## 2023-09-27 DIAGNOSIS — I10 ESSENTIAL HYPERTENSION WITH GOAL BLOOD PRESSURE LESS THAN 130/80: ICD-10-CM

## 2023-09-27 NOTE — TELEPHONE ENCOUNTER
You  Howard Strickland MDJust now (2:43 PM)     CK  Thank you for taking a look at it! I'll bring it up with him next week and let patient know that it would be best for Dr. Parker to review when he is back.     Howard Strickland MD  You13 minutes ago (2:30 PM)     PN  Complicated patient.  Please address with Artis when he returns next week     Samina Munoz MA routed conversation to [facility] Cardiology Adult [place] Nurse1 hour ago (1:37 PM)     You  Howard Strickland MD;  Cardiology2 hours ago (12:20 PM)     CK  Dr. Parker patient. Pt is having ureteroscopy on nov 7. They are recommending he hold his blood thinners 5-7 days prior. Looks like he is on Brilinta. Let me know what you advise. Thanks     [employer] message sent to patient that plan is to discuss with Dr. Parker next week.    Patricia Alvarado, RN, BSN  Cardiology RN Care Coordinator   [place]   Phone: [phone]  Fax: [phone] ([place]) [phone] ([place])

## 2023-09-28 RX ORDER — ISOSORBIDE MONONITRATE 60 MG/1
60 TABLET, EXTENDED RELEASE ORAL EVERY MORNING
Qty: 90 TABLET | Refills: 3 | Status: SHIPPED | OUTPATIENT
Start: 2023-09-28 | End: 2023-11-22

## 2023-09-28 NOTE — TELEPHONE ENCOUNTER
Patient saw [employer] message. Postponing message until next week when Dr. Parker is able to review per recommendation.     Patricia Alvarado, RN, BSN  Cardiology RN Care Coordinator   [place]   Phone: [phone]  Fax: [phone] ([place]) [phone] ([place])

## 2023-09-28 NOTE — TELEPHONE ENCOUNTER
isosorbide mononitrate (IMDUR) 60 MG 24 hr tablet 90 tablet 2 1/4/2023     Last Office Visit: 9/14/23  Future Office visit:   9/26/24      Nitrates Lduxpm4009/27/2023 09:00 AM   Protocol Details Blood pressure under 140/90 in past 12 months        9/14/23 clinic visit--3. Hypertension, controlled:    -continue amlodipine 5 mg daily and Toprol XL at present dose    Refill sent    Marti Roger RN

## 2023-10-03 NOTE — TELEPHONE ENCOUNTER
Date: 10/3/2023    Time of Call: 9:38 AM     Diagnosis:  s/p TAVR for severe aortic stenosis, Coronary artery disease     [ TORB ] Ordering provider: Dr. Parker  Order: Okay to hold Brilinta for 5 days for upcoming Ureteroscopy     Order received by: Patricia Alvarado RN     Follow-up/additional notes: [employer] message sent to patient with information.    Patricia Alvarado RN, BSN  Cardiology RN Care Coordinator   [place]   Phone: [phone]  Fax: [phone] ([place]) [phone] ([place])

## 2023-11-08 ENCOUNTER — ANCILLARY ORDERS (OUTPATIENT)
Dept: UROLOGY | Facility: CLINIC | Age: 88
End: 2023-11-08

## 2023-11-08 ENCOUNTER — ANESTHESIA EVENT (OUTPATIENT)
Dept: SURGERY | Facility: AMBULATORY SURGERY CENTER | Age: 88
End: 2023-11-08
Payer: COMMERCIAL

## 2023-11-08 DIAGNOSIS — R52 PAIN: Primary | ICD-10-CM

## 2023-11-08 NOTE — ANESTHESIA PREPROCEDURE EVALUATION
Anesthesia Pre-Procedure Evaluation    Patient: Tima Mckenzie   MRN: [id number] : 1935        Procedure : Procedure(s):  CYSTOURETEROSCOPY with retrograde pyelogram/possible ureteral mass biopsy/possible stent placement          Past Medical History:   Diagnosis Date    Arthritis     BPH (benign prostatic hypertrophy)     Coronary artery disease     Diverticulosis     Hypercholesterolemia     Hypertension     MI (myocardial infarction) (H) 3/1997    Anterior Wall MI/LAD/OM1/SVG    CABG X 3    Neuropathy     RT Foot    Seasonal allergic rhinitis 2013      Past Surgical History:   Procedure Laterality Date    ANGIOGRAM  2003    No Disease, [place],LAD,SVG,OM1- small LT Main, Occlusion of CX- D1 50-70% Stenosis, RCA 30-80%    COLONOSCOPY      COLONOSCOPY N/A 10/14/2014    Procedure: COMBINED COLONOSCOPY, SINGLE BIOPSY/POLYPECTOMY BY BIOPSY;  Surgeon: Konstantin Coates MD;  Location: [facility] OR    COLONOSCOPY WITH CO2 INSUFFLATION N/A 10/14/2014    Procedure: COLONOSCOPY WITH CO2 INSUFFLATION;  Surgeon: Konstantin Coates MD;  Location: [facility] OR    CORONARY ARTERY BYPASS  1997    X 3    CV CORONARY ANGIOGRAM N/A 2020    Procedure: CV CORONARY ANGIOGRAM;  Surgeon: Magdiel Hernandez MD;  Location:  [facility]    CV CORONARY ANGIOGRAM N/A 2021    Procedure: CV CORONARY ANGIOGRAM;  Surgeon: Ravi Albright MD;  Location: [facility] CARDIAC CATH LAB    CV FRACTIONAL FLOW RATIO WIRE N/A 2020    Procedure: Fractional Flow Reserve;  Surgeon: Magdiel Hernandez MD;  Location: [facility] CARDIAC CATH LAB    CV INTRAVASULAR ULTRASOUND N/A 2021    Procedure: Intravascular Ultrasound;  Surgeon: Ravi Albright MD;  Location: [facility] CARDIAC CATH LAB    CV PCI STENT DRUG ELUTING N/A 2020    Procedure: Percutaneous Coronary Intervention Stent Drug Eluting;  Surgeon: Magdiel Hernandez MD;  Location: [facility] CARDIAC CATH LAB    CV PCI STENT DRUG  ELUTING N/A 6/28/2021    Procedure: Percutaneous Coronary Intervention Stent Drug Eluting;  Surgeon: Ravi Albright MD;  Location:  [facility]    CV TRANSCATHETER AORTIC VALVE REPLACEMENT N/A 8/17/2022    Procedure: Transfemoral Transcatheter Aortic Valve Replacement ([place]);  Surgeon: Magdiel Hernandez MD;  Location:  OR    HERNIA REPAIR  2014    [facility]    OR TRANSCATHETER AORTIC VALVE REPLACEMENT, FEMORAL PERCUTANEOUS APPROACH (STANDBY) N/A 8/17/2022    Procedure: with possible open heart bypass and or balloon pump placement and any indicated procedure,;  Surgeon: Karl Webster MD;  Location:  OR      Allergies   Allergen Reactions    No Known Drug Allergy     Seasonal Allergies       Social History     Tobacco Use    Smoking status: Former     Passive exposure: Past (Parents smoked while growing up. Wife also smoked.)    Smokeless tobacco: Never    Tobacco comments:     1997   Substance Use Topics    Alcohol use: Yes     Comment: rarely       Wt Readings from Last 1 Encounters:   09/25/23 80 kg (176 lb 6.4 oz)        Anesthesia Evaluation            ROS/MED HX  ENT/Pulmonary:     (+)           allergic rhinitis,                            Neurologic:       Cardiovascular:     (+)  hypertension- Peripheral Vascular Disease-  CAD - past MI CABG- stent-   Taking blood thinners                        valvular problems/murmurs (s/p TAVR) type: AS       (-) angina, angina and murmur   METS/Exercise Tolerance: >4 METS    Hematologic:       Musculoskeletal:       GI/Hepatic:       Renal/Genitourinary:     (+)       Nephrolithiasis ,       Endo:       Psychiatric/Substance Use:       Infectious Disease:       Malignancy:       Other:            Physical Exam    Airway        Mallampati: I   TM distance: > 3 FB   Neck ROM: full   Mouth opening: > 3 cm    Respiratory Devices and Support         Dental     Comment: Teeth examined without any significant abnormality, patient  "denies loose, missing or chipped teeth or anything removable.         Cardiovascular          Rhythm and rate: regular and normal (-) no murmur    Pulmonary   pulmonary exam normal        breath sounds clear to auscultation           OUTSIDE LABS:  CBC:   Lab Results   Component Value Date    WBC 7.7 06/28/2023    WBC 6.4 09/29/2022    HGB 15.4 06/28/2023    HGB 14.3 09/29/2022    HCT 45.0 06/28/2023    HCT 43.2 09/29/2022     (L) 06/28/2023     (L) 09/29/2022     BMP:   Lab Results   Component Value Date     06/28/2023     09/29/2022    POTASSIUM 4.5 06/28/2023    POTASSIUM 4.1 09/29/2022    CHLORIDE 105 06/28/2023    CHLORIDE 102 09/29/2022    CO2 27 06/28/2023    CO2 26 09/29/2022    BUN 20 06/28/2023    BUN 17.5 09/29/2022    CR 1.2 09/21/2023    CR 1.30 (H) 06/28/2023     (H) 06/28/2023    GLC 83 09/29/2022     COAGS:   Lab Results   Component Value Date    INR 1.07 08/15/2022     POC: No results found for: \"BGM\", \"HCG\", \"HCGS\"  HEPATIC:   Lab Results   Component Value Date    ALBUMIN 3.8 06/28/2023    PROTTOTAL 7.2 06/28/2023    ALT 16 06/28/2023    AST 23 06/28/2023    ALKPHOS 59 06/28/2023    BILITOTAL 0.6 06/28/2023     OTHER:   Lab Results   Component Value Date    CLAUDIA 9.5 06/28/2023    MAG 1.8 08/18/2022    LIPASE 70 (L) 11/08/2021    TSH 3.39 10/08/2010    CRP 17.6 (H) 11/08/2021       Anesthesia Plan    ASA Status:  3    NPO Status:  NPO Appropriate    Anesthesia Type: General.     - Airway: LMA   Induction: Intravenous, Propofol.   Maintenance: Balanced.        Consents    Anesthesia Plan(s) and associated risks, benefits, and realistic alternatives discussed. Questions answered and patient/representative(s) expressed understanding.     - Discussed:     - Discussed with:  Patient      - Extended Intubation/Ventilatory Support Discussed: No.      - Patient is DNR/DNI Status: No     Use of blood products discussed: No .     Postoperative Care    Pain management: IV " analgesics, Oral pain medications, Multi-modal analgesia.   PONV prophylaxis: Ondansetron (or other 5HT-3), Dexamethasone or Solumedrol, Background Propofol Infusion     Comments:    Other Comments: Discussed plan for general anesthetic with LMA. Discussed risks of sore throat, post op pain/nausea, oropharyngeal damage, rare major complications.  Discussed increased risk of cardiac events given past history but given improved/stable symptoms since TAVR, no acute concerns.        H&P reviewed: Unable to attach H&P to encounter due to EHR limitations. H&P Update: appropriate H&P reviewed, patient examined. No interval changes since H&P (within 30 days).         Raymon Caurso MD

## 2023-11-09 ENCOUNTER — ANESTHESIA (OUTPATIENT)
Dept: SURGERY | Facility: AMBULATORY SURGERY CENTER | Age: 88
End: 2023-11-09
Payer: COMMERCIAL

## 2023-11-09 ENCOUNTER — HOSPITAL ENCOUNTER (OUTPATIENT)
Facility: AMBULATORY SURGERY CENTER | Age: 88
Discharge: HOME | End: 2023-11-09
Attending: UROLOGY | Admitting: UROLOGY
Payer: COMMERCIAL

## 2023-11-09 ENCOUNTER — ANCILLARY PROCEDURE (OUTPATIENT)
Dept: GENERAL RADIOLOGY | Facility: CLINIC | Age: 88
End: 2023-11-09
Attending: UROLOGY
Payer: COMMERCIAL

## 2023-11-09 VITALS
HEART RATE: 70 BPM | SYSTOLIC BLOOD PRESSURE: 152 MMHG | RESPIRATION RATE: 16 BRPM | WEIGHT: 180 LBS | OXYGEN SATURATION: 100 % | TEMPERATURE: 98.8 F | BODY MASS INDEX: 26.36 KG/M2 | DIASTOLIC BLOOD PRESSURE: 79 MMHG

## 2023-11-09 DIAGNOSIS — R52 PAIN: ICD-10-CM

## 2023-11-09 DIAGNOSIS — D49.59 URETERAL TUMOR: Primary | ICD-10-CM

## 2023-11-09 PROCEDURE — 52354 CYSTOURETERO W/BIOPSY: CPT | Mod: LT | Performed by: UROLOGY

## 2023-11-09 PROCEDURE — 88305 TISSUE EXAM BY PATHOLOGIST: CPT | Performed by: PATHOLOGY

## 2023-11-09 PROCEDURE — G8916 PT W IV AB GIVEN ON TIME: HCPCS

## 2023-11-09 PROCEDURE — 52332 CYSTOSCOPY AND TREATMENT: CPT | Mod: LT

## 2023-11-09 PROCEDURE — G8907 PT DOC NO EVENTS ON DISCHARG: HCPCS

## 2023-11-09 PROCEDURE — G8918 PT W/O PREOP ORDER IV AB PRO: HCPCS

## 2023-11-09 PROCEDURE — 52354 CYSTOURETERO W/BIOPSY: CPT

## 2023-11-09 PROCEDURE — 52332 CYSTOSCOPY AND TREATMENT: CPT | Mod: LT | Performed by: UROLOGY

## 2023-11-09 DEVICE — URETERAL STENT
Type: IMPLANTABLE DEVICE | Site: URETHRA | Status: NON-FUNCTIONAL
Brand: PERCUFLEX™ PLUS
Removed: 2023-12-07

## 2023-11-09 RX ORDER — ONDANSETRON 4 MG/1
4 TABLET, ORALLY DISINTEGRATING ORAL EVERY 30 MIN PRN
Status: DISCONTINUED | OUTPATIENT
Start: 2023-11-09 | End: 2023-11-10 | Stop reason: HOSPADM

## 2023-11-09 RX ORDER — CEFAZOLIN SODIUM 2 G/50ML
2 SOLUTION INTRAVENOUS
Status: COMPLETED | OUTPATIENT
Start: 2023-11-09 | End: 2023-11-09

## 2023-11-09 RX ORDER — METOPROLOL TARTRATE 1 MG/ML
1-2 INJECTION, SOLUTION INTRAVENOUS EVERY 5 MIN PRN
Status: DISCONTINUED | OUTPATIENT
Start: 2023-11-09 | End: 2023-11-10 | Stop reason: HOSPADM

## 2023-11-09 RX ORDER — HYDRALAZINE HYDROCHLORIDE 20 MG/ML
2.5-5 INJECTION INTRAMUSCULAR; INTRAVENOUS EVERY 10 MIN PRN
Status: DISCONTINUED | OUTPATIENT
Start: 2023-11-09 | End: 2023-11-10 | Stop reason: HOSPADM

## 2023-11-09 RX ORDER — FENTANYL CITRATE 50 UG/ML
25 INJECTION, SOLUTION INTRAMUSCULAR; INTRAVENOUS
Status: DISCONTINUED | OUTPATIENT
Start: 2023-11-09 | End: 2023-11-10 | Stop reason: HOSPADM

## 2023-11-09 RX ORDER — PROPOFOL 10 MG/ML
INJECTION, EMULSION INTRAVENOUS PRN
Status: DISCONTINUED | OUTPATIENT
Start: 2023-11-09 | End: 2023-11-09

## 2023-11-09 RX ORDER — LIDOCAINE HYDROCHLORIDE 20 MG/ML
INJECTION, SOLUTION INFILTRATION; PERINEURAL PRN
Status: DISCONTINUED | OUTPATIENT
Start: 2023-11-09 | End: 2023-11-09

## 2023-11-09 RX ORDER — ONDANSETRON 2 MG/ML
4 INJECTION INTRAMUSCULAR; INTRAVENOUS EVERY 30 MIN PRN
Status: DISCONTINUED | OUTPATIENT
Start: 2023-11-09 | End: 2023-11-10 | Stop reason: HOSPADM

## 2023-11-09 RX ORDER — FENTANYL CITRATE 50 UG/ML
25 INJECTION, SOLUTION INTRAMUSCULAR; INTRAVENOUS EVERY 5 MIN PRN
Status: DISCONTINUED | OUTPATIENT
Start: 2023-11-09 | End: 2023-11-10 | Stop reason: HOSPADM

## 2023-11-09 RX ORDER — FENTANYL CITRATE 50 UG/ML
INJECTION, SOLUTION INTRAMUSCULAR; INTRAVENOUS PRN
Status: DISCONTINUED | OUTPATIENT
Start: 2023-11-09 | End: 2023-11-09

## 2023-11-09 RX ORDER — CEPHALEXIN 500 MG/1
500 CAPSULE ORAL 3 TIMES DAILY
Qty: 9 CAPSULE | Refills: 0 | Status: SHIPPED | OUTPATIENT
Start: 2023-11-09 | End: 2023-11-12

## 2023-11-09 RX ORDER — SODIUM CHLORIDE, SODIUM LACTATE, POTASSIUM CHLORIDE, CALCIUM CHLORIDE 600; 310; 30; 20 MG/100ML; MG/100ML; MG/100ML; MG/100ML
INJECTION, SOLUTION INTRAVENOUS CONTINUOUS
Status: DISCONTINUED | OUTPATIENT
Start: 2023-11-09 | End: 2023-11-10 | Stop reason: HOSPADM

## 2023-11-09 RX ORDER — PROPOFOL 10 MG/ML
INJECTION, EMULSION INTRAVENOUS CONTINUOUS PRN
Status: DISCONTINUED | OUTPATIENT
Start: 2023-11-09 | End: 2023-11-09

## 2023-11-09 RX ORDER — HYDROCODONE BITARTRATE AND ACETAMINOPHEN 5; 325 MG/1; MG/1
1-2 TABLET ORAL EVERY 4 HOURS PRN
Qty: 10 TABLET | Refills: 0 | Status: SHIPPED | OUTPATIENT
Start: 2023-11-09

## 2023-11-09 RX ORDER — FENTANYL CITRATE 50 UG/ML
50 INJECTION, SOLUTION INTRAMUSCULAR; INTRAVENOUS EVERY 5 MIN PRN
Status: DISCONTINUED | OUTPATIENT
Start: 2023-11-09 | End: 2023-11-10 | Stop reason: HOSPADM

## 2023-11-09 RX ORDER — OXYCODONE HYDROCHLORIDE 5 MG/1
5 TABLET ORAL EVERY 4 HOURS PRN
Status: DISCONTINUED | OUTPATIENT
Start: 2023-11-09 | End: 2023-11-10 | Stop reason: HOSPADM

## 2023-11-09 RX ORDER — ACETAMINOPHEN 325 MG/1
975 TABLET ORAL ONCE
Status: COMPLETED | OUTPATIENT
Start: 2023-11-09 | End: 2023-11-09

## 2023-11-09 RX ORDER — CEFAZOLIN SODIUM 2 G/50ML
2 SOLUTION INTRAVENOUS SEE ADMIN INSTRUCTIONS
Status: DISCONTINUED | OUTPATIENT
Start: 2023-11-09 | End: 2023-11-10 | Stop reason: HOSPADM

## 2023-11-09 RX ORDER — LIDOCAINE 40 MG/G
CREAM TOPICAL
Status: DISCONTINUED | OUTPATIENT
Start: 2023-11-09 | End: 2023-11-10 | Stop reason: HOSPADM

## 2023-11-09 RX ORDER — OXYCODONE HYDROCHLORIDE 5 MG/1
10 TABLET ORAL EVERY 4 HOURS PRN
Status: DISCONTINUED | OUTPATIENT
Start: 2023-11-09 | End: 2023-11-10 | Stop reason: HOSPADM

## 2023-11-09 RX ADMIN — PROPOFOL 150 MG: 10 INJECTION, EMULSION INTRAVENOUS at 12:28

## 2023-11-09 RX ADMIN — OXYCODONE HYDROCHLORIDE 5 MG: 5 TABLET ORAL at 13:53

## 2023-11-09 RX ADMIN — FENTANYL CITRATE 50 MCG: 50 INJECTION, SOLUTION INTRAMUSCULAR; INTRAVENOUS at 12:28

## 2023-11-09 RX ADMIN — CEFAZOLIN SODIUM 2 G: 2 SOLUTION INTRAVENOUS at 12:20

## 2023-11-09 RX ADMIN — PROPOFOL 50 MCG/KG/MIN: 10 INJECTION, EMULSION INTRAVENOUS at 12:28

## 2023-11-09 RX ADMIN — SODIUM CHLORIDE, SODIUM LACTATE, POTASSIUM CHLORIDE, CALCIUM CHLORIDE: 600; 310; 30; 20 INJECTION, SOLUTION INTRAVENOUS at 12:23

## 2023-11-09 RX ADMIN — ACETAMINOPHEN 975 MG: 325 TABLET ORAL at 12:07

## 2023-11-09 RX ADMIN — LIDOCAINE HYDROCHLORIDE 60 MG: 20 INJECTION, SOLUTION INFILTRATION; PERINEURAL at 12:28

## 2023-11-09 NOTE — ANESTHESIA PROCEDURE NOTES
Airway       Patient location during procedure: OR  Staff -        Performed By: CRNAIndications and Patient Condition       Indications for airway management: santos-procedural       Induction type:intravenous       Mask difficulty assessment: 1 - vent by mask    Final Airway Details       Final airway type: supraglottic airway    Supraglottic Airway Details        Type: LMA       Brand: I-Gel       LMA size: 5    Post intubation assessment        Placement verified by: capnometry, equal breath sounds and chest rise        Number of attempts at approach: 1       Number of other approaches attempted: 0       Secured with: cloth tape       Ease of procedure: easy       Dentition: Intact

## 2023-11-09 NOTE — ANESTHESIA CARE TRANSFER NOTE
Patient: Tima Mckenzie    Procedure: Procedure(s):  CYSTOURETEROSCOPY with retrograde pyelogram/possible ureteral mass biopsy/possible stent placement       Diagnosis: Abnormal CT scan [R93.89]  Diagnosis Additional Information: No value filed.    Anesthesia Type:   General     Note:    Oropharynx: oropharynx clear of all foreign objects  Level of Consciousness: awake  Oxygen Supplementation: nasal cannula    Independent Airway: airway patency satisfactory and stable  Dentition: dentition unchanged  Vital Signs Stable: post-procedure vital signs reviewed and stable  Report to RN Given: handoff report given  Patient transferred to: PACU    Handoff Report: Identifed the Patient, Identified the Reponsible Provider, Reviewed the pertinent medical history, Discussed the surgical course, Reviewed Intra-OP anesthesia mangement and issues during anesthesia, Set expectations for post-procedure period and Allowed opportunity for questions and acknowledgement of understanding    Vitals:  Vitals Value Taken Time   /81 11/09/23 1312   Temp 98.1  F (36.7  C) 11/09/23 1312   Pulse     Resp 16 11/09/23 1312   SpO2 100 % 11/09/23 1312       Electronically Signed By: MARIA DEL CARMEN Schroeder CRNA  November 9, 2023  1:17 PM

## 2023-11-09 NOTE — BRIEF OP NOTE
Ann[facility]  Brief Operative Note    Pre-operative diagnosis: Left distal ureteral tumor   Post-operative diagnosis same   Procedure: Procedure(s):  CYSTOURETEROSCOPY with retrograde pyelogram/possible ureteral mass biopsy/possible stent placement   Surgeon: Evan Hand MD   Assistants(s): None   Anesthesia: General    Estimated blood loss: minimal                   Specimens: tumor   Implants: stent       Complications: None   Condition on discharge: Stable   Findings: See dictated operative report for full details

## 2023-11-09 NOTE — ANESTHESIA POSTPROCEDURE EVALUATION
Patient: Tima Mckenzie    Procedure: Procedure(s):  CYSTOURETEROSCOPY with retrograde pyelogram/possible ureteral mass biopsy/possible stent placement       Anesthesia Type:  General    Note:  Disposition: Outpatient   Postop Pain Control: Uneventful            Sign Out: Well controlled pain   PONV: No   Neuro/Psych: Uneventful            Sign Out: Acceptable/Baseline neuro status   Airway/Respiratory: Uneventful            Sign Out: Acceptable/Baseline resp. status   CV/Hemodynamics: Uneventful            Sign Out: Acceptable CV status; No obvious hypovolemia; No obvious fluid overload   Other NRE:    DID A NON-ROUTINE EVENT OCCUR? No           Last vitals:  Vitals Value Taken Time   /81 11/09/23 1331   Temp 98.1  F (36.7  C) 11/09/23 1312   Pulse     Resp 16 11/09/23 1331   SpO2 100 % 11/09/23 1312       Electronically Signed By: Raymon Caruso MD  November 9, 2023  2:26 PM

## 2023-11-09 NOTE — OP NOTE
Preop diagnosis: Left distal ureteral tumor    Postop diagnosis: Same    Procedure done:  #1 cystoscopy and left retrograde pyelogram and interpretation  #2 left ureteroscopy with tumor biopsy  #3 cystoscopy and left stent placement    Procedure    Patient brought into the OR suite and placed in a dorsolithotomy position after general anesthesia has been induced.  He was draped and prepped sterilely.  He received preop antibiotics.  A cystoscope was then placed into the bladder under direct vision.  The left ureteral orifice identified.  An 8 Swazi cone-tip cath was then placed.  Retrograde pyelogram was performed using 50% contrast solution.  A filling defect was found in the distal ureter.  I then placed a sensor wire into the renal pelvis.  This was followed by placement of a semirigid ureteroscope.  In the distal ureter there was 2 tumors identified.  Fortunately the tumors were attaching to the ureter with a small stalk only.  Using a grasper I was able to remove the tumors en bloc.  Afterwards I placed a 7 Swazi by 28 cm ureteral stent.  Patient tolerated procedure well.  No complications identified during the procedure.  There was minimal bleeding during operation.

## 2023-11-13 LAB
PATH REPORT.COMMENTS IMP SPEC: ABNORMAL
PATH REPORT.COMMENTS IMP SPEC: ABNORMAL
PATH REPORT.COMMENTS IMP SPEC: YES
PATH REPORT.FINAL DX SPEC: ABNORMAL
PATH REPORT.GROSS SPEC: ABNORMAL
PATH REPORT.MICROSCOPIC SPEC OTHER STN: ABNORMAL
PATH REPORT.RELEVANT HX SPEC: ABNORMAL
PHOTO IMAGE: ABNORMAL

## 2023-11-14 ENCOUNTER — VIRTUAL VISIT (OUTPATIENT)
Dept: UROLOGY | Facility: CLINIC | Age: 88
End: 2023-11-14
Payer: COMMERCIAL

## 2023-11-14 ENCOUNTER — PREP FOR PROCEDURE (OUTPATIENT)
Dept: UROLOGY | Facility: CLINIC | Age: 88
End: 2023-11-14

## 2023-11-14 DIAGNOSIS — D49.59 URETERAL TUMOR: Primary | ICD-10-CM

## 2023-11-14 DIAGNOSIS — C66.2 MALIGNANT TUMOR OF LEFT URETER (H): Primary | ICD-10-CM

## 2023-11-14 PROCEDURE — 99213 OFFICE O/P EST LOW 20 MIN: CPT | Mod: 95 | Performed by: UROLOGY

## 2023-11-14 NOTE — PROGRESS NOTES
"Cameron is a 88 year old who is being evaluated via a billable telephone visit.      What phone number would you like to be contacted at?    How would you like to obtain your AVS? MyChart    Distant Location (provider location):  On-site    Assessment & Plan   Problem List Items Addressed This Visit    None  Visit Diagnoses       Malignant tumor of left ureter (H)    -  Primary             Review of the result(s) of each unique test - path report  20 minutes spent by me on the date of the encounter doing chart review, history and exam, documentation and further activities per the note       BMI:   Estimated body mass index is 26.36 kg/m  as calculated from the following:    Height as of 3/20/23: 1.76 m (5' 9.29\").    Weight as of 11/9/23: 81.6 kg (180 lb).           No follow-ups on file.    Evan Hand MD  [facility] [place]    Subjective   Cameron is a 88 year old, presenting for the following health issues:  Video Visit    HPI     Follow up post ureteroscopy and ureteral tumor biopsy.  He is without any complaints except for stent discomfort.        Review of Systems   Constitutional, HEENT, cardiovascular, pulmonary, gi and gu systems are negative, except as otherwise noted.      Objective           Vitals:  No vitals were obtained today due to virtual visit.    Physical Exam   healthy, alert, and no distress  PSYCH: Alert and oriented times 3; coherent speech, normal   rate and volume, able to articulate logical thoughts, able   to abstract reason, no tangential thoughts, no hallucinations   or delusions  His affect is normal  RESP: No cough, no audible wheezing, able to talk in full sentences  Remainder of exam unable to be completed due to telephone visits    Case Report   Surgical Pathology Report                         Case: [id number]                                   Authorizing Provider:  Evan Hand MD     Collected:           11/09/2023 12:55 PM           Ordering Location:     " [facility]    Received:            11/09/2023 02:47 PM                                  [place]                                                                     Pathologist:           Damion Fisher MD                                                           Specimen:    Ureter, Left, left ureteral mass biopsy                                                    Final Diagnosis   Left ureteral mass, biopsy:  - Non invasive low-grade papillary urothelial carcinoma           Ureteral tumor found low grade TCC.   Findings discussed with patient and wife.  Discussed partial ureterectomy vs ureteroscopy and treatment.  Schedule for repeat ureteroscopy/stent exchange/tumor treatment next.        Phone call duration: 10 minutes

## 2023-11-15 ENCOUNTER — TELEPHONE (OUTPATIENT)
Dept: UROLOGY | Facility: CLINIC | Age: 88
End: 2023-11-15
Payer: COMMERCIAL

## 2023-11-15 NOTE — TELEPHONE ENCOUNTER
Type of surgery: CYSTOURETEROSCOPY, WITH TUMOR TREATMENT USING LASER AND URETERAL STENT EXCHANGE (Left)   Location of surgery: [facility]  Date and time of surgery: 12/7  Surgeon: Misty   Pre-Op Appt Date: 11/22  Post-Op Appt Date: misty will let patient know    Packet sent out: Yes  Pre-cert/Authorization completed:  Not Applicable  Date:

## 2023-11-22 ENCOUNTER — OFFICE VISIT (OUTPATIENT)
Dept: FAMILY MEDICINE | Facility: CLINIC | Age: 88
End: 2023-11-22
Payer: COMMERCIAL

## 2023-11-22 VITALS
HEIGHT: 70 IN | HEART RATE: 77 BPM | BODY MASS INDEX: 24.82 KG/M2 | WEIGHT: 173.4 LBS | SYSTOLIC BLOOD PRESSURE: 128 MMHG | TEMPERATURE: 97.4 F | RESPIRATION RATE: 23 BRPM | DIASTOLIC BLOOD PRESSURE: 72 MMHG | OXYGEN SATURATION: 98 %

## 2023-11-22 DIAGNOSIS — N40.1 BENIGN PROSTATIC HYPERPLASIA WITH URINARY FREQUENCY: ICD-10-CM

## 2023-11-22 DIAGNOSIS — I71.43 INFRARENAL ABDOMINAL AORTIC ANEURYSM (AAA) WITHOUT RUPTURE (H): ICD-10-CM

## 2023-11-22 DIAGNOSIS — R05.9 COUGH, UNSPECIFIED TYPE: ICD-10-CM

## 2023-11-22 DIAGNOSIS — E78.5 HYPERLIPIDEMIA LDL GOAL <70: ICD-10-CM

## 2023-11-22 DIAGNOSIS — C66.2 MALIGNANT TUMOR OF LEFT URETER (H): ICD-10-CM

## 2023-11-22 DIAGNOSIS — Z95.3 S/P TAVR (TRANSCATHETER AORTIC VALVE REPLACEMENT), BIOPROSTHETIC: ICD-10-CM

## 2023-11-22 DIAGNOSIS — D69.6 THROMBOCYTOPENIA (H): ICD-10-CM

## 2023-11-22 DIAGNOSIS — I10 ESSENTIAL HYPERTENSION WITH GOAL BLOOD PRESSURE LESS THAN 130/80: ICD-10-CM

## 2023-11-22 DIAGNOSIS — Z01.818 PREOP GENERAL PHYSICAL EXAM: Primary | ICD-10-CM

## 2023-11-22 DIAGNOSIS — R35.0 BENIGN PROSTATIC HYPERPLASIA WITH URINARY FREQUENCY: ICD-10-CM

## 2023-11-22 DIAGNOSIS — I25.10 CORONARY ARTERY DISEASE INVOLVING NATIVE CORONARY ARTERY OF NATIVE HEART WITHOUT ANGINA PECTORIS: ICD-10-CM

## 2023-11-22 LAB
ANION GAP SERPL CALCULATED.3IONS-SCNC: 10 MMOL/L (ref 7–15)
BUN SERPL-MCNC: 17.5 MG/DL (ref 8–23)
CALCIUM SERPL-MCNC: 9.7 MG/DL (ref 8.8–10.2)
CHLORIDE SERPL-SCNC: 104 MMOL/L (ref 98–107)
CREAT SERPL-MCNC: 1.04 MG/DL (ref 0.67–1.17)
DEPRECATED HCO3 PLAS-SCNC: 26 MMOL/L (ref 22–29)
EGFRCR SERPLBLD CKD-EPI 2021: 70 ML/MIN/1.73M2
ERYTHROCYTE [DISTWIDTH] IN BLOOD BY AUTOMATED COUNT: 12.8 % (ref 10–15)
GLUCOSE SERPL-MCNC: 112 MG/DL (ref 70–99)
HCT VFR BLD AUTO: 46.1 % (ref 40–53)
HGB BLD-MCNC: 15.2 G/DL (ref 13.3–17.7)
MCH RBC QN AUTO: 31.2 PG (ref 26.5–33)
MCHC RBC AUTO-ENTMCNC: 33 G/DL (ref 31.5–36.5)
MCV RBC AUTO: 95 FL (ref 78–100)
PLATELET # BLD AUTO: 212 10E3/UL (ref 150–450)
POTASSIUM SERPL-SCNC: 4.6 MMOL/L (ref 3.4–5.3)
RBC # BLD AUTO: 4.87 10E6/UL (ref 4.4–5.9)
SODIUM SERPL-SCNC: 140 MMOL/L (ref 135–145)
WBC # BLD AUTO: 8.2 10E3/UL (ref 4–11)

## 2023-11-22 PROCEDURE — 85027 COMPLETE CBC AUTOMATED: CPT | Performed by: FAMILY MEDICINE

## 2023-11-22 PROCEDURE — 99215 OFFICE O/P EST HI 40 MIN: CPT | Performed by: FAMILY MEDICINE

## 2023-11-22 PROCEDURE — 80048 BASIC METABOLIC PNL TOTAL CA: CPT | Performed by: FAMILY MEDICINE

## 2023-11-22 PROCEDURE — 36415 COLL VENOUS BLD VENIPUNCTURE: CPT | Performed by: FAMILY MEDICINE

## 2023-11-22 RX ORDER — METOPROLOL SUCCINATE 25 MG/1
25 TABLET, EXTENDED RELEASE ORAL DAILY
Qty: 90 TABLET | Refills: 0 | Status: SHIPPED | OUTPATIENT
Start: 2023-11-22 | End: 2023-11-28

## 2023-11-22 RX ORDER — NITROGLYCERIN 0.4 MG/1
0.4 TABLET SUBLINGUAL EVERY 5 MIN PRN
Qty: 25 TABLET | Refills: 0 | Status: SHIPPED | OUTPATIENT
Start: 2023-11-22

## 2023-11-22 RX ORDER — ISOSORBIDE MONONITRATE 60 MG/1
60 TABLET, EXTENDED RELEASE ORAL EVERY MORNING
Qty: 90 TABLET | Refills: 0 | Status: SHIPPED | OUTPATIENT
Start: 2023-11-22 | End: 2023-11-28

## 2023-11-22 RX ORDER — ROSUVASTATIN CALCIUM 40 MG/1
40 TABLET, COATED ORAL DAILY
Qty: 90 TABLET | Refills: 0 | Status: SHIPPED | OUTPATIENT
Start: 2023-11-22 | End: 2023-11-28

## 2023-11-22 RX ORDER — AMOXICILLIN 500 MG/1
CAPSULE ORAL
Qty: 4 CAPSULE | Refills: 5 | Status: SHIPPED | OUTPATIENT
Start: 2023-11-22

## 2023-11-22 RX ORDER — ALBUTEROL SULFATE 90 UG/1
2 AEROSOL, METERED RESPIRATORY (INHALATION) EVERY 4 HOURS PRN
Qty: 18 G | Refills: 0 | Status: SHIPPED | OUTPATIENT
Start: 2023-11-22

## 2023-11-22 ASSESSMENT — PAIN SCALES - GENERAL: PAINLEVEL: NO PAIN (0)

## 2023-11-22 NOTE — H&P (VIEW-ONLY)
[address] [address]  Phone: [phone]  Primary Provider: Ben Gandhi  Pre-op Performing Provider: BEN GANDHI    PREOPERATIVE EVALUATION:  Today's date: 11/22/2023    Cameron is a 88 year old, presenting for the following:  Pre-Op Exam        11/22/2023    10:13 AM   Additional Questions   Roomed by jing       Surgical Information:  Surgery/Procedure: CYSTOURETEROSCOPY, WITH TUMOR TREATMENT USING LASER AND URETERAL STENT EXCHANGE   Surgery Location: [facility] surgery center  Surgeon: Evan Hand MD   Surgery Date: 12/07/2023  Time of Surgery: 12:40  Where patient plans to recover: At home with family  Fax number for surgical facility: Note does not need to be faxed, will be available electronically in Epic.    Assessment & Plan     The proposed surgical procedure is considered INTERMEDIATE risk.    Preop general physical exam    Malignant tumor of left ureter (H)    S/p TAVR (transcatheter aortic valve replacement), bioprosthetic  Currently on Eliquis for anticoagulation.  Okay to hold anticoagulation 3 to 5 days ahead of time without bridging.    Coronary artery disease involving native coronary artery of native heart without angina pectoris    Infrarenal abdominal aortic aneurysm (AAA) without rupture (H24)    Essential hypertension with goal blood pressure less than 130/80    Hyperlipidemia LDL goal <70    Thrombocytopenia (H24)    Benign prostatic hyperplasia with urinary frequency       Implanted Device:         - No identified additional risk factors other than previously addressed    Antiplatelet or Anticoagulation Medication Instructions:   - apixaban (Eliquis), edoxaban (Savaysa), rivaroxaban (Xarelto): Bleeding risk is moderate or high for this procedure AND CrCl  (>=) 50 mL/min. HOLD 2 days before surgery.     Additional Medication Instructions:  Patient is to take all scheduled  medications on the day of surgery EXCEPT for modifications listed below:   - Beta Blockers: Continue taking on the day of surgery.   - Statins: Continue taking on the day of surgery.    - Imdur continue taking on the day of surgery    RECOMMENDATION:  APPROVAL GIVEN to proceed with proposed procedure, without further diagnostic evaluation.      Subjective       HPI related to upcoming procedure:   Gross hematuria led to diagnosis of left ureteral tumor.  Plan cystoscopy with laser treatment and exchange of ureteral stent.          11/22/2023    10:16 AM   Preop Questions   1. Have you ever had a heart attack or stroke? YES - CAD   2. Have you ever had surgery on your heart or blood vessels, such as a stent placement, a coronary artery bypass, or surgery on an artery in your head, neck, heart, or legs? YES    3. Do you have chest pain with activity? No   4. Do you have a history of  heart failure? No   5. Do you currently have a cold, bronchitis or symptoms of other infection? No   6. Do you have a cough, shortness of breath, or wheezing? YES    7. Do you or anyone in your family have previous history of blood clots? UNKNOWN    8. Do you or does anyone in your family have a serious bleeding problem such as prolonged bleeding following surgeries or cuts? No   9. Have you ever had problems with anemia or been told to take iron pills? No   10. Have you had any abnormal blood loss such as black, tarry or bloody stools? No   11. Have you ever had a blood transfusion? YES    11a. Have you ever had a transfusion reaction? No   12. Are you willing to have a blood transfusion if it is medically needed before, during, or after your surgery? Yes   13. Have you or any of your relatives ever had problems with anesthesia? No   14. Do you have sleep apnea, excessive snoring or daytime drowsiness? No   15. Do you have any artifical heart valves or other implanted medical devices like a pacemaker, defibrillator, or continuous glucose  monitor? YES -bioprosthetic valve via TAVR   15a. What type of device do you have? heart valve   15b. Name of the clinic that manages your device:  [facility]   16. Do you have artificial joints? No   17. Are you allergic to latex? No     Health Care Directive:  Patient does not have a Health Care Directive or Living Will:     Preoperative Review of :   reviewed - no record of controlled substances prescribed.      Status of Chronic Conditions:  See problem list for active medical problems.  Problems all longstanding and stable, except as noted/documented.  See ROS for pertinent symptoms related to these conditions.    CAD - Patient has a longstanding history of moderate-severe CAD.  Followed by cardiology    HYPERLIPIDEMIA - Patient has a long history of significant Hyperlipidemia requiring medication for treatment with recent good control. Patient reports no problems or side effects with the medication.     HYPERTENSION - Patient has longstanding history of HTN , currently denies any symptoms referable to elevated blood pressure. Specifically denies chest pain, palpitations, dyspnea, orthopnea, PND or peripheral edema. Blood pressure readings have been in normal range. Current medication regimen is as listed below. Patient denies any side effects of medication.     Review of Systems  CONSTITUTIONAL: NEGATIVE for fever, chills, change in weight  INTEGUMENTARY/SKIN: NEGATIVE for worrisome rashes, moles or lesions  EYES: NEGATIVE for vision changes or irritation  ENT/MOUTH: NEGATIVE for ear, mouth and throat problems  RESP: NEGATIVE for significant cough or SOB  CV: NEGATIVE for chest pain, palpitations or peripheral edema  GI: NEGATIVE for nausea, abdominal pain, heartburn, or change in bowel habits  : NEGATIVE for frequency, dysuria, or hematuria  MUSCULOSKELETAL: NEGATIVE for significant arthralgias or myalgia  NEURO: NEGATIVE for weakness, dizziness or paresthesias  ENDOCRINE: NEGATIVE for  temperature intolerance, skin/hair changes  HEME: NEGATIVE for bleeding problems  PSYCHIATRIC: NEGATIVE for changes in mood or affect    Patient Active Problem List    Diagnosis Date Noted    S/p TAVR (transcatheter aortic valve replacement), bioprosthetic 11/22/2023     Priority: Medium    Malignant tumor of left ureter (H) 11/14/2023     Priority: Medium    Abnormal CT scan 09/25/2023     Priority: Medium    Thrombocytopenia (H24) 03/20/2023     Priority: Medium    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) 01/26/2023     Priority: Medium    Aortic stenosis, severe 08/17/2022     Priority: Medium     TAVR with 29 mm Khoury Miky 3 prosthesis 8/17/22       Abdominal aortic aneurysm (AAA) without rupture (H24) 11/09/2021     Priority: Medium     Noted CT 11/2021. Repeat us in one year (due 11/2022)      Nonrheumatic aortic valve stenosis 08/01/2019     Priority: Medium    Coronary artery disease involving native coronary artery of native heart without angina pectoris 06/14/2017     Priority: Medium     CAD s/p 3vCABG (LIMA-LAD SVG-OM1-D1) 1997 and PCI w/ BYRON to pRCA/rPDA/D1 1/20/20 and PCI+BYRON to RCA 6/28/21      Essential hypertension with goal blood pressure less than 130/80 10/24/2016     Priority: Medium    Seasonal allergic rhinitis, unspecified allergic rhinitis trigger 10/24/2016     Priority: Medium    Senile nuclear sclerosis 04/22/2015     Priority: Medium    Posterior vitreous detachment of both eyes 04/22/2015     Priority: Medium    Bilateral inguinal hernia 08/08/2014     Priority: Medium    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 11/3/2011          Hyperlipidemia LDL goal <70 10/31/2010     Priority: Medium    BPH (benign prostatic hypertrophy)      Priority: Medium      Past Medical History:   Diagnosis Date    Arthritis     BPH (benign prostatic hypertrophy)     Coronary artery disease     Diverticulosis     Hypercholesterolemia     Hypertension     MI (myocardial infarction) (H) 3/1997    Anterior Wall MI/LAD/OM1/SVG    CABG X 3    Neuropathy     RT Foot    Seasonal allergic rhinitis 7/1/2013     Past Surgical History:   Procedure Laterality Date    ANGIOGRAM  4/30/2003    No Disease, Clemons,LAD,SVG,OM1- small LT Main, Occlusion of CX- D1 50-70% Stenosis, RCA 30-80%    COLONOSCOPY      COLONOSCOPY N/A 10/14/2014    Procedure: COMBINED COLONOSCOPY, SINGLE BIOPSY/POLYPECTOMY BY BIOPSY;  Surgeon: Konstantin Coates MD;  Location: [facility]    COLONOSCOPY WITH CO2 INSUFFLATION N/A 10/14/2014    Procedure: COLONOSCOPY WITH CO2 INSUFFLATION;  Surgeon: Konstantin Coates MD;  Location: [facility]    CORONARY ARTERY BYPASS  1997    X 3    CV CORONARY ANGIOGRAM N/A 1/20/2020    Procedure: CV CORONARY ANGIOGRAM;  Surgeon: Magdiel Hernandez MD;  [facility]    CV CORONARY ANGIOGRAM N/A 6/28/2021    Procedure: CV CORONARY ANGIOGRAM;  Surgeon: Ravi Albright MD;  Location:  [facility]    CV FRACTIONAL FLOW RATIO WIRE N/A 1/20/2020    Procedure: Fractional Flow Reserve;  Surgeon: Magdiel Hernandez MD;  Location:  [facility]    CV INTRAVASULAR ULTRASOUND N/A 6/28/2021    Procedure: Intravascular Ultrasound;  Surgeon: Ravi Albright MD;  Location:  [facility]    CV PCI STENT DRUG ELUTING N/A 1/20/2020    Procedure: Percutaneous Coronary Intervention Stent Drug Eluting;  Surgeon: Magdiel Hernandez MD;  Location:  [facility]    CV PCI STENT DRUG ELUTING N/A 6/28/2021    Procedure: Percutaneous Coronary Intervention Stent Drug Eluting;  Surgeon: Ravi Albright  MD Ancelmo;  Location:  [facility]    CV TRANSCATHETER AORTIC VALVE REPLACEMENT N/A 8/17/2022    Procedure: Transfemoral Transcatheter Aortic Valve Replacement (Khoury);  Surgeon: Magdiel Hernandez MD;  [facility]    CYSTOSCOPY, RETROGRADES, INSERT STENT URETER(S), COMBINED Left 11/9/2023    Procedure: CYSTOURETEROSCOPY with retrograde pyelogram/possible ureteral mass biopsy/possible stent placement;  Surgeon: Evan Hand MD;  Location:  OR    HERNIA REPAIR  2014    [facility]    OR TRANSCATHETER AORTIC VALVE REPLACEMENT, FEMORAL PERCUTANEOUS APPROACH (STANDBY) N/A 8/17/2022    Procedure: with possible open heart bypass and or balloon pump placement and any indicated procedure,;  Surgeon: Karl Webster MD;  Location:  OR     Current Outpatient Medications   Medication Sig Dispense Refill    albuterol (PROAIR HFA/PROVENTIL HFA/VENTOLIN HFA) 108 (90 Base) MCG/ACT inhaler Inhale 2 puffs into the lungs every 4 hours as needed for shortness of breath, wheezing or cough 18 g 0    amoxicillin (AMOXIL) 500 MG capsule Take 4 capsules (2000 mg) by mouth 30-60 minutes prior to dental work. 4 capsule 5    apixaban ANTICOAGULANT (ELIQUIS) 5 MG tablet Take 5 mg by mouth 2 times daily      HYDROcodone-acetaminophen (NORCO) 5-325 MG tablet Take 1-2 tablets by mouth every 4 hours as needed for moderate to severe pain 10 tablet 0    isosorbide mononitrate (IMDUR) 60 MG 24 hr tablet Take 1 tablet (60 mg) by mouth every morning 90 tablet 3    metoprolol succinate ER (TOPROL XL) 25 MG 24 hr tablet Take 1 tablet (25 mg) by mouth daily 90 tablet 3    mometasone (NASONEX) 50 MCG/ACT nasal spray Spray 2 sprays into both nostrils as needed (nasal congestion) 17 g 1    multivitamin, therapeutic (THERA-VIT) TABS tablet Take 1 tablet by mouth daily      nitroGLYcerin (NITROSTAT) 0.4 MG sublingual tablet Place 1 tablet (0.4 mg) under the tongue every 5 minutes as needed for chest pain up to 3  "tablets per episode. 25 tablet 3    rosuvastatin (CRESTOR) 40 MG tablet TAKE 1 TABLET BY MOUTH EVERY DAY 90 tablet 0       Allergies   Allergen Reactions    No Known Drug Allergy     Seasonal Allergies         Social History     Tobacco Use    Smoking status: Former     Passive exposure: Past (Parents smoked while growing up. Wife also smoked.)    Smokeless tobacco: Never    Tobacco comments:     1997   Substance Use Topics    Alcohol use: Yes     Comment: rarely      Family History   Problem Relation Age of Onset    Coronary Artery Disease Brother 55        Fatal MI     History   Drug Use No         Objective     /72 (BP Location: Right arm, Patient Position: Sitting, Cuff Size: Adult Large)   Pulse 77   Temp 97.4  F (36.3  C) (Oral)   Resp 23   Ht 1.781 m (5' 10.12\")   Wt 78.7 kg (173 lb 6.4 oz)   SpO2 98%   BMI 24.80 kg/m      Physical Exam    GENERAL APPEARANCE: healthy, alert and no distress     EYES: EOMI,  PERRL     HENT: ear canals and TM's normal and nose and mouth without ulcers or lesions     NECK: no adenopathy, no asymmetry, masses, or scars and thyroid normal to palpation     RESP: lungs clear to auscultation - no rales, rhonchi or wheezes     CV: Irregular beat, rate controlled      ABDOMEN:  soft, nontender, no HSM or masses and bowel sounds normal     MS: extremities normal- no gross deformities noted, no evidence of inflammation in joints, FROM in all extremities.     SKIN: no suspicious lesions or rashes     NEURO: Normal strength and tone, sensory exam grossly normal, mentation intact and speech normal     PSYCH: mentation appears normal. and affect normal/bright     LYMPHATICS: No cervical adenopathy    Recent Labs   Lab Test 09/21/23  1326 06/28/23  1927 09/29/22  0959 08/18/22  0612 08/15/22  1034   HGB  --  15.4 14.3   < > 16.1   PLT  --  131* 149*   < > 151   INR  --   --   --   --  1.07   NA  --  139 137   < > 142   POTASSIUM  --  4.5 4.1   < > 4.4   CR 1.2 1.30* 1.07   < > " 1.06    < > = values in this interval not displayed.        Diagnostics:  CBC, BMP pending  Cardiac studies on file    Revised Cardiac Risk Index (RCRI):  The patient has the following serious cardiovascular risks for perioperative complications:   - Coronary Artery Disease (MI, positive stress test, angina, Qs on EKG) = 1 point     RCRI Interpretation: 0 points: Class I (very low risk - 0.4% complication rate)         Signed Electronically by: Julieta Gandhi MD  Copy of this evaluation report is provided to requesting physician.

## 2023-11-22 NOTE — PROGRESS NOTES
[address] [address]  Phone: [phone]  Primary Provider: Ben Gandhi  Pre-op Performing Provider: BEN GANDHI    PREOPERATIVE EVALUATION:  Today's date: 11/22/2023    Cameron is a 88 year old, presenting for the following:  Pre-Op Exam        11/22/2023    10:13 AM   Additional Questions   Roomed by jing       Surgical Information:  Surgery/Procedure: CYSTOURETEROSCOPY, WITH TUMOR TREATMENT USING LASER AND URETERAL STENT EXCHANGE   Surgery Location: [facility] surgery center  Surgeon: Evan Hand MD   Surgery Date: 12/07/2023  Time of Surgery: 12:40  Where patient plans to recover: At home with family  Fax number for surgical facility: Note does not need to be faxed, will be available electronically in Epic.    Assessment & Plan     The proposed surgical procedure is considered INTERMEDIATE risk.    Preop general physical exam    Malignant tumor of left ureter (H)    S/p TAVR (transcatheter aortic valve replacement), bioprosthetic  Currently on Eliquis for anticoagulation.  Okay to hold anticoagulation 3 to 5 days ahead of time without bridging.    Coronary artery disease involving native coronary artery of native heart without angina pectoris    Infrarenal abdominal aortic aneurysm (AAA) without rupture (H24)    Essential hypertension with goal blood pressure less than 130/80    Hyperlipidemia LDL goal <70    Thrombocytopenia (H24)    Benign prostatic hyperplasia with urinary frequency       Implanted Device:         - No identified additional risk factors other than previously addressed    Antiplatelet or Anticoagulation Medication Instructions:   - apixaban (Eliquis), edoxaban (Savaysa), rivaroxaban (Xarelto): Bleeding risk is moderate or high for this procedure AND CrCl  (>=) 50 mL/min. HOLD 2 days before surgery.     Additional Medication Instructions:  Patient is to take all scheduled  medications on the day of surgery EXCEPT for modifications listed below:   - Beta Blockers: Continue taking on the day of surgery.   - Statins: Continue taking on the day of surgery.    - Imdur continue taking on the day of surgery    RECOMMENDATION:  APPROVAL GIVEN to proceed with proposed procedure, without further diagnostic evaluation.      Subjective       HPI related to upcoming procedure:   Gross hematuria led to diagnosis of left ureteral tumor.  Plan cystoscopy with laser treatment and exchange of ureteral stent.          11/22/2023    10:16 AM   Preop Questions   1. Have you ever had a heart attack or stroke? YES - CAD   2. Have you ever had surgery on your heart or blood vessels, such as a stent placement, a coronary artery bypass, or surgery on an artery in your head, neck, heart, or legs? YES    3. Do you have chest pain with activity? No   4. Do you have a history of  heart failure? No   5. Do you currently have a cold, bronchitis or symptoms of other infection? No   6. Do you have a cough, shortness of breath, or wheezing? YES    7. Do you or anyone in your family have previous history of blood clots? UNKNOWN    8. Do you or does anyone in your family have a serious bleeding problem such as prolonged bleeding following surgeries or cuts? No   9. Have you ever had problems with anemia or been told to take iron pills? No   10. Have you had any abnormal blood loss such as black, tarry or bloody stools? No   11. Have you ever had a blood transfusion? YES    11a. Have you ever had a transfusion reaction? No   12. Are you willing to have a blood transfusion if it is medically needed before, during, or after your surgery? Yes   13. Have you or any of your relatives ever had problems with anesthesia? No   14. Do you have sleep apnea, excessive snoring or daytime drowsiness? No   15. Do you have any artifical heart valves or other implanted medical devices like a pacemaker, defibrillator, or continuous glucose  monitor? YES -bioprosthetic valve via TAVR   15a. What type of device do you have? heart valve   15b. Name of the clinic that manages your device:  [place] [facility]   16. Do you have artificial joints? No   17. Are you allergic to latex? No     Health Care Directive:  Patient does not have a Health Care Directive or Living Will:     Preoperative Review of :   reviewed - no record of controlled substances prescribed.      Status of Chronic Conditions:  See problem list for active medical problems.  Problems all longstanding and stable, except as noted/documented.  See ROS for pertinent symptoms related to these conditions.    CAD - Patient has a longstanding history of moderate-severe CAD.  Followed by cardiology    HYPERLIPIDEMIA - Patient has a long history of significant Hyperlipidemia requiring medication for treatment with recent good control. Patient reports no problems or side effects with the medication.     HYPERTENSION - Patient has longstanding history of HTN , currently denies any symptoms referable to elevated blood pressure. Specifically denies chest pain, palpitations, dyspnea, orthopnea, PND or peripheral edema. Blood pressure readings have been in normal range. Current medication regimen is as listed below. Patient denies any side effects of medication.     Review of Systems  CONSTITUTIONAL: NEGATIVE for fever, chills, change in weight  INTEGUMENTARY/SKIN: NEGATIVE for worrisome rashes, moles or lesions  EYES: NEGATIVE for vision changes or irritation  ENT/MOUTH: NEGATIVE for ear, mouth and throat problems  RESP: NEGATIVE for significant cough or SOB  CV: NEGATIVE for chest pain, palpitations or peripheral edema  GI: NEGATIVE for nausea, abdominal pain, heartburn, or change in bowel habits  : NEGATIVE for frequency, dysuria, or hematuria  MUSCULOSKELETAL: NEGATIVE for significant arthralgias or myalgia  NEURO: NEGATIVE for weakness, dizziness or paresthesias  ENDOCRINE: NEGATIVE for  temperature intolerance, skin/hair changes  HEME: NEGATIVE for bleeding problems  PSYCHIATRIC: NEGATIVE for changes in mood or affect    Patient Active Problem List    Diagnosis Date Noted    S/p TAVR (transcatheter aortic valve replacement), bioprosthetic 11/22/2023     Priority: Medium    Malignant tumor of left ureter (H) 11/14/2023     Priority: Medium    Abnormal CT scan 09/25/2023     Priority: Medium    Thrombocytopenia (H24) 03/20/2023     Priority: Medium    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) 01/26/2023     Priority: Medium    Aortic stenosis, severe 08/17/2022     Priority: Medium     TAVR with 29 mm Khoury Miky 3 prosthesis 8/17/22       Abdominal aortic aneurysm (AAA) without rupture (H24) 11/09/2021     Priority: Medium     Noted CT 11/2021. Repeat us in one year (due 11/2022)      Nonrheumatic aortic valve stenosis 08/01/2019     Priority: Medium    Coronary artery disease involving native coronary artery of native heart without angina pectoris 06/14/2017     Priority: Medium     CAD s/p 3vCABG (LIMA-LAD SVG-OM1-D1) 1997 and PCI w/ BYRON to pRCA/rPDA/D1 1/20/20 and PCI+BYRON to RCA 6/28/21      Essential hypertension with goal blood pressure less than 130/80 10/24/2016     Priority: Medium    Seasonal allergic rhinitis, unspecified allergic rhinitis trigger 10/24/2016     Priority: Medium    Senile nuclear sclerosis 04/22/2015     Priority: Medium    Posterior vitreous detachment of both eyes 04/22/2015     Priority: Medium    Bilateral inguinal hernia 08/08/2014     Priority: Medium    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 11/3/2011          Hyperlipidemia LDL goal <70 10/31/2010     Priority: Medium    BPH (benign prostatic hypertrophy)      Priority: Medium      Past Medical History:   Diagnosis Date    Arthritis     BPH (benign prostatic hypertrophy)     Coronary artery disease     Diverticulosis     Hypercholesterolemia     Hypertension     MI (myocardial infarction) (H) 3/1997    Anterior Wall MI/LAD/OM1/SVG    CABG X 3    Neuropathy     RT Foot    Seasonal allergic rhinitis 7/1/2013     Past Surgical History:   Procedure Laterality Date    ANGIOGRAM  4/30/2003    No Disease, [place],LAD,SVG,OM1- small LT Main, Occlusion of CX- D1 50-70% Stenosis, RCA 30-80%    COLONOSCOPY      COLONOSCOPY N/A 10/14/2014    Procedure: COMBINED COLONOSCOPY, SINGLE BIOPSY/POLYPECTOMY BY BIOPSY;  Surgeon: Konstantin Coates MD;  Location: [facility] OR    COLONOSCOPY WITH CO2 INSUFFLATION N/A 10/14/2014    Procedure: COLONOSCOPY WITH CO2 INSUFFLATION;  Surgeon: Konstantin Coates MD;  Location: [facility] OR    CORONARY ARTERY BYPASS  1997    X 3    CV CORONARY ANGIOGRAM N/A 1/20/2020    Procedure: CV CORONARY ANGIOGRAM;  Surgeon: Magdiel Hernandez MD;  [facility]    CV CORONARY ANGIOGRAM N/A 6/28/2021    Procedure: CV CORONARY ANGIOGRAM;  Surgeon: Ravi Albright MD;  Location:  [facility]    CV FRACTIONAL FLOW RATIO WIRE N/A 1/20/2020    Procedure: Fractional Flow Reserve;  Surgeon: Magdiel Hernandez MD;  Location:  [facility]    CV INTRAVASULAR ULTRASOUND N/A 6/28/2021    Procedure: Intravascular Ultrasound;  Surgeon: Ravi Albright MD;  Location:  [facility]    CV PCI STENT DRUG ELUTING N/A 1/20/2020    Procedure: Percutaneous Coronary Intervention Stent Drug Eluting;  Surgeon: Magdiel Hernandez MD;  Location:  [facility]    CV PCI STENT DRUG ELUTING N/A 6/28/2021    Procedure: Percutaneous Coronary Intervention Stent Drug Eluting;  Surgeon: Ravi Albright  MD Ancelmo;  Location:  [facility]    CV TRANSCATHETER AORTIC VALVE REPLACEMENT N/A 8/17/2022    Procedure: Transfemoral Transcatheter Aortic Valve Replacement (Khoury);  Surgeon: Magdiel Hernandez MD;  [facility]    CYSTOSCOPY, RETROGRADES, INSERT STENT URETER(S), COMBINED Left 11/9/2023    Procedure: CYSTOURETEROSCOPY with retrograde pyelogram/possible ureteral mass biopsy/possible stent placement;  Surgeon: Evan Hand MD;  Location:  OR    HERNIA REPAIR  2014    [facility]    OR TRANSCATHETER AORTIC VALVE REPLACEMENT, FEMORAL PERCUTANEOUS APPROACH (STANDBY) N/A 8/17/2022    Procedure: with possible open heart bypass and or balloon pump placement and any indicated procedure,;  Surgeon: Karl Webster MD;  Location:  OR     Current Outpatient Medications   Medication Sig Dispense Refill    albuterol (PROAIR HFA/PROVENTIL HFA/VENTOLIN HFA) 108 (90 Base) MCG/ACT inhaler Inhale 2 puffs into the lungs every 4 hours as needed for shortness of breath, wheezing or cough 18 g 0    amoxicillin (AMOXIL) 500 MG capsule Take 4 capsules (2000 mg) by mouth 30-60 minutes prior to dental work. 4 capsule 5    apixaban ANTICOAGULANT (ELIQUIS) 5 MG tablet Take 5 mg by mouth 2 times daily      HYDROcodone-acetaminophen (NORCO) 5-325 MG tablet Take 1-2 tablets by mouth every 4 hours as needed for moderate to severe pain 10 tablet 0    isosorbide mononitrate (IMDUR) 60 MG 24 hr tablet Take 1 tablet (60 mg) by mouth every morning 90 tablet 3    metoprolol succinate ER (TOPROL XL) 25 MG 24 hr tablet Take 1 tablet (25 mg) by mouth daily 90 tablet 3    mometasone (NASONEX) 50 MCG/ACT nasal spray Spray 2 sprays into both nostrils as needed (nasal congestion) 17 g 1    multivitamin, therapeutic (THERA-VIT) TABS tablet Take 1 tablet by mouth daily      nitroGLYcerin (NITROSTAT) 0.4 MG sublingual tablet Place 1 tablet (0.4 mg) under the tongue every 5 minutes as needed for chest pain up to 3  "tablets per episode. 25 tablet 3    rosuvastatin (CRESTOR) 40 MG tablet TAKE 1 TABLET BY MOUTH EVERY DAY 90 tablet 0       Allergies   Allergen Reactions    No Known Drug Allergy     Seasonal Allergies         Social History     Tobacco Use    Smoking status: Former     Passive exposure: Past (Parents smoked while growing up. Wife also smoked.)    Smokeless tobacco: Never    Tobacco comments:     1997   Substance Use Topics    Alcohol use: Yes     Comment: rarely      Family History   Problem Relation Age of Onset    Coronary Artery Disease Brother 55        Fatal MI     History   Drug Use No         Objective     /72 (BP Location: Right arm, Patient Position: Sitting, Cuff Size: Adult Large)   Pulse 77   Temp 97.4  F (36.3  C) (Oral)   Resp 23   Ht 1.781 m (5' 10.12\")   Wt 78.7 kg (173 lb 6.4 oz)   SpO2 98%   BMI 24.80 kg/m      Physical Exam    GENERAL APPEARANCE: healthy, alert and no distress     EYES: EOMI,  PERRL     HENT: ear canals and TM's normal and nose and mouth without ulcers or lesions     NECK: no adenopathy, no asymmetry, masses, or scars and thyroid normal to palpation     RESP: lungs clear to auscultation - no rales, rhonchi or wheezes     CV: Irregular beat, rate controlled      ABDOMEN:  soft, nontender, no HSM or masses and bowel sounds normal     MS: extremities normal- no gross deformities noted, no evidence of inflammation in joints, FROM in all extremities.     SKIN: no suspicious lesions or rashes     NEURO: Normal strength and tone, sensory exam grossly normal, mentation intact and speech normal     PSYCH: mentation appears normal. and affect normal/bright     LYMPHATICS: No cervical adenopathy    Recent Labs   Lab Test 09/21/23  1326 06/28/23  1927 09/29/22  0959 08/18/22  0612 08/15/22  1034   HGB  --  15.4 14.3   < > 16.1   PLT  --  131* 149*   < > 151   INR  --   --   --   --  1.07   NA  --  139 137   < > 142   POTASSIUM  --  4.5 4.1   < > 4.4   CR 1.2 1.30* 1.07   < > " 1.06    < > = values in this interval not displayed.        Diagnostics:  CBC, BMP pending  Cardiac studies on file    Revised Cardiac Risk Index (RCRI):  The patient has the following serious cardiovascular risks for perioperative complications:   - Coronary Artery Disease (MI, positive stress test, angina, Qs on EKG) = 1 point     RCRI Interpretation: 0 points: Class I (very low risk - 0.4% complication rate)         Signed Electronically by: Julieta Gandhi MD  Copy of this evaluation report is provided to requesting physician.

## 2023-11-22 NOTE — PATIENT INSTRUCTIONS
Preparing for Your Surgery  Getting started  A nurse will call you to review your health history and instructions. They will give you an arrival time based on your scheduled surgery time. Please be ready to share:  Your doctor's clinic name and phone number  Your medical, surgical, and anesthesia history  A list of allergies and sensitivities  A list of medicines, including herbal treatments and over-the-counter drugs  Whether the patient has a legal guardian (ask how to send us the papers in advance)  Please tell us if you're pregnant--or if there's any chance you might be pregnant. Some surgeries may injure a fetus (unborn baby), so they require a pregnancy test. Surgeries that are safe for a fetus don't always need a test, and you can choose whether to have one.   If you have a child who's having surgery, please ask for a copy of Preparing for Your Child's Surgery.    Preparing for surgery  Within 10 to 30 days of surgery: Have a pre-op exam (sometimes called an H&P, or History and Physical). This can be done at a clinic or pre-operative center.  If you're having a , you may not need this exam. Talk to your care team.  At your pre-op exam, talk to your care team about all medicines you take. If you need to stop any medicines before surgery, ask when to start taking them again.  We do this for your safety. Many medicines can make you bleed too much during surgery. Some change how well surgery (anesthesia) drugs work.  Call your insurance company to let them know you're having surgery. (If you don't have insurance, call [phone].)  Call your clinic if there's any change in your health. This includes signs of a cold or flu (sore throat, runny nose, cough, rash, fever). It also includes a scrape or scratch near the surgery site.  If you have questions on the day of surgery, call your hospital or surgery center.  Eating and drinking guidelines  For your safety: Unless your surgeon tells you otherwise,  follow the guidelines below.  Eat and drink as usual until 8 hours before you arrive for surgery. After that, no food or milk.  Drink clear liquids until 2 hours before you arrive. These are liquids you can see through, like water, Gatorade, and Propel Water. They also include plain black coffee and tea (no cream or milk), candy, and breath mints. You can spit out gum when you arrive.  If you drink alcohol: Stop drinking it the night before surgery.  If your care team tells you to take medicine on the morning of surgery, it's okay to take it with a sip of water.  Preventing infection  Shower or bathe the night before and morning of your surgery. Follow the instructions your clinic gave you. (If no instructions, use regular soap.)  Don't shave or clip hair near your surgery site. We'll remove the hair if needed.  Don't smoke or vape the morning of surgery. You may chew nicotine gum up to 2 hours before surgery. A nicotine patch is okay.  Note: Some surgeries require you to completely quit smoking and nicotine. Check with your surgeon.  Your care team will make every effort to keep you safe from infection. We will:  Clean our hands often with soap and water (or an alcohol-based hand rub).  Clean the skin at your surgery site with a special soap that kills germs.  Give you a special gown to keep you warm. (Cold raises the risk of infection.)  Wear special hair covers, masks, gowns and gloves during surgery.  Give antibiotic medicine, if prescribed. Not all surgeries need antibiotics.  What to bring on the day of surgery  Photo ID and insurance card  Copy of your health care directive, if you have one  Glasses and hearing aids (bring cases)  You can't wear contacts during surgery  Inhaler and eye drops, if you use them (tell us about these when you arrive)  CPAP machine or breathing device, if you use them  A few personal items, if spending the night  If you have . . .  A pacemaker, ICD (cardiac defibrillator) or other  implant: Bring the ID card.  An implanted stimulator: Bring the remote control.  A legal guardian: Bring a copy of the certified (court-stamped) guardianship papers.  Please remove any jewelry, including body piercings. Leave jewelry and other valuables at home.  If you're going home the day of surgery  You must have a responsible adult drive you home. They should stay with you overnight as well.  If you don't have someone to stay with you, and you aren't safe to go home alone, we may keep you overnight. Insurance often won't pay for this.  After surgery  If it's hard to control your pain or you need more pain medicine, please call your surgeon's office.  Questions?   If you have any questions for your care team, list them here: _________________________________________________________________________________________________________________________________________________________________________ ____________________________________ ____________________________________ ____________________________________  For informational purposes only. Not to replace the advice of your health care provider. Copyright   2003, 2019 [facility]. All rights reserved. Clinically reviewed by Jacklyn Madsen MD. SMARTworks [id number] - REV 12/22.    How to Take Your Medication Before Surgery  - Take all of your medications before surgery except as noted below  - HOLD (do not take) Eliquis for 3 days ahead of procedure

## 2023-11-26 ENCOUNTER — MYC MEDICAL ADVICE (OUTPATIENT)
Dept: CARDIOLOGY | Facility: CLINIC | Age: 88
End: 2023-11-26
Payer: COMMERCIAL

## 2023-11-26 DIAGNOSIS — I35.0 SEVERE AORTIC STENOSIS: ICD-10-CM

## 2023-11-26 DIAGNOSIS — I25.10 CORONARY ARTERY DISEASE INVOLVING NATIVE CORONARY ARTERY OF NATIVE HEART WITHOUT ANGINA PECTORIS: ICD-10-CM

## 2023-11-26 DIAGNOSIS — I10 ESSENTIAL HYPERTENSION WITH GOAL BLOOD PRESSURE LESS THAN 130/80: ICD-10-CM

## 2023-11-26 DIAGNOSIS — I35.0 NONRHEUMATIC AORTIC VALVE STENOSIS: ICD-10-CM

## 2023-11-26 DIAGNOSIS — Z95.2 S/P TAVR (TRANSCATHETER AORTIC VALVE REPLACEMENT): Primary | ICD-10-CM

## 2023-11-26 DIAGNOSIS — I71.43 INFRARENAL ABDOMINAL AORTIC ANEURYSM (AAA) WITHOUT RUPTURE (H): ICD-10-CM

## 2023-11-26 DIAGNOSIS — E78.5 HYPERLIPIDEMIA LDL GOAL <70: ICD-10-CM

## 2023-11-28 RX ORDER — ISOSORBIDE MONONITRATE 60 MG/1
60 TABLET, EXTENDED RELEASE ORAL EVERY MORNING
Qty: 90 TABLET | Refills: 3 | Status: SHIPPED | OUTPATIENT
Start: 2023-11-28

## 2023-11-28 RX ORDER — ROSUVASTATIN CALCIUM 40 MG/1
40 TABLET, COATED ORAL DAILY
Qty: 90 TABLET | Refills: 3 | Status: SHIPPED | OUTPATIENT
Start: 2023-11-28

## 2023-11-28 RX ORDER — METOPROLOL SUCCINATE 25 MG/1
25 TABLET, EXTENDED RELEASE ORAL DAILY
Qty: 90 TABLET | Refills: 3 | Status: SHIPPED | OUTPATIENT
Start: 2023-11-28

## 2023-11-28 RX ORDER — AMLODIPINE BESYLATE 5 MG/1
5 TABLET ORAL DAILY
Qty: 90 TABLET | Refills: 3 | Status: SHIPPED | OUTPATIENT
Start: 2023-11-28 | End: 2024-09-17

## 2023-11-28 NOTE — TELEPHONE ENCOUNTER
Date: 11/28/2023    Time of Call: 3:07 PM     Diagnosis:  S/p 29 mm Miky 3 TAVR for 8/17/22 for severe aortic stenosis, Coronary artery disease status post three vessel coronary bypass graft, Exertional dyspnea s/p TAVR, Hypertension, Hyperlipidemia, AAA     [ TORB ] Ordering provider: Dr. Parker  Order: Patient to continue taking  - Brilinta 90 mg BID  - Isosorbide 60 mg daily  - Rosuvastatin 40 mg daily  - Metorprolol 25 mg daily  - Amlodipine 5 mg daily    Patient to not be taking Eliquis 5 mg daily     Order received by: Patricia Alvarado RN     Follow-up/additional notes: Medication list updated and prescriptions sent in. Called and reviewed medication list with patient. No questions at this time.    Patricia Alvarado RN, BSN  Cardiology RN Care Coordinator   [place]   Phone: [phone]  Fax: [phone] ([place]) [phone] ([place])

## 2023-11-28 NOTE — TELEPHONE ENCOUNTER
Called and spoke with patient to review medication list. Patient reports that his medication list got mixed up and he is uncertain of what all he should be taking.    Currently taking  Brilinta 90 mg BID  Isosorbide mononitrate 60 mg Daily  Rosuvastatin 40 mg Daily    Not currently taking  Eliquis 5 mg BID  Amlodipine. Patient did not have tablet dosage on hand but stated that he came across the bottle recently.  Metoprolol 25 mg daily. Patient reports that he has not been able to get refills on medication.     Patient will be going on a cruise in January and is looking to make sure he has refills on hand. Informed patient that provider would be updated to confirm what patient should be currently taking as medication list does not match.    Patricia Alvarado RN, BSN  Cardiology RN Care Coordinator   [place]   Phone: [phone]  Fax: [phone] ([place]) [phone] ([place])

## 2023-12-06 ENCOUNTER — ANESTHESIA EVENT (OUTPATIENT)
Dept: SURGERY | Facility: AMBULATORY SURGERY CENTER | Age: 88
End: 2023-12-06
Payer: COMMERCIAL

## 2023-12-06 ENCOUNTER — ANCILLARY ORDERS (OUTPATIENT)
Dept: UROLOGY | Facility: CLINIC | Age: 88
End: 2023-12-06

## 2023-12-06 DIAGNOSIS — R52 PAIN: Primary | ICD-10-CM

## 2023-12-07 ENCOUNTER — ANCILLARY PROCEDURE (OUTPATIENT)
Dept: GENERAL RADIOLOGY | Facility: CLINIC | Age: 88
End: 2023-12-07
Attending: UROLOGY
Payer: COMMERCIAL

## 2023-12-07 ENCOUNTER — ANESTHESIA (OUTPATIENT)
Dept: SURGERY | Facility: AMBULATORY SURGERY CENTER | Age: 88
End: 2023-12-07
Payer: COMMERCIAL

## 2023-12-07 ENCOUNTER — HOSPITAL ENCOUNTER (OUTPATIENT)
Facility: AMBULATORY SURGERY CENTER | Age: 88
Discharge: HOME | End: 2023-12-07
Attending: UROLOGY | Admitting: UROLOGY
Payer: COMMERCIAL

## 2023-12-07 VITALS
TEMPERATURE: 97.5 F | OXYGEN SATURATION: 98 % | RESPIRATION RATE: 16 BRPM | SYSTOLIC BLOOD PRESSURE: 110 MMHG | HEART RATE: 58 BPM | DIASTOLIC BLOOD PRESSURE: 62 MMHG

## 2023-12-07 DIAGNOSIS — R52 PAIN: ICD-10-CM

## 2023-12-07 PROCEDURE — 52310 CYSTOSCOPY AND TREATMENT: CPT | Mod: LT

## 2023-12-07 PROCEDURE — 52351 CYSTOURETERO & OR PYELOSCOPE: CPT | Mod: LT | Performed by: UROLOGY

## 2023-12-07 PROCEDURE — G8916 PT W IV AB GIVEN ON TIME: HCPCS

## 2023-12-07 PROCEDURE — G8907 PT DOC NO EVENTS ON DISCHARG: HCPCS

## 2023-12-07 RX ORDER — OXYCODONE HYDROCHLORIDE 5 MG/1
10 TABLET ORAL
Status: DISCONTINUED | OUTPATIENT
Start: 2023-12-07 | End: 2023-12-08 | Stop reason: HOSPADM

## 2023-12-07 RX ORDER — ONDANSETRON 4 MG/1
4 TABLET, ORALLY DISINTEGRATING ORAL EVERY 30 MIN PRN
Status: DISCONTINUED | OUTPATIENT
Start: 2023-12-07 | End: 2023-12-08 | Stop reason: HOSPADM

## 2023-12-07 RX ORDER — LIDOCAINE HYDROCHLORIDE 20 MG/ML
INJECTION, SOLUTION INFILTRATION; PERINEURAL PRN
Status: DISCONTINUED | OUTPATIENT
Start: 2023-12-07 | End: 2023-12-07

## 2023-12-07 RX ORDER — ACETAMINOPHEN 325 MG/1
975 TABLET ORAL ONCE
Status: COMPLETED | OUTPATIENT
Start: 2023-12-07 | End: 2023-12-07

## 2023-12-07 RX ORDER — SODIUM CHLORIDE, SODIUM LACTATE, POTASSIUM CHLORIDE, CALCIUM CHLORIDE 600; 310; 30; 20 MG/100ML; MG/100ML; MG/100ML; MG/100ML
INJECTION, SOLUTION INTRAVENOUS CONTINUOUS
Status: DISCONTINUED | OUTPATIENT
Start: 2023-12-07 | End: 2023-12-08 | Stop reason: HOSPADM

## 2023-12-07 RX ORDER — OXYCODONE HYDROCHLORIDE 5 MG/1
5 TABLET ORAL
Status: DISCONTINUED | OUTPATIENT
Start: 2023-12-07 | End: 2023-12-08 | Stop reason: HOSPADM

## 2023-12-07 RX ORDER — ONDANSETRON 2 MG/ML
4 INJECTION INTRAMUSCULAR; INTRAVENOUS EVERY 30 MIN PRN
Status: DISCONTINUED | OUTPATIENT
Start: 2023-12-07 | End: 2023-12-08 | Stop reason: HOSPADM

## 2023-12-07 RX ORDER — CEFAZOLIN SODIUM 2 G/50ML
2 SOLUTION INTRAVENOUS
Status: COMPLETED | OUTPATIENT
Start: 2023-12-07 | End: 2023-12-07

## 2023-12-07 RX ORDER — PROPOFOL 10 MG/ML
INJECTION, EMULSION INTRAVENOUS PRN
Status: DISCONTINUED | OUTPATIENT
Start: 2023-12-07 | End: 2023-12-07

## 2023-12-07 RX ORDER — FENTANYL CITRATE 50 UG/ML
25 INJECTION, SOLUTION INTRAMUSCULAR; INTRAVENOUS EVERY 5 MIN PRN
Status: DISCONTINUED | OUTPATIENT
Start: 2023-12-07 | End: 2023-12-08 | Stop reason: HOSPADM

## 2023-12-07 RX ORDER — LIDOCAINE 40 MG/G
CREAM TOPICAL
Status: DISCONTINUED | OUTPATIENT
Start: 2023-12-07 | End: 2023-12-08 | Stop reason: HOSPADM

## 2023-12-07 RX ORDER — FENTANYL CITRATE 50 UG/ML
50 INJECTION, SOLUTION INTRAMUSCULAR; INTRAVENOUS EVERY 5 MIN PRN
Status: DISCONTINUED | OUTPATIENT
Start: 2023-12-07 | End: 2023-12-08 | Stop reason: HOSPADM

## 2023-12-07 RX ORDER — FENTANYL CITRATE 50 UG/ML
INJECTION, SOLUTION INTRAMUSCULAR; INTRAVENOUS PRN
Status: DISCONTINUED | OUTPATIENT
Start: 2023-12-07 | End: 2023-12-07

## 2023-12-07 RX ORDER — CEFAZOLIN SODIUM 2 G/50ML
2 SOLUTION INTRAVENOUS SEE ADMIN INSTRUCTIONS
Status: DISCONTINUED | OUTPATIENT
Start: 2023-12-07 | End: 2023-12-08 | Stop reason: HOSPADM

## 2023-12-07 RX ADMIN — SODIUM CHLORIDE, SODIUM LACTATE, POTASSIUM CHLORIDE, CALCIUM CHLORIDE: 600; 310; 30; 20 INJECTION, SOLUTION INTRAVENOUS at 12:03

## 2023-12-07 RX ADMIN — PROPOFOL 150 MG: 10 INJECTION, EMULSION INTRAVENOUS at 12:57

## 2023-12-07 RX ADMIN — CEFAZOLIN SODIUM 2 G: 2 SOLUTION INTRAVENOUS at 12:47

## 2023-12-07 RX ADMIN — ACETAMINOPHEN 975 MG: 325 TABLET ORAL at 12:03

## 2023-12-07 RX ADMIN — LIDOCAINE HYDROCHLORIDE 60 MG: 20 INJECTION, SOLUTION INFILTRATION; PERINEURAL at 12:57

## 2023-12-07 RX ADMIN — FENTANYL CITRATE 75 MCG: 50 INJECTION, SOLUTION INTRAMUSCULAR; INTRAVENOUS at 12:57

## 2023-12-07 ASSESSMENT — LIFESTYLE VARIABLES: TOBACCO_USE: 1

## 2023-12-07 NOTE — ANESTHESIA PROCEDURE NOTES
Airway       Patient location during procedure: OR  Staff -        CRNA: Elham Pereyra APRN CRNA       Performed By: CRNA  Consent for Airway        Urgency: elective  Indications and Patient Condition       Indications for airway management: santos-procedural       Induction type:intravenous       Mask difficulty assessment: 0 - not attempted    Final Airway Details       Final airway type: supraglottic airway    Supraglottic Airway Details        Type: LMA       Brand: I-Gel       LMA size: 5    Post intubation assessment        Placement verified by: capnometry, equal breath sounds and chest rise        Number of attempts at approach: 1       Number of other approaches attempted: 0       Secured with: tape       Ease of procedure: easy       Dentition: Intact

## 2023-12-07 NOTE — DISCHARGE INSTRUCTIONS
Tylenol 975 mg was given at 12pm, you may have tylenol again at 6pm.    You should not take more then 4,000 mg of tylenol/acetaminophen in a 24 hour period.

## 2023-12-07 NOTE — ANESTHESIA PREPROCEDURE EVALUATION
Anesthesia Pre-Procedure Evaluation    Patient: Tima Mckenzie   MRN: [id number] : 1935        Procedure : Procedure(s):  CYSTOURETEROSCOPY, WITH TUMOR TREATMENT USING LASER AND URETERAL STENT EXCHANGE          Past Medical History:   Diagnosis Date     Arthritis      BPH (benign prostatic hypertrophy)      Coronary artery disease      Diverticulosis      Hypercholesterolemia      Hypertension      MI (myocardial infarction) (H) 3/1997    Anterior Wall MI/LAD/OM1/SVG    CABG X 3     Neuropathy     RT Foot     Seasonal allergic rhinitis 2013      Past Surgical History:   Procedure Laterality Date     ANGIOGRAM  2003    No Disease, Clemons,LAD,SVG,OM1- small LT Main, Occlusion of CX- D1 50-70% Stenosis, RCA 30-80%     COLONOSCOPY       COLONOSCOPY N/A 10/14/2014    Procedure: COMBINED COLONOSCOPY, SINGLE BIOPSY/POLYPECTOMY BY BIOPSY;  Surgeon: Konstantin Coates MD;  Location: [facility] OR     COLONOSCOPY WITH CO2 INSUFFLATION N/A 10/14/2014    Procedure: COLONOSCOPY WITH CO2 INSUFFLATION;  Surgeon: Konstantin Coates MD;  Location: [facility] OR     CORONARY ARTERY BYPASS  1997    X 3     CV CORONARY ANGIOGRAM N/A 2020    Procedure: CV CORONARY ANGIOGRAM;  Surgeon: Magdiel Hernandez MD;  Location:  [facility]     CV CORONARY ANGIOGRAM N/A 2021    Procedure: CV CORONARY ANGIOGRAM;  Surgeon: Ravi Albright MD;  Location:  [facility]     CV FRACTIONAL FLOW RATIO WIRE N/A 2020    Procedure: Fractional Flow Reserve;  Surgeon: Magdiel Hernandez MD;  Location:  [facility]     CV INTRAVASULAR ULTRASOUND N/A 2021    Procedure: Intravascular Ultrasound;  Surgeon: Ravi Albright MD;  Location:  [facility]     CV PCI STENT DRUG ELUTING N/A 2020    Procedure: Percutaneous Coronary Intervention Stent Drug Eluting;  Surgeon: Magdiel Hernandez MD;  Location:  [facility]     CV PCI STENT DRUG  ELUTING N/A 6/28/2021    Procedure: Percutaneous Coronary Intervention Stent Drug Eluting;  Surgeon: Ravi Albright MD;  Location: [facility]     CV TRANSCATHETER AORTIC VALVE REPLACEMENT N/A 8/17/2022    Procedure: Transfemoral Transcatheter Aortic Valve Replacement (Khoury);  Surgeon: Magdiel Hernandez MD;  Location: [facility] OR     CYSTOSCOPY, RETROGRADES, INSERT STENT URETER(S), COMBINED Left 11/9/2023    Procedure: CYSTOURETEROSCOPY with retrograde pyelogram/possible ureteral mass biopsy/possible stent placement;  Surgeon: Evan Hand MD;  Location:  OR     HERNIA REPAIR  2014    [facility]     OR TRANSCATHETER AORTIC VALVE REPLACEMENT, FEMORAL PERCUTANEOUS APPROACH (STANDBY) N/A 8/17/2022    Procedure: with possible open heart bypass and or balloon pump placement and any indicated procedure,;  Surgeon: Karl Webster MD;  Location: [facility] OR      Allergies   Allergen Reactions     No Known Drug Allergy      Seasonal Allergies       Social History     Tobacco Use     Smoking status: Former     Passive exposure: Past (Parents smoked while growing up. Wife also smoked.)     Smokeless tobacco: Never     Tobacco comments:     1997   Substance Use Topics     Alcohol use: Yes     Comment: rarely       Wt Readings from Last 1 Encounters:   11/22/23 78.7 kg (173 lb 6.4 oz)        Anesthesia Evaluation   Pt has had prior anesthetic. Type: General and MAC.    No history of anesthetic complications       ROS/MED HX  ENT/Pulmonary:     (+)                tobacco use, Past use,                      Neurologic:  - neg neurologic ROS     Cardiovascular: Comment: Aortic aneurysm without rupture    (+)  hypertension- Peripheral Vascular Disease-  CAD -  CABG- stent-                           valvular problems/murmurs type: AS S/P TAVR replacement.         METS/Exercise Tolerance:     Hematologic:  - neg hematologic  ROS     Musculoskeletal:  - neg musculoskeletal ROS     GI/Hepatic:  -  "neg GI/hepatic ROS     Renal/Genitourinary: Comment: Bladder tumor      Endo:  - neg endo ROS     Psychiatric/Substance Use:  - neg psychiatric ROS     Infectious Disease:  - neg infectious disease ROS     Malignancy:   (+) Malignancy, History of Other.Other CA Bladder Active status post Surgery.    Other:  - neg other ROS           OUTSIDE LABS:  CBC:   Lab Results   Component Value Date    WBC 8.2 11/22/2023    WBC 7.7 06/28/2023    HGB 15.2 11/22/2023    HGB 15.4 06/28/2023    HCT 46.1 11/22/2023    HCT 45.0 06/28/2023     11/22/2023     (L) 06/28/2023     BMP:   Lab Results   Component Value Date     11/22/2023     06/28/2023    POTASSIUM 4.6 11/22/2023    POTASSIUM 4.5 06/28/2023    CHLORIDE 104 11/22/2023    CHLORIDE 105 06/28/2023    CO2 26 11/22/2023    CO2 27 06/28/2023    BUN 17.5 11/22/2023    BUN 20 06/28/2023    CR 1.04 11/22/2023    CR 1.2 09/21/2023     (H) 11/22/2023     (H) 06/28/2023     COAGS:   Lab Results   Component Value Date    INR 1.07 08/15/2022     POC: No results found for: \"BGM\", \"HCG\", \"HCGS\"  HEPATIC:   Lab Results   Component Value Date    ALBUMIN 3.8 06/28/2023    PROTTOTAL 7.2 06/28/2023    ALT 16 06/28/2023    AST 23 06/28/2023    ALKPHOS 59 06/28/2023    BILITOTAL 0.6 06/28/2023     OTHER:   Lab Results   Component Value Date    CLAUDIA 9.7 11/22/2023    MAG 1.8 08/18/2022    LIPASE 70 (L) 11/08/2021    TSH 3.39 10/08/2010    CRP 17.6 (H) 11/08/2021       Anesthesia Plan    ASA Status:  3    NPO Status:  NPO Appropriate    Anesthesia Type: General.     - Airway: LMA   Induction: Intravenous, Propofol.   Maintenance: Balanced.        Consents    Anesthesia Plan(s) and associated risks, benefits, and realistic alternatives discussed. Questions answered and patient/representative(s) expressed understanding.     - Discussed:     - Discussed with:  Patient, Spouse      - Extended Intubation/Ventilatory Support Discussed: No.      - Patient is DNR/DNI " Status: No     Use of blood products discussed: No .     Postoperative Care    Pain management: IV analgesics, Oral pain medications.   PONV prophylaxis: Ondansetron (or other 5HT-3), Dexamethasone or Solumedrol     Comments:             Sarbjit Kaur MD    I have reviewed the pertinent notes and labs in the chart from the past 30 days and (re)examined the patient.  Any updates or changes from those notes are reflected in this note.             # Drug Induced Platelet Defect: home medication list includes an antiplatelet medication

## 2023-12-07 NOTE — ANESTHESIA CARE TRANSFER NOTE
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    Anesthesia Type:   General     Note:    Oropharynx: oropharynx clear of all foreign objects  Level of Consciousness: awake  Oxygen Supplementation: face mask  Level of Supplemental Oxygen (L/min / FiO2): 8  Independent Airway: airway patency satisfactory and stable  Dentition: dentition unchanged  Vital Signs Stable: post-procedure vital signs reviewed and stable  Report to RN Given: handoff report given  Patient transferred to: PACU  Comments: Anesthesia Care Transfer Note    Patient: Tima Mckenzie    Transferred to: PACU with supplemental O2    Patient vital signs: stable    Airway: none    Monitors on, VSS, breathing spontaneously, report to RN      Elham Jorge CRNA   12/7/2023 1:39 PM   Handoff Report: Identifed the Patient, Identified the Reponsible Provider, Reviewed the pertinent medical history, Discussed the surgical course, Reviewed Intra-OP anesthesia mangement and issues during anesthesia, Set expectations for post-procedure period and Allowed opportunity for questions and acknowledgement of understanding    Vitals:  Vitals Value Taken Time   /72 12/07/23 1326   Temp 97.5  F (36.4  C) 12/07/23 1326   Pulse 63 12/07/23 1338   Resp 9 12/07/23 1338   SpO2 99 % 12/07/23 1338   Vitals shown include unfiled device data.    Electronically Signed By: MARIA DEL CARMEN Matos CRNA  December 7, 2023  1:38 PM

## 2023-12-07 NOTE — OP NOTE
Preop diagnosis: History of left ureteral tumor    Postop diagnosis: Same    Procedure done:  #1 left ureteroscopy  #2 cystoscopy and left stent removal    Procedure    Patient brought into the OR suite and placed in a dorsolithotomy position after general anesthesia has been induced.  He was draped and prepped sterilely.  He received preop antibiotics.  A rigid cystoscope was then placed directly into the bladder.  The left ureteral stent identified.  Using a grasper this was removed after a sensor wire placed in the renal pelvis.  A semirigid ureteroscope was then placed into the ureter.  I inspected the ureter completely and no residual tumor was identified.  Patient tolerated procedure well.  No complications identified during the procedure.  There was minimal bleeding during operation.  I will plan to have the patient to come back to the office in 4 to 6 months from now with a repeat urogram.

## 2023-12-07 NOTE — BRIEF OP NOTE
Ann[facility]  Brief Operative Note    Pre-operative diagnosis: Left ureteral cancer   Post-operative diagnosis same   Procedure: Procedure(s):  CYSTOURETEROSCOPY, WITH URETERAL STENT REMOVAL   Surgeon: Evan Hand MD   Assistants(s): None   Anesthesia: General    Estimated blood loss: minimal                   Specimens: None   Implants: None       Complications: None   Condition on discharge: Stable   Findings: See dictated operative report for full details

## 2023-12-07 NOTE — ANESTHESIA POSTPROCEDURE EVALUATION
Patient: Tima Mckenzie    Procedure: Procedure(s):  CYSTOURETEROSCOPY, WITH URETERAL STENT REMOVAL       Anesthesia Type:  General    Note:  Disposition: Outpatient   Postop Pain Control: Uneventful            Sign Out: Well controlled pain   PONV: No   Neuro/Psych: Uneventful            Sign Out: Acceptable/Baseline neuro status   Airway/Respiratory: Uneventful            Sign Out: Acceptable/Baseline resp. status   CV/Hemodynamics: Uneventful            Sign Out: Acceptable CV status; No obvious hypovolemia; No obvious fluid overload   Other NRE: NONE   DID A NON-ROUTINE EVENT OCCUR? No       Last vitals:  Vitals Value Taken Time   /72 12/07/23 1326   Temp 97.5  F (36.4  C) 12/07/23 1326   Pulse 63 12/07/23 1338   Resp 10 12/07/23 1339   SpO2 96 % 12/07/23 1342   Vitals shown include unfiled device data.    Electronically Signed By: Sarbjit Kaur MD  December 7, 2023  1:47 PM

## 2024-02-02 ENCOUNTER — TELEPHONE (OUTPATIENT)
Dept: FAMILY MEDICINE | Facility: CLINIC | Age: 89
End: 2024-02-02
Payer: COMMERCIAL

## 2024-02-02 NOTE — TELEPHONE ENCOUNTER
Patient Quality Outreach    Patient is due for the following:   Hypertension -  Hypertension follow-up visit  Physical Annual Wellness Visit      Topic Date Due    Zoster (Shingles) Vaccine (1 of 2) Never done    Diptheria Tetanus Pertussis (DTAP/TDAP/TD) Vaccine (1 - Tdap) 07/13/2018    Flu Vaccine (1) 09/01/2023    COVID-19 Vaccine (6 - 2023-24 season) 09/01/2023       Next Steps:   Schedule a Annual Wellness Visit    Type of outreach:    Sent [employer] message.    Next Steps:  Reach out within 90 days via [employer].    Max number of attempts reached: No. Will try again in 90 days if patient still on fail list.    Questions for provider review:    None           Elisabeth Cervantes MA

## 2024-06-27 ENCOUNTER — MYC MEDICAL ADVICE (OUTPATIENT)
Dept: FAMILY MEDICINE | Facility: CLINIC | Age: 89
End: 2024-06-27
Payer: COMMERCIAL

## 2024-06-28 NOTE — TELEPHONE ENCOUNTER
I responded to patient, but this, like all new questions and concerns, really requires a visit.  We need to quit passing through patient messages that require a workup and expecting us to do them for free.  We have protocol for this.

## 2024-09-10 ENCOUNTER — LAB (OUTPATIENT)
Dept: LAB | Facility: CLINIC | Age: 89
End: 2024-09-10
Payer: COMMERCIAL

## 2024-09-10 DIAGNOSIS — Z95.2 S/P TAVR (TRANSCATHETER AORTIC VALVE REPLACEMENT): ICD-10-CM

## 2024-09-10 DIAGNOSIS — I25.10 CORONARY ARTERY DISEASE INVOLVING NATIVE CORONARY ARTERY OF NATIVE HEART WITHOUT ANGINA PECTORIS: ICD-10-CM

## 2024-09-10 LAB
ANION GAP SERPL CALCULATED.3IONS-SCNC: 9 MMOL/L (ref 7–15)
BUN SERPL-MCNC: 19.6 MG/DL (ref 8–23)
CALCIUM SERPL-MCNC: 10 MG/DL (ref 8.8–10.4)
CHLORIDE SERPL-SCNC: 105 MMOL/L (ref 98–107)
CHOLEST SERPL-MCNC: 142 MG/DL
CREAT SERPL-MCNC: 1.31 MG/DL (ref 0.67–1.17)
EGFRCR SERPLBLD CKD-EPI 2021: 53 ML/MIN/1.73M2
FASTING STATUS PATIENT QL REPORTED: YES
FASTING STATUS PATIENT QL REPORTED: YES
GLUCOSE SERPL-MCNC: 98 MG/DL (ref 70–99)
HCO3 SERPL-SCNC: 26 MMOL/L (ref 22–29)
HDLC SERPL-MCNC: 50 MG/DL
LDLC SERPL CALC-MCNC: 71 MG/DL
NONHDLC SERPL-MCNC: 92 MG/DL
POTASSIUM SERPL-SCNC: 4.6 MMOL/L (ref 3.4–5.3)
SODIUM SERPL-SCNC: 140 MMOL/L (ref 135–145)
TRIGL SERPL-MCNC: 104 MG/DL

## 2024-09-10 PROCEDURE — 80061 LIPID PANEL: CPT

## 2024-09-10 PROCEDURE — 36415 COLL VENOUS BLD VENIPUNCTURE: CPT

## 2024-09-10 PROCEDURE — 80048 BASIC METABOLIC PNL TOTAL CA: CPT

## 2024-09-13 ENCOUNTER — ANCILLARY PROCEDURE (OUTPATIENT)
Dept: CARDIOLOGY | Facility: CLINIC | Age: 89
End: 2024-09-13
Attending: INTERNAL MEDICINE
Payer: COMMERCIAL

## 2024-09-13 DIAGNOSIS — Z95.2 S/P TAVR (TRANSCATHETER AORTIC VALVE REPLACEMENT): ICD-10-CM

## 2024-09-13 LAB — LVEF ECHO: NORMAL

## 2024-09-13 PROCEDURE — 93306 TTE W/DOPPLER COMPLETE: CPT | Mod: GC | Performed by: INTERNAL MEDICINE

## 2024-09-13 NOTE — PROGRESS NOTES
[facility]   Cardiology Clinic Note      HPI:   Mr. Tima Mckenzie is a pleasant 89 year old male with medical history pertinent for CAD s/p 3vCABG (LIMA-LAD SVG-OM1-D1) 1997 and PCI w/ BYRON to pRCA/rPDA/D1 1/20/20 and PCI+BYRON to RCA 6/28/21, severe aortic stenosis s/p TAVR 8/17/22 with 29 mm Khoury Miky 3 prosthesis. He presents to cardiology clinic for annual follow up.    Patient was last seen in cardiology 09/2023 with Dr. Parkre for management of his chronic cardiac conditions. At that time, patient was reportedly doing well and was increasing his amount of physical activity. He was able to do strenuous yardwork and was riding his bike 6-8miles per day.     Patient recently completed an echocardiogram 9/13/24 which showed:   Left ventricular function is normal.The ejection fraction is 60-65%.  Global right ventricular function is normal. s/p TAVR with Miky 3 Ultra 29 mm (08/17/2022). The mean gradient is 10 mmHg. The peak velocity across the aortic valve is 2.05 m/sec. No AI.  The inferior vena cava was normal in size with preserved respiratory variability.  This study was compared with the study from 09/29/22 . No significant changes noted.  He also completed an abdominal US and aortic aneurysm 4.2 cm at largest point (4.0 cm in 2023).    Since his last visit, patient reports overall feeling well, however he does feel like he is having worsening RAE. He feels it has been a slow progression over time and he is no longer able to do as much work as he was previously and is very limited by dyspnea. He notices this the most when he is carrying things up the stairs and feels this is more severe than it has been in the past. He denies any symptoms at rest and he reports he stops what he's doing before he ever experiences chest pain or tightness. He has not taken nitroglycerin for several years.   He rides his electric bike about 10miles every day without symptoms, but reports the pedal assist  mechanism does a lot of the labor for him.     He reports that he does not check his blood pressure at home and is not positive which medications he is taking. He notes that his ankles are occasionally slightly swollen, but notes this is more of a casual observation than a bothersome symptom.     Of note, patient reports that he will be moving to [place] this coming spring to be closer to his daughter.     Today in clinic, he denies chest pain, palpitations, dizziness, or syncope.     PAST MEDICAL HISTORY:  Past Medical History:   Diagnosis Date    Arthritis     BPH (benign prostatic hypertrophy)     Coronary artery disease     Diverticulosis     Hypercholesterolemia     Hypertension     MI (myocardial infarction) (H) 3/1997    Anterior Wall MI/LAD/OM1/SVG    CABG X 3    Neuropathy     RT Foot    Seasonal allergic rhinitis 7/1/2013       FAMILY HISTORY:  Family History   Problem Relation Age of Onset    Coronary Artery Disease Brother 55        Fatal MI       SOCIAL HISTORY:  Social History     Socioeconomic History    Marital status:    Tobacco Use    Smoking status: Former     Passive exposure: Past (Parents smoked while growing up. Wife also smoked.)    Smokeless tobacco: Never    Tobacco comments:     1997   Vaping Use    Vaping status: Never Used   Substance and Sexual Activity    Alcohol use: Yes     Comment: rarely     Drug use: No    Sexual activity: Yes     Partners: Female     Social Determinants of Health     Financial Resource Strain: Low Risk  (11/22/2023)    Financial Resource Strain     Within the past 12 months, have you or your family members you live with been unable to get utilities (heat, electricity) when it was really needed?: No   Food Insecurity: Low Risk  (11/22/2023)    Food Insecurity     Within the past 12 months, did you worry that your food would run out before you got money to buy more?: No     Within the past 12 months, did the food you bought just not last and you didn t have money  to get more?: No   Transportation Needs: Low Risk  (11/22/2023)    Transportation Needs     Within the past 12 months, has lack of transportation kept you from medical appointments, getting your medicines, non-medical meetings or appointments, work, or from getting things that you need?: No   Interpersonal Safety: Low Risk  (11/22/2023)    Interpersonal Safety     Do you feel physically and emotionally safe where you currently live?: Yes     Within the past 12 months, have you been hit, slapped, kicked or otherwise physically hurt by someone?: No     Within the past 12 months, have you been humiliated or emotionally abused in other ways by your partner or ex-partner?: No   Housing Stability: Low Risk  (11/22/2023)    Housing Stability     Do you have housing? : Yes     Are you worried about losing your housing?: No       CURRENT MEDICATIONS:  Current Outpatient Medications   Medication Sig Dispense Refill    albuterol (PROAIR HFA/PROVENTIL HFA/VENTOLIN HFA) 108 (90 Base) MCG/ACT inhaler Inhale 2 puffs into the lungs every 4 hours as needed for shortness of breath, wheezing or cough 18 g 0    amLODIPine (NORVASC) 5 MG tablet Take 2 tablets (10 mg) by mouth daily. 90 tablet 3    amoxicillin (AMOXIL) 500 MG capsule Take 4 capsules (2000 mg) by mouth 30-60 minutes prior to dental work. 4 capsule 5    amoxicillin-clavulanate (AUGMENTIN) 875-125 MG tablet Take 1 tablet by mouth 2 times daily 20 tablet 0    HYDROcodone-acetaminophen (NORCO) 5-325 MG tablet Take 1-2 tablets by mouth every 4 hours as needed for moderate to severe pain 10 tablet 0    isosorbide mononitrate (IMDUR) 60 MG 24 hr tablet Take 1 tablet (60 mg) by mouth every morning 90 tablet 3    mometasone (NASONEX) 50 MCG/ACT nasal spray Spray 2 sprays into both nostrils as needed (nasal congestion) 17 g 1    multivitamin, therapeutic (THERA-VIT) TABS tablet Take 1 tablet by mouth daily      nitroGLYcerin (NITROSTAT) 0.4 MG sublingual tablet Place 1 tablet (0.4  "mg) under the tongue every 5 minutes as needed for chest pain up to 3 tablets per episode. 25 tablet 0    rosuvastatin (CRESTOR) 40 MG tablet Take 1 tablet (40 mg) by mouth daily 90 tablet 3    ticagrelor (BRILINTA) 90 MG tablet Take 1 tablet (90 mg) by mouth 2 times daily 180 tablet 3    metoprolol succinate ER (TOPROL XL) 25 MG 24 hr tablet Take 1 tablet (25 mg) by mouth daily (Patient not taking: Reported on 9/17/2024) 90 tablet 3     No current facility-administered medications for this visit.       ROS:   Refer to HPI    EXAM:  BP (!) 156/68   Pulse 66   Ht 1.778 m (5' 10\")   Wt 80.7 kg (178 lb)   SpO2 97%   BMI 25.54 kg/m      GENERAL: Appears comfortable, in no acute distress.   HEENT: Eye symmetrical, no discharge or icterus bilaterally.   CV: RRR, +S1S2, no murmur, rub, or gallop.   RESPIRATORY: Respirations regular, even, and unlabored. Lungs CTA throughout.   GI: Soft and non distended.   EXTREMITIES: no peripheral edema.   NEUROLOGIC: Alert and oriented x 3. No focal deficits.   MUSCULOSKELETAL: No joint swelling or tenderness.   SKIN: No jaundice. No rashes or lesions.     Labs, reviewed with patient in clinic today:  CBC RESULTS:  Lab Results   Component Value Date    WBC 8.2 11/22/2023    WBC 7.3 06/28/2021    RBC 4.87 11/22/2023    RBC 5.22 06/28/2021    HGB 15.2 11/22/2023    HGB 16.5 06/28/2021    HCT 46.1 11/22/2023    HCT 48.7 06/28/2021    MCV 95 11/22/2023    MCV 93 06/28/2021    MCH 31.2 11/22/2023    MCH 31.6 06/28/2021    MCHC 33.0 11/22/2023    MCHC 33.9 06/28/2021    RDW 12.8 11/22/2023    RDW 12.4 06/28/2021     11/22/2023     06/28/2021       CMP RESULTS:  Lab Results   Component Value Date     09/10/2024     06/28/2021    POTASSIUM 4.6 09/10/2024    POTASSIUM 4.5 06/28/2023    POTASSIUM 4.4 06/28/2021    CHLORIDE 105 09/10/2024    CHLORIDE 105 06/28/2023    CHLORIDE 108 06/28/2021    CO2 26 09/10/2024    CO2 27 06/28/2023    CO2 26 06/28/2021    ANIONGAP 9 " "09/10/2024    ANIONGAP 7 06/28/2023    ANIONGAP 4 06/28/2021    GLC 98 09/10/2024     (H) 06/28/2023    GLC 92 06/28/2021    BUN 19.6 09/10/2024    BUN 20 06/28/2023    BUN 14 06/28/2021    CR 1.31 (H) 09/10/2024    CR 1.11 06/28/2021    GFRESTIMATED 53 (L) 09/10/2024    GFRESTIMATED 59 (L) 09/21/2023    GFRESTIMATED 61 06/28/2021    GFRESTBLACK 70 06/28/2021    CLAUDIA 10.0 09/10/2024    CLAUDIA 9.3 06/28/2021    BILITOTAL 0.6 06/28/2023    BILITOTAL 0.5 06/17/2021    ALBUMIN 3.8 06/28/2023    ALBUMIN 3.9 06/17/2021    ALKPHOS 59 06/28/2023    ALKPHOS 48 06/17/2021    ALT 16 06/28/2023    ALT 29 06/17/2021    AST 23 06/28/2023    AST 20 06/17/2021        INR RESULTS:  Lab Results   Component Value Date    INR 1.07 08/15/2022       Lab Results   Component Value Date    MAG 1.8 08/18/2022     No results found for: \"NTBNPI\"  Lab Results   Component Value Date    NTBNP 310 08/15/2022       LIPIDS:  Lab Results   Component Value Date    CHOL 142 09/10/2024    CHOL 138 06/17/2021     Lab Results   Component Value Date    HDL 50 09/10/2024    HDL 49 06/17/2021     Lab Results   Component Value Date    LDL 71 09/10/2024    LDL 66 06/17/2021     Lab Results   Component Value Date    TRIG 104 09/10/2024    TRIG 116 06/17/2021     Lab Results   Component Value Date    CHOLHDLRATIO 2.8 07/21/2015         Assessment and Plan:   Mr. Mckenzie is a 89 year old male with a PMH of CAD s/p 3vCABG (LIMA-LAD SVG-OM1-D1) 1997 and PCI w/ BYRON to pRCA/rPDA/D1 1/20/20 and PCI+BYRON to RCA 6/28/21, severe aortic stenosis s/p TAVR 8/17/22 with 29 mm Khoury Miky 3 prosthesis    1. S/P 29 mm Miky 3 TAVR 8/17/22 for severe aortic stenosis:  2. 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 Coronary artery disease status post three-vessel coronary bypass graft (LIMA-LAD SVG-OM-D1) s/p PCI w/ BYRON to pRCA/rPDA/D1 1/20/20 and PCI+BYRON to RCA 6/28/21 without angina  4. Exertional dyspnea, greatly improved " (nearly resolved) s/p TAVR  Exertional dyspnea has been his anginal equivalent (initially resolved after his 1st PCI; he did have more classic chest tightness prior to his 2nd PCI) and has also been his symptom of severe aortic stenosis. Cameron reports he experienced dramatic improvement in his previous exertional dyspnea since his TAVR..    Today in clinic he notes a return of RAE that feels more severe than it ever has in the past. He notices this the most when he is using the stairs. TTE 9/13 shows stable, well-seated TAVR valve.   - CT coronary angiogram to assess RAE as this historically has been patient's angingal equivalent  - Continue ticagrelor 90 mg BID monotherapy indefinitely   - Continue rosuvastatin 40 mg and Imdur 60 mg daily; could consider uptitrating Imdur pending CTA results  - SBE prophylaxis with dental procedures    3. Hypertension   Patient does not check his BP at home and is unsure what medications he is taking. Pressure elevated on initial and recheck in clinic today. Patient with 4.2 cm abdominal aortic aneurysm.  - Increase Amlodipine to 10mg daily  - Patient will reach out to RN after checking his medicines at home whether or not he is taking Toprol     4. Hyperlipidemia, controlled: continue present management.   Most recent lipid panel:   Recent Labs   Lab Test 09/10/24  0933 06/27/22  0853   CHOL 142 132   HDL 50 46   LDL 71 64   TRIG 104 109   - Continue Crestor 40mg daily     5. AAA, 4.0 cm on US aorta 9/12/23 and 3.7 on CT abdomen 11/2021:   - US aorta 9/17/23 with 4.2 cm AAA (4.0 cm 2023)  - repeat US aorta 1 year     6. Venous insufficiency: continue compression hose and counseled regarding elevation      Follow up: Pending CTA; 1 year (patient moving to [place] in spring)  Chart review time today: 10 minutes  Visit time today: 25 minutes  Total time spent today: 35 minutes        Andria Rojas PA-C  General Cardiology   09/17/24

## 2024-09-17 ENCOUNTER — OFFICE VISIT (OUTPATIENT)
Dept: CARDIOLOGY | Facility: CLINIC | Age: 89
End: 2024-09-17
Payer: COMMERCIAL

## 2024-09-17 ENCOUNTER — ANCILLARY PROCEDURE (OUTPATIENT)
Dept: ULTRASOUND IMAGING | Facility: CLINIC | Age: 89
End: 2024-09-17
Attending: INTERNAL MEDICINE
Payer: COMMERCIAL

## 2024-09-17 ENCOUNTER — LAB (OUTPATIENT)
Dept: LAB | Facility: CLINIC | Age: 89
End: 2024-09-17
Payer: COMMERCIAL

## 2024-09-17 VITALS
WEIGHT: 178 LBS | HEART RATE: 66 BPM | OXYGEN SATURATION: 97 % | HEIGHT: 70 IN | SYSTOLIC BLOOD PRESSURE: 156 MMHG | DIASTOLIC BLOOD PRESSURE: 68 MMHG | BODY MASS INDEX: 25.48 KG/M2

## 2024-09-17 DIAGNOSIS — R06.09 DYSPNEA ON EXERTION: Primary | ICD-10-CM

## 2024-09-17 DIAGNOSIS — E78.5 HYPERLIPIDEMIA LDL GOAL <70: ICD-10-CM

## 2024-09-17 DIAGNOSIS — I35.0 NONRHEUMATIC AORTIC VALVE STENOSIS: ICD-10-CM

## 2024-09-17 DIAGNOSIS — Z95.2 S/P TAVR (TRANSCATHETER AORTIC VALVE REPLACEMENT): ICD-10-CM

## 2024-09-17 DIAGNOSIS — I35.0 SEVERE AORTIC STENOSIS: ICD-10-CM

## 2024-09-17 DIAGNOSIS — E78.5 HYPERLIPIDEMIA LDL GOAL <70: Primary | ICD-10-CM

## 2024-09-17 DIAGNOSIS — I71.43 INFRARENAL ABDOMINAL AORTIC ANEURYSM (AAA) WITHOUT RUPTURE (H): ICD-10-CM

## 2024-09-17 DIAGNOSIS — I10 ESSENTIAL HYPERTENSION WITH GOAL BLOOD PRESSURE LESS THAN 130/80: ICD-10-CM

## 2024-09-17 DIAGNOSIS — I25.10 CORONARY ARTERY DISEASE INVOLVING NATIVE CORONARY ARTERY OF NATIVE HEART WITHOUT ANGINA PECTORIS: ICD-10-CM

## 2024-09-17 PROCEDURE — 76775 US EXAM ABDO BACK WALL LIM: CPT | Performed by: RADIOLOGY

## 2024-09-17 PROCEDURE — 99214 OFFICE O/P EST MOD 30 MIN: CPT | Mod: 25

## 2024-09-17 RX ORDER — AMLODIPINE BESYLATE 5 MG/1
10 TABLET ORAL DAILY
Qty: 90 TABLET | Refills: 3 | Status: SHIPPED | OUTPATIENT
Start: 2024-09-17

## 2024-09-17 NOTE — PATIENT INSTRUCTIONS
Take your medicines every day, as directed     Changes made today:  Increase Amlodipine to 10 mg daily   Check at home if taking Metoprolol succinate 25 mg daily   Complete a coronary CTA in 1-2 months . Call central imaging at [phone] to set-up an appointment.        Cardiology Care Coordinators:      Consuelo PERRY RN     Cardiology Rooming staff:  Tiffany LLANOS    Phone  [phone]      Fax [phone]    To Contact us     During Business Hours:  [phone]     If you are needing refills please contact your pharmacy.     For urgent after hour care please call the [facility] at [phone] or the [facility] at [phone] and ask to speak to the cardiologist on call.            HOW TO CHECK YOUR BLOOD PRESSURE AT HOME:     Avoid eating, smoking, and exercising for at least 30 minutes before taking a reading.     Be sure you have taken your BP medication at least 2-3 hours before you check it.      Sit quietly for 10 minutes before a reading.      Sit in a chair with your feet flat on the floor. Rest your  arm on a table so that the arm cuff is at the same level as your heart.     Remain still during the reading.  Record your blood pressure and pulse in a log and bring to your next appointment.       Use [employer] allows you to communicate directly with your heart team through secure messaging.  [employer] can be accessed any time on your phone, computer, or tablet.  If you need assistance, we'd be happy to help!             Keep your Heart Appointments:     No follow-up plan since you are moving to [place]          Please follow these INSTRUCTIONS for PREP of your CT SCAN:  1.  PLEASE DO NOT EAT OR DRINK 3 HOURS PRIOR TO PROCEDURE  2.  PLEASE DO NOT USE ALCOHOL, CAFFEINE OR TOBACCO 12 HOURS PRIOR TO THE PROCEDURE    Please also call your insurance company  for any billing questions regarding the test.

## 2024-09-17 NOTE — NURSING NOTE
Cardiac Testing:   -Complete a coronary CTA in 1-2 months     Med Reconcile: Reviewed and verified all current medications with the patient. The updated medication list was printed and given to the patient./ Increase Amlodipine to 10 mg daily.     Return Appointment:   No follow-up plan. Pt is moving to [place] next Spring.

## 2024-09-17 NOTE — NURSING NOTE
"No chief complaint on file.      Initial BP (!) 167/75 (BP Location: Right arm, Patient Position: Chair, Cuff Size: Adult Regular)   Pulse 66   Ht 1.778 m (5' 10\")   Wt 80.7 kg (178 lb)   SpO2 97%   BMI 25.54 kg/m   Estimated body mass index is 25.54 kg/m  as calculated from the following:    Height as of this encounter: 1.778 m (5' 10\").    Weight as of this encounter: 80.7 kg (178 lb)..  BP completed using cuff size: regular    Arabella BROWN CNA  "

## 2024-11-04 ENCOUNTER — HOSPITAL ENCOUNTER (OUTPATIENT)
Dept: CT IMAGING | Facility: CLINIC | Age: 89
Discharge: HOME | End: 2024-11-04
Payer: COMMERCIAL

## 2024-11-04 ENCOUNTER — ANCILLARY ORDERS (OUTPATIENT)
Dept: CARDIOLOGY | Facility: CLINIC | Age: 89
End: 2024-11-04

## 2024-11-04 VITALS — HEART RATE: 55 BPM | SYSTOLIC BLOOD PRESSURE: 129 MMHG | DIASTOLIC BLOOD PRESSURE: 80 MMHG | RESPIRATION RATE: 16 BRPM

## 2024-11-04 DIAGNOSIS — I25.10 CORONARY ARTERY DISEASE INVOLVING NATIVE CORONARY ARTERY OF NATIVE HEART WITHOUT ANGINA PECTORIS: ICD-10-CM

## 2024-11-04 DIAGNOSIS — I25.10 CORONARY ARTERY DISEASE INVOLVING NATIVE CORONARY ARTERY OF NATIVE HEART WITHOUT ANGINA PECTORIS: Primary | ICD-10-CM

## 2024-11-04 PROCEDURE — 250N000013 HC RX MED GY IP 250 OP 250 PS 637: Performed by: STUDENT IN AN ORGANIZED HEALTH CARE EDUCATION/TRAINING PROGRAM

## 2024-11-04 PROCEDURE — 250N000009 HC RX 250: Performed by: STUDENT IN AN ORGANIZED HEALTH CARE EDUCATION/TRAINING PROGRAM

## 2024-11-04 PROCEDURE — 250N000011 HC RX IP 250 OP 636: Performed by: STUDENT IN AN ORGANIZED HEALTH CARE EDUCATION/TRAINING PROGRAM

## 2024-11-04 PROCEDURE — 75574 CT ANGIO HRT W/3D IMAGE: CPT | Mod: 26 | Performed by: STUDENT IN AN ORGANIZED HEALTH CARE EDUCATION/TRAINING PROGRAM

## 2024-11-04 PROCEDURE — 75574 CT ANGIO HRT W/3D IMAGE: CPT

## 2024-11-04 RX ORDER — IVABRADINE 5 MG/1
5-15 TABLET, FILM COATED ORAL
Status: COMPLETED | OUTPATIENT
Start: 2024-11-04 | End: 2024-11-04

## 2024-11-04 RX ORDER — METHYLPREDNISOLONE SODIUM SUCCINATE 125 MG/2ML
125 INJECTION INTRAMUSCULAR; INTRAVENOUS
Status: DISCONTINUED | OUTPATIENT
Start: 2024-11-04 | End: 2024-11-05 | Stop reason: HOSPADM

## 2024-11-04 RX ORDER — METOPROLOL TARTRATE 1 MG/ML
5-20 INJECTION, SOLUTION INTRAVENOUS
Status: DISCONTINUED | OUTPATIENT
Start: 2024-11-04 | End: 2024-11-05 | Stop reason: HOSPADM

## 2024-11-04 RX ORDER — METOPROLOL TARTRATE 25 MG/1
25-100 TABLET, FILM COATED ORAL
Status: COMPLETED | OUTPATIENT
Start: 2024-11-04 | End: 2024-11-04

## 2024-11-04 RX ORDER — LIDOCAINE 40 MG/G
CREAM TOPICAL
Status: DISCONTINUED | OUTPATIENT
Start: 2024-11-04 | End: 2024-11-05 | Stop reason: HOSPADM

## 2024-11-04 RX ORDER — DIPHENHYDRAMINE HCL 25 MG
25 CAPSULE ORAL
Status: DISCONTINUED | OUTPATIENT
Start: 2024-11-04 | End: 2024-11-05 | Stop reason: HOSPADM

## 2024-11-04 RX ORDER — NITROGLYCERIN 0.4 MG/1
.4-.8 TABLET SUBLINGUAL
Status: DISCONTINUED | OUTPATIENT
Start: 2024-11-04 | End: 2024-11-05 | Stop reason: HOSPADM

## 2024-11-04 RX ORDER — DILTIAZEM HYDROCHLORIDE 5 MG/ML
10-15 INJECTION INTRAVENOUS
Status: DISCONTINUED | OUTPATIENT
Start: 2024-11-04 | End: 2024-11-05 | Stop reason: HOSPADM

## 2024-11-04 RX ORDER — DILTIAZEM HYDROCHLORIDE 120 MG/1
120 TABLET, FILM COATED ORAL
Status: DISCONTINUED | OUTPATIENT
Start: 2024-11-04 | End: 2024-11-05 | Stop reason: HOSPADM

## 2024-11-04 RX ORDER — IOPAMIDOL 755 MG/ML
110 INJECTION, SOLUTION INTRAVASCULAR ONCE
Status: COMPLETED | OUTPATIENT
Start: 2024-11-04 | End: 2024-11-04

## 2024-11-04 RX ORDER — ONDANSETRON 2 MG/ML
4 INJECTION INTRAMUSCULAR; INTRAVENOUS
Status: DISCONTINUED | OUTPATIENT
Start: 2024-11-04 | End: 2024-11-05 | Stop reason: HOSPADM

## 2024-11-04 RX ORDER — DIPHENHYDRAMINE HYDROCHLORIDE 50 MG/ML
25-50 INJECTION INTRAMUSCULAR; INTRAVENOUS
Status: DISCONTINUED | OUTPATIENT
Start: 2024-11-04 | End: 2024-11-05 | Stop reason: HOSPADM

## 2024-11-04 RX ADMIN — IOPAMIDOL 110 ML: 755 INJECTION, SOLUTION INTRAVENOUS at 13:32

## 2024-11-04 RX ADMIN — METOPROLOL TARTRATE 5 MG: 1 INJECTION, SOLUTION INTRAVENOUS at 13:37

## 2024-11-04 RX ADMIN — NITROGLYCERIN 0.8 MG: 0.4 TABLET SUBLINGUAL at 13:35

## 2024-11-04 RX ADMIN — METOPROLOL TARTRATE 50 MG: 50 TABLET, FILM COATED ORAL at 12:28

## 2024-11-04 RX ADMIN — IVABRADINE 10 MG: 5 TABLET, FILM COATED ORAL at 12:29

## 2024-11-04 NOTE — PROGRESS NOTES
Pt arrived for Coronary CT angiogram. Test, meds and side effects reviewed with pt. Resting HR 66 bpm. Given 50 mg PO Metoprolol + 10 mg PO Ivabradine per verbal order. Administered 0.8 mg SL nitro and 5 mg IV metoprolol on CTA table per order. CTA completed. Patient tolerated procedure well and denies symptoms of allergic reaction. Post monitoring completed and VSS. D/C instructions reviewed with pt whom verbalized understanding of need to increase PO fluids today. D/C to gold waiting room accompanied by staff.

## 2024-11-17 ENCOUNTER — HEALTH MAINTENANCE LETTER (OUTPATIENT)
Age: 89
End: 2024-11-17

## 2024-11-22 DIAGNOSIS — I10 ESSENTIAL HYPERTENSION WITH GOAL BLOOD PRESSURE LESS THAN 130/80: ICD-10-CM

## 2024-11-22 DIAGNOSIS — E78.5 HYPERLIPIDEMIA LDL GOAL <70: ICD-10-CM

## 2024-11-23 RX ORDER — ROSUVASTATIN CALCIUM 40 MG/1
40 TABLET, COATED ORAL DAILY
Qty: 90 TABLET | Refills: 2 | Status: SHIPPED | OUTPATIENT
Start: 2024-11-23

## 2024-11-23 RX ORDER — METOPROLOL SUCCINATE 25 MG/1
25 TABLET, EXTENDED RELEASE ORAL DAILY
Qty: 90 TABLET | Refills: 2 | Status: SHIPPED | OUTPATIENT
Start: 2024-11-23

## 2025-01-14 ENCOUNTER — MYC MEDICAL ADVICE (OUTPATIENT)
Dept: FAMILY MEDICINE | Facility: CLINIC | Age: 89
End: 2025-01-14
Payer: COMMERCIAL

## 2025-01-16 ENCOUNTER — VIRTUAL VISIT (OUTPATIENT)
Dept: FAMILY MEDICINE | Facility: CLINIC | Age: 89
End: 2025-01-16
Payer: COMMERCIAL

## 2025-01-16 DIAGNOSIS — E78.5 HYPERLIPIDEMIA LDL GOAL <70: ICD-10-CM

## 2025-01-16 DIAGNOSIS — R05.2 SUBACUTE COUGH: Primary | ICD-10-CM

## 2025-01-16 DIAGNOSIS — N18.31 CHRONIC KIDNEY DISEASE, STAGE 3A (H): ICD-10-CM

## 2025-01-16 DIAGNOSIS — I11.0 HYPERTENSIVE HEART DISEASE WITH HEART FAILURE (H): ICD-10-CM

## 2025-01-16 DIAGNOSIS — I25.10 CORONARY ARTERY DISEASE INVOLVING NATIVE CORONARY ARTERY OF NATIVE HEART WITHOUT ANGINA PECTORIS: ICD-10-CM

## 2025-01-16 DIAGNOSIS — I10 ESSENTIAL HYPERTENSION WITH GOAL BLOOD PRESSURE LESS THAN 130/80: ICD-10-CM

## 2025-01-16 RX ORDER — DOXYCYCLINE HYCLATE 100 MG
100 TABLET ORAL 2 TIMES DAILY
Qty: 14 TABLET | Refills: 0 | Status: SHIPPED | OUTPATIENT
Start: 2025-01-16 | End: 2025-01-23

## 2025-01-16 ASSESSMENT — ENCOUNTER SYMPTOMS: COUGH: 1

## 2025-01-16 NOTE — PROGRESS NOTES
"  If patient has telephone visit, have they been educated on video visit as preferred visit method and offered to change to video visit? N/A        Instructions Relayed to Patient by Virtual Roomer:     Patient is active on [employer]:   Relayed following to patient: \"It looks like you are active on [employer], are you able to join the visit this way? If not, do you need us to send you a link now or would you like your provider to send a link via text or email when they are ready to initiate the visit?\"      Patient Confirmed they will join visit via: [employer]  Reminded patient to ensure they were logged on to virtual visit by arrival time listed.   Asked if patient has flexibility to initiate visit sooner than arrival time: patient is unable to initiate visit earlier than arrival time     If pediatric virtual visit, ensured pediatric patient along with parent/guardian will be present for video visit.     Patient offered the website [email or web address] and/or phone number to [employer] Help line: [phone]      Cameron is a 89 year old who is being evaluated via a billable video visit.    How would you like to obtain your AVS? [employer]  If the video visit is dropped, the invitation should be resent by: Text to cell phone: [phone]  Will anyone else be joining your video visit? No      Assessment & Plan     Subacute cough  Patient well-known to me though I have not seen him in a little bit.  Has had a nagging cough for couple of months but seems to be changing lately.  No fevers or chills but he is feeling a bit more rundown with exercise.  Cough is now producing a little bit of light yellow sputum and nasal congestion the same.  So I think it is time we intervene with a course of antibiotics.  Contact me with progress.  As a side note he is going to be moving to [place] to be near her daughter.  So I told him to get a hold of me with anything he needs before or just after the move to help coordinate " "care.  - doxycycline hyclate (VIBRA-TABS) 100 MG tablet; Take 1 tablet (100 mg) by mouth 2 times daily for 7 days.    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    Weight as of 9/17/24: 80.7 kg (178 lb).         Regular exercise  See Patient Instructions    Subjective   Cameron is a 89 year old, presenting for the following health issues:  Cough        1/16/2025    10:03 AM   Additional Questions   Roomed by Viki MIJARES   Accompanied by Self     Video Start Time:  1036    History of Present Illness       Reason for visit:  Cough  Symptom onset:  More than a month  Symptoms include:  Intermittently Productive Cough - Green Phelgm to Pale yellow  Symptom intensity:  Moderate  Symptom progression:  Improving  Had these symptoms before:  No  What makes it worse:  None  What makes it better:  Mucenix intermittently - seems to help thin it out per pt   He is taking medications regularly.         Video visit with patient for the above symptoms      Review of Systems  Constitutional, HEENT, cardiovascular, pulmonary, gi and gu systems are negative, except as otherwise noted.      Objective           Vitals:  No vitals were obtained today due to virtual visit.    Physical Exam   GENERAL: alert and no distress  EYES: Eyes grossly normal to inspection.  No discharge or erythema, or obvious scleral/conjunctival abnormalities.  RESP: No audible wheeze, cough, or visible cyanosis.    SKIN: Visible skin clear. No significant rash, abnormal pigmentation or lesions.  NEURO: Cranial nerves grossly intact.  Mentation and " speech appropriate for age.  PSYCH: Appropriate affect, tone, and pace of words    1046      Video-Visit Details    Type of service:  Video Visit   Video End Time: 1046  Originating Location (pt. Location): Home    Distant Location (provider location):  Off-site  Platform used for Video Visit: [employer]  Signed Electronically by: Julieta Gnadhi MD

## 2025-02-05 ENCOUNTER — MYC MEDICAL ADVICE (OUTPATIENT)
Dept: CARDIOLOGY | Facility: CLINIC | Age: 89
End: 2025-02-05
Payer: COMMERCIAL

## 2025-02-05 DIAGNOSIS — I35.0 NONRHEUMATIC AORTIC VALVE STENOSIS: ICD-10-CM

## 2025-02-05 DIAGNOSIS — I35.0 SEVERE AORTIC STENOSIS: ICD-10-CM

## 2025-02-05 DIAGNOSIS — Z95.2 S/P TAVR (TRANSCATHETER AORTIC VALVE REPLACEMENT): ICD-10-CM

## 2025-02-05 DIAGNOSIS — E78.5 HYPERLIPIDEMIA LDL GOAL <70: ICD-10-CM

## 2025-02-05 DIAGNOSIS — I71.43 INFRARENAL ABDOMINAL AORTIC ANEURYSM (AAA) WITHOUT RUPTURE: ICD-10-CM

## 2025-02-05 DIAGNOSIS — I25.10 CORONARY ARTERY DISEASE INVOLVING NATIVE CORONARY ARTERY OF NATIVE HEART WITHOUT ANGINA PECTORIS: ICD-10-CM

## 2025-02-05 DIAGNOSIS — I10 ESSENTIAL HYPERTENSION WITH GOAL BLOOD PRESSURE LESS THAN 130/80: ICD-10-CM

## 2025-02-05 NOTE — TELEPHONE ENCOUNTER
Refill request received for Brilinta via [employer]. Per cardiology notes, pt to continue with P2Y12 inhibitor monotherapy indefinitely (see 9/14/2023 visit notes). Refill sent, pt notified via [employer].

## 2025-02-20 DIAGNOSIS — I10 ESSENTIAL HYPERTENSION WITH GOAL BLOOD PRESSURE LESS THAN 130/80: ICD-10-CM

## 2025-02-26 RX ORDER — ISOSORBIDE MONONITRATE 60 MG/1
60 TABLET, EXTENDED RELEASE ORAL EVERY MORNING
Qty: 90 TABLET | Refills: 3 | Status: SHIPPED | OUTPATIENT
Start: 2025-02-26

## 2025-04-01 DIAGNOSIS — I35.0 NONRHEUMATIC AORTIC VALVE STENOSIS: ICD-10-CM

## 2025-04-01 DIAGNOSIS — Z95.2 S/P TAVR (TRANSCATHETER AORTIC VALVE REPLACEMENT): ICD-10-CM

## 2025-04-01 DIAGNOSIS — E78.5 HYPERLIPIDEMIA LDL GOAL <70: ICD-10-CM

## 2025-04-01 DIAGNOSIS — I35.0 SEVERE AORTIC STENOSIS: ICD-10-CM

## 2025-04-01 DIAGNOSIS — I10 ESSENTIAL HYPERTENSION WITH GOAL BLOOD PRESSURE LESS THAN 130/80: ICD-10-CM

## 2025-04-01 DIAGNOSIS — I71.43 INFRARENAL ABDOMINAL AORTIC ANEURYSM (AAA) WITHOUT RUPTURE: ICD-10-CM

## 2025-04-01 DIAGNOSIS — I25.10 CORONARY ARTERY DISEASE INVOLVING NATIVE CORONARY ARTERY OF NATIVE HEART WITHOUT ANGINA PECTORIS: ICD-10-CM

## 2025-04-07 RX ORDER — AMLODIPINE BESYLATE 5 MG/1
10 TABLET ORAL DAILY
Qty: 180 TABLET | Refills: 2 | Status: SHIPPED | OUTPATIENT
Start: 2025-04-07

## 2025-04-07 NOTE — TELEPHONE ENCOUNTER
Stable GFR from Sept. 2024. Refill sent.       Requested Prescriptions   Pending Prescriptions Disp Refills    amLODIPine (NORVASC) 5 MG tablet 90 tablet 3     Sig: Take 2 tablets (10 mg) by mouth daily.       Calcium Channel Blockers Protocol  Failed - 4/7/2025  9:00 AM        Failed - GFR is on file in the past 12 months and most recent GFR is normal        Passed - Most recent blood pressure under 140/90 in past 12 months     BP Readings from Last 3 Encounters:   11/04/24 129/80   09/17/24 (!) 156/68   12/07/23 110/62       No data recorded            Passed - Medication is active on med list and the sig matches. RN to manually verify dose and sig if red X/fail.     If the protocol passes (green check), you do not need to verify med dose and sig.    A prescription matches if they are the same clinical intention.    For Example: once daily and every morning are the same.    The protocol can not identify upper and lower case letters as matching and will fail.     For Example: Take 1 tablet (50 mg) by mouth daily     TAKE 1 TABLET (50 MG) BY MOUTH DAILY    For all fails (red x), verify dose and sig.    If the refill does match what is on file, the RN can still proceed to approve the refill request.       If they do not match, route to the appropriate provider.             Passed - Medication is indicated for associated diagnosis        Passed - Recent (12 mo) or future (90 days) visit within the authorizing provider's specialty     The patient must have completed an in-person or virtual visit within the past 12 months or has a future visit scheduled within the next 90 days with the authorizing provider s specialty.  Urgent care and e-visits do not qualify as an office visit for this protocol.          Passed - Patient is age 18 or older

## (undated) DEVICE — CATH ANGIO INFINITI IM 4FRX100CM 538460

## (undated) DEVICE — DRAPE IOBAN INCISE 23X17" 6650EZ

## (undated) DEVICE — RAD CLOSURE ANGIOSEAL 8FR  610131

## (undated) DEVICE — PACK HEART LEFT CUSTOM

## (undated) DEVICE — SYR 50ML LL W/O NDL 309653

## (undated) DEVICE — CATH EP PACEL 5FRX110CM 1MM RIGHT HEART CURVE 401763

## (undated) DEVICE — GUIDEWIRE VASC 0.014INX180CM RUNTHROUGH 25-1011

## (undated) DEVICE — WIRE GUIDE 0.035"X150CM EMERALD J TIP 502521

## (undated) DEVICE — Device

## (undated) DEVICE — GOWN XLG DISP 9545

## (undated) DEVICE — MANIFOLD KIT ANGIO AUTOMATED 014613

## (undated) DEVICE — CATH US OD 5FR OD SEC 2.9FR EAGLE EYE PLATINUM 0.014 85900P

## (undated) DEVICE — CATH ANGIO INFINITI JL4 4FRX100CM 538420

## (undated) DEVICE — INTRO SHEATH 6FRX25CM PINNACLE RSS606

## (undated) DEVICE — GLOVE BIOGEL PI ULTRATOUCH G SZ 7.0 42170

## (undated) DEVICE — WIRE GUIDE 0.035"X150CM EMERALD STR 502542

## (undated) DEVICE — PREP CHLORAPREP 26ML TINTED HI-LITE ORANGE 930815

## (undated) DEVICE — KIT HAND CONTROL ACIST 014644 AR-P54

## (undated) DEVICE — INTRO SHEATH AVANTI 4FRX23CM 504604T

## (undated) DEVICE — INTRO SHEATH 7FRX25CM PINNACLE RSS706

## (undated) DEVICE — LIGHT HANDLE X1 31140133

## (undated) DEVICE — SPONGE RAY-TEC 4X8" 7318

## (undated) DEVICE — SYSTEM DELIVERY MECHANISM COMMANDER 29MM

## (undated) DEVICE — DRSG PRIMAPORE 02X3" 7133

## (undated) DEVICE — CATH ANGIO INFINITI JL5 4FRX100CM 538422

## (undated) DEVICE — BOWL STERILE 32OZ DYND50320

## (undated) DEVICE — CATH MICRO RESERVE SYSTEM ULTRA THIN 14667

## (undated) DEVICE — ESU GROUND PAD ADULT W/CORD E7507

## (undated) DEVICE — 0.035IN X 260CM, EMERALD DIAGNOSTIC GUIDEWIRE, FIXED-CORE PTFE COATED, STANDARD, 3MM EXCHANGE J-TIP (EA/1)

## (undated) DEVICE — VALVE HEMOSTASIS .096" COPILOT MECH 1003331

## (undated) DEVICE — CATH ANGIO INFINITI 3DRC 4FRX100CM 538476

## (undated) DEVICE — CLOSURE ANGIOSEAL 6FR 610130

## (undated) DEVICE — SOL WATER IRRIG 1000ML BOTTLE 07139-09

## (undated) DEVICE — DRSG TELFA 2X3"

## (undated) DEVICE — GW VASC .035IN DIA 260CML 7CML 3 MM RADIUS J CURVE 502455

## (undated) DEVICE — GUIDEWIRE ASAHI SION BLUE 0.014"X180CM J-TIP AHW14R004J

## (undated) DEVICE — CATH ANGIO INFINITI JR4 4FRX100CM 538421

## (undated) DEVICE — DRAPE STERI FLUOROSCOPE 35X43"1012 LATEX FREE

## (undated) DEVICE — TUBING PRESSURE 30"

## (undated) DEVICE — KIT ACCESSORY INTRO INFLATION SYS 20/30 PRIORITY 1000186-115

## (undated) DEVICE — LINEN TOWEL PACK X30 5481

## (undated) DEVICE — CATH ANGIO INFINITI AL1 4FRX100CM 538445

## (undated) DEVICE — SOL WATER INJ 2000ML BAG 07118-07

## (undated) DEVICE — ENDO SEAL BX PORT BPS-A

## (undated) DEVICE — CATH ANGIO INFINITI PIGTAIL 6FRX110CM 6 SH 534650S

## (undated) DEVICE — CATH BALLOON NC EMERGE 4.00X12MM H7493926712400

## (undated) DEVICE — DEFIB PADPRO CONMED ADULT/CHILD 2001Z-C

## (undated) DEVICE — CATH ANGIO SUPERTORQUE AL1 6FRX100CM 532-645

## (undated) DEVICE — SYR 30ML LL W/O NDL

## (undated) DEVICE — PACK GOWN 3/PK DISP XL SBA32GPFCB

## (undated) DEVICE — CATH GUIDING BLUE YELLOW PTFE JR4 6FRX100CM 67008200

## (undated) DEVICE — SPECIMEN CONTAINER 4OZ

## (undated) DEVICE — GUIDEWIRE VASC SAFARI2 0.035X275CM H74939406XS1

## (undated) DEVICE — KIT HAND CONTROL ANGIOTOUCH ACIST 65CM AT-P65

## (undated) DEVICE — WIRE GUIDE LUNDERQUIST 0.035"X260CM DBL CVD

## (undated) DEVICE — DRAPE CV SPLIT TIBURON 87"X136.5" 9155

## (undated) DEVICE — CATH GUIDING BLUE YELLOW PTFE XB4 6FRX100CM 67005600

## (undated) DEVICE — GUIDEWIRE SENSOR DUAL FLEX STR 0.035"X150CM M0066703080

## (undated) DEVICE — CATH ANGIO INFINITI PIGTAIL 145 6 SH 6FRX110CM  534-652S

## (undated) DEVICE — 16FR EDWARDS ESHEATH+ INTRODUCER SET

## (undated) DEVICE — LEFT HEART PK FAIRVIEW UNIV MEDICAL CENTER

## (undated) DEVICE — DRSG TEGADERM 4X4 3/4" 1626W

## (undated) RX ORDER — ONDANSETRON 2 MG/ML
INJECTION INTRAMUSCULAR; INTRAVENOUS
Status: DISPENSED
Start: 2022-08-17

## (undated) RX ORDER — METOPROLOL TARTRATE 50 MG
TABLET ORAL
Status: DISPENSED
Start: 2024-11-04

## (undated) RX ORDER — PROTAMINE SULFATE 10 MG/ML
INJECTION, SOLUTION INTRAVENOUS
Status: DISPENSED
Start: 2022-08-17

## (undated) RX ORDER — HEPARIN SODIUM 1000 [USP'U]/ML
INJECTION, SOLUTION INTRAVENOUS; SUBCUTANEOUS
Status: DISPENSED
Start: 2022-08-17

## (undated) RX ORDER — FENTANYL CITRATE 50 UG/ML
INJECTION, SOLUTION INTRAMUSCULAR; INTRAVENOUS
Status: DISPENSED
Start: 2023-12-07

## (undated) RX ORDER — LIDOCAINE HYDROCHLORIDE 10 MG/ML
INJECTION, SOLUTION EPIDURAL; INFILTRATION; INTRACAUDAL; PERINEURAL
Status: DISPENSED
Start: 2022-08-17

## (undated) RX ORDER — FENTANYL CITRATE 50 UG/ML
INJECTION, SOLUTION INTRAMUSCULAR; INTRAVENOUS
Status: DISPENSED
Start: 2020-01-20

## (undated) RX ORDER — CEFAZOLIN SODIUM/WATER 2 G/20 ML
SYRINGE (ML) INTRAVENOUS
Status: DISPENSED
Start: 2022-08-17

## (undated) RX ORDER — ASPIRIN 325 MG
TABLET ORAL
Status: DISPENSED
Start: 2022-08-17

## (undated) RX ORDER — PROPOFOL 10 MG/ML
INJECTION, EMULSION INTRAVENOUS
Status: DISPENSED
Start: 2023-11-09

## (undated) RX ORDER — SODIUM CHLORIDE 9 MG/ML
INJECTION, SOLUTION INTRAVENOUS
Status: DISPENSED
Start: 2020-01-20

## (undated) RX ORDER — BUPIVACAINE HYDROCHLORIDE 5 MG/ML
INJECTION, SOLUTION EPIDURAL; INTRACAUDAL
Status: DISPENSED
Start: 2022-08-17

## (undated) RX ORDER — NITROGLYCERIN 5 MG/ML
VIAL (ML) INTRAVENOUS
Status: DISPENSED
Start: 2021-06-28

## (undated) RX ORDER — HEPARIN SODIUM 1000 [USP'U]/ML
INJECTION, SOLUTION INTRAVENOUS; SUBCUTANEOUS
Status: DISPENSED
Start: 2021-06-28

## (undated) RX ORDER — OXYCODONE HYDROCHLORIDE 5 MG/1
TABLET ORAL
Status: DISPENSED
Start: 2023-11-09

## (undated) RX ORDER — ACETAMINOPHEN 325 MG/1
TABLET ORAL
Status: DISPENSED
Start: 2023-12-07

## (undated) RX ORDER — NITROGLYCERIN 5 MG/ML
VIAL (ML) INTRAVENOUS
Status: DISPENSED
Start: 2020-01-20

## (undated) RX ORDER — ACETAMINOPHEN 325 MG/1
TABLET ORAL
Status: DISPENSED
Start: 2023-11-09

## (undated) RX ORDER — FENTANYL CITRATE 50 UG/ML
INJECTION, SOLUTION INTRAMUSCULAR; INTRAVENOUS
Status: DISPENSED
Start: 2021-06-28

## (undated) RX ORDER — IVABRADINE 5 MG/1
TABLET, FILM COATED ORAL
Status: DISPENSED
Start: 2024-11-04

## (undated) RX ORDER — CEFAZOLIN SODIUM 1 G/3ML
INJECTION, POWDER, FOR SOLUTION INTRAMUSCULAR; INTRAVENOUS
Status: DISPENSED
Start: 2023-12-07

## (undated) RX ORDER — SODIUM CHLORIDE 9 MG/ML
INJECTION, SOLUTION INTRAVENOUS
Status: DISPENSED
Start: 2021-06-28

## (undated) RX ORDER — METOPROLOL TARTRATE 1 MG/ML
INJECTION, SOLUTION INTRAVENOUS
Status: DISPENSED
Start: 2024-11-04

## (undated) RX ORDER — HYDRALAZINE HYDROCHLORIDE 20 MG/ML
INJECTION INTRAMUSCULAR; INTRAVENOUS
Status: DISPENSED
Start: 2020-01-20

## (undated) RX ORDER — ASPIRIN 81 MG/1
TABLET, CHEWABLE ORAL
Status: DISPENSED
Start: 2021-06-28

## (undated) RX ORDER — FENTANYL CITRATE 50 UG/ML
INJECTION, SOLUTION INTRAMUSCULAR; INTRAVENOUS
Status: DISPENSED
Start: 2023-11-09

## (undated) RX ORDER — NITROGLYCERIN 0.4 MG/1
TABLET SUBLINGUAL
Status: DISPENSED
Start: 2024-11-04

## (undated) RX ORDER — CEFAZOLIN SODIUM 2 G/50ML
SOLUTION INTRAVENOUS
Status: DISPENSED
Start: 2023-11-09

## (undated) RX ORDER — NICARDIPINE HCL-0.9% SOD CHLOR 1 MG/10 ML
SYRINGE (ML) INTRAVENOUS
Status: DISPENSED
Start: 2021-06-28

## (undated) RX ORDER — FENTANYL CITRATE 50 UG/ML
INJECTION, SOLUTION INTRAMUSCULAR; INTRAVENOUS
Status: DISPENSED
Start: 2022-08-17

## (undated) RX ORDER — HEPARIN SODIUM 1000 [USP'U]/ML
INJECTION, SOLUTION INTRAVENOUS; SUBCUTANEOUS
Status: DISPENSED
Start: 2020-01-20

## (undated) RX ORDER — ADENOSINE 3 MG/ML
INJECTION, SOLUTION INTRAVENOUS
Status: DISPENSED
Start: 2020-01-20

## (undated) RX ORDER — SODIUM CHLORIDE 9 MG/ML
INJECTION, SOLUTION INTRAVENOUS
Status: DISPENSED
Start: 2022-08-17